# Patient Record
Sex: FEMALE | Race: WHITE | NOT HISPANIC OR LATINO | Employment: FULL TIME | URBAN - METROPOLITAN AREA
[De-identification: names, ages, dates, MRNs, and addresses within clinical notes are randomized per-mention and may not be internally consistent; named-entity substitution may affect disease eponyms.]

---

## 2017-01-12 ENCOUNTER — TELEPHONE (OUTPATIENT)
Dept: TRANSPLANT | Facility: CLINIC | Age: 52
End: 2017-01-12

## 2017-01-12 NOTE — TELEPHONE ENCOUNTER
Spoke with patient in regards to status update. Chart received in eval  Phase on 1/11/17. Patient informed that we have to process her chart and will review it to see if everything was received. Patient informed that if eval is complete, she may be presented to committee within the next few weeks. Patient verbalized understanding.

## 2017-01-12 NOTE — TELEPHONE ENCOUNTER
----- Message from Ethel Cevallos sent at 1/12/2017 11:09 AM CST -----  Contact: pt   ..Patient calling to speak with coordinator about immunization list, please call

## 2017-01-24 ENCOUNTER — TELEPHONE (OUTPATIENT)
Dept: TRANSPLANT | Facility: CLINIC | Age: 52
End: 2017-01-24

## 2017-01-24 NOTE — TELEPHONE ENCOUNTER
Patient will be presented to transplant selection committee on 2/3/17. Patient has [employer] primary, will need dental clearance. Patient stated that she has an appointment on tomorrow. Patient informed and verbalized understanding.

## 2017-01-24 NOTE — TELEPHONE ENCOUNTER
----- Message from Elie Langford RN sent at 1/24/2017  8:22 AM CST -----  Contact: pt      ----- Message -----     From: Evan Williamson     Sent: 1/23/2017   4:15 PM       To: [facility] Pre-Kidney Transplant Clinical    Pt is requesting to speak with you please return call at

## 2017-01-30 ENCOUNTER — TELEPHONE (OUTPATIENT)
Dept: TRANSPLANT | Facility: CLINIC | Age: 52
End: 2017-01-30

## 2017-01-30 NOTE — TELEPHONE ENCOUNTER
Noted colonoscopy was due to bew repeated 6/2016 according to report from 6/2015. I spoke with pt who states she was told 3 years. She will clarify with gastro and get an addendum to the note for 3 years if that is the case. If it does need to repeated at 1 year it is overdue and she will get it scheduled.

## 2017-02-02 ENCOUNTER — TELEPHONE (OUTPATIENT)
Dept: TRANSPLANT | Facility: CLINIC | Age: 52
End: 2017-02-02

## 2017-02-02 NOTE — TELEPHONE ENCOUNTER
----- Message from Gifty Humphrey sent at 2/1/2017  2:57 PM CST -----  Contact: pt   Patient calling to update elvia with some information. Please call

## 2017-02-02 NOTE — TELEPHONE ENCOUNTER
Patient stated that she's she spoken to her GI doctor who stated that she will need to repeat her colonoscopy.   she will schedule her appointment for a follow up.

## 2017-02-03 ENCOUNTER — COMMITTEE REVIEW (OUTPATIENT)
Dept: TRANSPLANT | Facility: CLINIC | Age: 52
End: 2017-02-03

## 2017-02-03 ENCOUNTER — TELEPHONE (OUTPATIENT)
Dept: TRANSPLANT | Facility: CLINIC | Age: 52
End: 2017-02-03

## 2017-02-03 NOTE — TELEPHONE ENCOUNTER
"I spoke with pt to clarify her allergy to Amphotericin B and why it was used and why she had a bone marrow biopsy. As far as Ampho B she states she was hospitalized at [facility] before her 1st transplant and "was having the "fidgets." She and her mother equate the use of Ampho B to that, NOT a fungal infection. She is sure she did not have a fungal infection. These records would have been at [facility] pre [facility] and therefore no longer available. She states she had the bone marrow biopsy because she was anemic and was bruising easily. She was told the biopsy was normal and she did not need hematology follow up. The biopsy was done  12/16/15 NOT 12/16/16 as listed on work up.  "

## 2017-02-03 NOTE — COMMITTEE REVIEW
Native Organ Dx: Polycystic Kidneys      SELECTION COMMITTEE NOTE    Apurva Rodriguez was presented at selection committee on 2/3/2017.  Patient met selection criteria for kidney transplant related to ESRD due to  Polycystic Kidneys.  No absolute contraindications to transplant at this time.  Patient will be placed on the cadaveric wait list pending clarification about treatment with Ampho B (which is listed in alleriges),clarification of why she had her bone marrow biopsy , and colonoscopy.She will also need final financial approval from insurance company.  Patient will return to clinic for routine appointment in 1 year(s).    I spoke with patient and she is not clear on why she got the Ampho B. She will speak with her mom whom she states has an excellent memory. She will also clarify the Bone marrow biopsy. She is to call when her colonoscopy is scheduled. She has requested the prior auth for that.        Note written by  Shruthi Jalloh RN    ===============================================      I was present at the meeting and attest to the decision of the committee.    Dakotah Cho  02/03/2017

## 2017-02-03 NOTE — LETTER
February 3, 2017    LUDA Raygoza  [address] [facility]  [id number]  [place] [address]         Dear Dr. LUDA Raygoza    Patient: Apurva Rodriguez   MR Number: [id number]   YOB: 1965     Your patient, Apurva Rodriguez, was recently discussed at the [facility] Selection Committee.  I am happy to inform you that Apurva has been approved for transplantation pending colonoscopy, clarification of why she received Amphotericin B and why she had a bone marrow biopsy.  She has met selection criteria for a kidney transplant related to ESRD secondary to primary diagnosis of Polycystic Kidneys. Your patient will be placed on the cadaveric wait list pending final financial approval from insurance company.     We appreciate your confidence in allowing us to participate in your patients care.      If you have any questions or concerns, please do not hesitate to contact me.    Sincerely,      Shreya Hodges MD  Medical Director, Kidney & Kidney/Pancreas Transplantation    Cc:  Apurva Rodriguez/OUMAR DE LA CRUZ

## 2017-04-12 ENCOUNTER — TELEPHONE (OUTPATIENT)
Dept: TRANSPLANT | Facility: CLINIC | Age: 52
End: 2017-04-12

## 2017-04-12 NOTE — TELEPHONE ENCOUNTER
----- Message from Ethel Cevallos sent at 4/12/2017 11:21 AM CDT -----  Contact: pt   Please call, [phone]

## 2017-04-12 NOTE — TELEPHONE ENCOUNTER
Call returned. Packet sent for patient to be added on UNOS waitlist. Patient verbalized understanding.

## 2017-04-18 DIAGNOSIS — Z76.82 ORGAN TRANSPLANT CANDIDATE: Primary | ICD-10-CM

## 2017-04-18 DIAGNOSIS — Z76.82 AWAITING ORGAN TRANSPLANT STATUS: Primary | ICD-10-CM

## 2017-05-02 ENCOUNTER — DOCUMENTATION ONLY (OUTPATIENT)
Dept: TRANSFUSION MEDICINE | Facility: HOSPITAL | Age: 52
End: 2017-05-02

## 2017-05-02 ENCOUNTER — LAB VISIT (OUTPATIENT)
Dept: LAB | Facility: HOSPITAL | Age: 52
End: 2017-05-02
Payer: COMMERCIAL

## 2017-05-02 DIAGNOSIS — Z76.82 AWAITING ORGAN TRANSPLANT STATUS: ICD-10-CM

## 2017-05-02 DIAGNOSIS — Z76.82 AWAITING ORGAN TRANSPLANT STATUS: Primary | ICD-10-CM

## 2017-05-02 LAB
B CELL RESULTS - XM ALLO: POSITIVE
B CELL RESULTS - XM ALLO: POSITIVE
B MCS AVERAGE - XM ALLO: 106.75
B MCS AVERAGE - XM ALLO: 98
DONOR MRN: NORMAL
DONOR MRN: NORMAL
FXMAL TESTING DATE: NORMAL
FXMAL TESTING DATE: NORMAL
HLA FLOW CROSSMATCH (ALLO) INTERPRETATION: NORMAL
SERUM COLLECTION DT - XM ALLO: NORMAL
SERUM COLLECTION DT - XM ALLO: NORMAL
T CELL RESULTS - XM ALLO: NEGATIVE
T CELL RESULTS - XM ALLO: NEGATIVE
T MCS AVERAGE - XM ALLO: -7
T MCS AVERAGE - XM ALLO: -9.5

## 2017-05-02 PROCEDURE — 86826 HLA X-MATCH NONCYTOTOXC ADDL: CPT | Mod: PO,TXP

## 2017-05-02 PROCEDURE — 86825 HLA X-MATH NON-CYTOTOXIC: CPT | Mod: PO,TXP

## 2017-05-02 PROCEDURE — 86825 HLA X-MATH NON-CYTOTOXIC: CPT | Mod: 91,PO,TXP

## 2017-05-02 PROCEDURE — 86826 HLA X-MATCH NONCYTOTOXC ADDL: CPT | Mod: 91,PO,TXP

## 2017-05-02 NOTE — PROGRESS NOTES
[facility] TRANSFUSION MEDICINE  Section of Transfusion Medicine and Histocompatibility  HLA Note    Virtual Crossmatch Result Report     Patient has weak Donor Specific Antibodies (DSA) to Donor [id number]: DP2 (1645), DQA1*03:03 (1594).    Virtual crossmatch results reported by ISAAC at 05/02/2017    Please call the HLA Lab [phone] with any concerns or questions.    MD KAITLYN Duran MD, KARIN  Section of Transfusion Medicine & Histocompatibility  Department of Pathology and Laboratory Medicine  [facility]  05/02/2017

## 2017-05-08 ENCOUNTER — TELEPHONE (OUTPATIENT)
Dept: TRANSPLANT | Facility: CLINIC | Age: 52
End: 2017-05-08

## 2017-05-08 NOTE — TELEPHONE ENCOUNTER
ON-CALL NOTE    UNOS# [id number]    Notified by Serge Cisneros, , that Apurva Rodriguez on list for cadaveric kidney.  Spoke with patient and identified no acute medical issues in telephone assessment.  Current sample of blood is available for crossmatch.  Patient reports no sensitizing event since last blood sample for PRA received.    Notified by Serge Cisneros that crossmatch is positive .  Patient notified of test results and verbalized understanding.

## 2017-05-11 ENCOUNTER — LAB VISIT (OUTPATIENT)
Dept: LAB | Facility: HOSPITAL | Age: 52
End: 2017-05-11
Payer: COMMERCIAL

## 2017-05-11 DIAGNOSIS — Z76.82 AWAITING ORGAN TRANSPLANT STATUS: ICD-10-CM

## 2017-06-05 ENCOUNTER — CLINICAL SUPPORT (OUTPATIENT)
Dept: INFECTIOUS DISEASES | Facility: CLINIC | Age: 52
End: 2017-06-05
Payer: MEDICARE

## 2017-06-05 DIAGNOSIS — Z76.82 ORGAN TRANSPLANT CANDIDATE: ICD-10-CM

## 2017-06-05 PROCEDURE — 99999 PR PBB SHADOW E&M-EST. PATIENT-LVL II: CPT | Mod: PBBFAC,TXP,,

## 2017-06-05 PROCEDURE — 90632 HEPA VACCINE ADULT IM: CPT | Mod: TXP,S$GLB,, | Performed by: INTERNAL MEDICINE

## 2017-06-05 PROCEDURE — 99212 OFFICE O/P EST SF 10 MIN: CPT | Mod: PBBFAC,TXP

## 2017-06-05 PROCEDURE — 90471 IMMUNIZATION ADMIN: CPT | Mod: TXP,S$GLB,, | Performed by: INTERNAL MEDICINE

## 2017-06-26 ENCOUNTER — LAB VISIT (OUTPATIENT)
Dept: LAB | Facility: HOSPITAL | Age: 52
End: 2017-06-26
Payer: COMMERCIAL

## 2017-06-26 DIAGNOSIS — Z76.82 AWAITING ORGAN TRANSPLANT STATUS: ICD-10-CM

## 2017-06-26 PROCEDURE — 86833 HLA CLASS II HIGH DEFIN QUAL: CPT | Mod: PO,TXP

## 2017-06-26 PROCEDURE — 86829 HLA CLASS I/II ANTIBODY QUAL: CPT | Mod: 91,PO,TXP

## 2017-06-26 PROCEDURE — 86832 HLA CLASS I HIGH DEFIN QUAL: CPT | Mod: PO,TXP

## 2017-06-26 PROCEDURE — 86829 HLA CLASS I/II ANTIBODY QUAL: CPT | Mod: PO,TXP

## 2017-07-07 PROCEDURE — 86829 HLA CLASS I/II ANTIBODY QUAL: CPT | Mod: PO,TXP

## 2017-07-07 PROCEDURE — 86829 HLA CLASS I/II ANTIBODY QUAL: CPT | Mod: 91,PO,TXP

## 2017-07-11 NOTE — Clinical Note
July 11, 2017        Apurva Rodriguez  [address] [facility] [address]         Dear Apurva Rodriguez:    As part of our commitment to share important information with our transplant patients, we want to provide you with the most current kidney and kidney/pancreas transplant outcomes at the [facility] as published bi-annually by the Scientific Registry for Organ Recipients (SRTR).    For kidney transplants performed between 1/1/2014 and 6/30/2016 the 1-year patient and graft survival rates are as follows:   Kidney-Cadaveric Donor Kidney-Living Donor Kidney/Pancreas   Adults [facility] 1-Year Expected 1-Year National 1-Year [facility] 1-Year Expected 1-Year National 1-Year [facility] 1-Year Expected 1-Year National 1-Year   Graft Survival 93.09% 94.31% 93.92% 97.83% 97.54% 97.75% 98.18% 96.73% 96.15%   Patient Survival 96.56% 96.91% 96.57% 100% 98.88% 98.83% 98.18% 97.52% 97.54%         To learn more about transplant outcomes in the [place] you can go to www.srtr.org.     Please call the Kidney Transplant Office at [phone] or [phone] should you have any questions or if:     Your insurance changes in any way   Your address or phone number changes   There are changes in your health   You are in the hospital or Emergency Room for any reason      Sincerely,  Kidney Transplant Team

## 2017-07-11 NOTE — LETTER
IMPORTANT      July 13, 2017    Apurva Rodriguez  [address] [facility] [address]     Re: [id number]    Dear Mr/Mrs Rodriguez,    Thank you for choosing [facility] as your health care provider.  We are committed to assisting you with timely insurance filing and payment of your account.  To protect your liability, updated insurance information must be given to us at the time of service and we should be notified immediately if      · Your insurance benefits/plan changes.   You become eligible for any other benefits   Your current plan/coverage terms.    Also, please bring in a copy of your insurance premium payment if you have one of the following types of insurance:    · Coverage from a longterm plan.  · Coverage from the Affordable Healthcare Act Plan.  · Coverage from a COBRA plan  · Premium paid by the National Kidney Foundation.    To ensure we have the correct insurance (Medical/Pharmacy) on file and to answer any questions regarding your benefits, please call us at [phone] or [phone] and ask to speak to the kidney  indicated below:      Transplant Dept  Kvng Hernandez   Heart   Guillermina Aceves   Kidney (A-K) and Lung  Alanjuan c Rojasrafael    Kidney (L-Z)  Lolis Love   Liver    We look forward to hearing from you soon.    Sincerely,      Transplant Financial Services

## 2017-07-12 LAB — HPRA INTERPRETATION: NORMAL

## 2017-07-13 ENCOUNTER — LAB VISIT (OUTPATIENT)
Dept: LAB | Facility: HOSPITAL | Age: 52
End: 2017-07-13
Payer: COMMERCIAL

## 2017-07-13 DIAGNOSIS — Z76.82 AWAITING ORGAN TRANSPLANT STATUS: ICD-10-CM

## 2017-07-13 PROCEDURE — 86829 HLA CLASS I/II ANTIBODY QUAL: CPT | Mod: PO,TXP

## 2017-07-13 PROCEDURE — 86829 HLA CLASS I/II ANTIBODY QUAL: CPT | Mod: 91,PO,TXP

## 2017-07-13 NOTE — ADDENDUM NOTE
Encounter addended by: Inna Williamson on: 7/13/2017 11:07 AM<BR>    Actions taken: Letter status changed

## 2017-07-25 LAB — HPRA INTERPRETATION: NORMAL

## 2017-07-27 LAB
CLASS I ANTIBODIES - LUMINEX: NEGATIVE
CLASS II ANTIBODIES - LUMINEX: NORMAL
CLASS II ANTIBODY COMMENTS - LUMINEX: NORMAL
CPRA %: 95
SERUM COLLECTION DT - LUMINEX CLASS I: NORMAL
SERUM COLLECTION DT - LUMINEX CLASS II: NORMAL
SPCL1 TESTING DATE: NORMAL
SPCL2 TESTING DATE: NORMAL
SPCLU TESTING DATE: NORMAL

## 2017-08-01 DIAGNOSIS — Z76.82 ORGAN TRANSPLANT CANDIDATE: ICD-10-CM

## 2017-08-08 ENCOUNTER — LAB VISIT (OUTPATIENT)
Dept: LAB | Facility: HOSPITAL | Age: 52
End: 2017-08-08
Payer: COMMERCIAL

## 2017-08-08 DIAGNOSIS — Z76.82 AWAITING ORGAN TRANSPLANT STATUS: ICD-10-CM

## 2017-08-08 PROCEDURE — 86829 HLA CLASS I/II ANTIBODY QUAL: CPT | Mod: PO,TXP

## 2017-08-08 PROCEDURE — 86829 HLA CLASS I/II ANTIBODY QUAL: CPT | Mod: 91,PO,TXP

## 2017-08-09 LAB — HPRA INTERPRETATION: NORMAL

## 2017-08-21 ENCOUNTER — TELEPHONE (OUTPATIENT)
Dept: TRANSPLANT | Facility: CLINIC | Age: 52
End: 2017-08-21

## 2017-08-23 LAB — HPRA INTERPRETATION: NORMAL

## 2017-10-05 ENCOUNTER — LAB VISIT (OUTPATIENT)
Dept: LAB | Facility: HOSPITAL | Age: 52
End: 2017-10-05
Payer: COMMERCIAL

## 2017-10-05 DIAGNOSIS — Z76.82 AWAITING ORGAN TRANSPLANT STATUS: ICD-10-CM

## 2017-11-06 ENCOUNTER — TELEPHONE (OUTPATIENT)
Dept: TRANSPLANT | Facility: CLINIC | Age: 52
End: 2017-11-06

## 2017-11-06 ENCOUNTER — SURGERY (OUTPATIENT)
Age: 52
End: 2017-11-06

## 2017-11-06 ENCOUNTER — LAB VISIT (OUTPATIENT)
Dept: LAB | Facility: HOSPITAL | Age: 52
End: 2017-11-06
Payer: COMMERCIAL

## 2017-11-06 ENCOUNTER — HOSPITAL ENCOUNTER (INPATIENT)
Facility: HOSPITAL | Age: 52
LOS: 8 days | Discharge: HOME | DRG: 652 | End: 2017-11-14
Attending: TRANSPLANT SURGERY | Admitting: TRANSPLANT SURGERY
Payer: MEDICARE

## 2017-11-06 ENCOUNTER — ANESTHESIA EVENT (OUTPATIENT)
Dept: SURGERY | Facility: HOSPITAL | Age: 52
DRG: 652 | End: 2017-11-06
Payer: MEDICARE

## 2017-11-06 ENCOUNTER — ANESTHESIA (OUTPATIENT)
Dept: SURGERY | Facility: HOSPITAL | Age: 52
DRG: 652 | End: 2017-11-06
Payer: MEDICARE

## 2017-11-06 DIAGNOSIS — Q61.3 POLYCYSTIC KIDNEY DISEASE: ICD-10-CM

## 2017-11-06 DIAGNOSIS — Z01.811 PRE-OP CHEST EXAM: ICD-10-CM

## 2017-11-06 DIAGNOSIS — E83.39 HYPOPHOSPHATEMIA: ICD-10-CM

## 2017-11-06 DIAGNOSIS — N19 RENAL FAILURE: ICD-10-CM

## 2017-11-06 DIAGNOSIS — D70.2 OTHER DRUG-INDUCED NEUTROPENIA: ICD-10-CM

## 2017-11-06 DIAGNOSIS — Z29.89 PROPHYLACTIC IMMUNOTHERAPY: ICD-10-CM

## 2017-11-06 DIAGNOSIS — Z76.82 KIDNEY TRANSPLANT CANDIDATE: ICD-10-CM

## 2017-11-06 DIAGNOSIS — R13.10 DYSPHAGIA, UNSPECIFIED TYPE: ICD-10-CM

## 2017-11-06 DIAGNOSIS — K13.79 UVULAR EDEMA: ICD-10-CM

## 2017-11-06 DIAGNOSIS — E87.20 METABOLIC ACIDOSIS: ICD-10-CM

## 2017-11-06 DIAGNOSIS — N25.81 SECONDARY HYPERPARATHYROIDISM OF RENAL ORIGIN: ICD-10-CM

## 2017-11-06 DIAGNOSIS — Z94.0 S/P KIDNEY TRANSPLANT: ICD-10-CM

## 2017-11-06 DIAGNOSIS — Z94.0 STATUS POST DECEASED-DONOR KIDNEY TRANSPLANTATION: Primary | ICD-10-CM

## 2017-11-06 DIAGNOSIS — I10 ESSENTIAL HYPERTENSION: ICD-10-CM

## 2017-11-06 DIAGNOSIS — Z91.89 AT RISK FOR OPPORTUNISTIC INFECTIONS: ICD-10-CM

## 2017-11-06 DIAGNOSIS — R74.01 TRANSAMINITIS: ICD-10-CM

## 2017-11-06 DIAGNOSIS — Z79.60 LONG-TERM USE OF IMMUNOSUPPRESSANT MEDICATION: ICD-10-CM

## 2017-11-06 DIAGNOSIS — E83.42 HYPOMAGNESEMIA: ICD-10-CM

## 2017-11-06 DIAGNOSIS — Z76.82 KIDNEY TRANSPLANT CANDIDATE: Primary | ICD-10-CM

## 2017-11-06 LAB
25(OH)D3+25(OH)D2 SERPL-MCNC: 24 NG/ML
ABO + RH BLD: NORMAL
ALBUMIN SERPL BCP-MCNC: 3.5 G/DL
ALP SERPL-CCNC: 194 U/L
ALT SERPL W/O P-5'-P-CCNC: 25 U/L
ANION GAP SERPL CALC-SCNC: 13 MMOL/L
APTT BLDCRRT: 24.4 SEC
AST SERPL-CCNC: 20 U/L
B CELL RESULTS - XM ALLO: NEGATIVE
B CELL RESULTS - XM ALLO: NEGATIVE
B MCS AVERAGE - XM ALLO: -9.5
B MCS AVERAGE - XM ALLO: -9.5
BASOPHILS # BLD AUTO: 0.03 K/UL
BASOPHILS NFR BLD: 0.7 %
BILIRUB SERPL-MCNC: 0.6 MG/DL
BLD GP AB SCN CELLS X3 SERPL QL: NORMAL
BUN SERPL-MCNC: 74 MG/DL
CALCIUM SERPL-MCNC: 9 MG/DL
CHLORIDE SERPL-SCNC: 92 MMOL/L
CHOLEST SERPL-MCNC: 186 MG/DL
CHOLEST/HDLC SERPL: 3.3 {RATIO}
CO2 SERPL-SCNC: 24 MMOL/L
CREAT SERPL-MCNC: 6.4 MG/DL
CREAT UR-MCNC: 48 MG/DL
DIFFERENTIAL METHOD: ABNORMAL
DONOR MRN: NORMAL
DONOR MRN: NORMAL
EOSINOPHIL # BLD AUTO: 0.1 K/UL
EOSINOPHIL NFR BLD: 3.1 %
ERYTHROCYTE [DISTWIDTH] IN BLOOD BY AUTOMATED COUNT: 14.3 %
EST. GFR  (AFRICAN AMERICAN): 7.8 ML/MIN/1.73 M^2
EST. GFR  (NON AFRICAN AMERICAN): 6.8 ML/MIN/1.73 M^2
FXMAL TESTING DATE: NORMAL
FXMAL TESTING DATE: NORMAL
GLUCOSE SERPL-MCNC: 89 MG/DL
HCT VFR BLD AUTO: 30 %
HDLC SERPL-MCNC: 56 MG/DL
HDLC SERPL: 30.1 %
HGB BLD-MCNC: 10.1 G/DL
HLA FLOW CROSSMATCH (ALLO) INTERPRETATION: NORMAL
IMM GRANULOCYTES # BLD AUTO: 0.01 K/UL
IMM GRANULOCYTES NFR BLD AUTO: 0.2 %
INR PPP: 0.9
LDH SERPL L TO P-CCNC: 192 U/L
LDLC SERPL CALC-MCNC: 96.4 MG/DL
LYMPHOCYTES # BLD AUTO: 1.8 K/UL
LYMPHOCYTES NFR BLD: 39.8 %
MCH RBC QN AUTO: 32.8 PG
MCHC RBC AUTO-ENTMCNC: 33.7 G/DL
MCV RBC AUTO: 97 FL
MONOCYTES # BLD AUTO: 0.4 K/UL
MONOCYTES NFR BLD: 8.5 %
NEUTROPHILS # BLD AUTO: 2.2 K/UL
NEUTROPHILS NFR BLD: 47.7 %
NONHDLC SERPL-MCNC: 130 MG/DL
NRBC BLD-RTO: 0 /100 WBC
PHOSPHATE SERPL-MCNC: 4.6 MG/DL
PLATELET # BLD AUTO: 135 K/UL
PMV BLD AUTO: 9.3 FL
POTASSIUM SERPL-SCNC: 3.7 MMOL/L
PROT SERPL-MCNC: 7.1 G/DL
PROT UR-MCNC: 61 MG/DL
PROT/CREAT RATIO, UR: 1.27
PROTHROMBIN TIME: 10 SEC
PTH-INTACT SERPL-MCNC: 996 PG/ML
RBC # BLD AUTO: 3.08 M/UL
SERUM COLLECTION DT - XM ALLO: NORMAL
SERUM COLLECTION DT - XM ALLO: NORMAL
SODIUM SERPL-SCNC: 129 MMOL/L
T CELL RESULTS - XM ALLO: NEGATIVE
T CELL RESULTS - XM ALLO: NEGATIVE
T MCS AVERAGE - XM ALLO: -14
T MCS AVERAGE - XM ALLO: -15.5
TRIGL SERPL-MCNC: 168 MG/DL
URATE SERPL-MCNC: 6.6 MG/DL
WBC # BLD AUTO: 4.57 K/UL

## 2017-11-06 PROCEDURE — 83970 ASSAY OF PARATHORMONE: CPT

## 2017-11-06 PROCEDURE — 86825 HLA X-MATH NON-CYTOTOXIC: CPT | Mod: PO,TXP

## 2017-11-06 PROCEDURE — 86826 HLA X-MATCH NONCYTOTOXC ADDL: CPT | Mod: PO,TXP

## 2017-11-06 PROCEDURE — D9220A PRA ANESTHESIA: Mod: ANES,,, | Performed by: ANESTHESIOLOGY

## 2017-11-06 PROCEDURE — 86901 BLOOD TYPING SEROLOGIC RH(D): CPT

## 2017-11-06 PROCEDURE — 87340 HEPATITIS B SURFACE AG IA: CPT

## 2017-11-06 PROCEDURE — 86706 HEP B SURFACE ANTIBODY: CPT

## 2017-11-06 PROCEDURE — 20600001 HC STEP DOWN PRIVATE ROOM: Mod: NTX

## 2017-11-06 PROCEDURE — 86826 HLA X-MATCH NONCYTOTOXC ADDL: CPT | Mod: 91,PO,TXP

## 2017-11-06 PROCEDURE — 36000930 HC OR TIME LEV VII 1ST 15 MIN: Performed by: TRANSPLANT SURGERY

## 2017-11-06 PROCEDURE — 89051 BODY FLUID CELL COUNT: CPT

## 2017-11-06 PROCEDURE — 50605 INSERT URETERAL SUPPORT: CPT | Mod: 51,RT,, | Performed by: TRANSPLANT SURGERY

## 2017-11-06 PROCEDURE — C1751 CATH, INF, PER/CENT/MIDLINE: HCPCS | Mod: NTX | Performed by: NURSE ANESTHETIST, CERTIFIED REGISTERED

## 2017-11-06 PROCEDURE — 50360 RNL ALTRNSPLJ W/O RCP NFRCT: CPT | Mod: RT,,, | Performed by: TRANSPLANT SURGERY

## 2017-11-06 PROCEDURE — 36000931 HC OR TIME LEV VII EA ADD 15 MIN: Performed by: TRANSPLANT SURGERY

## 2017-11-06 PROCEDURE — 80061 LIPID PANEL: CPT

## 2017-11-06 PROCEDURE — D9220A PRA ANESTHESIA: Mod: CRNA,,, | Performed by: NURSE ANESTHETIST, CERTIFIED REGISTERED

## 2017-11-06 PROCEDURE — 36556 INSERT NON-TUNNEL CV CATH: CPT | Mod: 59,,, | Performed by: ANESTHESIOLOGY

## 2017-11-06 PROCEDURE — 36620 INSERTION CATHETER ARTERY: CPT | Mod: 59,,, | Performed by: ANESTHESIOLOGY

## 2017-11-06 PROCEDURE — 86703 HIV-1/HIV-2 1 RESULT ANTBDY: CPT

## 2017-11-06 PROCEDURE — 27800505 HC CATH, RADIAL ARTERY KIT: Mod: NTX | Performed by: NURSE ANESTHETIST, CERTIFIED REGISTERED

## 2017-11-06 PROCEDURE — 25000003 PHARM REV CODE 250: Performed by: NURSE ANESTHETIST, CERTIFIED REGISTERED

## 2017-11-06 PROCEDURE — 37000009 HC ANESTHESIA EA ADD 15 MINS: Performed by: TRANSPLANT SURGERY

## 2017-11-06 PROCEDURE — 86920 COMPATIBILITY TEST SPIN: CPT

## 2017-11-06 PROCEDURE — 86900 BLOOD TYPING SEROLOGIC ABO: CPT

## 2017-11-06 PROCEDURE — 27100025 HC TUBING, SET FLUID WARMER: Mod: NTX | Performed by: NURSE ANESTHETIST, CERTIFIED REGISTERED

## 2017-11-06 PROCEDURE — 63600175 PHARM REV CODE 636 W HCPCS: Mod: NTX | Performed by: STUDENT IN AN ORGANIZED HEALTH CARE EDUCATION/TRAINING PROGRAM

## 2017-11-06 PROCEDURE — 93010 ELECTROCARDIOGRAM REPORT: CPT | Mod: ,,, | Performed by: INTERNAL MEDICINE

## 2017-11-06 PROCEDURE — 87075 CULTR BACTERIA EXCEPT BLOOD: CPT

## 2017-11-06 PROCEDURE — 86825 HLA X-MATH NON-CYTOTOXIC: CPT | Mod: 91,PO,TXP

## 2017-11-06 PROCEDURE — 84550 ASSAY OF BLOOD/URIC ACID: CPT

## 2017-11-06 PROCEDURE — 82306 VITAMIN D 25 HYDROXY: CPT

## 2017-11-06 PROCEDURE — 85610 PROTHROMBIN TIME: CPT

## 2017-11-06 PROCEDURE — 85025 COMPLETE CBC W/AUTO DIFF WBC: CPT

## 2017-11-06 PROCEDURE — 80053 COMPREHEN METABOLIC PANEL: CPT

## 2017-11-06 PROCEDURE — 27200950 HC CAPD SUPPORT: Mod: NTX

## 2017-11-06 PROCEDURE — 63600175 PHARM REV CODE 636 W HCPCS: Performed by: NURSE ANESTHETIST, CERTIFIED REGISTERED

## 2017-11-06 PROCEDURE — 36415 COLL VENOUS BLD VENIPUNCTURE: CPT

## 2017-11-06 PROCEDURE — 82570 ASSAY OF URINE CREATININE: CPT

## 2017-11-06 PROCEDURE — 87205 SMEAR GRAM STAIN: CPT

## 2017-11-06 PROCEDURE — 27201423 OPTIME MED/SURG SUP & DEVICES STERILE SUPPLY: Performed by: TRANSPLANT SURGERY

## 2017-11-06 PROCEDURE — 37000008 HC ANESTHESIA 1ST 15 MINUTES: Performed by: TRANSPLANT SURGERY

## 2017-11-06 PROCEDURE — 71000033 HC RECOVERY, INTIAL HOUR: Performed by: TRANSPLANT SURGERY

## 2017-11-06 PROCEDURE — 87070 CULTURE OTHR SPECIMN AEROBIC: CPT

## 2017-11-06 PROCEDURE — 0TY00Z0 TRANSPLANTATION OF RIGHT KIDNEY, ALLOGENEIC, OPEN APPROACH: ICD-10-PCS | Performed by: TRANSPLANT SURGERY

## 2017-11-06 PROCEDURE — 82962 GLUCOSE BLOOD TEST: CPT | Performed by: TRANSPLANT SURGERY

## 2017-11-06 PROCEDURE — 84100 ASSAY OF PHOSPHORUS: CPT

## 2017-11-06 PROCEDURE — 85730 THROMBOPLASTIN TIME PARTIAL: CPT

## 2017-11-06 PROCEDURE — 71000039 HC RECOVERY, EACH ADD'L HOUR: Performed by: TRANSPLANT SURGERY

## 2017-11-06 PROCEDURE — C2617 STENT, NON-COR, TEM W/O DEL: HCPCS | Performed by: TRANSPLANT SURGERY

## 2017-11-06 PROCEDURE — 83615 LACTATE (LD) (LDH) ENZYME: CPT

## 2017-11-06 DEVICE — STENT DOUBLE J 7FRX12CM: Type: IMPLANTABLE DEVICE | Site: URETER | Status: FUNCTIONAL

## 2017-11-06 RX ORDER — DIPHENHYDRAMINE HYDROCHLORIDE 50 MG/ML
50 INJECTION INTRAMUSCULAR; INTRAVENOUS ONCE
Status: CANCELLED | OUTPATIENT
Start: 2017-11-06

## 2017-11-06 RX ORDER — ACETAMINOPHEN 650 MG/20.3ML
650 LIQUID ORAL EVERY 4 HOURS PRN
Status: COMPLETED | OUTPATIENT
Start: 2017-11-06 | End: 2017-11-07

## 2017-11-06 RX ORDER — DIPHENHYDRAMINE HYDROCHLORIDE 50 MG/ML
50 INJECTION INTRAMUSCULAR; INTRAVENOUS EVERY 6 HOURS PRN
Status: COMPLETED | OUTPATIENT
Start: 2017-11-06 | End: 2017-11-07

## 2017-11-06 RX ORDER — MUPIROCIN 20 MG/G
OINTMENT TOPICAL
Status: DISCONTINUED | OUTPATIENT
Start: 2017-11-06 | End: 2017-11-07 | Stop reason: HOSPADM

## 2017-11-06 RX ORDER — HEPARIN SODIUM 5000 [USP'U]/ML
5000 INJECTION, SOLUTION INTRAVENOUS; SUBCUTANEOUS ONCE
Status: COMPLETED | OUTPATIENT
Start: 2017-11-06 | End: 2017-11-06

## 2017-11-06 RX ORDER — ACETAMINOPHEN 650 MG/20.3ML
650 LIQUID ORAL EVERY 4 HOURS PRN
Status: CANCELLED | OUTPATIENT
Start: 2017-11-06

## 2017-11-06 RX ADMIN — PROPOFOL 100 MG: 10 INJECTION, EMULSION INTRAVENOUS at 11:11

## 2017-11-06 RX ADMIN — FENTANYL CITRATE 100 MCG: 50 INJECTION, SOLUTION INTRAMUSCULAR; INTRAVENOUS at 11:11

## 2017-11-06 RX ADMIN — ROCURONIUM BROMIDE 40 MG: 10 INJECTION, SOLUTION INTRAVENOUS at 11:11

## 2017-11-06 RX ADMIN — SODIUM CHLORIDE: 0.9 INJECTION, SOLUTION INTRAVENOUS at 11:11

## 2017-11-06 RX ADMIN — HEPARIN SODIUM 5000 UNITS: 5000 INJECTION, SOLUTION INTRAVENOUS; SUBCUTANEOUS at 10:11

## 2017-11-06 RX ADMIN — LIDOCAINE HYDROCHLORIDE 50 MG: 10 INJECTION, SOLUTION INTRAVENOUS at 11:11

## 2017-11-06 RX ADMIN — MIDAZOLAM HYDROCHLORIDE 2 MG: 1 INJECTION, SOLUTION INTRAMUSCULAR; INTRAVENOUS at 11:11

## 2017-11-06 NOTE — TELEPHONE ENCOUNTER
ON-CALL NOTE    UNOS# [id number]    Notified by Serge Cisneros, , that Apurva Rodriguez on list for cadaveric kidney.  Spoke with patient and identified no acute medical issues in telephone assessment.  Current sample of blood is available for crossmatch.  Patient reports no sensitizing event since last blood sample for PRA received.  Pt informed that this offer is PHS high risk due to sample hemodilution, pt accepts offer.    Report given to Desiree Valente RN, and Larry Arias RN.

## 2017-11-06 NOTE — TELEPHONE ENCOUNTER
SW spoke with pt regarding her caregiver plan. Pt reports that her caregiver plan in unchanged and is as follows:    PRIMARY CAREGIVER: Lawrence Perdomo will be primary caregiver, phone number [phone].       provided in-depth information to patient and caregiver regarding pre- and post-transplant caregiver role.   strongly encourages patient and caregiver to have concrete plan regarding post-transplant care giving, including back-up caregiver(s) to ensure care giving needs are met as needed.     Patient and Caregiver states understanding all aspects of caregiver role/commitment and is able/willing/committed to being caregiver to the fullest extent necessary. Wife was with pt when she had her first transplant (at [facility]) 20 years ago.       Patient and Caregiver verbalizes understanding of the education provided today and caregiver responsibilities.          remains available. Patient and Caregiver agree to contact  in a timely manner if concerns arise.       Able to take time off work without financial concerns: Pt's wife states she has plenty of time off, mother is retired and sister is able to take time off as well.  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      Additional Significant Others who will Assist with Transplant:  Name: Lory Rodriguez [phone]  Age: 72  City: [place] State: [place]  Relationship: mother  Does person drive? yes     Name: Fior Duarte [phone]  Age: 53  City: [place] State: [place]  Relationship: sister  Does person drive? Yes      Pt also reports that he [employer] is primary and her Medicare is secondary and reports no concerns with affording post transplant costs. FREDERIC remains available to pt, pt's family, and transplant team at [phone].

## 2017-11-07 ENCOUNTER — TELEPHONE (OUTPATIENT)
Dept: TRANSPLANT | Facility: CLINIC | Age: 52
End: 2017-11-07

## 2017-11-07 PROBLEM — Z94.0 S/P KIDNEY TRANSPLANT: Status: ACTIVE | Noted: 2017-11-07

## 2017-11-07 PROBLEM — N19 RENAL FAILURE: Status: RESOLVED | Noted: 2017-11-06 | Resolved: 2017-11-07

## 2017-11-07 PROBLEM — Z29.89 PROPHYLACTIC IMMUNOTHERAPY: Status: ACTIVE | Noted: 2017-11-07

## 2017-11-07 PROBLEM — Z91.89 AT RISK FOR OPPORTUNISTIC INFECTIONS: Status: ACTIVE | Noted: 2017-11-07

## 2017-11-07 PROBLEM — Z79.60 LONG-TERM USE OF IMMUNOSUPPRESSANT MEDICATION: Status: ACTIVE | Noted: 2017-11-07

## 2017-11-07 LAB
ALBUMIN SERPL BCP-MCNC: 2.2 G/DL
ALBUMIN SERPL BCP-MCNC: 2.3 G/DL
ANION GAP SERPL CALC-SCNC: 11 MMOL/L
ANION GAP SERPL CALC-SCNC: 7 MMOL/L
APPEARANCE FLD: CLEAR
BASOPHILS # BLD AUTO: 0 K/UL
BASOPHILS NFR BLD: 0 %
BODY FLD TYPE: NORMAL
BUN SERPL-MCNC: 55 MG/DL
BUN SERPL-MCNC: 66 MG/DL
CALCIUM SERPL-MCNC: 7.5 MG/DL
CALCIUM SERPL-MCNC: 7.7 MG/DL
CHLORIDE SERPL-SCNC: 106 MMOL/L
CHLORIDE SERPL-SCNC: 108 MMOL/L
CO2 SERPL-SCNC: 18 MMOL/L
CO2 SERPL-SCNC: 21 MMOL/L
COLOR FLD: YELLOW
CREAT SERPL-MCNC: 4.4 MG/DL
CREAT SERPL-MCNC: 5.3 MG/DL
DIFFERENTIAL METHOD: ABNORMAL
EOSINOPHIL # BLD AUTO: 0 K/UL
EOSINOPHIL NFR BLD: 0 %
EOSINOPHIL NFR FLD MANUAL: 1 %
ERYTHROCYTE [DISTWIDTH] IN BLOOD BY AUTOMATED COUNT: 14.6 %
EST. GFR  (AFRICAN AMERICAN): 12.3 ML/MIN/1.73 M^2
EST. GFR  (AFRICAN AMERICAN): 9.8 ML/MIN/1.73 M^2
EST. GFR  (NON AFRICAN AMERICAN): 10.7 ML/MIN/1.73 M^2
EST. GFR  (NON AFRICAN AMERICAN): 8.5 ML/MIN/1.73 M^2
GLUCOSE SERPL-MCNC: 120 MG/DL (ref 70–110)
GLUCOSE SERPL-MCNC: 133 MG/DL
GLUCOSE SERPL-MCNC: 233 MG/DL
GLUCOSE SERPL-MCNC: 82 MG/DL (ref 70–110)
HBV SURFACE AG SERPL QL IA: NEGATIVE
HCO3 UR-SCNC: 17.1 MMOL/L (ref 24–28)
HCO3 UR-SCNC: 21.2 MMOL/L (ref 24–28)
HCT VFR BLD AUTO: 24.6 %
HCT VFR BLD AUTO: 25.7 %
HCT VFR BLD CALC: 23 %PCV (ref 36–54)
HCT VFR BLD CALC: 25 %PCV (ref 36–54)
HGB BLD-MCNC: 8.3 G/DL
HIV 1+2 AB+HIV1 P24 AG SERPL QL IA: NEGATIVE
IMM GRANULOCYTES # BLD AUTO: 0.04 K/UL
IMM GRANULOCYTES NFR BLD AUTO: 0.4 %
LYMPHOCYTES # BLD AUTO: 0 K/UL
LYMPHOCYTES NFR BLD: 0 %
LYMPHOCYTES NFR FLD MANUAL: 14 %
MCH RBC QN AUTO: 32.7 PG
MCHC RBC AUTO-ENTMCNC: 32.3 G/DL
MCV RBC AUTO: 101 FL
MONOCYTES # BLD AUTO: 0.1 K/UL
MONOCYTES NFR BLD: 1.1 %
MONOS+MACROS NFR FLD MANUAL: 82 %
NEUTROPHILS # BLD AUTO: 11.2 K/UL
NEUTROPHILS NFR BLD: 98.5 %
NEUTROPHILS NFR FLD MANUAL: 3 %
NRBC BLD-RTO: 0 /100 WBC
PCO2 BLDA: 27.6 MMHG (ref 35–45)
PCO2 BLDA: 27.8 MMHG (ref 35–45)
PH SMN: 7.4 [PH] (ref 7.35–7.45)
PH SMN: 7.49 [PH] (ref 7.35–7.45)
PHOSPHATE SERPL-MCNC: 3.6 MG/DL
PHOSPHATE SERPL-MCNC: 3.9 MG/DL
PLATELET # BLD AUTO: 92 K/UL
PMV BLD AUTO: 9.2 FL
PO2 BLDA: 119 MMHG (ref 80–100)
PO2 BLDA: 181 MMHG (ref 80–100)
POC BE: -2 MMOL/L
POC BE: -8 MMOL/L
POC IONIZED CALCIUM: 0.98 MMOL/L (ref 1.06–1.42)
POC IONIZED CALCIUM: 1.13 MMOL/L (ref 1.06–1.42)
POC SATURATED O2: 100 % (ref 95–100)
POC SATURATED O2: 99 % (ref 95–100)
POC TCO2: 18 MMOL/L (ref 23–27)
POC TCO2: 22 MMOL/L (ref 23–27)
POCT GLUCOSE: 129 MG/DL (ref 70–110)
POCT GLUCOSE: 130 MG/DL (ref 70–110)
POCT GLUCOSE: 155 MG/DL (ref 70–110)
POCT GLUCOSE: 183 MG/DL (ref 70–110)
POTASSIUM BLD-SCNC: 2.8 MMOL/L (ref 3.5–5.1)
POTASSIUM BLD-SCNC: 3 MMOL/L (ref 3.5–5.1)
POTASSIUM SERPL-SCNC: 3.3 MMOL/L
POTASSIUM SERPL-SCNC: 3.5 MMOL/L
RBC # BLD AUTO: 2.54 M/UL
SAMPLE: ABNORMAL
SAMPLE: ABNORMAL
SODIUM BLD-SCNC: 136 MMOL/L (ref 136–145)
SODIUM BLD-SCNC: 137 MMOL/L (ref 136–145)
SODIUM SERPL-SCNC: 135 MMOL/L
SODIUM SERPL-SCNC: 136 MMOL/L
WBC # BLD AUTO: 11.32 K/UL
WBC # FLD: 11 /CU MM

## 2017-11-07 PROCEDURE — 63600175 PHARM REV CODE 636 W HCPCS: Performed by: TRANSPLANT SURGERY

## 2017-11-07 PROCEDURE — 86833 HLA CLASS II HIGH DEFIN QUAL: CPT | Mod: PO,TXP

## 2017-11-07 PROCEDURE — 25000003 PHARM REV CODE 250: Mod: NTX | Performed by: STUDENT IN AN ORGANIZED HEALTH CARE EDUCATION/TRAINING PROGRAM

## 2017-11-07 PROCEDURE — 85025 COMPLETE CBC W/AUTO DIFF WBC: CPT

## 2017-11-07 PROCEDURE — 25000003 PHARM REV CODE 250: Performed by: NURSE PRACTITIONER

## 2017-11-07 PROCEDURE — 25000003 PHARM REV CODE 250: Performed by: TRANSPLANT SURGERY

## 2017-11-07 PROCEDURE — S5010 5% DEXTROSE AND 0.45% SALINE: HCPCS | Performed by: SURGERY

## 2017-11-07 PROCEDURE — 85014 HEMATOCRIT: CPT

## 2017-11-07 PROCEDURE — 80069 RENAL FUNCTION PANEL: CPT

## 2017-11-07 PROCEDURE — 80069 RENAL FUNCTION PANEL: CPT | Mod: 91

## 2017-11-07 PROCEDURE — 86832 HLA CLASS I HIGH DEFIN QUAL: CPT | Mod: PO,TXP

## 2017-11-07 PROCEDURE — 25000003 PHARM REV CODE 250: Performed by: SURGERY

## 2017-11-07 PROCEDURE — 63600175 PHARM REV CODE 636 W HCPCS: Performed by: SURGERY

## 2017-11-07 PROCEDURE — 36415 COLL VENOUS BLD VENIPUNCTURE: CPT

## 2017-11-07 PROCEDURE — 99223 1ST HOSP IP/OBS HIGH 75: CPT | Mod: ,,, | Performed by: PHYSICIAN ASSISTANT

## 2017-11-07 PROCEDURE — 82960 TEST FOR G6PD ENZYME: CPT

## 2017-11-07 PROCEDURE — 97802 MEDICAL NUTRITION INDIV IN: CPT | Performed by: DIETITIAN, REGISTERED

## 2017-11-07 PROCEDURE — 88300 SURGICAL PATH GROSS: CPT | Performed by: PATHOLOGY

## 2017-11-07 PROCEDURE — 63600175 PHARM REV CODE 636 W HCPCS

## 2017-11-07 PROCEDURE — 63600175 PHARM REV CODE 636 W HCPCS: Performed by: NURSE ANESTHETIST, CERTIFIED REGISTERED

## 2017-11-07 PROCEDURE — 63600175 PHARM REV CODE 636 W HCPCS: Mod: NTX | Performed by: PHYSICIAN ASSISTANT

## 2017-11-07 PROCEDURE — 81300002 HC KIDNEY TRANSPORT, GROUND 4-5 HOURS

## 2017-11-07 PROCEDURE — 63600175 PHARM REV CODE 636 W HCPCS: Mod: NTX | Performed by: STUDENT IN AN ORGANIZED HEALTH CARE EDUCATION/TRAINING PROGRAM

## 2017-11-07 PROCEDURE — 20600001 HC STEP DOWN PRIVATE ROOM

## 2017-11-07 PROCEDURE — 88300 SURGICAL PATH GROSS: CPT | Mod: 26,,, | Performed by: PATHOLOGY

## 2017-11-07 PROCEDURE — 25000003 PHARM REV CODE 250: Performed by: PHYSICIAN ASSISTANT

## 2017-11-07 PROCEDURE — 81200001 HC KIDNEY ACQUISITION - CADAVER

## 2017-11-07 PROCEDURE — 25000003 PHARM REV CODE 250: Performed by: NURSE ANESTHETIST, CERTIFIED REGISTERED

## 2017-11-07 RX ORDER — SULFAMETHOXAZOLE AND TRIMETHOPRIM 400; 80 MG/1; MG/1
1 TABLET ORAL DAILY
Status: DISCONTINUED | OUTPATIENT
Start: 2017-11-08 | End: 2017-11-10

## 2017-11-07 RX ORDER — HYDRALAZINE HYDROCHLORIDE 20 MG/ML
10 INJECTION INTRAMUSCULAR; INTRAVENOUS EVERY 6 HOURS PRN
Status: DISCONTINUED | OUTPATIENT
Start: 2017-11-07 | End: 2017-11-14 | Stop reason: HOSPADM

## 2017-11-07 RX ORDER — CARVEDILOL 6.25 MG/1
6.25 TABLET ORAL 2 TIMES DAILY
Status: DISCONTINUED | OUTPATIENT
Start: 2017-11-07 | End: 2017-11-07

## 2017-11-07 RX ORDER — MYCOPHENOLATE MOFETIL 250 MG/1
500 CAPSULE ORAL 2 TIMES DAILY
Status: DISCONTINUED | OUTPATIENT
Start: 2017-11-07 | End: 2017-11-09

## 2017-11-07 RX ORDER — MIDAZOLAM HYDROCHLORIDE 1 MG/ML
INJECTION, SOLUTION INTRAMUSCULAR; INTRAVENOUS
Status: DISCONTINUED | OUTPATIENT
Start: 2017-11-06 | End: 2017-11-07

## 2017-11-07 RX ORDER — BISACODYL 5 MG
10 TABLET, DELAYED RELEASE (ENTERIC COATED) ORAL EVERY EVENING
Status: DISCONTINUED | OUTPATIENT
Start: 2017-11-07 | End: 2017-11-08

## 2017-11-07 RX ORDER — HEPARIN SODIUM 5000 [USP'U]/ML
5000 INJECTION, SOLUTION INTRAVENOUS; SUBCUTANEOUS EVERY 8 HOURS
Status: DISCONTINUED | OUTPATIENT
Start: 2017-11-07 | End: 2017-11-09

## 2017-11-07 RX ORDER — HYDROMORPHONE HYDROCHLORIDE 2 MG/ML
INJECTION, SOLUTION INTRAMUSCULAR; INTRAVENOUS; SUBCUTANEOUS
Status: DISCONTINUED | OUTPATIENT
Start: 2017-11-07 | End: 2017-11-07

## 2017-11-07 RX ORDER — VERAPAMIL HYDROCHLORIDE 2.5 MG/ML
INJECTION, SOLUTION INTRAVENOUS
Status: DISCONTINUED | OUTPATIENT
Start: 2017-11-07 | End: 2017-11-07 | Stop reason: HOSPADM

## 2017-11-07 RX ORDER — DEXTROSE MONOHYDRATE AND SODIUM CHLORIDE 5; .45 G/100ML; G/100ML
INJECTION, SOLUTION INTRAVENOUS CONTINUOUS
Status: DISCONTINUED | OUTPATIENT
Start: 2017-11-07 | End: 2017-11-08

## 2017-11-07 RX ORDER — INSULIN ASPART 100 [IU]/ML
0-5 INJECTION, SOLUTION INTRAVENOUS; SUBCUTANEOUS
Status: DISCONTINUED | OUTPATIENT
Start: 2017-11-07 | End: 2017-11-14 | Stop reason: HOSPADM

## 2017-11-07 RX ORDER — FUROSEMIDE 10 MG/ML
INJECTION INTRAMUSCULAR; INTRAVENOUS
Status: DISCONTINUED | OUTPATIENT
Start: 2017-11-07 | End: 2017-11-07

## 2017-11-07 RX ORDER — BISACODYL 5 MG
10 TABLET, DELAYED RELEASE (ENTERIC COATED) ORAL EVERY EVENING
Status: DISCONTINUED | OUTPATIENT
Start: 2017-11-07 | End: 2017-11-07

## 2017-11-07 RX ORDER — IBUPROFEN 200 MG
24 TABLET ORAL
Status: DISCONTINUED | OUTPATIENT
Start: 2017-11-07 | End: 2017-11-14 | Stop reason: HOSPADM

## 2017-11-07 RX ORDER — ROCURONIUM BROMIDE 10 MG/ML
INJECTION, SOLUTION INTRAVENOUS
Status: DISCONTINUED | OUTPATIENT
Start: 2017-11-06 | End: 2017-11-07

## 2017-11-07 RX ORDER — MANNITOL 250 MG/ML
INJECTION, SOLUTION INTRAVENOUS
Status: DISCONTINUED | OUTPATIENT
Start: 2017-11-07 | End: 2017-11-07

## 2017-11-07 RX ORDER — HYDROMORPHONE HCL IN 0.9% NACL 6 MG/30 ML
PATIENT CONTROLLED ANALGESIA SYRINGE INTRAVENOUS
Status: COMPLETED
Start: 2017-11-07 | End: 2017-11-07

## 2017-11-07 RX ORDER — ERGOCALCIFEROL 1.25 MG/1
50000 CAPSULE ORAL
Status: DISCONTINUED | OUTPATIENT
Start: 2017-11-08 | End: 2017-11-14 | Stop reason: HOSPADM

## 2017-11-07 RX ORDER — PAPAVERINE HYDROCHLORIDE 30 MG/ML
INJECTION INTRAMUSCULAR; INTRAVENOUS
Status: DISCONTINUED | OUTPATIENT
Start: 2017-11-07 | End: 2017-11-07 | Stop reason: HOSPADM

## 2017-11-07 RX ORDER — CARVEDILOL 3.12 MG/1
3.12 TABLET ORAL 2 TIMES DAILY
Status: DISCONTINUED | OUTPATIENT
Start: 2017-11-07 | End: 2017-11-10

## 2017-11-07 RX ORDER — ONDANSETRON 2 MG/ML
4 INJECTION INTRAMUSCULAR; INTRAVENOUS ONCE AS NEEDED
Status: ACTIVE | OUTPATIENT
Start: 2017-11-07 | End: 2017-11-07

## 2017-11-07 RX ORDER — TACROLIMUS 1 MG/1
2 CAPSULE ORAL 2 TIMES DAILY
Status: DISCONTINUED | OUTPATIENT
Start: 2017-11-07 | End: 2017-11-08

## 2017-11-07 RX ORDER — PHENYLEPHRINE HYDROCHLORIDE 10 MG/ML
INJECTION INTRAVENOUS
Status: DISCONTINUED | OUTPATIENT
Start: 2017-11-07 | End: 2017-11-07

## 2017-11-07 RX ORDER — DOCUSATE SODIUM 100 MG/1
100 CAPSULE, LIQUID FILLED ORAL 3 TIMES DAILY
Status: DISCONTINUED | OUTPATIENT
Start: 2017-11-07 | End: 2017-11-08

## 2017-11-07 RX ORDER — SODIUM CHLORIDE 9 MG/ML
INJECTION, SOLUTION INTRAVENOUS CONTINUOUS PRN
Status: DISCONTINUED | OUTPATIENT
Start: 2017-11-06 | End: 2017-11-07

## 2017-11-07 RX ORDER — CINACALCET 30 MG/1
30 TABLET, FILM COATED ORAL
Status: DISCONTINUED | OUTPATIENT
Start: 2017-11-08 | End: 2017-11-14 | Stop reason: HOSPADM

## 2017-11-07 RX ORDER — GLYCOPYRROLATE 0.2 MG/ML
INJECTION INTRAMUSCULAR; INTRAVENOUS
Status: DISCONTINUED | OUTPATIENT
Start: 2017-11-07 | End: 2017-11-07

## 2017-11-07 RX ORDER — FENTANYL CITRATE 50 UG/ML
INJECTION, SOLUTION INTRAMUSCULAR; INTRAVENOUS
Status: DISCONTINUED | OUTPATIENT
Start: 2017-11-06 | End: 2017-11-07

## 2017-11-07 RX ORDER — NALOXONE HCL 0.4 MG/ML
0.02 VIAL (ML) INJECTION
Status: DISCONTINUED | OUTPATIENT
Start: 2017-11-07 | End: 2017-11-14 | Stop reason: HOSPADM

## 2017-11-07 RX ORDER — METHYLPREDNISOLONE SOD SUCC 125 MG
125 VIAL (EA) INJECTION ONCE
Status: COMPLETED | OUTPATIENT
Start: 2017-11-09 | End: 2017-11-09

## 2017-11-07 RX ORDER — BISACODYL 5 MG
5 TABLET, DELAYED RELEASE (ENTERIC COATED) ORAL NIGHTLY
Status: DISCONTINUED | OUTPATIENT
Start: 2017-11-07 | End: 2017-11-07

## 2017-11-07 RX ORDER — IBUPROFEN 200 MG
16 TABLET ORAL
Status: DISCONTINUED | OUTPATIENT
Start: 2017-11-07 | End: 2017-11-14 | Stop reason: HOSPADM

## 2017-11-07 RX ORDER — HEPARIN SODIUM 10000 [USP'U]/ML
INJECTION, SOLUTION INTRAVENOUS; SUBCUTANEOUS
Status: DISCONTINUED | OUTPATIENT
Start: 2017-11-07 | End: 2017-11-07 | Stop reason: HOSPADM

## 2017-11-07 RX ORDER — NEOSTIGMINE METHYLSULFATE 1 MG/ML
INJECTION, SOLUTION INTRAVENOUS
Status: DISCONTINUED | OUTPATIENT
Start: 2017-11-07 | End: 2017-11-07

## 2017-11-07 RX ORDER — LIDOCAINE HYDROCHLORIDE 10 MG/ML
INJECTION, SOLUTION INTRAVENOUS
Status: DISCONTINUED | OUTPATIENT
Start: 2017-11-06 | End: 2017-11-07

## 2017-11-07 RX ORDER — BISACODYL 5 MG
10 TABLET, DELAYED RELEASE (ENTERIC COATED) ORAL NIGHTLY
Status: DISCONTINUED | OUTPATIENT
Start: 2017-11-07 | End: 2017-11-07

## 2017-11-07 RX ORDER — CEFAZOLIN SODIUM 1 G/3ML
INJECTION, POWDER, FOR SOLUTION INTRAMUSCULAR; INTRAVENOUS
Status: DISCONTINUED | OUTPATIENT
Start: 2017-11-07 | End: 2017-11-07 | Stop reason: HOSPADM

## 2017-11-07 RX ORDER — SODIUM CHLORIDE 9 MG/ML
INJECTION, SOLUTION INTRAVENOUS CONTINUOUS
Status: DISCONTINUED | OUTPATIENT
Start: 2017-11-07 | End: 2017-11-08

## 2017-11-07 RX ORDER — NYSTATIN 100000 [USP'U]/ML
500000 SUSPENSION ORAL
Status: DISCONTINUED | OUTPATIENT
Start: 2017-11-07 | End: 2017-11-14 | Stop reason: HOSPADM

## 2017-11-07 RX ORDER — HYDROMORPHONE HCL IN 0.9% NACL 6 MG/30 ML
PATIENT CONTROLLED ANALGESIA SYRINGE INTRAVENOUS CONTINUOUS
Status: DISCONTINUED | OUTPATIENT
Start: 2017-11-07 | End: 2017-11-08

## 2017-11-07 RX ORDER — FAMOTIDINE 20 MG/1
20 TABLET, FILM COATED ORAL NIGHTLY
Status: DISCONTINUED | OUTPATIENT
Start: 2017-11-07 | End: 2017-11-14 | Stop reason: HOSPADM

## 2017-11-07 RX ORDER — MUPIROCIN 20 MG/G
1 OINTMENT TOPICAL 2 TIMES DAILY
Status: DISPENSED | OUTPATIENT
Start: 2017-11-07 | End: 2017-11-12

## 2017-11-07 RX ORDER — ONDANSETRON 2 MG/ML
INJECTION INTRAMUSCULAR; INTRAVENOUS
Status: DISCONTINUED | OUTPATIENT
Start: 2017-11-07 | End: 2017-11-07

## 2017-11-07 RX ORDER — FAMOTIDINE 20 MG/1
20 TABLET, FILM COATED ORAL NIGHTLY
Status: DISCONTINUED | OUTPATIENT
Start: 2017-11-07 | End: 2017-11-07

## 2017-11-07 RX ORDER — GLUCAGON 1 MG
1 KIT INJECTION CONTINUOUS PRN
Status: DISCONTINUED | OUTPATIENT
Start: 2017-11-07 | End: 2017-11-14 | Stop reason: HOSPADM

## 2017-11-07 RX ORDER — CEFAZOLIN SODIUM 2 G/50ML
2 SOLUTION INTRAVENOUS
Status: COMPLETED | OUTPATIENT
Start: 2017-11-07 | End: 2017-11-07

## 2017-11-07 RX ORDER — PROPOFOL 10 MG/ML
VIAL (ML) INTRAVENOUS
Status: DISCONTINUED | OUTPATIENT
Start: 2017-11-06 | End: 2017-11-07

## 2017-11-07 RX ORDER — AMLODIPINE BESYLATE 10 MG/1
10 TABLET ORAL DAILY
Status: DISCONTINUED | OUTPATIENT
Start: 2017-11-07 | End: 2017-11-07

## 2017-11-07 RX ORDER — VALGANCICLOVIR 450 MG/1
450 TABLET, FILM COATED ORAL EVERY MORNING
Status: DISCONTINUED | OUTPATIENT
Start: 2017-11-07 | End: 2017-11-10

## 2017-11-07 RX ORDER — DIPHENHYDRAMINE HYDROCHLORIDE 50 MG/ML
12.5 INJECTION INTRAMUSCULAR; INTRAVENOUS EVERY 4 HOURS PRN
Status: DISCONTINUED | OUTPATIENT
Start: 2017-11-07 | End: 2017-11-14 | Stop reason: HOSPADM

## 2017-11-07 RX ADMIN — CALCIUM CHLORIDE 100 MG: 100 INJECTION, SOLUTION INTRAVENOUS at 02:11

## 2017-11-07 RX ADMIN — HYDROMORPHONE HYDROCHLORIDE 0.4 MG: 2 INJECTION, SOLUTION INTRAMUSCULAR; INTRAVENOUS; SUBCUTANEOUS at 04:11

## 2017-11-07 RX ADMIN — THERA TABS 1 TABLET: TAB at 09:11

## 2017-11-07 RX ADMIN — BISACODYL 10 MG: 5 TABLET, COATED ORAL at 11:11

## 2017-11-07 RX ADMIN — VERAPAMIL HYDROCHLORIDE 5 MG: 2.5 INJECTION, SOLUTION INTRAVENOUS at 03:11

## 2017-11-07 RX ADMIN — HEPARIN SODIUM 10000 UNITS: 10000 INJECTION INTRAVENOUS; SUBCUTANEOUS at 12:11

## 2017-11-07 RX ADMIN — SODIUM CHLORIDE 250 ML: 9 INJECTION, SOLUTION INTRAVENOUS at 12:11

## 2017-11-07 RX ADMIN — PHENYLEPHRINE HYDROCHLORIDE 0.5 MCG/KG/MIN: 10 INJECTION INTRAVENOUS at 02:11

## 2017-11-07 RX ADMIN — SODIUM CHLORIDE 30 MG: 9 INJECTION, SOLUTION INTRAVENOUS at 01:11

## 2017-11-07 RX ADMIN — CEFAZOLIN SODIUM 2 G: 2 SOLUTION INTRAVENOUS at 09:11

## 2017-11-07 RX ADMIN — PHENYLEPHRINE HYDROCHLORIDE 50 MCG: 10 INJECTION INTRAVENOUS at 02:11

## 2017-11-07 RX ADMIN — CEFAZOLIN SODIUM 2 G: 2 SOLUTION INTRAVENOUS at 12:11

## 2017-11-07 RX ADMIN — DIPHENHYDRAMINE HYDROCHLORIDE 50 MG: 50 INJECTION, SOLUTION INTRAMUSCULAR; INTRAVENOUS at 12:11

## 2017-11-07 RX ADMIN — SODIUM CHLORIDE 1000 ML: 0.9 INJECTION, SOLUTION INTRAVENOUS at 04:11

## 2017-11-07 RX ADMIN — NEOSTIGMINE METHYLSULFATE 5 MG: 1 INJECTION INTRAVENOUS at 04:11

## 2017-11-07 RX ADMIN — SODIUM CHLORIDE 175 ML/HR: 0.9 INJECTION, SOLUTION INTRAVENOUS at 05:11

## 2017-11-07 RX ADMIN — MYCOPHENOLATE MOFETIL 500 MG: 250 CAPSULE ORAL at 09:11

## 2017-11-07 RX ADMIN — NYSTATIN 500000 UNITS: 500000 SUSPENSION ORAL at 05:11

## 2017-11-07 RX ADMIN — DOCUSATE SODIUM 100 MG: 100 CAPSULE, LIQUID FILLED ORAL at 01:11

## 2017-11-07 RX ADMIN — SODIUM CHLORIDE: 0.9 INJECTION, SOLUTION INTRAVENOUS at 12:11

## 2017-11-07 RX ADMIN — CALCIUM CHLORIDE 300 MG: 100 INJECTION, SOLUTION INTRAVENOUS at 01:11

## 2017-11-07 RX ADMIN — NYSTATIN 500000 UNITS: 500000 SUSPENSION ORAL at 09:11

## 2017-11-07 RX ADMIN — NYSTATIN 500000 UNITS: 500000 SUSPENSION ORAL at 01:11

## 2017-11-07 RX ADMIN — DEXTROSE AND SODIUM CHLORIDE: 5; .45 INJECTION, SOLUTION INTRAVENOUS at 05:11

## 2017-11-07 RX ADMIN — TACROLIMUS 2 MG: 1 CAPSULE ORAL at 05:11

## 2017-11-07 RX ADMIN — CEFAZOLIN 1 G: 1 INJECTION, POWDER, FOR SOLUTION INTRAVENOUS at 12:11

## 2017-11-07 RX ADMIN — MANNITOL 25000 MG: 250 INJECTION, SOLUTION INTRAVENOUS at 02:11

## 2017-11-07 RX ADMIN — DOCUSATE SODIUM 100 MG: 100 CAPSULE, LIQUID FILLED ORAL at 06:11

## 2017-11-07 RX ADMIN — VALGANCICLOVIR 450 MG: 450 TABLET, FILM COATED ORAL at 08:11

## 2017-11-07 RX ADMIN — SODIUM CHLORIDE: 0.9 INJECTION, SOLUTION INTRAVENOUS at 02:11

## 2017-11-07 RX ADMIN — ROCURONIUM BROMIDE 10 MG: 10 INJECTION, SOLUTION INTRAVENOUS at 02:11

## 2017-11-07 RX ADMIN — HEPARIN SODIUM 5000 UNITS: 5000 INJECTION, SOLUTION INTRAVENOUS; SUBCUTANEOUS at 01:11

## 2017-11-07 RX ADMIN — HEPARIN SODIUM 5000 UNITS: 5000 INJECTION, SOLUTION INTRAVENOUS; SUBCUTANEOUS at 09:11

## 2017-11-07 RX ADMIN — CALCIUM CHLORIDE 600 MG: 100 INJECTION, SOLUTION INTRAVENOUS at 02:11

## 2017-11-07 RX ADMIN — SODIUM CHLORIDE 500 ML: 0.9 INJECTION, SOLUTION INTRAVENOUS at 07:11

## 2017-11-07 RX ADMIN — CARVEDILOL 3.12 MG: 3.12 TABLET, FILM COATED ORAL at 09:11

## 2017-11-07 RX ADMIN — Medication: at 05:11

## 2017-11-07 RX ADMIN — FENTANYL CITRATE 100 MCG: 50 INJECTION, SOLUTION INTRAMUSCULAR; INTRAVENOUS at 12:11

## 2017-11-07 RX ADMIN — SODIUM CHLORIDE 500 ML: 0.9 INJECTION, SOLUTION INTRAVENOUS at 11:11

## 2017-11-07 RX ADMIN — PROPOFOL 50 MG: 10 INJECTION, EMULSION INTRAVENOUS at 12:11

## 2017-11-07 RX ADMIN — ROCURONIUM BROMIDE 10 MG: 10 INJECTION, SOLUTION INTRAVENOUS at 12:11

## 2017-11-07 RX ADMIN — MYCOPHENOLATE MOFETIL 500 MG: 250 CAPSULE ORAL at 01:11

## 2017-11-07 RX ADMIN — TACROLIMUS 2 MG: 1 CAPSULE ORAL at 08:11

## 2017-11-07 RX ADMIN — GLYCOPYRROLATE 0.6 MG: 0.2 INJECTION, SOLUTION INTRAMUSCULAR; INTRAVENOUS at 04:11

## 2017-11-07 RX ADMIN — ACETAMINOPHEN 650 MG: 650 SOLUTION ORAL at 12:11

## 2017-11-07 RX ADMIN — PHENYLEPHRINE HYDROCHLORIDE 100 MCG: 10 INJECTION INTRAVENOUS at 02:11

## 2017-11-07 RX ADMIN — Medication 2 G: at 04:11

## 2017-11-07 RX ADMIN — Medication 2 G: at 12:11

## 2017-11-07 RX ADMIN — HEPARIN SODIUM 5000 UNITS: 5000 INJECTION, SOLUTION INTRAVENOUS; SUBCUTANEOUS at 06:11

## 2017-11-07 RX ADMIN — FUROSEMIDE 100 MG: 10 INJECTION, SOLUTION INTRAMUSCULAR; INTRAVENOUS at 02:11

## 2017-11-07 RX ADMIN — DOCUSATE SODIUM 100 MG: 100 CAPSULE, LIQUID FILLED ORAL at 09:11

## 2017-11-07 NOTE — PROGRESS NOTES
Patient admitted for Kidney transplant.Transplant coordinator met with the patient on rounds to introduce self and explain the coordinator role. The post-transplant teaching book was given. Transplant Coordinator explained that she will follow the patient while in the hospital and assist with discharge.     Pt admitted for a cadaveric kidney transplant  ESRD 2/2 to [facility]. 1st transplant in 1996  Campath induction  7 day ranjeet and ABIGAIL in place

## 2017-11-07 NOTE — ASSESSMENT & PLAN NOTE
- Start Bactrim for PCP prophylaxis  - Start Valcyte for CMV prophylaxis  - Start nystatin for thrush prophylaxis

## 2017-11-07 NOTE — PROGRESS NOTES
Pre-operative Discussion Note  Kidney Transplant Surgery    Apurva Rodriguez is a 52 y.o. female admitted for kidney transplant.  I discussed the planned procedure in detail, including expected hospital course and outcomes, benefits, risks, and potential complications.  Complications discussed included death, graft failure, bleeding, infection, vascular thrombosis, and rejection.  I discussed the risks of anesthesia, as well as the potential need for re-operation.  The possibility of other complications not specifically mentioned was also discussed.  Also, I discussed the need for lifelong immunosuppression and the possibility of serious complications from immunosuppressive drugs.    The discussion included the risks that the patient will incur if she elects to not have the proposed procedure.    Relevant donor-specific risk factors were disclosed and discussed with the patient, including:   PHS: I discussed the use of organs from donors with PHS increased risk behavior, including the testing protocols utilized, as well as data from the literature regarding the likelihood of transmission of hepatitis or HIV.  The patient is willing to consider such grafts.    Specific PHS Increased Risk Behavior criteria for the organ donor include:  Donor risk for infection is unknown due to history of hemodilution    All questions were answered.  The patient and available family members voice understanding and agree to proceed with the transplant.    UNOS Patient Status  Note on scores:  ICU = 10 = total assistance  TSU = 20-30 = partial assistance  Outpatient admitted for transplant requiring medical care in last year = 40-50 = partial assistance  Scores 60 or higher indicate no assistance, meaning no need for medical care in last year. This would be very unusual for a transplant candidate.    UNOS Patient Status  Functional Status: 50% - Requires considerable assistance and frequent medical care  Physical Capacity: No  Limitations

## 2017-11-07 NOTE — PROGRESS NOTES
Notified family of pt arrival to PACU.  Family waiting in room for pt return.  Family had no questions.

## 2017-11-07 NOTE — ANESTHESIA PROCEDURE NOTES
Arterial    Diagnosis: ESRD    Patient location during procedure: done in OR  Procedure start time: 11/6/2017 11:50 PM  Timeout: 11/6/2017 11:45 PM  Procedure end time: 11/7/2017 11:55 PM  Staffing  Anesthesiologist: SARAI PRESTON  Resident/CRNA: GENEVA MACIAS  Performed: resident/CRNA   Anesthesiologist was present at the time of the procedure.  Preanesthetic Checklist  Completed: patient identified, site marked, surgical consent, pre-op evaluation, timeout performed, IV checked, risks and benefits discussed, monitors and equipment checked and anesthesia consent givenArterial  Skin Prep: chlorhexidine gluconate  Local Infiltration: none  Orientation: left  Location: radial  Catheter Size: 20 G  Catheter placement by Anatomical landmarks. Heme positive aspiration all ports.Insertion Attempts: 1  Assessment  Dressing: secured with tape and tegaderm

## 2017-11-07 NOTE — TRANSFER OF CARE
Anesthesia Transfer of Care Note    Patient: Apurva Rodriguez    Procedure(s) Performed: Procedure(s):  TRANSPLANT-KIDNEY    Patient location: PACU    Anesthesia Type: general    Transport from OR: Transported from OR on 6-10 L/min O2 by face mask with adequate spontaneous ventilation    Post pain: adequate analgesia    Post assessment: no apparent anesthetic complications    Post vital signs: stable    Level of consciousness: sedated    Nausea/Vomiting: no nausea/vomiting    Complications: none    Transfer of care protocol was followed      Last vitals:   Visit Vitals  BP (!) 90/54 (BP Location: Left arm, Patient Position: Lying)   Pulse 70   Temp 36.6 °C (97.9 °F) (Temporal)   Resp 14   Ht 5' (1.524 m)   Wt 62.5 kg (137 lb 12.6 oz)   SpO2 100%   Breastfeeding? No   BMI 26.91 kg/m²

## 2017-11-07 NOTE — ANESTHESIA PROCEDURE NOTES
Central Line    Diagnosis: esrd  Patient location during procedure: done in OR  Procedure start time: 11/7/2017 12:08 AM  Timeout: 11/7/2017 12:08 AM  Procedure end time: 11/7/2017 12:15 AM  Staffing  Anesthesiologist: SARAI PRESTON  Performed: anesthesiologist   Anesthesiologist was present at the time of the procedure.  Preanesthetic Checklist  Completed: patient identified, site marked, surgical consent, pre-op evaluation, timeout performed, IV checked, risks and benefits discussed, monitors and equipment checked and anesthesia consent given  Indication  Indication: hemodynamic monitoring, vascular access, med administration     Anesthesia   general anesthesia    Central Line  Skin Prep: skin prepped with Betadine, skin prep agent completely dried prior to procedure  maximum sterile barriers used during central venous catheter insertion  hand hygiene performed prior to central venous catheter insertion  Location: left internal jugular,   Catheter type: triple lumen  Catheter Size: 7 Fr  Ultrasound: vascular probe with ultrasound  Vessel Caliber: medium, patent  Vascular Doppler:  not done, compressibility normal  Needle advanced into vessel with real time Ultrasound guidance.  Guidewire confirmed in vessel.  Sterile sheath used.   Manometry: Venous cannualation confirmed by visual estimation of blood vessel pressure using manometry.  Insertion Attempts: 1   Securement:line sutured, chlorhexidine patch, sterile dressing applied and blood return through all ports     Post-Procedure

## 2017-11-07 NOTE — NURSING
Pt escorted to OR via transport escort. Surgeon at the bedside for final assessment prior to surgery. All consents signed, labs and X-ray complete, Teds/scds on. Pt denies having any questions/concerns at this time.

## 2017-11-07 NOTE — PROGRESS NOTES
[facility]  Adult Nutrition  Progress Note    SUMMARY     Recommendations    Recommendation/Intervention: Continue current diet, encouraging intake of protein at all meals and snacks. Patient tolerating diet well. Emphasized importance of low sodium diet lifelong. Post transplant nutrition education provided. RD following.     Goals: Pt to consume/tolerate > 75% EEN and EPN  Nutrition Goal Status: new  Communication of RD Recs: discussed on rounds    Reason for Assessment    Reason for Assessment: physician consult  Diagnosis: transplant/postoperative complications (OKTx 11/7)  Relevent Medical History: 1st [facility]Tx 1996, gerd, htn, dld   Interdisciplinary Rounds: attended     General Information Comments: Pt diet advanced POD#1 to regular, pt ate 1/3 of breakfast today with no complaints, reports following renal diet in past few years once back on dialysis    Nutrition Discharge Planning: Post transplant nutrition education provided. Food safety/drug interactions emphasized. General healthy diet recommended. RD name/contact information, education material left. No other needs identified. Caregiver present.    Nutrition Prescription Ordered    Current Diet Order: Regular       Evaluation of Received Nutrients/Fluid Intake      % Intake of Estimated Energy Needs: 25 - 50 %  % Meal Intake: 50%     Nutrition Risk Screen     Nutrition Risk Screen: no indicators present    Nutrition/Diet History    Patient Reported Diet/Restrictions/Preferences: renal  Typical Food/Fluid Intake: Patient and wife both cook frequently  Food Preferences: No cultural or Judaism food preferences noted     Labs/Tests/Procedures/Meds       Pertinent Labs Reviewed: reviewed  Pertinent Labs Comments: K 3.3, BUN 66, Cr 5.3, gfr 8.5, glu 133, ,   Pertinent Medications Reviewed: reviewed  Pertinent Medications Comments: docusate, prednisone, MVI, tacrolimus, IVF, D5    Physical Findings    Overall Physical Appearance:  nourished     Oral/Mouth Cavity: WDL  Skin: incision    Anthropometrics    Temp: 98.3 °F (36.8 °C)     Height: 5' (152.4 cm)  Weight Method: Bed Scale  Weight: 62.5 kg (137 lb 12.6 oz)     Ideal Body Weight (IBW), Female: 100 lb     % Ideal Body Weight, Female (lb): 137.79 lb  BMI (Calculated): 27        Estimated/Assessed Needs    Weight Used For Calorie Calculations: 62.5 kg (137 lb 12.6 oz)   Height (cm): 152.4 cm  Energy Calorie Requirements (kcal): 1434  Energy Need Method: [place]-St Jeor (x 1.25 PAL)      RMR ([place]-St. Jeor Equation): 1146.5      Weight Used For Protein Calculations: 62.5 kg (137 lb 12.6 oz)  Protein Requirements: 75-88g (1.2-1.4g/kg)     Fluid Need Method: RDA Method (or per MD)      RDA Method (mL): 1434             Assessment and Plan    * S/P kidney transplant    Nutrition Diagnosis:  Increased nutrient needs    Related to (etiology):   Protein for wound healing and muscle maintenance    Signs and Symptoms (as evidenced by):   S/p OKTx 11/7    Interventions/Recommendations (treatment strategy):  See [facility]    Nutrition Diagnosis Status:   New              Monitor and Evaluation    Food and Nutrient Intake: energy intake, food and beverage intake  Food and Nutrient Adminstration: diet order  Knowledge/Beliefs/Attitudes: food and nutrition knowledge/skill  Physical Activity and Function: nutrition-related ADLs and IADLs  Anthropometric Measurements: weight, weight change, body mass index  Biochemical Data, Medical Tests and Procedures: electrolyte and renal panel, lipid profile, gastrointestinal profile, glucose/endocrine profile, inflammatory profile  Nutrition-Focused Physical Findings: overall appearance    Nutrition Risk    Level of Risk:  (2x/week)    Nutrition Follow-Up

## 2017-11-07 NOTE — ANESTHESIA PREPROCEDURE EVALUATION
2017  Apurva Rodriguez is a 52 y.o., female with PMH significant for ESRD 2/2 polycystic kidney disease (She received her first kidney transplant at [facility] at  ) now on peritoneal dialysis, GERD and HTN. She presents for:    Pre-operative evaluation for Procedure(s) (LRB):  TRANSPLANT-KIDNEY (N/A)      Patient Active Problem List   Diagnosis    Monocular esotropia, right eye    History of strabismus surgery    Lattice degeneration of right retina    Status post -donor kidney transplantation    Avascular necrosis left hip    Gastroesophageal reflux disease without esophagitis    Secondary hyperparathyroidism of renal origin    S/P bone marrow biopsy    Dyslipidemia    ESRD (end stage renal disease)    Essential hypertension    Polycystic kidney disease    Renal failure       Review of patient's allergies indicates:   Allergen Reactions    Amphotericin b      Peritonitis reaction in dialysis solution while on peritoneal dialysis    Azelex [azelaic acid] Hives    Cleocin [clindamycin hcl] Hives    Decadron [dexamethasone]      Highly anxious    Morphine Swelling     lips    Reglan [metoclopramide hcl] Other (See Comments)    Valumag      Vallium//Highly anxious    Vancomycin analogues Itching     Hair areas        No current facility-administered medications on file prior to encounter.      Current Outpatient Prescriptions on File Prior to Encounter   Medication Sig Dispense Refill    amlodipine (NORVASC) 10 MG tablet Take 10 mg by mouth once daily.       atorvastatin (LIPITOR) 20 MG tablet Take 20 mg by mouth once daily.       carvedilol (COREG) 6.25 MG tablet TK ONE TABLET PO BID  3    DARBEPOETIN SHOLA IN POLYSORBAT (ARANESP, IN POLYSORBATE, INJ) Inject 15,000 Units as directed every 14 (fourteen) days.       docusate sodium (COLACE) 100 MG capsule Take 100 mg by  mouth 2 (two) times daily.      ELIDEL 1 % cream Apply 1 application topically 2 (two) times daily.  6    fish oil-omega-3 fatty acids 300-1,000 mg capsule Take 2 g by mouth once daily.      gabapentin (NEURONTIN) 100 MG capsule TK 1 TO 3 CS PO QHS  3    lidocaine (LIDODERM) 5 % PLACE 1 PATCH TO INTACT SKIN REMOVE AFTER 12 H QD  3    ranitidine (ZANTAC) 150 MG tablet Take 150 mg by mouth 2 (two) times daily.       sevelamer HCl (RENAGEL) 800 MG Tab Take 1,600 mg by mouth 3 (three) times daily with meals.      sulfamethoxazole-trimethoprim 400-80mg (BACTRIM,SEPTRA) 400-80 mg per tablet Take 1 tablet by mouth every 7 days.  3       Past Surgical History:   Procedure Laterality Date    ABDOMINAL HERNIA REPAIR      CHOLECYSTECTOMY      laparoascopic    EYE MUSCLE SURGERY Bilateral     as a baby(2 years)    FRACTURE SURGERY Right     rigt femur :steel karan pl;acement and replacement    KIDNEY TRANSPLANT Right     RLQ transplant,  donor    left hip revision      PARATHYROIDECTOMY      PERITONEAL CATHETER INSERTION      PERITONEAL CATHETER INSERTION      right hip decompression      TONSILLECTOMY      TOTAL HIP ARTHROPLASTY Left        Social History     Social History    Marital status:      Spouse name: N/A    Number of children: N/A    Years of education: N/A     Occupational History    self employed      full time     Social History Main Topics    Smoking status: Never Smoker    Smokeless tobacco: Not on file    Alcohol use No    Drug use: No    Sexual activity: Yes     Partners: Female     Other Topics Concern    Not on file     Social History Narrative    No narrative on file         Vital Signs Range (Last 24H):  Temp:  [36.5 °C (97.7 °F)]   Pulse:  [64]   SpO2:  [100 %]       CBC: No results for input(s): WBC, RBC, HGB, HCT, PLT, MCV, MCH, MCHC in the last 72 hours.    CMP: No results for input(s): NA, K, CL, CO2, BUN, CREATININE, GLU, MG, PHOS, CALCIUM, ALBUMIN,  PROT, ALKPHOS, ALT, AST, BILITOT in the last 72 hours.    INR  No results for input(s): INR, PROTIME, APTT in the last 72 hours.    Invalid input(s): PT        Diagnostic Studies:      EKG:  pending      Anesthesia Evaluation    I have reviewed the Patient Summary Reports.    I have reviewed the Nursing Notes.   I have reviewed the Medications.     Review of Systems  Anesthesia Hx:  No problems with previous Anesthesia  History of prior surgery of interest to airway management or planning: Denies Family Hx of Anesthesia complications.   Denies Personal Hx of Anesthesia complications.   Social:  Non-Smoker, No Alcohol Use    Hematology/Oncology:  Hematology Normal   Oncology Normal     EENT/Dental:EENT/Dental Normal   Cardiovascular:   Hypertension ECG has been reviewed.    Pulmonary:  Pulmonary Normal    Renal/:   Chronic Renal Disease, ESRD    Hepatic/GI:   GERD, well controlled    Musculoskeletal:  Musculoskeletal Normal    Neurological:  Neurology Normal    Endocrine:  Endocrine Normal    Dermatological:  Skin Normal    Psych:  Psychiatric Normal           Physical Exam  General:  Well nourished    Airway/Jaw/Neck:  Airway Findings: Mouth Opening: Normal Tongue: Normal  General Airway Assessment: Adult  Mallampati: II  Improves to I with phonation.  TM Distance: Normal, at least 6 cm      Dental:  Dental Findings: In tact   Chest/Lungs:  Chest/Lungs Findings: Clear to auscultation, Normal Respiratory Rate     Heart/Vascular:  Heart Findings: Rate: Normal  Rhythm: Regular Rhythm  Sounds: Normal        Mental Status:  Mental Status Findings:  Cooperative, Alert and Oriented         Anesthesia Plan  Type of Anesthesia, risks & benefits discussed:  Anesthesia Type:  general  Patient's Preference:   Intra-op Monitoring Plan: arterial line, central line and standard ASA monitors  Intra-op Monitoring Plan Comments:   Post Op Pain Control Plan:   Post Op Pain Control Plan Comments:   Induction:   IV  Beta Blocker:   Patient is on a Beta-Blocker and has received one dose within the past 24 hours (No further documentation required).       Informed Consent: Patient understands risks and agrees with Anesthesia plan.  Questions answered. Anesthesia consent signed with patient.  ASA Score: 4     Day of Surgery Review of History & Physical:    H&P update referred to the surgeon.         Ready For Surgery From Anesthesia Perspective.

## 2017-11-07 NOTE — OP NOTE
Operative Report    Date of Procedure: 11/07/2017    Surgeon: Rei Cartagena MD  First Assistant: Anabela Heller    Pre-operative Diagnosis: Allograft kidney for transplantation  Post-operative Diagnosis: Same    Procedure(s) Performed: Back Table Preparation of Kidney, Simple    Anesthesia: Not applicable  Estimated Blood Loss: Not applicable  Fluids Administered: Not applicable    Findings: as described below   Drains: not applicable    Preamble  Indications: This report describes only the backbench preparation of the kidney prior to transplantation.  The transplant operation itself is described in a separate report.    ABO Confirmation: Immediately following arrival of the donor organ and prior to implantation, a formal ABO confirmation was done according to hospital and UNOS policies.  I confirmed the UNOS ID number of the donor organ and the donor and recipient ABO types, directly verifying these data by comparison with the UNOS [facility] report.  This confirmation was personally done by an attending surgeon and circulating nurse, and is officially documented elsewhere.    Time-Out: A complete time out was carried out prior to the procedure, with confirmation of patient identity, correct procedure, correct operative site, appropriate antibiotic prophylaxis, review of any known allergies, and presence of all needed equipment.    Procedure in Detail  Prior to starting the operation, the right kidney  was prepared on the back table. Arterial anatomy was double. Venous anatomy was single. Ureteral anatomy was single. Back table vascular reconstruction was not required .  Unneeded fat was removed from the kidney, the vessels were cleaned of adherent tissue and tested for leaks, and the kidney was maintained at ice temperature in organ preservation solution until it was brought to the operative field.

## 2017-11-07 NOTE — CONSULTS
[facility]  Kidney Transplant  Consult Note    Inpatient consult to Transplant Nephrology (KTM)  Consult performed by: JACQUELINE PEREZ  Consult ordered by: KANE WILSON            Subjective:     History of Present Illness:   Ms. Apurva Rodriguez is a 52 y.o female with a PMH of PKD with previous RLQ renal transplant (3/16/96-4/2016).  Prior to admit she was on nightly peritoneal dialysis, without complication. Her urine output is about 0.5-0.75 liters/day. PMH otherwise significant for parathyroidectomy and AVN requiring hip replacement.  She was admitted 11/6/17 for DBD kidney transplant which took place this AM without complication.  PD cath was removed.  It was noted that pt had significant scar tissue and a friable bladder and a 7 day pollack was recommended and drain was placed.  Post op labs with stable H/H, nice drop in Cr, and pt with good UOP.    Pre op labs with Vit D 24 and .  Cinacalcet started.  Of note, pt with parathyroidectomy in past with implant in shoulder.  Her recent PTHs have not been as high- unclear specific numbers.    Post surgery, Mrs. Rodriguez's pain is controlled, she is siting in chair, tolerating a diet, IS ordered.  ABIGAIL serosang.  She will continue PCA today and start full pathway tomorrow.  Of note, pt's BP dropped to low 100s systolic throughout the day with low CVP and improved x 2 with NaCl bolus.  Plan is to continue to monitor closely.        Immunosuppressants     Start     Stop Route Frequency Ordered    11/07/17 1000  mycophenolate capsule 500 mg  (IP TXP POST-OP CAMPATH)      -- Oral 2 times daily 11/07/17 0427    11/07/17 0800  tacrolimus capsule 2 mg  (IP TXP POST-OP CAMPATH)      -- Oral 2 times daily 11/07/17 0427          Previous Transplant: yes    Past Medical and Surgical History: Ms. Rodriguez has a past medical history of Anemia; Avascular necrosis left hip (12/6/2016); Dyslipidemia (12/6/2016); ESRD (end stage renal disease)  (12/6/2016); Essential hypertension (12/6/2016); Gastroesophageal reflux disease without esophagitis (12/6/2016); Hypertension; Polycystic kidney disease; S/P bone marrow biopsy (12/6/2016); and Secondary hyperparathyroidism of renal origin (12/6/2016).  She has a past surgical history that includes Eye muscle surgery (Bilateral); Peritoneal catheter insertion; Kidney transplant (Right, 1996); Peritoneal catheter insertion; Cholecystectomy; Total hip arthroplasty (Left, 2001); right hip decompression; left hip revision; Parathyroidectomy; Abdominal hernia repair; Fracture surgery (Right); and Tonsillectomy.    Past Social and Family History: Ms. Rodriguez reports that she has never smoked. She does not have any smokeless tobacco history on file. She reports that she does not drink alcohol or use drugs.Her family history includes COPD in her father; Cancer in her paternal grandmother and sister; Kidney disease in her sister; No Known Problems in her mother.    Intake/Output - Last 3 Shifts       11/05 0700 - 11/06 0659 11/06 0700 - 11/07 0659 11/07 0700 - 11/08 0659    P.O.   2540    I.V. (mL/kg)  3800 (60.8) 325 (5.2)    IV Piggyback   1050    Total Intake(mL/kg)  3800 (60.8) 3915 (62.6)    Urine (mL/kg/hr)  1175 3000 (5.2)    Drains  25 50 (0.1)    Blood  300     Total Output   1500 3050    Net   +2300 +865           Urine Occurrence  1 x     Stool Occurrence  0 x            Review of Systems   Constitutional: Positive for fatigue. Negative for activity change, appetite change, chills, diaphoresis and fever.   HENT: Negative for mouth sores.    Respiratory: Negative for cough and shortness of breath.    Cardiovascular: Negative for leg swelling.   Gastrointestinal: Positive for abdominal pain. Negative for abdominal distention, constipation, diarrhea, nausea and vomiting.   Genitourinary: Negative for flank pain and hematuria.   Musculoskeletal: Negative for arthralgias, back pain and myalgias.   Skin: Positive  for wound.   Neurological: Negative for tremors and weakness.   Psychiatric/Behavioral: Negative for dysphoric mood and sleep disturbance. The patient is not nervous/anxious.      Objective:     Vital Signs (Most Recent):  Temp: 98.3 °F (36.8 °C) (11/07/17 1445)  Pulse: 76 (11/07/17 1445)  Resp: 18 (11/07/17 1230)  BP: 133/75 (11/07/17 1445)  SpO2: 99 % (11/07/17 1445) Vital Signs (24h Range):  Temp:  [97.6 °F (36.4 °C)-98.3 °F (36.8 °C)] 98.3 °F (36.8 °C)  Pulse:  [62-76] 76  Resp:  [10-18] 18  SpO2:  [96 %-100 %] 99 %  BP: ()/(54-78) 133/75  Arterial Line BP: ()/(42-51) 101/51     Weight: 62.5 kg (137 lb 12.6 oz)  Height: 5' (152.4 cm)  Body mass index is 26.91 kg/m².    Physical Exam   Constitutional: She is oriented to person, place, and time. She appears well-developed and well-nourished. No distress.   HENT:   Head: Normocephalic and atraumatic.   Eyes: No scleral icterus.   Cardiovascular: Normal rate, regular rhythm and normal heart sounds.  Exam reveals no gallop and no friction rub.    No murmur heard.  Pulmonary/Chest: Effort normal and breath sounds normal. She has no rales.   Abdominal: Soft. Bowel sounds are normal. She exhibits no distension. There is no tenderness.   Ktx incision bandaged with minimal oozing on dressing  ABIGAIL x 1 serosang   Musculoskeletal: Normal range of motion. She exhibits no edema.   Neurological: She is alert and oriented to person, place, and time.   Skin: Skin is warm and dry. She is not diaphoretic.   Psychiatric: She has a normal mood and affect. Her behavior is normal. Judgment and thought content normal.   Nursing note and vitals reviewed.      Significant Labs:  CBC:   Recent Labs  Lab 11/06/17  1947  11/07/17  0422 11/07/17  0526 11/07/17  1108   WBC 4.57  --   --   --  11.32   RBC 3.08*  --   --   --  2.54*   HGB 10.1*  --   --   --  8.3*   HCT 30.0*  < > 25* 24.6* 25.7*   *  --   --   --  92*   MCV 97  --   --   --  101*   MCH 32.8*  --   --   --   32.7*   MCHC 33.7  --   --   --  32.3   < > = values in this interval not displayed.  CMP:   Recent Labs  Lab 17  1947 17  0526 17  1108   GLU 89 133* 233*   CALCIUM 9.0 7.7* 7.5*   ALBUMIN 3.5 2.3* 2.2*   PROT 7.1  --   --    * 136 135*   K 3.7 3.3* 3.5   CO2 24 21* 18*   CL 92* 108 106   BUN 74* 66* 55*   CREATININE 6.4* 5.3* 4.4*   ALKPHOS 194*  --   --    ALT 25  --   --    AST 20  --   --      Labs within the past 24 hours have been reviewed.      Assessment/Plan:     * Status post -donor kidney transplantation    - Labs trending down nicely with great UOP  - Continue pathway  - Keep -140 for now        Prophylactic immunotherapy    - Campath induction protocol  - Continue tacrolimus and Cellcept  - Will monitor for signs of toxic side effects, check daily tacrolimus troughs, and change meds accordingly.          Long-term use of immunosuppressant medication              At risk for opportunistic infections    - Start Bactrim for PCP prophylaxis  - Start Valcyte for CMV prophylaxis  - Start nystatin for thrush prophylaxis          Essential hypertension    - Hold off on starting home amlodipine for lower pressures  - Goal pressure for now 130-140 SBP  - Start low dose Coreg (home med) 3.125 mg with hold parameters          Secondary hyperparathyroidism of renal origin    - Start cinacalcet and ergo  - Monitor              Discharge Planning:  Plan for POD 2-3 discharge.      Wolf Fernandez PA-C  Kidney Transplant  [facility]

## 2017-11-07 NOTE — ASSESSMENT & PLAN NOTE
Nutrition Diagnosis:  Increased nutrient needs    Related to (etiology):   Protein for wound healing and muscle maintenance    Signs and Symptoms (as evidenced by):   S/p OKTx 11/7    Interventions/Recommendations (treatment strategy):  See reccs    Nutrition Diagnosis Status:   New

## 2017-11-07 NOTE — PROGRESS NOTES
Admit Note     Met with patient and neighbor to assess needs. Patient is a 52 y.o.  female, admitted for for kidney transplant.      Patient admitted from home on 11/6/2017 .  At this time, patient presents as alert and oriented x 4, pleasant, good eye contact, well groomed, recall good, concentration/judgement good, average intelligence, calm, communicative, cooperative and asking and answering questions appropriately.  At this time, patients caregiver presents as at home taking a shower, however pt's neighbor presents as alert and oriented x 4, pleasant, good eye contact, well groomed, recall good, concentration/judgement good, average intelligence, calm, communicative, cooperative and asking and answering questions appropriately.    Household/Family Systems     Patient resides with patient's wife, at [address] [place] [address].  Support system includes wife, mother, sister, neighbors, and other friends.  Patient does not have dependents that are need of being cared for. pt does have a dog that will be cared for by neighbor (Ajith) and a cat to be cared for by friend: Jerry Lima primary caregiver is Lawrence Perdomo, patients wife, phone number [phone]. Pt also reports mother: Lory Rodriguez [phone] and sister: Fior Duarte [phone] will assist with care.   Confirmed patients contact information is [phone] (home);   Telephone Information:   Mobile [phone]   .    During admission, patient's caregiver plans to stay in patient's room.  Confirmed patient and patients caregivers do have access to reliable transportation.    Cognitive Status/Learning     Patient reports reading ability as college and states patient does have difficulty with seeing and wears glasses.  Patient reports patient learns best by a combination of verbal, written, and tactile instructions..   Needed: No.   Highest education level: Post-College Graduate Degree  x2    Vocation/Disability   .  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    Adherence     Patient reports a high level of adherence to patients health care regimen.  Adherence counseling and education provided. Patient verbalizes understanding.    Substance Use    Patient reports the following substance usage.    Tobacco: none, patient denies any use.  Alcohol: none, patient denies any use.  Illicit Drugs/Non-prescribed Medications: none, patient denies any use.  Patient states clear understanding of the potential impact of substance use.  Substance abstinence/cessation counseling, education and resources provided and reviewed.     Services Utilizing/ADLS    Infusion Service: Prior to admission, patient utilizing? yes in past after first transplant  Home Health: Prior to admission, patient utilizing? no  DME: Prior to admission, yes Pt reports having a walker, cane, BSC (from pt's total hip replacement), and PD equipment  Pulmonary/Cardiac Rehab: Prior to admission, no  Dialysis:  Prior to admission, yes PD at [facility]  Transplant Specialty Pharmacy:  Prior to admission, no. Pt reports that she is unsure of her choice at this time and reports that she will need to discuss it with her wife. SW notified ERNESTINA Paz, PharmD.    Prior to admission, patient reports patient was independent with ADLS and was driving.  Patient reports patient is not able to care for self at this time due to compromised medical condition (as documented in medical record) and physical weakness..  Patient indicates a willingness to care for self once medically cleared to do so.    Insurance/Medications    Insured by   Payor/Plan Subscr  Sex Relation Sub. Ins. ID Effective Group Num   1. CIGNA - CIGNA* LIU PAULSON 1965 Female  [id number]                                   [id number]   2. CIG[employer] - CIGNA* LIU PAULSON  1965 Female  [id number] 11/[birth year]/16 [id number]                                   [address], [place] [address]      Primary Insurance (for UNOS reporting): Private Insurance  Secondary Insurance (for UNOS reporting): Public Insurance - Medicare FFS (Fee For Service)    Patient reports patient is able to obtain and afford medications at this time and at time of discharge.    Living Will/Healthcare Power of     Patient states patient has a LW and/or HCPA.   provided education regarding LW and HCPA and the completion of forms.    Coping/Mental Health    Patient is coping adequately with the aid of  family members and friends. Pt's wife at home showering before returning to hospital and pt's neighbor present in room.  Patient denies mental health difficulties.     Discharge Planning    At time of discharge, patient plans to return to patient's home under the care of Lawrence Perdomo.  Patients wife will transport patient.  Per rounds today, expected discharge date has not been medically determined at this time. Patient and patients caregiver  verbalize understanding and are involved in treatment planning and discharge process.    Additional Concerns    Patient and Caregiver deny additional needs and/or concerns at this time. Patient is being followed for needs, education, resources, information, emotional support, supportive counseling, and for supportive and skilled discharge plan of care.  Patient and Caregiver verbalizes understanding and agreement with information reviewed, social work availability, and how to access available resources as needed.  remains available.

## 2017-11-07 NOTE — PROGRESS NOTES
JOANA Heart notified of patient's most recent /63, CVP 5 to 6. Order received for 500cc NS bolus. Will implement and continue to monitor.

## 2017-11-07 NOTE — PLAN OF CARE
Problem: Patient Care Overview  Goal: Plan of Care Review  Outcome: Ongoing (interventions implemented as appropriate)  POD #0 - patient remains in stepdown status this afternoon. Hourly UOP 200cc to 450cc. IVF remain I=O. Patient received two 500cc NS boluses with appropriate response in CVP/BP. Remains on Dilaudid PCA - reports pain is controlled but has not been pushing PCA button often. Encouraged to use pain medication as needed. Patient splinting incision to cough, using IS. Tolerating regular diet and liquids without difficulty - patient drank 2.7L today - patient's wife recording all intake. OOB to chair this afternoon.     1815 Surgical dressing removed per JOSSUE Main NP. Incision painted with betadine.

## 2017-11-07 NOTE — H&P
[facility]  Kidney Transplant  H&P      Subjective:     Chief Complaint/Reason for Admission: Kidney Transplantation    History of Present Illness:  Ms. Apurva Rodriguez is a 52 y.o female with a PMH of PCKD with previous RLQ renal transplant (3/16/96). See is being admitted for kidney transplantation #2. She is currently doing nightly peritoneal dialysis, without complication. Her urine output is about 0.5-0.75 liters/day. She states she is in her usual state of health, denies fevers, chills, N/V, diarrhea, dysuria, CP, SOB, or any recent illnesses.     Dialysis History: Ms. Mixon is on peritoneal dialysis started on 4/2016. Patient is dialyzing on cyclic peritoneal dialysis.  Patient reports that she is tolerating dialysis well.     Date of Last Dialysis: 11/6/17    Native urine output per day: 500-750 mL    Previous Transplant: yes; organ: kidney, date: 3/16/1996, complications: failed in 4/2016.    PTA Medications   Medication Sig    amlodipine (NORVASC) 10 MG tablet Take 10 mg by mouth once daily.     atorvastatin (LIPITOR) 20 MG tablet Take 20 mg by mouth once daily.     carvedilol (COREG) 6.25 MG tablet TK ONE TABLET PO BID    DARBEPOETIN SHOLA IN POLYSORBAT (ARANESP, IN POLYSORBATE, INJ) Inject 15,000 Units as directed every 14 (fourteen) days.     docusate sodium (COLACE) 100 MG capsule Take 100 mg by mouth 2 (two) times daily.    ELIDEL 1 % cream Apply 1 application topically 2 (two) times daily.    fish oil-omega-3 fatty acids 300-1,000 mg capsule Take 2 g by mouth once daily.    gabapentin (NEURONTIN) 100 MG capsule TK 1 TO 3 CS PO QHS    lidocaine (LIDODERM) 5 % PLACE 1 PATCH TO INTACT SKIN REMOVE AFTER 12 H QD    ranitidine (ZANTAC) 150 MG tablet Take 150 mg by mouth 2 (two) times daily.     sevelamer HCl (RENAGEL) 800 MG Tab Take 1,600 mg by mouth 3 (three) times daily with meals.    sulfamethoxazole-trimethoprim 400-80mg (BACTRIM,SEPTRA) 400-80 mg per tablet Take 1  "tablet by mouth every 7 days.       Review of patient's allergies indicates:   Allergen Reactions    Amphotericin b      Peritonitis reaction in dialysis solution while on peritoneal dialysis    Azelex [azelaic acid] Hives    Cleocin [clindamycin hcl] Hives    Decadron [dexamethasone]      Highly anxious    Morphine Swelling     lips    Reglan [metoclopramide hcl] Other (See Comments)    Valumag      Vallium//Highly anxious    Vancomycin analogues Itching     Hair areas       Past Medical History:   Diagnosis Date    Anemia     Avascular necrosis left hip 2016    S/p replacement Cord decompression right side Revision of the left hip: with a "larger" hardware placement     Dyslipidemia 2016    ESRD (end stage renal disease) 2016    She mentioned that the cause of the kidney failure was associated with several birth defects: PKD Bilateral nephrocalcinosis Hyperuricemia Chronic Icthyosis     Essential hypertension 2016    Gastroesophageal reflux disease without esophagitis 2016    Hypertension     Polycystic kidney disease     diagnosed at early age    S/P bone marrow biopsy 2016    Secondary hyperparathyroidism of renal origin 2016     Past Surgical History:   Procedure Laterality Date    ABDOMINAL HERNIA REPAIR      CHOLECYSTECTOMY      laparoascopic    EYE MUSCLE SURGERY Bilateral     as a baby(2 years)    FRACTURE SURGERY Right     rigt femur :steel karan pl;acement and replacement    KIDNEY TRANSPLANT Right     RLQ transplant,  donor    left hip revision      PARATHYROIDECTOMY      PERITONEAL CATHETER INSERTION      PERITONEAL CATHETER INSERTION      right hip decompression      TONSILLECTOMY      TOTAL HIP ARTHROPLASTY Left      Family History     Problem Relation (Age of Onset)    COPD Father    Cancer Sister, Paternal Grandmother    Kidney disease Sister    No Known Problems Mother        Social History Main Topics    Smoking " status: Never Smoker    Smokeless tobacco: Not on file    Alcohol use No    Drug use: No    Sexual activity: Yes     Partners: Female        Review of Systems   Constitutional: Negative for activity change, appetite change, fever and unexpected weight change.   HENT: Negative for congestion, sore throat, trouble swallowing and voice change.    Respiratory: Negative for cough and shortness of breath.    Gastrointestinal: Negative for abdominal distention, abdominal pain, diarrhea, nausea and vomiting.   Genitourinary: Negative for decreased urine volume, difficulty urinating, flank pain, hematuria and urgency.   Skin: Negative for color change and wound.   Neurological: Negative for seizures, weakness and light-headedness.   Psychiatric/Behavioral: Negative for confusion and suicidal ideas. The patient is not nervous/anxious.      Objective:     Vital Signs (Most Recent):  Temp: 97.7 °F (36.5 °C) (11/06/17 1912)  Pulse: 64 (11/06/17 1912)  SpO2: 100 % (11/06/17 1912)        There is no height or weight on file to calculate BMI.     Physical Exam   Constitutional: She is oriented to person, place, and time. She appears well-developed and well-nourished.   HENT:   Head: Normocephalic and atraumatic.   Eyes: Pupils are equal, round, and reactive to light. Right eye exhibits no discharge. No scleral icterus.   Neck: Normal range of motion. Neck supple.   Cardiovascular: Normal rate, regular rhythm, normal heart sounds and intact distal pulses.  Exam reveals no gallop and no friction rub.    No murmur heard.  Pulmonary/Chest: Effort normal and breath sounds normal. No respiratory distress.   Abdominal: Soft. Bowel sounds are normal. She exhibits no distension. There is no tenderness.   PD catheter   Musculoskeletal: Normal range of motion. She exhibits no edema.   Neurological: She is alert and oriented to person, place, and time.   Skin: Skin is warm and dry. No rash noted. She is not diaphoretic.   Psychiatric: She  has a normal mood and affect. Her behavior is normal. Judgment and thought content normal.       Laboratory    Recent Labs  Lab 11/06/17 1947   WBC 4.57   RBC 3.08*   HGB 10.1*   HCT 30.0*   *   MCV 97   MCH 32.8*   MCHC 33.7     CMP: No results for input(s): GLU, CALCIUM, ALBUMIN, PROT, NA, K, CO2, CL, BUN, CREATININE, ALKPHOS, ALT, AST in the last 168 hours.    Invalid input(s): BILITO  Coagulation: No results for input(s): APTT in the last 168 hours.    Invalid input(s): PT  Labs within the past 24 hours have been reviewed.    Diagnostic Results:  EKG on admission- NSR, normal EKG  CXR- WNL, no acute process seen      Assessment/Plan:     The patient presents for kidney transplant.  There are no apparent contraindications to proceeding with the planned transplant.  The patient understands that the transplant could potentially be cancelled pending detailed assessment of the donor organ.  She will receive Campath induction.  A complete discussion of the transplant procedure, including risks, complications, and alternatives, as well as any donor-specific risk factors requiring specific disclosure, will be carried out by the responsible staff surgeon prior to the procedure.     Plan:  - Admit to Kidney transplant  - NPO  - Cultures from PD cath  - Pre-op orders placed, labs pending   - Consent performed by Transplant Surgical Resident and Anesthesia   - To OR tonight for kidney transplant      Deana Parra PA-C  Kidney Transplant  [facility]

## 2017-11-07 NOTE — PROGRESS NOTES
Notified JOANA Warner that patient has some concerns with Cellcept from prior use because of diarrhea and bruising.  PA states will discuss with patient.  Will continue to monitor patient.

## 2017-11-07 NOTE — PROGRESS NOTES
TRANSPLANT NOTE:      ORGAN:  RIGHT KIDNEY   Disease Etiology: Polycystic Kidneys  Donor Type:   - Brain Death   [facility] High Risk:  Yes   Donor CMV Status:  Positive  Donor HBcAB:  Negative     Apurva Rodriguez is a 52 y.o. female s/p    - Brain Death  kidney transplant on 2017 (Kidney), 3/16/1996 (Kidney) for Polycystic Kidneys.   This patient will follow the Campath Induction protocol.  This patients immunosuppression will include Campath for induction with early steroid withdrawal and Prograf and Cellcept for maintenance immunosuppression.  Opportunistic infection prophylaxis will include Valcyte for 3 months (CMV D + , R + ), Bactrim for 1 year, and nystatin for 4 weeks.  Patient is to begin self medications today, and I plan to meet with this patient and his/her support person on POD #2 to review the medication section of the Kidney Transplant Education Manual.  I have reviewed the pre-op medications and have restarted those, as appropriate.

## 2017-11-07 NOTE — SUBJECTIVE & OBJECTIVE
Subjective:     History of Present Illness:   Ms. Apurva Rodriguez is a 52 y.o female with a PMH of PKD with previous RLQ renal transplant (3/16/96-4/2016).  Prior to admit she was on nightly peritoneal dialysis, without complication. Her urine output is about 0.5-0.75 liters/day. PMH otherwise significant for parathyroidectomy and AVN requiring hip replacement.  She was admitted 11/6/17 for DBD kidney transplant which took place this AM without complication.  PD cath was removed.  It was noted that pt had significant scar tissue and a friable bladder and a 7 day pollack was recommended and drain was placed.  Post op labs with stable H/H, nice drop in Cr, and pt with good UOP.    Pre op labs with Vit D 24 and .  Cinacalcet started.  Of note, pt with parathyroidectomy in past with implant in shoulder.  Her recent PTHs have not been as high- unclear specific numbers.    Post surgery, Mrs. Rodriguez's pain is controlled, she is siting in chair, tolerating a diet, IS ordered.  ABIGAIL serosang.  She will continue PCA today and start full pathway tomorrow.  Of note, pt's BP dropped to low 100s systolic throughout the day with low CVP and improved x 2 with NaCl bolus.  Plan is to continue to monitor closely.      Ms. Rodriguez is a 52 y.o. 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  She received a living related kidney transplant on 11/7/17.  She was on hemodialysis at the time of transplantation.  She received induction therapy with Campath and her maintenance immunosuppression consists of:   Immunosuppressants     Start     Stop Route Frequency Ordered    11/07/17 1000  mycophenolate capsule 500 mg  (IP TXP POST-OP CAMPATH)      -- Oral 2 times daily 11/07/17 0427    11/07/17 0800  tacrolimus capsule 2 mg  (IP TXP POST-OP CAMPATH)      -- Oral 2 times daily 11/07/17 0427          Previous Transplant: yes      Interval History:      Past Medical and Surgical History: Ms. Rodriguez has  a past medical history of Anemia; Avascular necrosis left hip (12/6/2016); Dyslipidemia (12/6/2016); ESRD (end stage renal disease) (12/6/2016); Essential hypertension (12/6/2016); Gastroesophageal reflux disease without esophagitis (12/6/2016); Hypertension; Polycystic kidney disease; S/P bone marrow biopsy (12/6/2016); and Secondary hyperparathyroidism of renal origin (12/6/2016).  She has a past surgical history that includes Eye muscle surgery (Bilateral); Peritoneal catheter insertion; Kidney transplant (Right, 1996); Peritoneal catheter insertion; Cholecystectomy; Total hip arthroplasty (Left, 2001); right hip decompression; left hip revision; Parathyroidectomy; Abdominal hernia repair; Fracture surgery (Right); and Tonsillectomy.    Past Social and Family History: Ms. Rodriguez reports that she has never smoked. She does not have any smokeless tobacco history on file. She reports that she does not drink alcohol or use drugs.Her family history includes COPD in her father; Cancer in her paternal grandmother and sister; Kidney disease in her sister; No Known Problems in her mother.    Intake/Output - Last 3 Shifts       11/05 0700 - 11/06 0659 11/06 0700 - 11/07 0659 11/07 0700 - 11/08 0659    P.O.   2540    I.V. (mL/kg)  3800 (60.8) 325 (5.2)    IV Piggyback   1050    Total Intake(mL/kg)  3800 (60.8) 3915 (62.6)    Urine (mL/kg/hr)  1175 3000 (5.2)    Drains  25 50 (0.1)    Blood  300     Total Output   1500 3050    Net   +2300 +865           Urine Occurrence  1 x     Stool Occurrence  0 x            Review of Systems   Constitutional: Positive for fatigue. Negative for activity change, appetite change, chills, diaphoresis and fever.   HENT: Negative for mouth sores.    Respiratory: Negative for cough and shortness of breath.    Cardiovascular: Negative for leg swelling.   Gastrointestinal: Positive for abdominal pain. Negative for abdominal distention, constipation, diarrhea, nausea and vomiting.    Genitourinary: Negative for flank pain and hematuria.   Musculoskeletal: Negative for arthralgias, back pain and myalgias.   Skin: Positive for wound.   Neurological: Negative for tremors and weakness.   Psychiatric/Behavioral: Negative for dysphoric mood and sleep disturbance. The patient is not nervous/anxious.      Objective:     Vital Signs (Most Recent):  Temp: 98.3 °F (36.8 °C) (11/07/17 1445)  Pulse: 76 (11/07/17 1445)  Resp: 18 (11/07/17 1230)  BP: 133/75 (11/07/17 1445)  SpO2: 99 % (11/07/17 1445) Vital Signs (24h Range):  Temp:  [97.6 °F (36.4 °C)-98.3 °F (36.8 °C)] 98.3 °F (36.8 °C)  Pulse:  [62-76] 76  Resp:  [10-18] 18  SpO2:  [96 %-100 %] 99 %  BP: ()/(54-78) 133/75  Arterial Line BP: ()/(42-51) 101/51     Weight: 62.5 kg (137 lb 12.6 oz)  Height: 5' (152.4 cm)  Body mass index is 26.91 kg/m².    Physical Exam   Constitutional: She is oriented to person, place, and time. She appears well-developed and well-nourished. No distress.   HENT:   Head: Normocephalic and atraumatic.   Eyes: No scleral icterus.   Cardiovascular: Normal rate, regular rhythm and normal heart sounds.  Exam reveals no gallop and no friction rub.    No murmur heard.  Pulmonary/Chest: Effort normal and breath sounds normal. She has no rales.   Abdominal: Soft. Bowel sounds are normal. She exhibits no distension. There is no tenderness.   Ktx incision bandaged with minimal oozing on dressing  ABIGAIL x 1 serosang   Musculoskeletal: Normal range of motion. She exhibits no edema.   Neurological: She is alert and oriented to person, place, and time.   Skin: Skin is warm and dry. She is not diaphoretic.   Psychiatric: She has a normal mood and affect. Her behavior is normal. Judgment and thought content normal.   Nursing note and vitals reviewed.      Significant Labs:  CBC:   Recent Labs  Lab 11/06/17  1947  11/07/17  0422 11/07/17  0526 11/07/17  1108   WBC 4.57  --   --   --  11.32   RBC 3.08*  --   --   --  2.54*   HGB 10.1*   --   --   --  8.3*   HCT 30.0*  < > 25* 24.6* 25.7*   *  --   --   --  92*   MCV 97  --   --   --  101*   MCH 32.8*  --   --   --  32.7*   MCHC 33.7  --   --   --  32.3   < > = values in this interval not displayed.  CMP:   Recent Labs  Lab 11/06/17  1947 11/07/17  0526 11/07/17  1108   GLU 89 133* 233*   CALCIUM 9.0 7.7* 7.5*   ALBUMIN 3.5 2.3* 2.2*   PROT 7.1  --   --    * 136 135*   K 3.7 3.3* 3.5   CO2 24 21* 18*   CL 92* 108 106   BUN 74* 66* 55*   CREATININE 6.4* 5.3* 4.4*   ALKPHOS 194*  --   --    ALT 25  --   --    AST 20  --   --      Labs within the past 24 hours have been reviewed.

## 2017-11-07 NOTE — ASSESSMENT & PLAN NOTE
- Hold off on starting home amlodipine for lower pressures  - Goal pressure for now 130-140 SBP  - Start low dose Coreg (home med) 3.125 mg with hold parameters

## 2017-11-07 NOTE — OP NOTE
Operative Report    Date of Procedure: 2017    Surgeons:  Surgeon(s) and Role:     * Rei Cartagena MD - Primary     * Anabela Rodrigez MD - Resident - Assisting    First Assistant Attestation:  The indicated resident served as first assistant for this procedure.    Pre-operative Diagnosis: ESRD, requiring chronic dialysis secondary to Polycystic Kidneys  Post-operative Diagnosis: Same    Procedure(s) Performed:   1. Back Table Preparation of Right Kidney    2.  - Brain Death Kidney transplant    Anesthesia: General endotracheal    Preamble  Indications and Patient Counseling: The patient is a 52 y.o. year-old female with end-stage kidney disease secondary to Polycystic Kidneys who has been evaluated for a kidney transplant.  The procedure was thoroughly discussed with the patient, including potential risks, complications, and alternatives.  Specific complications mentioned included death, graft non-function, bleeding, infection, and rejection, as well as the possibility of other complications not specifically mentioned.    Donor Risk Factors: Prior to the operation, the patient was advised of any donor-specific risk factors requiring specific disclosure.  Factors in this case included PHS increased risk behavior.      Specific PHS Increased Risk Behavior criteria for the organ donor include:  Donor risk for infection is unknown due to history of hemodilution    All questions were answered, the patient voiced appropriate understanding, and she agreed to proceed with the planned procedure.    ABO Confirmation: Immediately following arrival of the donor organ and prior to implantation, a formal ABO confirmation was done according to hospital and UNOS policies.  I confirmed the UNOS ID number ([id number]) of the donor organ and the donor and recipient ABO types, directly verifying these data by comparison with the UNOS Match Run report ([id number]).  This confirmation was personally done by an attending  surgeon and circulating nurse, and is officially documented elsewhere.    Time-Out: A complete time out was carried out prior to incision, with confirmation of patient identity, correct procedure, correct operative site, appropriate antibiotic prophylaxis, review of any known allergies, and presence of all needed equipment.    Procedure in Detail  Prior to starting the operation, the right kidney  was prepared on the back table. Arterial anatomy was double. Venous anatomy was single. Ureteral anatomy was single. Back table vascular reconstruction was not required .  Unneeded fat was removed from the kidney, the vessels were cleaned of adherent tissue and tested for leaks, and the kidney was maintained at ice temperature in organ preservation solution until it was brought to the operative field.     The patient was brought into the operating room and placed in a supine position on the OR table.  After the induction of general endotracheal anesthesia, lines were placed by the anesthesiologist.  The urinary bladder was catheterized and irrigated with antibiotic solution.  There was no tension on the axillae and all pressure points were padded.  Sequential compression boots were used as were Alyse Huggers.  The abdomen was prepped and draped in the usual sterile fashion.  Skin was incised over the left with a knife and deepened with electrocautery.  The peritoneum and its contents were swept medially, exposing the left external iliac artery and the left external iliac vein.  The Bookwalter retractor was used to provide exposure.  Overlying lymphatics were ligated or cauterized and the vessels were dissected free for a length compatible with anastomosis.  The kidney was brought to the OR table at 11/7/2017  2:05 AM.  Venous control was obtained with a vascular clamp.  A venotomy was made, the vein irrigated, and an end renal to left external iliac vein anastomosis was created with 5-0 polypropylene.  Arterial control was  obtained with a vascular clamp.  Arteriotomy was made, the artery irrigated, and an end renal to left external iliac artery anastomosis was created with 6-0 polypropylene.  The kidney was unclamped and reperfused at 2017  2:40 AM.  Reperfusion quality was good. Intraoperative urine production was observed.  After hemostasis was obtained, a Lich uretero-neocystostomy was created.  The bladder was filled and identified, opened, and the anastomosis created using 6-0 PDS.  The bladder muscle was closed over the distal ureter to create an antireflux tunnel.  A ureteral stent was used.  With the kidney well perfused and sitting appropriately without tension on the anastomoses, viscera were replaced in their usual position.  The wound was closed after a final check for hemostasis.  Overall, the graft quality was assessed to be good. At the end of the case the needle, sponge and instrument counts were all correct.  Sterile dressings were applied and the patient was brought to the recovery room/ICU in good condition.  Tissues were very friable and bladder quality was thin and poor   Peritoneal dialysis catheter was removed at the end of the case    Estimated Blood Loss: 300 mL  Fluids Administered:    Drains: 15fBlake drains x 1  Specimens: none    Findings:    Organ Transplanted: Right Kidney    Arterial Anatomy: double  Number of Arteries: 2  Configuration of Multiple Arteries: common patch   Venous Anatomy: single  Number of Veins: 1  Ureteral Anatomy: single  Number of Ureters: 1  Reperfusion Quality: good  Overall Graft Quality: good  Intraoperative Urine Production: yes  [place]: not to be removed before 7 days.  Ureteral Stent: Yes    Ischemic Times:   Anastomosis (warm ischemia) time: 35 minutes   Cold ischemia time: 852 minutes  Total ischemia time: 887 minutes    Donor Data:  UNOS ID:  [id number]  UNOS Match Run:  [id number]  Donor Type:   - Brain Death  Donor CMV Status:  Positive  Donor HBcAB:   Negative  Donor HCV Status:  Negative

## 2017-11-07 NOTE — ASSESSMENT & PLAN NOTE
- Campath induction protocol  - Continue tacrolimus and Cellcept  - Will monitor for signs of toxic side effects, check daily tacrolimus troughs, and change meds accordingly.

## 2017-11-07 NOTE — TELEPHONE ENCOUNTER
ON-CALL NOTE    UNOS# [id number]    Notified by Serge Cisneros that crossmatch is negative.  Patient notified of test results and verbalized understanding.  Instructed patient to drain PD fluid that was dwelling from last cycle and instill 300 ml of dianeal fluid so that a sample can be collected.  Instructed patient to proceed to admissions office for admission for kidney transplant.  Dialysis unit notified.    Contacted IBETH Nicholson (PD nurse) to notify of upcoming admission and fluid sample at 1505.

## 2017-11-07 NOTE — SUBJECTIVE & OBJECTIVE
Subjective:     Chief Complaint/Reason for Admission: Kidney Transplantation    History of Present Illness:  Ms. Apurva Rodriguez is a 52 y.o female with a PMH of PCKD with previous RLQ renal transplant (3/16/96). See is being admitted for kidney transplantation #2. She is currently doing nightly peritoneal dialysis, without complication. Her urine output is about 0.5-0.75 liters/day. She states she is in her usual state of health, denies fevers, chills, N/V, diarrhea, dysuria, CP, SOB, or any recent illnesses.     Dialysis History: Ms. Mixon is on peritoneal dialysis started on 4/2016. Patient is dialyzing on cyclic peritoneal dialysis.  Patient reports that she is tolerating dialysis well.      Date of Last Dialysis: 11/6/17    Native urine output per day: 500-750 mL    Previous Transplant: yes; organ: kidney, date: 3/16/1996, complications: failed in 4/2016.    PTA Medications   Medication Sig    amlodipine (NORVASC) 10 MG tablet Take 10 mg by mouth once daily.     atorvastatin (LIPITOR) 20 MG tablet Take 20 mg by mouth once daily.     carvedilol (COREG) 6.25 MG tablet TK ONE TABLET PO BID    DARBEPOETIN SHOLA IN POLYSORBAT (ARANESP, IN POLYSORBATE, INJ) Inject 15,000 Units as directed every 14 (fourteen) days.     docusate sodium (COLACE) 100 MG capsule Take 100 mg by mouth 2 (two) times daily.    ELIDEL 1 % cream Apply 1 application topically 2 (two) times daily.    fish oil-omega-3 fatty acids 300-1,000 mg capsule Take 2 g by mouth once daily.    gabapentin (NEURONTIN) 100 MG capsule TK 1 TO 3 CS PO QHS    lidocaine (LIDODERM) 5 % PLACE 1 PATCH TO INTACT SKIN REMOVE AFTER 12 H QD    ranitidine (ZANTAC) 150 MG tablet Take 150 mg by mouth 2 (two) times daily.     sevelamer HCl (RENAGEL) 800 MG Tab Take 1,600 mg by mouth 3 (three) times daily with meals.    sulfamethoxazole-trimethoprim 400-80mg (BACTRIM,SEPTRA) 400-80 mg per tablet Take 1 tablet by mouth every 7 days.       Review of patient's  "allergies indicates:   Allergen Reactions    Amphotericin b      Peritonitis reaction in dialysis solution while on peritoneal dialysis    Azelex [azelaic acid] Hives    Cleocin [clindamycin hcl] Hives    Decadron [dexamethasone]      Highly anxious    Morphine Swelling     lips    Reglan [metoclopramide hcl] Other (See Comments)    Valumag      Vallium//Highly anxious    Vancomycin analogues Itching     Hair areas       Past Medical History:   Diagnosis Date    Anemia     Avascular necrosis left hip 2016    S/p replacement Cord decompression right side Revision of the left hip: with a "larger" hardware placement     Dyslipidemia 2016    ESRD (end stage renal disease) 2016    She mentioned that the cause of the kidney failure was associated with several birth defects: PKD Bilateral nephrocalcinosis Hyperuricemia Chronic Icthyosis     Essential hypertension 2016    Gastroesophageal reflux disease without esophagitis 2016    Hypertension     Polycystic kidney disease     diagnosed at early age    S/P bone marrow biopsy 2016    Secondary hyperparathyroidism of renal origin 2016     Past Surgical History:   Procedure Laterality Date    ABDOMINAL HERNIA REPAIR      CHOLECYSTECTOMY      laparoascopic    EYE MUSCLE SURGERY Bilateral     as a baby(1 years)    FRACTURE SURGERY Right     rigt femur :steel karan pl;acement and replacement    KIDNEY TRANSPLANT Right     RLQ transplant,  donor    left hip revision      PARATHYROIDECTOMY      PERITONEAL CATHETER INSERTION      PERITONEAL CATHETER INSERTION      right hip decompression      TONSILLECTOMY      TOTAL HIP ARTHROPLASTY Left      Family History     Problem Relation (Age of Onset)    COPD Father    Cancer Sister, Paternal Grandmother    Kidney disease Sister    No Known Problems Mother        Social History Main Topics    Smoking status: Never Smoker    Smokeless tobacco: Not on file    " Alcohol use No    Drug use: No    Sexual activity: Yes     Partners: Female        Review of Systems   Constitutional: Negative for activity change, appetite change, fever and unexpected weight change.   HENT: Negative for congestion, sore throat, trouble swallowing and voice change.    Respiratory: Negative for cough and shortness of breath.    Gastrointestinal: Negative for abdominal distention, abdominal pain, diarrhea, nausea and vomiting.   Genitourinary: Negative for decreased urine volume, difficulty urinating, flank pain, hematuria and urgency.   Skin: Negative for color change and wound.   Neurological: Negative for seizures, weakness and light-headedness.   Psychiatric/Behavioral: Negative for confusion and suicidal ideas. The patient is not nervous/anxious.      Objective:     Vital Signs (Most Recent):  Temp: 97.7 °F (36.5 °C) (11/06/17 1912)  Pulse: 64 (11/06/17 1912)  Resp: 18 (11/06/17 1912)  SpO2: 100 % (11/06/17 1912)  Height: 5' (152.4 cm)  Weight: 62.1 kg (136 lb 14.5 oz)  Body mass index is 26.74 kg/m².     Physical Exam   Constitutional: She is oriented to person, place, and time. She appears well-developed and well-nourished.   HENT:   Head: Normocephalic and atraumatic.   Scar at base of throat 2/2 parathyroid surgery   Eyes: Pupils are equal, round, and reactive to light. Right eye exhibits no discharge. No scleral icterus.   Neck: Normal range of motion. Neck supple.   Cardiovascular: Normal rate, regular rhythm, normal heart sounds and intact distal pulses.  Exam reveals no gallop and no friction rub.    No murmur heard.  Pulmonary/Chest: Effort normal and breath sounds normal. No respiratory distress.   Abdominal: Soft. Bowel sounds are normal. She exhibits no distension. There is no tenderness.   PD catheter R abdomen  Previous ktx incision, healed and barely visible, RLQ   Musculoskeletal: Normal range of motion. She exhibits no edema.   Scar on R thigh and buttocks  Scar on L hip and  buttocks   Neurological: She is alert and oriented to person, place, and time.   Skin: Skin is warm and dry. No rash noted. She is not diaphoretic.   Psychiatric: She has a normal mood and affect. Her behavior is normal. Judgment and thought content normal.       Laboratory  CBC:     Recent Labs  Lab 11/06/17 1947   WBC 4.57   RBC 3.08*   HGB 10.1*   HCT 30.0*   *   MCV 97   MCH 32.8*   MCHC 33.7     CMP: No results for input(s): GLU, CALCIUM, ALBUMIN, PROT, NA, K, CO2, CL, BUN, CREATININE, ALKPHOS, ALT, AST in the last 168 hours.    Invalid input(s): BILITO  Coagulation:     Recent Labs  Lab 11/06/17 1947   APTT 24.4     Labs within the past 24 hours have been reviewed.    Diagnostic Results:  EKG on admission- NSR, normal EKG  CXR- WNL, no acute process noted

## 2017-11-07 NOTE — HPI
Ms. Apurva Rodriguez is a 52 y.o female with a PMH of PKD with previous RLQ renal transplant (3/16/96-4/2016).  Prior to admit she was on nightly peritoneal dialysis, without complication. Her urine output is about 0.5-0.75 liters/day. PMH otherwise significant for parathyroidectomy and AVN requiring hip replacement.  She was admitted 11/6/17 for DBD kidney transplant which took place this AM without complication.  PD cath was removed.  It was noted that pt had significant scar tissue and a friable bladder and a 7 day pollack was recommended and drain was placed.  Post op labs with stable H/H, nice drop in Cr, and pt with good UOP.    Pre op labs with Vit D 24 and .  Cinacalcet started.  Of note, pt with parathyroidectomy in past with implant in shoulder.  Her recent PTHs have not been as high- unclear specific numbers.    Interval history:  No acute events overnight.  Cr GREAT, UOP excellent.  Pt is 7 day pollack- will plan to remove after possible bx tomorrow of liver.  Voice is back to baseline.  Main issue, elevating LFTs.  Hepatology following, several viral studies pending. Plan for NPO after MN and schedule liver bx tomorrow if numbers up more tomorrow and viral studies unremarkable.

## 2017-11-07 NOTE — PROGRESS NOTES
Notified JOANA Wheeler that patient's BP in 110's/60's and patient scheduled for Norvasc and Coreg at this time.  PA states to hold BP temporarily.  Also notified PA that patient requesting PPI twice a day per home regiment.  PA states will speak with pharmacist.  Will continue to monitor patient.

## 2017-11-07 NOTE — PLAN OF CARE
Problem: Patient Care Overview  Goal: Plan of Care Review  Recommendations    Recommendation/Intervention: Continue current diet, encouraging intake of protein at all meals and snacks. Patient tolerating diet well. Emphasized importance of low sodium diet lifelong. Post transplant nutrition education provided. RD following.     Goals: Pt to consume/tolerate > 75% EEN and EPN  Nutrition Goal Status: new  Communication of RD Recs: discussed on rounds

## 2017-11-08 PROBLEM — R13.10 DIFFICULTY SWALLOWING: Status: ACTIVE | Noted: 2017-11-08

## 2017-11-08 LAB
ALBUMIN SERPL BCP-MCNC: 1.9 G/DL
ALP SERPL-CCNC: 156 U/L
ALT SERPL W/O P-5'-P-CCNC: 47 U/L
ANION GAP SERPL CALC-SCNC: 4 MMOL/L
AST SERPL-CCNC: 62 U/L
BASOPHILS # BLD AUTO: 0.01 K/UL
BASOPHILS NFR BLD: 0.2 %
BILIRUB SERPL-MCNC: 0.2 MG/DL
BUN SERPL-MCNC: 29 MG/DL
CALCIUM SERPL-MCNC: 7.4 MG/DL
CHLORIDE SERPL-SCNC: 115 MMOL/L
CO2 SERPL-SCNC: 21 MMOL/L
CREAT SERPL-MCNC: 1.8 MG/DL
DIFFERENTIAL METHOD: ABNORMAL
EOSINOPHIL # BLD AUTO: 0 K/UL
EOSINOPHIL NFR BLD: 0 %
ERYTHROCYTE [DISTWIDTH] IN BLOOD BY AUTOMATED COUNT: 14.7 %
EST. GFR  (AFRICAN AMERICAN): 36.3 ML/MIN/1.73 M^2
EST. GFR  (NON AFRICAN AMERICAN): 31.5 ML/MIN/1.73 M^2
GLUCOSE SERPL-MCNC: 110 MG/DL
HCT VFR BLD AUTO: 21.8 %
HCT VFR BLD AUTO: 24.7 %
HEP. B SURF AB, QUAL: POSITIVE
HEP. B SURF AB, QUANT.: NORMAL MIU/ML
HGB BLD-MCNC: 7 G/DL
HGB BLD-MCNC: 7.8 G/DL
HPRA INTERPRETATION: NORMAL
IMM GRANULOCYTES # BLD AUTO: 0.03 K/UL
IMM GRANULOCYTES NFR BLD AUTO: 0.6 %
LDH SERPL L TO P-CCNC: 270 U/L
LYMPHOCYTES # BLD AUTO: 0 K/UL
LYMPHOCYTES NFR BLD: 0.2 %
MAGNESIUM SERPL-MCNC: 1.8 MG/DL
MCH RBC QN AUTO: 32.3 PG
MCHC RBC AUTO-ENTMCNC: 32.1 G/DL
MCV RBC AUTO: 101 FL
MONOCYTES # BLD AUTO: 0.1 K/UL
MONOCYTES NFR BLD: 1.8 %
NEUTROPHILS # BLD AUTO: 5.3 K/UL
NEUTROPHILS NFR BLD: 97.2 %
NRBC BLD-RTO: 0 /100 WBC
PHOSPHATE SERPL-MCNC: 2.5 MG/DL
PLATELET # BLD AUTO: 70 K/UL
PMV BLD AUTO: 9.2 FL
POCT GLUCOSE: 156 MG/DL (ref 70–110)
POCT GLUCOSE: 163 MG/DL (ref 70–110)
POCT GLUCOSE: 172 MG/DL (ref 70–110)
POTASSIUM SERPL-SCNC: 3.3 MMOL/L
PROT SERPL-MCNC: 4.3 G/DL
RBC # BLD AUTO: 2.17 M/UL
SODIUM SERPL-SCNC: 140 MMOL/L
TACROLIMUS BLD-MCNC: <1.5 NG/ML
WBC # BLD AUTO: 5.44 K/UL

## 2017-11-08 PROCEDURE — 84100 ASSAY OF PHOSPHORUS: CPT

## 2017-11-08 PROCEDURE — 80197 ASSAY OF TACROLIMUS: CPT

## 2017-11-08 PROCEDURE — 80053 COMPREHEN METABOLIC PANEL: CPT

## 2017-11-08 PROCEDURE — 63600175 PHARM REV CODE 636 W HCPCS: Performed by: PHYSICIAN ASSISTANT

## 2017-11-08 PROCEDURE — 63600175 PHARM REV CODE 636 W HCPCS: Performed by: SURGERY

## 2017-11-08 PROCEDURE — 25000003 PHARM REV CODE 250: Performed by: TRANSPLANT SURGERY

## 2017-11-08 PROCEDURE — 85018 HEMOGLOBIN: CPT

## 2017-11-08 PROCEDURE — 85014 HEMATOCRIT: CPT

## 2017-11-08 PROCEDURE — 83615 LACTATE (LD) (LDH) ENZYME: CPT

## 2017-11-08 PROCEDURE — 85025 COMPLETE CBC W/AUTO DIFF WBC: CPT

## 2017-11-08 PROCEDURE — 25000003 PHARM REV CODE 250: Performed by: PHYSICIAN ASSISTANT

## 2017-11-08 PROCEDURE — 83735 ASSAY OF MAGNESIUM: CPT

## 2017-11-08 PROCEDURE — 25000003 PHARM REV CODE 250: Performed by: SURGERY

## 2017-11-08 PROCEDURE — 20600001 HC STEP DOWN PRIVATE ROOM

## 2017-11-08 RX ORDER — CINACALCET 30 MG/1
30 TABLET, FILM COATED ORAL
Qty: 30 TABLET | Refills: 11 | Status: SHIPPED | OUTPATIENT
Start: 2017-11-09 | End: 2017-12-06 | Stop reason: SDUPTHER

## 2017-11-08 RX ORDER — HYDROCODONE BITARTRATE AND ACETAMINOPHEN 500; 5 MG/1; MG/1
TABLET ORAL
Status: DISCONTINUED | OUTPATIENT
Start: 2017-11-08 | End: 2017-11-14

## 2017-11-08 RX ORDER — VALGANCICLOVIR 450 MG/1
900 TABLET, FILM COATED ORAL DAILY
Qty: 60 TABLET | Refills: 2 | Status: SHIPPED | OUTPATIENT
Start: 2017-11-07 | End: 2017-11-14 | Stop reason: ALTCHOICE

## 2017-11-08 RX ORDER — HYDROCODONE BITARTRATE AND ACETAMINOPHEN 500; 5 MG/1; MG/1
TABLET ORAL
Status: DISCONTINUED | OUTPATIENT
Start: 2017-11-08 | End: 2017-11-08

## 2017-11-08 RX ORDER — TACROLIMUS 1 MG/1
2 CAPSULE ORAL ONCE
Status: COMPLETED | OUTPATIENT
Start: 2017-11-08 | End: 2017-11-08

## 2017-11-08 RX ORDER — TACROLIMUS 1 MG/1
4 CAPSULE ORAL 2 TIMES DAILY
Status: DISCONTINUED | OUTPATIENT
Start: 2017-11-08 | End: 2017-11-09

## 2017-11-08 RX ORDER — NYSTATIN 100000 [USP'U]/ML
500000 SUSPENSION ORAL
Qty: 473 ML | Refills: 0 | Status: SHIPPED | OUTPATIENT
Start: 2017-11-08 | End: 2017-12-06

## 2017-11-08 RX ORDER — SODIUM CHLORIDE 9 MG/ML
INJECTION, SOLUTION INTRAVENOUS CONTINUOUS
Status: DISCONTINUED | OUTPATIENT
Start: 2017-11-08 | End: 2017-11-08

## 2017-11-08 RX ORDER — TACROLIMUS 1 MG/1
6 CAPSULE ORAL EVERY 12 HOURS
Qty: 360 CAPSULE | Refills: 11 | Status: SHIPPED | OUTPATIENT
Start: 2017-11-08 | End: 2017-11-14

## 2017-11-08 RX ORDER — POTASSIUM CHLORIDE 750 MG/1
40 CAPSULE, EXTENDED RELEASE ORAL ONCE
Status: COMPLETED | OUTPATIENT
Start: 2017-11-08 | End: 2017-11-08

## 2017-11-08 RX ORDER — ONDANSETRON 8 MG/1
8 TABLET, ORALLY DISINTEGRATING ORAL EVERY 6 HOURS PRN
Status: DISCONTINUED | OUTPATIENT
Start: 2017-11-09 | End: 2017-11-14 | Stop reason: HOSPADM

## 2017-11-08 RX ORDER — MYCOPHENOLATE MOFETIL 250 MG/1
500 CAPSULE ORAL 2 TIMES DAILY
Qty: 120 CAPSULE | Refills: 11 | Status: SHIPPED | OUTPATIENT
Start: 2017-11-08 | End: 2017-11-10 | Stop reason: ALTCHOICE

## 2017-11-08 RX ORDER — BISACODYL 5 MG
10 TABLET, DELAYED RELEASE (ENTERIC COATED) ORAL DAILY
Qty: 30 TABLET | Refills: 0 | Status: SHIPPED | OUTPATIENT
Start: 2017-11-09 | End: 2017-11-29 | Stop reason: ALTCHOICE

## 2017-11-08 RX ORDER — CARVEDILOL 3.12 MG/1
3.12 TABLET ORAL 2 TIMES DAILY
Qty: 60 TABLET | Refills: 11 | Status: SHIPPED | OUTPATIENT
Start: 2017-11-08 | End: 2017-11-14 | Stop reason: DRUGHIGH

## 2017-11-08 RX ORDER — ERGOCALCIFEROL 1.25 MG/1
50000 CAPSULE ORAL
Qty: 4 CAPSULE | Refills: 3 | Status: SHIPPED | OUTPATIENT
Start: 2017-11-15 | End: 2017-12-06 | Stop reason: SDUPTHER

## 2017-11-08 RX ORDER — SULFAMETHOXAZOLE AND TRIMETHOPRIM 400; 80 MG/1; MG/1
1 TABLET ORAL DAILY
Qty: 30 TABLET | Refills: 11 | Status: SHIPPED | OUTPATIENT
Start: 2017-11-07 | End: 2017-11-10 | Stop reason: ALTCHOICE

## 2017-11-08 RX ORDER — FAMOTIDINE 20 MG/1
20 TABLET, FILM COATED ORAL NIGHTLY
Qty: 30 TABLET | Refills: 2 | Status: SHIPPED | OUTPATIENT
Start: 2017-11-08 | End: 2017-12-06 | Stop reason: SDUPTHER

## 2017-11-08 RX ORDER — ACETAMINOPHEN 325 MG/1
650 TABLET ORAL EVERY 6 HOURS PRN
Status: DISCONTINUED | OUTPATIENT
Start: 2017-11-08 | End: 2017-11-09

## 2017-11-08 RX ADMIN — BISACODYL 10 MG: 5 TABLET, COATED ORAL at 09:11

## 2017-11-08 RX ADMIN — ERGOCALCIFEROL 50000 UNITS: 1.25 CAPSULE ORAL at 09:11

## 2017-11-08 RX ADMIN — SULFAMETHOXAZOLE AND TRIMETHOPRIM 1 TABLET: 400; 80 TABLET ORAL at 09:11

## 2017-11-08 RX ADMIN — THERA TABS 1 TABLET: TAB at 09:11

## 2017-11-08 RX ADMIN — TRAMADOL HYDROCHLORIDE 25 MG: 50 TABLET, FILM COATED ORAL at 02:11

## 2017-11-08 RX ADMIN — DOCUSATE SODIUM 100 MG: 100 CAPSULE, LIQUID FILLED ORAL at 06:11

## 2017-11-08 RX ADMIN — MYCOPHENOLATE MOFETIL 500 MG: 250 CAPSULE ORAL at 08:11

## 2017-11-08 RX ADMIN — POTASSIUM CHLORIDE 40 MEQ: 750 CAPSULE, EXTENDED RELEASE ORAL at 09:11

## 2017-11-08 RX ADMIN — MYCOPHENOLATE MOFETIL 500 MG: 250 CAPSULE ORAL at 09:11

## 2017-11-08 RX ADMIN — CINACALCET HYDROCHLORIDE 30 MG: 30 TABLET, COATED ORAL at 09:11

## 2017-11-08 RX ADMIN — VALGANCICLOVIR 450 MG: 450 TABLET, FILM COATED ORAL at 09:11

## 2017-11-08 RX ADMIN — SODIUM CHLORIDE: 0.9 INJECTION, SOLUTION INTRAVENOUS at 09:11

## 2017-11-08 RX ADMIN — HEPARIN SODIUM 5000 UNITS: 5000 INJECTION, SOLUTION INTRAVENOUS; SUBCUTANEOUS at 02:11

## 2017-11-08 RX ADMIN — NYSTATIN 500000 UNITS: 500000 SUSPENSION ORAL at 08:11

## 2017-11-08 RX ADMIN — DOCUSATE SODIUM 100 MG: 100 CAPSULE, LIQUID FILLED ORAL at 08:11

## 2017-11-08 RX ADMIN — NYSTATIN 500000 UNITS: 500000 SUSPENSION ORAL at 05:11

## 2017-11-08 RX ADMIN — NYSTATIN 500000 UNITS: 500000 SUSPENSION ORAL at 09:11

## 2017-11-08 RX ADMIN — DIBASIC SODIUM PHOSPHATE, MONOBASIC POTASSIUM PHOSPHATE AND MONOBASIC SODIUM PHOSPHATE 2 TABLET: 852; 155; 130 TABLET ORAL at 08:11

## 2017-11-08 RX ADMIN — FAMOTIDINE 20 MG: 20 TABLET, FILM COATED ORAL at 08:11

## 2017-11-08 RX ADMIN — DEXTROSE MONOHYDRATE 250 MG: 5 INJECTION, SOLUTION INTRAVENOUS at 10:11

## 2017-11-08 RX ADMIN — HEPARIN SODIUM 5000 UNITS: 5000 INJECTION, SOLUTION INTRAVENOUS; SUBCUTANEOUS at 06:11

## 2017-11-08 RX ADMIN — TACROLIMUS 2 MG: 1 CAPSULE ORAL at 09:11

## 2017-11-08 RX ADMIN — HEPARIN SODIUM 5000 UNITS: 5000 INJECTION, SOLUTION INTRAVENOUS; SUBCUTANEOUS at 10:11

## 2017-11-08 RX ADMIN — CARVEDILOL 3.12 MG: 3.12 TABLET, FILM COATED ORAL at 08:11

## 2017-11-08 RX ADMIN — DOCUSATE SODIUM 100 MG: 100 CAPSULE, LIQUID FILLED ORAL at 04:11

## 2017-11-08 RX ADMIN — TACROLIMUS 2 MG: 1 CAPSULE ORAL at 11:11

## 2017-11-08 RX ADMIN — TACROLIMUS 4 MG: 1 CAPSULE ORAL at 05:11

## 2017-11-08 RX ADMIN — DIBASIC SODIUM PHOSPHATE, MONOBASIC POTASSIUM PHOSPHATE AND MONOBASIC SODIUM PHOSPHATE 2 TABLET: 852; 155; 130 TABLET ORAL at 09:11

## 2017-11-08 RX ADMIN — NYSTATIN 500000 UNITS: 500000 SUSPENSION ORAL at 02:11

## 2017-11-08 RX ADMIN — ACETAMINOPHEN 650 MG: 325 TABLET ORAL at 11:11

## 2017-11-08 NOTE — SUBJECTIVE & OBJECTIVE
Subjective:     History of Present Illness:   Ms. Apurva Rodriguez is a 52 y.o female with a PMH of PKD with previous RLQ renal transplant (3/16/96-4/2016).  Prior to admit she was on nightly peritoneal dialysis, without complication. Her urine output is about 0.5-0.75 liters/day. PMH otherwise significant for parathyroidectomy and AVN requiring hip replacement.  She was admitted 11/6/17 for DBD kidney transplant which took place this AM without complication.  PD cath was removed.  It was noted that pt had significant scar tissue and a friable bladder and a 7 day pollack was recommended and drain was placed.  Post op labs with stable H/H, nice drop in Cr, and pt with good UOP.    Pre op labs with Vit D 24 and .  Cinacalcet started.  Of note, pt with parathyroidectomy in past with implant in shoulder.  Her recent PTHs have not been as high- unclear specific numbers.    Interval history:  Creatinine continues to trend down nicely - 1.8 today with great UOP.  She appears to be positive 5 L from yesterday- plan to d/c IVF.  Post surgery, Mrs. Rodriguez's pain is controlled, she is ambulating well, tolerating a diet, using IS.  ABIGAIL serosang.  Pt placed on pathway.      Of note, she reports coughing on phlegm this AM with subsequent irritation of uvula leading to difficulty swallowing.  Upon examination, a fluid filled area adjacent to the uvula on L side present the size of a pea.  Plan to discuss with ENT.      Ms. Rodriguez is a 52 y.o. 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  She received a living related kidney transplant on 11/7/17.  She was on hemodialysis at the time of transplantation.  She received induction therapy with Campath and her maintenance immunosuppression consists of:   Immunosuppressants     Start     Stop Route Frequency Ordered    11/08/17 1800  tacrolimus capsule 4 mg  (IP TXP POST-OP CAMPATH)      -- Oral 2 times daily 11/08/17 1029    11/07/17 1000   mycophenolate capsule 500 mg  (IP TXP POST-OP CAMPATH)      -- Oral 2 times daily 11/07/17 0427          Previous Transplant: yes      Interval History:      Past Medical and Surgical History: Ms. Rodriguez has a past medical history of Anemia; Avascular necrosis left hip (12/6/2016); Dyslipidemia (12/6/2016); ESRD (end stage renal disease) (12/6/2016); Essential hypertension (12/6/2016); Gastroesophageal reflux disease without esophagitis (12/6/2016); Hypertension; Polycystic kidney disease; S/P bone marrow biopsy (12/6/2016); and Secondary hyperparathyroidism of renal origin (12/6/2016).  She has a past surgical history that includes Eye muscle surgery (Bilateral); Peritoneal catheter insertion; Kidney transplant (Right, 1996); Peritoneal catheter insertion; Cholecystectomy; Total hip arthroplasty (Left, 2001); right hip decompression; left hip revision; Parathyroidectomy; Abdominal hernia repair; Fracture surgery (Right); and Tonsillectomy.    Past Social and Family History: Ms. Rodriguez reports that she has never smoked. She does not have any smokeless tobacco history on file. She reports that she does not drink alcohol or use drugs.Her family history includes COPD in her father; Cancer in her paternal grandmother and sister; Kidney disease in her sister; No Known Problems in her mother.    Intake/Output - Last 3 Shifts       11/06 0700 - 11/07 0659 11/07 0700 - 11/08 0659 11/08 0700 - 11/09 0659    P.O.  4370 645    I.V. (mL/kg) 3800 (60.8) 7924.7 (125) 723.8 (11.4)    IV Piggyback  1050 100    Total Intake(mL/kg) 3800 (60.8) 02652.7 (210.5) 1468.8 (23.2)    Urine (mL/kg/hr) 1175 7975 (5.2) 1125 (2.4)    Drains 25 150 (0.1) 50 (0.1)    Blood 300      Total Output 1500 8125 1175    Net +2300 +5219.7 +293.8           Urine Occurrence 1 x      Stool Occurrence 0 x             Review of Systems   Constitutional: Negative for activity change, appetite change, chills, diaphoresis, fatigue and fever.   HENT:  Positive for sore throat and trouble swallowing. Negative for mouth sores.    Respiratory: Negative for cough and shortness of breath.    Cardiovascular: Negative.  Negative for leg swelling.   Gastrointestinal: Positive for abdominal pain and constipation. Negative for abdominal distention, diarrhea, nausea and vomiting.   Genitourinary: Negative for flank pain and hematuria.   Musculoskeletal: Negative for arthralgias, back pain and myalgias.   Skin: Positive for wound.   Neurological: Negative for tremors and weakness.   Psychiatric/Behavioral: Negative for dysphoric mood and sleep disturbance. The patient is not nervous/anxious.      Objective:     Vital Signs (Most Recent):  Temp: 98 °F (36.7 °C) (11/08/17 1116)  Pulse: 88 (11/08/17 1116)  Resp: 18 (11/08/17 1116)  BP: (!) 141/73 (11/08/17 1116)  SpO2: (!) 94 % (11/08/17 1116) Vital Signs (24h Range):  Temp:  [97.7 °F (36.5 °C)-98.7 °F (37.1 °C)] 98 °F (36.7 °C)  Pulse:  [65-89] 88  Resp:  [18] 18  SpO2:  [94 %-100 %] 94 %  BP: (125-141)/(64-75) 141/73     Weight: 63.4 kg (139 lb 12.4 oz)  Height: 5' (152.4 cm)  Body mass index is 27.3 kg/m².    Physical Exam   Constitutional: She is oriented to person, place, and time. She appears well-developed and well-nourished. No distress.   HENT:   Head: Normocephalic and atraumatic.   Mouth/Throat:       Eyes: No scleral icterus.   Cardiovascular: Normal rate, regular rhythm and normal heart sounds.  Exam reveals no gallop and no friction rub.    No murmur heard.  Pulmonary/Chest: Effort normal and breath sounds normal. She has no rales.   Abdominal: Bowel sounds are normal. She exhibits distension. There is no tenderness.   Ktx incision bandaged with minimal oozing on dressing  ABIGAIL x 1 serosang   Musculoskeletal: Normal range of motion. She exhibits no edema.   Neurological: She is alert and oriented to person, place, and time.   Skin: Skin is warm and dry. She is not diaphoretic.   Psychiatric: She has a normal mood  and affect. Her behavior is normal. Judgment and thought content normal.   Nursing note and vitals reviewed.      Significant Labs:  CBC:   Recent Labs  Lab 11/06/17 1947 11/07/17 1108 11/08/17 0415 11/08/17 0807   WBC 4.57  --  11.32 5.44  --    RBC 3.08*  --  2.54* 2.17*  --    HGB 10.1*  --  8.3* 7.0* 7.8*   HCT 30.0*  < > 25.7* 21.8* 24.7*   *  --  92* 70*  --    MCV 97  --  101* 101*  --    MCH 32.8*  --  32.7* 32.3*  --    MCHC 33.7  --  32.3 32.1  --    < > = values in this interval not displayed.  CMP:     Recent Labs  Lab 11/06/17 1947 11/07/17 0526 11/07/17 1108 11/08/17 0415   GLU 89 133* 233* 110   CALCIUM 9.0 7.7* 7.5* 7.4*   ALBUMIN 3.5 2.3* 2.2* 1.9*   PROT 7.1  --   --  4.3*   * 136 135* 140   K 3.7 3.3* 3.5 3.3*   CO2 24 21* 18* 21*   CL 92* 108 106 115*   BUN 74* 66* 55* 29*   CREATININE 6.4* 5.3* 4.4* 1.8*   ALKPHOS 194*  --   --  156*   ALT 25  --   --  47*   AST 20  --   --  62*     Labs within the past 24 hours have been reviewed.

## 2017-11-08 NOTE — PLAN OF CARE
"Problem: Patient Care Overview  Goal: Plan of Care Review  Outcome: Ongoing (interventions implemented as appropriate)  POD #1 - patient placed on pathway. IVF d/c'd, PO intake encouraged. Patient drank about 1L so far today, CVP 6 to 7. [place] to remain in place for 7 days - UOP 1875cc this shift. PCA discontinued - Ultram and Tylenol ordered for pain. Moderate relief obtained from pain meds. Patient with c/o "feeling like something is caught in my throat" and being unable to clear it. Also with c/o sore throat - chloraseptic spray in use. ENT consulted. Patient using IS and acapella independently. Self-med teaching done with patient and spouse. Will reinforce education as needed.      "

## 2017-11-08 NOTE — ASSESSMENT & PLAN NOTE
- Pt with inflammation of uvula s/p coughing on phlegm this AM  - Now with diffifulty eating hard foods  - Continue soft foods  - ENT paged to discuss- will await return call

## 2017-11-08 NOTE — DISCHARGE SUMMARY
[facility]  Kidney Transplant  Discharge Summary    Patient Name: Apurva Rodriguez  MRN: [id number]  Admission Date: 11/6/2017  Hospital Length of Stay: 2 days  Discharge Date and Time:  11/11/2017 8:26 AM  Attending Physician: Rei Cartagena MD   Discharging Provider: Dia Main NP-C  Primary Care Provider: Patrice Mclaughlin MD    HPI/Hospital Course:   Mrs. Apurva Rodriguez is a 52 y.o female with a PMH of PKD with previous RLQ renal transplant (3/16/96-4/2016).  Prior to admit she was on nightly peritoneal dialysis, without complication. Her urine output is about 0.5-0.75 liters/day. PMH otherwise significant for parathyroidectomy and AVN requiring hip replacement.  She was admitted 11/6/17 for DBD kidney transplant which took place this AM without complication.  PD cath was removed.  It was noted that pt had significant scar tissue and a friable bladder and a 7 day pollack was recommended and drain was placed.  Post op labs with stable H/H, nice drop in Cr, and pt with good UOP.    Creatinine trended down nicely during stay with excellent UOP.  She was placed on pathway.  Post surgery, Mrs. Rodriguez's surgical pain was controlled.  She is ambulating well, tolerating diet, using IS, with return of GI function.  Pollack was removed on POD #7 (friable bladder noted in OR).  ABIGAIL removed later in day w/o complication.      Pre op labs with , Vit D 24.  Cinacalcet and ergocalciferol started.  Of note, pt with parathyroidectomy in past with implant in shoulder.  Her recent PTHs have not been as high- unclear specific numbers.  For BP, she was restarted on Coreg at lower dose and increaed to home dose 6.25 on POD#3 w good management.  Potassium 5.2 day of discharge- instructed pt to f/u low potassium diet.  Magnesium was low.  She was started on Mag PO.  She has been instructed to follow high Mag diet.    Of note, patient had coughing w phlegm POD #1 w subsequent irritation of uvula leading  to difficulty swallowing.  Upon examination, a clear fluid filled area adjacent to the uvula on L side present the size of a pea.  ENT consulted and recommended ice decadron.  Pain better w Chloraseptic spray, soft diet and pain meds as needed. This improved during stay.  Voice back to baseline.     WBC trending down.  Myfortic was d/c'd.  Of note, pt had been on Cellcept w previous transplant for 20 years.  Cellcept was changed briefly to Myfortic when pt had abdominal cramping and mult BMs.  Once bowel regimen discontinued, bowel movements returned to normal.  If WBC cont to improve, restart Cellcept.      Lastly, LFTs noted to be acutely up.  Repeat levels higher.  Liver US 11/10/17 completely normal.  Tylenol d/c'd.  Bactrim discontinued.  Patient was unable to tolerate Pentamidine treatment.  G6PD normal activity.  Was going to start Atovaquone however, Atovaquone can cause LFTs to go up more than Bactrim.  As LFTs cont to improve, restart Bactrim.  Valcyte stopped for neutropenia.  Cont to check CMV PCR weekly.  Hepatology viral studies, HSV, EBV, VZV, HCV and CMV PCR pending.   AFP normal at 3.5.  Cylex and parvovirus also pending.  Will need Hepatology f/u as outpatient.      Ms. Rodriguez was discharged home in good, stable condition with great kidney function.  She looked well on day of discharge.  She will follow up per protocol.  She has labs and clinic visit tomorrow.      Final Active Diagnoses:    Diagnosis Date Noted POA    PRINCIPAL PROBLEM:  Status post -donor kidney transplantation [Z94.0] 2016 Not Applicable    Long-term use of immunosuppressant medication [Z79.899] 2017 Not Applicable    Prophylactic immunotherapy [Z29.8] 2017 Not Applicable    At risk for opportunistic infections [Z91.89] 2017 No    Difficulty swallowing [R13.10] 2017 No    Essential hypertension [I10] 2016 Yes    Secondary hyperparathyroidism of renal origin [N25.81]  12/06/2016 Yes    Polycystic kidney disease [Q61.3]  Not Applicable      Problems Resolved During this Admission:    Diagnosis Date Noted Date Resolved POA    Renal failure [N19] 11/06/2017 11/07/2017 Yes       Treatments: surgery: see HPI.    Consults         Status Ordering Provider     Inpatient consult to Transplant Nephrology (KTM)  Once     Provider:  (Not yet assigned)    Completed KANE WILSON     IP consult to dietary  Once     Provider:  (Not yet assigned)    Completed KANE WILSON          Pending Diagnostic Studies:     Procedure Component Value Units Date/Time    Glucose 6 phosphate dehydrogenase [[id number]] Collected:  11/07/17 0526    Order Status:  Sent Lab Status:  In process Updated:  11/07/17 0527    Specimen:  Blood from Blood     Narrative:       Collection has been rescheduled by PMK at 11/7/2017 04:35 Reason: pt   in surgery         Significant Diagnostic Studies: Labs:   Lab Results   Component Value Date    WBC 2.34 (L) 11/11/2017    HGB 8.3 (L) 11/11/2017    HCT 25.5 (L) 11/11/2017    MCV 99 (H) 11/11/2017     (L) 11/11/2017     CMP  Sodium   Date Value Ref Range Status   11/11/2017 146 (H) 136 - 145 mmol/L Final     Potassium   Date Value Ref Range Status   11/11/2017 4.5 3.5 - 5.1 mmol/L Final     Chloride   Date Value Ref Range Status   11/11/2017 116 (H) 95 - 110 mmol/L Final     CO2   Date Value Ref Range Status   11/11/2017 24 23 - 29 mmol/L Final     Glucose   Date Value Ref Range Status   11/11/2017 91 70 - 110 mg/dL Final     BUN, Bld   Date Value Ref Range Status   11/11/2017 12 6 - 20 mg/dL Final     Creatinine   Date Value Ref Range Status   11/11/2017 0.9 0.5 - 1.4 mg/dL Final     Calcium   Date Value Ref Range Status   11/11/2017 7.7 (L) 8.7 - 10.5 mg/dL Final     Total Protein   Date Value Ref Range Status   11/11/2017 5.0 (L) 6.0 - 8.4 g/dL Final     Albumin   Date Value Ref Range Status   11/11/2017 2.4 (L) 3.5 - 5.2 g/dL Final     Total Bilirubin    Date Value Ref Range Status   11/11/2017 0.5 0.1 - 1.0 mg/dL Final     Comment:     For infants and newborns, interpretation of results should be based  on gestational age, weight and in agreement with clinical  observations.  Premature Infant recommended reference ranges:  Up to 24 hours.............<8.0 mg/dL  Up to 48 hours............<12.0 mg/dL  3-5 days..................<15.0 mg/dL  6-29 days.................<15.0 mg/dL       Alkaline Phosphatase   Date Value Ref Range Status   11/11/2017 284 (H) 55 - 135 U/L Final     AST   Date Value Ref Range Status   11/11/2017 198 (H) 10 - 40 U/L Final     ALT   Date Value Ref Range Status   11/11/2017 366 (H) 10 - 44 U/L Final     Anion Gap   Date Value Ref Range Status   11/11/2017 6 (L) 8 - 16 mmol/L Final     eGFR if    Date Value Ref Range Status   11/11/2017 >60.0 >60 mL/min/1.73 m^2 Final     eGFR if non    Date Value Ref Range Status   11/11/2017 >60.0 >60 mL/min/1.73 m^2 Final     Comment:     Calculation used to obtain the estimated glomerular filtration  rate (eGFR) is the CKD-EPI equation.        Tacrolimus Lvl   Date Value Ref Range Status   11/10/2017 6.8 5.0 - 15.0 ng/mL Final     Comment:     Testing performed by Liquid Chromatography-Tandem  Mass Spectrometry (LC-MS/MS).  This test was developed and its performance characteristics  determined by [facility], Department of Pathology  and Laboratory Medicine in a manner consistent with CLIA  requirements. It has not been cleared or approved by the [place]  Food and Drug Administration.  This test is used for clinical   purposes.  It should not be regarded as investigational or for  research.     11/09/2017 2.4 (L) 5.0 - 15.0 ng/mL Final     Comment:     Testing performed by Liquid Chromatography-Tandem  Mass Spectrometry (LC-MS/MS).  This test was developed and its performance characteristics  determined by [facility], Department of Pathology  and  Laboratory Medicine in a manner consistent with CLIA  requirements. It has not been cleared or approved by the [place]  Food and Drug Administration.  This test is used for clinical   purposes.  It should not be regarded as investigational or for  research.     11/08/2017 <1.5 (L) 5.0 - 15.0 ng/mL Final     Comment:     Testing performed by Liquid Chromatography-Tandem  Mass Spectrometry (LC-MS/MS).  This test was developed and its performance characteristics  determined by [facility], Department of Pathology  and Laboratory Medicine in a manner consistent with CLIA  requirements. It has not been cleared or approved by the [place]  Food and Drug Administration.  This test is used for clinical   purposes.  It should not be regarded as investigational or for  research.       All labs within the past 24 hours have been reviewed    Discharged Condition: good    Medications:  Reconciled Home Medications:   Current Discharge Medication List      START taking these medications    Details   bisacodyl (DULCOLAX) 5 mg EC tablet Take 2 tablets (10 mg total) by mouth once daily.  Qty: 30 tablet, Refills: 0      cinacalcet (SENSIPAR) 30 MG Tab Take 1 tablet (30 mg total) by mouth daily with breakfast.  Qty: 30 tablet, Refills: 11      ergocalciferol (ERGOCALCIFEROL) 50,000 unit Cap Take 1 capsule (50,000 Units total) by mouth every 7 days. Wednesday  Qty: 4 capsule, Refills: 3      famotidine (PEPCID) 20 MG tablet Take 1 tablet (20 mg total) by mouth every evening.  Qty: 30 tablet, Refills: 2      k phos di & mono-sod phos mono (K-PHOS-NEUTRAL) 250 mg Tab Take 2 tablets by mouth 2 (two) times daily.  Qty: 120 tablet, Refills: 2      multivitamin (THERAGRAN) tablet Take 1 tablet by mouth once daily.      mycophenolate (MYFORTIC) 180 MG TbEC Take 2 tablets (360 mg total) by mouth 2 (two) times daily.  Qty: 120 tablet, Refills: 11      nystatin (MYCOSTATIN) 100,000 unit/mL suspension Take 5 mLs (500,000 Units total) by mouth 2  hours after meals and at bedtime. STOP 12/6/17  Qty: 473 mL, Refills: 0      oxyCODONE (ROXICODONE) 5 MG immediate release tablet Take 0.5-1 tablets (2.5-5 mg total) by mouth every 4 (four) hours as needed for Pain.  Qty: 30 tablet, Refills: 0      sodium bicarbonate 650 MG tablet Take 1 tablet (650 mg total) by mouth 2 (two) times daily.  Qty: 60 tablet, Refills: 2      tacrolimus (PROGRAF) 1 MG Cap Take 6 capsules (6 mg total) by mouth every 12 (twelve) hours.  Qty: 360 capsule, Refills: 11      valGANciclovir (VALCYTE) 450 mg Tab Take 2 tablets (900 mg total) by mouth once daily. STOP 2/5/18  Qty: 60 tablet, Refills: 2         CONTINUE these medications which have CHANGED    Details   !! carvedilol (COREG) 3.125 MG tablet Take 1 tablet (3.125 mg total) by mouth 2 (two) times daily.  Qty: 60 tablet, Refills: 11      !! carvedilol (COREG) 6.25 MG tablet Take 1 tablet (6.25 mg total) by mouth 2 (two) times daily.  Qty: 60 tablet, Refills: 11       !! - Potential duplicate medications found. Please discuss with provider.      CONTINUE these medications which have NOT CHANGED    Details   atorvastatin (LIPITOR) 20 MG tablet Take 20 mg by mouth once daily.       docusate sodium (COLACE) 100 MG capsule Take 100 mg by mouth 2 (two) times daily.      gabapentin (NEURONTIN) 100 MG capsule TK 1 TO 3 CS PO QHS  Refills: 3           Time spent caring for patient (Greater than 1/2 spent in direct face-to-face contact): > 30 minutes    Dia Main, NP-C  Kidney Transplant  [facility]

## 2017-11-08 NOTE — ASSESSMENT & PLAN NOTE
- Continue Bactrim for PCP prophylaxis  - Continue Valcyte for CMV prophylaxis  - Continue nystatin for thrush prophylaxis

## 2017-11-08 NOTE — PROGRESS NOTES
[facility]  Kidney Transplant  Progress Note      Reason for Follow-up: Reassessment of Kidney Transplant - 2017  (#2) recipient and management of immunosuppression.    ORGAN:  RIGHT KIDNEY   Donor Type:   - Brain Death       Subjective:     History of Present Illness:   Ms. Apurva Rodriguez is a 52 y.o female with a PMH of PKD with previous RLQ renal transplant (3/16/).  Prior to admit she was on nightly peritoneal dialysis, without complication. Her urine output is about 0.5-0.75 liters/day. PMH otherwise significant for parathyroidectomy and AVN requiring hip replacement.  She was admitted 17 for DBD kidney transplant which took place this AM without complication.  PD cath was removed.  It was noted that pt had significant scar tissue and a friable bladder and a 7 day pollack was recommended and drain was placed.  Post op labs with stable H/H, nice drop in Cr, and pt with good UOP.    Pre op labs with Vit D 24 and .  Cinacalcet started.  Of note, pt with parathyroidectomy in past with implant in shoulder.  Her recent PTHs have not been as high- unclear specific numbers.    Interval history:  Creatinine continues to trend down nicely - 1.8 today with great UOP.  She appears to be positive 5 L from yesterday- plan to d/c IVF.  Post surgery, Mrs. Rodriguez's pain is controlled, she is ambulating well, tolerating a diet, using IS.  ABIGAIL serosang.  Pt placed on pathway.      Of note, she reports coughing on phlegm this AM with subsequent irritation of uvula leading to difficulty swallowing.  Upon examination, a fluid filled area adjacent to the uvula on L side present the size of a pea.  Plan to discuss with ENT.        Immunosuppressants     Start     Stop Route Frequency Ordered    17 1800  tacrolimus capsule 4 mg  (IP TXP POST-OP CAMPATH)      -- Oral 2 times daily 17 1029    17 1000  mycophenolate capsule 500 mg  (IP TXP POST-OP CAMPATH)      --  Oral 2 times daily 11/07/17 0427          Previous Transplant: yes    Past Medical and Surgical History: Ms. Rodriguez has a past medical history of Anemia; Avascular necrosis left hip (12/6/2016); Dyslipidemia (12/6/2016); ESRD (end stage renal disease) (12/6/2016); Essential hypertension (12/6/2016); Gastroesophageal reflux disease without esophagitis (12/6/2016); Hypertension; Polycystic kidney disease; S/P bone marrow biopsy (12/6/2016); and Secondary hyperparathyroidism of renal origin (12/6/2016).  She has a past surgical history that includes Eye muscle surgery (Bilateral); Peritoneal catheter insertion; Kidney transplant (Right, 1996); Peritoneal catheter insertion; Cholecystectomy; Total hip arthroplasty (Left, 2001); right hip decompression; left hip revision; Parathyroidectomy; Abdominal hernia repair; Fracture surgery (Right); and Tonsillectomy.    Past Social and Family History: Ms. Rodriguez reports that she has never smoked. She does not have any smokeless tobacco history on file. She reports that she does not drink alcohol or use drugs.Her family history includes COPD in her father; Cancer in her paternal grandmother and sister; Kidney disease in her sister; No Known Problems in her mother.    Intake/Output - Last 3 Shifts       11/06 0700 - 11/07 0659 11/07 0700 - 11/08 0659 11/08 0700 - 11/09 0659    P.O.  4370 645    I.V. (mL/kg) 3800 (60.8) 7924.7 (125) 723.8 (11.4)    IV Piggyback  1050 100    Total Intake(mL/kg) 3800 (60.8) 94652.7 (210.5) 1468.8 (23.2)    Urine (mL/kg/hr) 1175 7975 (5.2) 1125 (2.4)    Drains 25 150 (0.1) 50 (0.1)    Blood 300      Total Output 1500 8125 1175    Net +2300 +5219.7 +293.8           Urine Occurrence 1 x      Stool Occurrence 0 x             Review of Systems   Constitutional: Negative for activity change, appetite change, chills, diaphoresis, fatigue and fever.   HENT: Positive for sore throat and trouble swallowing. Negative for mouth sores.    Respiratory:  Negative for cough and shortness of breath.    Cardiovascular: Negative.  Negative for leg swelling.   Gastrointestinal: Positive for abdominal pain and constipation. Negative for abdominal distention, diarrhea, nausea and vomiting.   Genitourinary: Negative for flank pain and hematuria.   Musculoskeletal: Negative for arthralgias, back pain and myalgias.   Skin: Positive for wound.   Neurological: Negative for tremors and weakness.   Psychiatric/Behavioral: Negative for dysphoric mood and sleep disturbance. The patient is not nervous/anxious.      Objective:     Vital Signs (Most Recent):  Temp: 98 °F (36.7 °C) (11/08/17 1116)  Pulse: 88 (11/08/17 1116)  Resp: 18 (11/08/17 1116)  BP: (!) 141/73 (11/08/17 1116)  SpO2: (!) 94 % (11/08/17 1116) Vital Signs (24h Range):  Temp:  [97.7 °F (36.5 °C)-98.7 °F (37.1 °C)] 98 °F (36.7 °C)  Pulse:  [65-89] 88  Resp:  [18] 18  SpO2:  [94 %-100 %] 94 %  BP: (125-141)/(64-75) 141/73     Weight: 63.4 kg (139 lb 12.4 oz)  Height: 5' (152.4 cm)  Body mass index is 27.3 kg/m².    Physical Exam   Constitutional: She is oriented to person, place, and time. She appears well-developed and well-nourished. No distress.   HENT:   Head: Normocephalic and atraumatic.   Mouth/Throat:       Eyes: No scleral icterus.   Cardiovascular: Normal rate, regular rhythm and normal heart sounds.  Exam reveals no gallop and no friction rub.    No murmur heard.  Pulmonary/Chest: Effort normal and breath sounds normal. She has no rales.   Abdominal: Bowel sounds are normal. She exhibits distension. There is no tenderness.   Ktx incision bandaged with minimal oozing on dressing  ABIGAIL x 1 serosang   Musculoskeletal: Normal range of motion. She exhibits no edema.   Neurological: She is alert and oriented to person, place, and time.   Skin: Skin is warm and dry. She is not diaphoretic.   Psychiatric: She has a normal mood and affect. Her behavior is normal. Judgment and thought content normal.   Nursing note and  vitals reviewed.      Significant Labs:  CBC:   Recent Labs  Lab 175 17  0807   WBC 4.57  --  11.32 5.44  --    RBC 3.08*  --  2.54* 2.17*  --    HGB 10.1*  --  8.3* 7.0* 7.8*   HCT 30.0*  < > 25.7* 21.8* 24.7*   *  --  92* 70*  --    MCV 97  --  101* 101*  --    MCH 32.8*  --  32.7* 32.3*  --    MCHC 33.7  --  32.3 32.1  --    < > = values in this interval not displayed.  CMP:     Recent Labs  Lab 175   GLU 89 133* 233* 110   CALCIUM 9.0 7.7* 7.5* 7.4*   ALBUMIN 3.5 2.3* 2.2* 1.9*   PROT 7.1  --   --  4.3*   * 136 135* 140   K 3.7 3.3* 3.5 3.3*   CO2 24 21* 18* 21*   CL 92* 108 106 115*   BUN 74* 66* 55* 29*   CREATININE 6.4* 5.3* 4.4* 1.8*   ALKPHOS 194*  --   --  156*   ALT 25  --   --  47*   AST 20  --   --  62*     Labs within the past 24 hours have been reviewed.      Assessment/Plan:     * Status post -donor kidney transplantation    - Labs trending down nicely with great UOP  - Continue pathway, D/C IVF  - Keep -140 for now        Prophylactic immunotherapy    - Campath induction protocol  - Continue tacrolimus and Cellcept  - Will monitor for signs of toxic side effects, check daily tacrolimus troughs, and change meds accordingly.          Long-term use of immunosuppressant medication              At risk for opportunistic infections    - Continue Bactrim for PCP prophylaxis  - Continue Valcyte for CMV prophylaxis  - Continue nystatin for thrush prophylaxis          Difficulty swallowing    - Pt with inflammation of uvula s/p coughing on phlegm this AM  - Now with diffifulty eating hard foods  - Continue soft foods  - ENT paged to discuss- will await return call          Polycystic kidney disease              Essential hypertension    - Hold off on starting home amlodipine for lower pressures  - Goal pressure for now 130-140 SBP  - Start low dose Coreg (home med) 3.125 mg with  hold parameters          Secondary hyperparathyroidism of renal origin    - Continue cinacalcet and ergo  - Monitor              Discharge Planning:  D/C Friday.    Wolf Fernandez PA-C  Kidney Transplant  [facility]

## 2017-11-08 NOTE — NURSING
Pt AAO, wife at the bedside. VSS(Q2), see flowsheet for further assessment data.    ACX AC/HS. Last .    POD #1. LLQ incision HARLAN w/ staples. 1 J/P drain in place; mls(SS). Pain well controlled, PCA pump in use(2mg max, 6min lock out).     Q2 CVPs; 6-9.    Infection control in progress; Ancef administered as ordered.    I/Os monitored.I=Os.Pollack remains patent(7 day pollack)    Pt up with 1 assist ambulated in room; Ambulated X 1.      Fall precautions in place; pt remains free of injury. Daily labs monitored. NO acute events overnight.

## 2017-11-09 PROBLEM — R19.7 DIARRHEA: Status: ACTIVE | Noted: 2017-11-09

## 2017-11-09 PROBLEM — E87.20 METABOLIC ACIDOSIS: Status: ACTIVE | Noted: 2017-11-09

## 2017-11-09 PROBLEM — R13.10 DIFFICULTY SWALLOWING: Status: RESOLVED | Noted: 2017-11-08 | Resolved: 2017-11-09

## 2017-11-09 PROBLEM — E83.39 HYPOPHOSPHATEMIA: Status: ACTIVE | Noted: 2017-11-09

## 2017-11-09 PROBLEM — J02.9 SORE THROAT: Status: ACTIVE | Noted: 2017-11-09

## 2017-11-09 LAB
ALBUMIN SERPL BCP-MCNC: 2.2 G/DL
ALBUMIN SERPL BCP-MCNC: 2.5 G/DL
ALP SERPL-CCNC: 241 U/L
ALT SERPL W/O P-5'-P-CCNC: 172 U/L
ANION GAP SERPL CALC-SCNC: 5 MMOL/L
ANION GAP SERPL CALC-SCNC: 7 MMOL/L
AST SERPL-CCNC: 153 U/L
BACTERIA FLD AEROBE CULT: NO GROWTH
BASOPHILS # BLD AUTO: 0 K/UL
BASOPHILS NFR BLD: 0 %
BILIRUB SERPL-MCNC: 0.6 MG/DL
BLD PROD TYP BPU: NORMAL
BLOOD UNIT EXPIRATION DATE: NORMAL
BLOOD UNIT TYPE CODE: 5100
BLOOD UNIT TYPE: NORMAL
BUN SERPL-MCNC: 15 MG/DL
BUN SERPL-MCNC: 17 MG/DL
CALCIUM SERPL-MCNC: 7.8 MG/DL
CALCIUM SERPL-MCNC: 8.3 MG/DL
CHLORIDE SERPL-SCNC: 114 MMOL/L
CHLORIDE SERPL-SCNC: 116 MMOL/L
CO2 SERPL-SCNC: 19 MMOL/L
CO2 SERPL-SCNC: 23 MMOL/L
CODING SYSTEM: NORMAL
CREAT SERPL-MCNC: 1 MG/DL
CREAT SERPL-MCNC: 1.1 MG/DL
DIFFERENTIAL METHOD: ABNORMAL
DISPENSE STATUS: NORMAL
EOSINOPHIL # BLD AUTO: 0 K/UL
EOSINOPHIL NFR BLD: 0 %
ERYTHROCYTE [DISTWIDTH] IN BLOOD BY AUTOMATED COUNT: 15.3 %
EST. GFR  (AFRICAN AMERICAN): >60 ML/MIN/1.73 M^2
EST. GFR  (AFRICAN AMERICAN): >60 ML/MIN/1.73 M^2
EST. GFR  (NON AFRICAN AMERICAN): 57.1 ML/MIN/1.73 M^2
EST. GFR  (NON AFRICAN AMERICAN): >60 ML/MIN/1.73 M^2
GLUCOSE SERPL-MCNC: 222 MG/DL
GLUCOSE SERPL-MCNC: 85 MG/DL
GLUCOSE-6-PHOSPHATE DEHYDROGENASE QUAL: NORMAL
GRAM STN SPEC: NORMAL
HCT VFR BLD AUTO: 21.2 %
HCT VFR BLD AUTO: 21.2 %
HGB BLD-MCNC: 6.9 G/DL
HGB BLD-MCNC: 7 G/DL
IMM GRANULOCYTES # BLD AUTO: 0.02 K/UL
IMM GRANULOCYTES NFR BLD AUTO: 0.4 %
LYMPHOCYTES # BLD AUTO: 0 K/UL
LYMPHOCYTES NFR BLD: 0 %
MAGNESIUM SERPL-MCNC: 1.6 MG/DL
MCH RBC QN AUTO: 32.9 PG
MCHC RBC AUTO-ENTMCNC: 32.5 G/DL
MCV RBC AUTO: 101 FL
MONOCYTES # BLD AUTO: 0.2 K/UL
MONOCYTES NFR BLD: 3.1 %
NEUTROPHILS # BLD AUTO: 5 K/UL
NEUTROPHILS NFR BLD: 96.5 %
NRBC BLD-RTO: 0 /100 WBC
NUM UNITS TRANS PACKED RBC: NORMAL
PHOSPHATE SERPL-MCNC: 1.9 MG/DL
PLATELET # BLD AUTO: 65 K/UL
PMV BLD AUTO: 9.7 FL
POCT GLUCOSE: 112 MG/DL (ref 70–110)
POCT GLUCOSE: 169 MG/DL (ref 70–110)
POCT GLUCOSE: 174 MG/DL (ref 70–110)
POCT GLUCOSE: 189 MG/DL (ref 70–110)
POTASSIUM SERPL-SCNC: 3.6 MMOL/L
POTASSIUM SERPL-SCNC: 4.4 MMOL/L
PROT SERPL-MCNC: 5.7 G/DL
RBC # BLD AUTO: 2.1 M/UL
SODIUM SERPL-SCNC: 142 MMOL/L
SODIUM SERPL-SCNC: 142 MMOL/L
TACROLIMUS BLD-MCNC: 2.4 NG/ML
WBC # BLD AUTO: 5.17 K/UL

## 2017-11-09 PROCEDURE — 25000003 PHARM REV CODE 250: Performed by: NURSE PRACTITIONER

## 2017-11-09 PROCEDURE — P9016 RBC LEUKOCYTES REDUCED: HCPCS

## 2017-11-09 PROCEDURE — 63600175 PHARM REV CODE 636 W HCPCS: Performed by: NURSE PRACTITIONER

## 2017-11-09 PROCEDURE — 36430 TRANSFUSION BLD/BLD COMPNT: CPT

## 2017-11-09 PROCEDURE — 85025 COMPLETE CBC W/AUTO DIFF WBC: CPT

## 2017-11-09 PROCEDURE — 99233 SBSQ HOSP IP/OBS HIGH 50: CPT | Mod: 24,,, | Performed by: NURSE PRACTITIONER

## 2017-11-09 PROCEDURE — 97803 MED NUTRITION INDIV SUBSEQ: CPT | Performed by: DIETITIAN, REGISTERED

## 2017-11-09 PROCEDURE — 20600001 HC STEP DOWN PRIVATE ROOM

## 2017-11-09 PROCEDURE — 25000003 PHARM REV CODE 250: Performed by: PHYSICIAN ASSISTANT

## 2017-11-09 PROCEDURE — 85014 HEMATOCRIT: CPT

## 2017-11-09 PROCEDURE — 80069 RENAL FUNCTION PANEL: CPT

## 2017-11-09 PROCEDURE — 63600175 PHARM REV CODE 636 W HCPCS: Performed by: TRANSPLANT SURGERY

## 2017-11-09 PROCEDURE — 80053 COMPREHEN METABOLIC PANEL: CPT

## 2017-11-09 PROCEDURE — 83735 ASSAY OF MAGNESIUM: CPT

## 2017-11-09 PROCEDURE — 63600175 PHARM REV CODE 636 W HCPCS: Performed by: PHYSICIAN ASSISTANT

## 2017-11-09 PROCEDURE — 85018 HEMOGLOBIN: CPT

## 2017-11-09 PROCEDURE — 25000003 PHARM REV CODE 250: Performed by: INTERNAL MEDICINE

## 2017-11-09 PROCEDURE — 25000003 PHARM REV CODE 250: Performed by: SURGERY

## 2017-11-09 PROCEDURE — 80197 ASSAY OF TACROLIMUS: CPT

## 2017-11-09 PROCEDURE — 63600175 PHARM REV CODE 636 W HCPCS: Performed by: SURGERY

## 2017-11-09 PROCEDURE — 63600175 PHARM REV CODE 636 W HCPCS: Performed by: INTERNAL MEDICINE

## 2017-11-09 RX ORDER — HYDROCODONE BITARTRATE AND ACETAMINOPHEN 500; 5 MG/1; MG/1
TABLET ORAL
Status: DISCONTINUED | OUTPATIENT
Start: 2017-11-09 | End: 2017-11-14

## 2017-11-09 RX ORDER — HEPARIN SODIUM 5000 [USP'U]/ML
5000 INJECTION, SOLUTION INTRAVENOUS; SUBCUTANEOUS EVERY 12 HOURS
Status: DISCONTINUED | OUTPATIENT
Start: 2017-11-09 | End: 2017-11-14 | Stop reason: HOSPADM

## 2017-11-09 RX ORDER — ACETAMINOPHEN 325 MG/1
650 TABLET ORAL EVERY 6 HOURS
Status: DISCONTINUED | OUTPATIENT
Start: 2017-11-09 | End: 2017-11-09

## 2017-11-09 RX ORDER — OXYCODONE HCL 5 MG/5 ML
5 SOLUTION, ORAL ORAL EVERY 4 HOURS PRN
Status: DISCONTINUED | OUTPATIENT
Start: 2017-11-09 | End: 2017-11-10

## 2017-11-09 RX ORDER — SODIUM BICARBONATE 650 MG/1
650 TABLET ORAL 2 TIMES DAILY
Status: DISCONTINUED | OUTPATIENT
Start: 2017-11-09 | End: 2017-11-14 | Stop reason: HOSPADM

## 2017-11-09 RX ORDER — TACROLIMUS 1 MG/1
6 CAPSULE ORAL 2 TIMES DAILY
Status: DISCONTINUED | OUTPATIENT
Start: 2017-11-09 | End: 2017-11-13

## 2017-11-09 RX ORDER — MYCOPHENOLIC ACID 180 MG/1
360 TABLET, DELAYED RELEASE ORAL 2 TIMES DAILY
Status: DISCONTINUED | OUTPATIENT
Start: 2017-11-09 | End: 2017-11-12

## 2017-11-09 RX ORDER — OXYCODONE HCL 5 MG/5 ML
2.5 SOLUTION, ORAL ORAL EVERY 4 HOURS PRN
Status: DISCONTINUED | OUTPATIENT
Start: 2017-11-09 | End: 2017-11-10

## 2017-11-09 RX ORDER — TACROLIMUS 1 MG/1
2 CAPSULE ORAL ONCE
Status: COMPLETED | OUTPATIENT
Start: 2017-11-09 | End: 2017-11-09

## 2017-11-09 RX ORDER — ACETAMINOPHEN 650 MG/20.3ML
650 LIQUID ORAL EVERY 6 HOURS
Status: DISCONTINUED | OUTPATIENT
Start: 2017-11-09 | End: 2017-11-10

## 2017-11-09 RX ORDER — MAGNESIUM SULFATE HEPTAHYDRATE 40 MG/ML
2 INJECTION, SOLUTION INTRAVENOUS ONCE
Status: COMPLETED | OUTPATIENT
Start: 2017-11-09 | End: 2017-11-09

## 2017-11-09 RX ADMIN — DIBASIC SODIUM PHOSPHATE, MONOBASIC POTASSIUM PHOSPHATE AND MONOBASIC SODIUM PHOSPHATE 2 TABLET: 852; 155; 130 TABLET ORAL at 09:11

## 2017-11-09 RX ADMIN — NYSTATIN 500000 UNITS: 500000 SUSPENSION ORAL at 08:11

## 2017-11-09 RX ADMIN — CARVEDILOL 3.12 MG: 3.12 TABLET, FILM COATED ORAL at 08:11

## 2017-11-09 RX ADMIN — THERA TABS 1 TABLET: TAB at 08:11

## 2017-11-09 RX ADMIN — METHYLPREDNISOLONE SODIUM SUCCINATE 125 MG: 125 INJECTION, POWDER, FOR SOLUTION INTRAMUSCULAR; INTRAVENOUS at 08:11

## 2017-11-09 RX ADMIN — MYCOPHENOLATE MOFETIL 500 MG: 250 CAPSULE ORAL at 08:11

## 2017-11-09 RX ADMIN — CINACALCET HYDROCHLORIDE 30 MG: 30 TABLET, COATED ORAL at 08:11

## 2017-11-09 RX ADMIN — NYSTATIN 500000 UNITS: 500000 SUSPENSION ORAL at 02:11

## 2017-11-09 RX ADMIN — OXYCODONE HYDROCHLORIDE 2.5 MG: 5 SOLUTION ORAL at 08:11

## 2017-11-09 RX ADMIN — CARVEDILOL 3.12 MG: 3.12 TABLET, FILM COATED ORAL at 09:11

## 2017-11-09 RX ADMIN — DIBASIC SODIUM PHOSPHATE, MONOBASIC POTASSIUM PHOSPHATE AND MONOBASIC SODIUM PHOSPHATE 2 TABLET: 852; 155; 130 TABLET ORAL at 08:11

## 2017-11-09 RX ADMIN — TACROLIMUS 2 MG: 1 CAPSULE ORAL at 10:11

## 2017-11-09 RX ADMIN — MAGNESIUM SULFATE IN WATER 2 G: 40 INJECTION, SOLUTION INTRAVENOUS at 10:11

## 2017-11-09 RX ADMIN — VALGANCICLOVIR 450 MG: 450 TABLET, FILM COATED ORAL at 08:11

## 2017-11-09 RX ADMIN — TACROLIMUS 4 MG: 1 CAPSULE ORAL at 08:11

## 2017-11-09 RX ADMIN — ACETAMINOPHEN 650 MG: 650 SOLUTION ORAL at 06:11

## 2017-11-09 RX ADMIN — ACETAMINOPHEN 650 MG: 325 TABLET ORAL at 09:11

## 2017-11-09 RX ADMIN — SODIUM BICARBONATE 650 MG TABLET 650 MG: at 10:11

## 2017-11-09 RX ADMIN — TRAMADOL HYDROCHLORIDE 25 MG: 50 TABLET, FILM COATED ORAL at 11:11

## 2017-11-09 RX ADMIN — TACROLIMUS 6 MG: 1 CAPSULE ORAL at 06:11

## 2017-11-09 RX ADMIN — FAMOTIDINE 20 MG: 20 TABLET, FILM COATED ORAL at 09:11

## 2017-11-09 RX ADMIN — HEPARIN SODIUM 5000 UNITS: 5000 INJECTION, SOLUTION INTRAVENOUS; SUBCUTANEOUS at 09:11

## 2017-11-09 RX ADMIN — SODIUM BICARBONATE 650 MG TABLET 650 MG: at 09:11

## 2017-11-09 RX ADMIN — NYSTATIN 500000 UNITS: 500000 SUSPENSION ORAL at 09:11

## 2017-11-09 RX ADMIN — ONDANSETRON 8 MG: 8 TABLET, ORALLY DISINTEGRATING ORAL at 02:11

## 2017-11-09 RX ADMIN — NYSTATIN 500000 UNITS: 500000 SUSPENSION ORAL at 06:11

## 2017-11-09 RX ADMIN — POTASSIUM PHOSPHATE, MONOBASIC AND POTASSIUM PHOSPHATE, DIBASIC 20 MMOL: 224; 236 INJECTION, SOLUTION, CONCENTRATE INTRAVENOUS at 06:11

## 2017-11-09 RX ADMIN — SULFAMETHOXAZOLE AND TRIMETHOPRIM 1 TABLET: 400; 80 TABLET ORAL at 08:11

## 2017-11-09 RX ADMIN — OXYCODONE HYDROCHLORIDE 2.5 MG: 5 SOLUTION ORAL at 02:11

## 2017-11-09 RX ADMIN — MYCOPHENILIC ACID 360 MG: 180 TABLET, DELAYED RELEASE ORAL at 09:11

## 2017-11-09 NOTE — ASSESSMENT & PLAN NOTE
- s/p intubation.  ENT consulted.  Mild erythema to oropharynx noted.  ENT recommended ice chips and Decadron.  Pt is on solu-medrol.  Pt having some improvement w Chloraseptic spray.  - Cont liquids/soft food.  Replacing electrolytes.  Monitor.

## 2017-11-09 NOTE — CONSULTS
Subjective:       Patient ID: Apurva Rodriguez is a 52 y.o. female.    Reason for consult: uvular edema     HPI   53 y/o F with ESRD POD#2 s/p kidney transplant. Intubation was uneventful. She was extubated without complications postoperatively. She report noticing throat irritation with coughing and gagging earlier today. She denies throat pain or odynophagia. She denies dyspnea or SOB. ENT consulted to evaluate edematous uvula.    Review of Systems    See HPI    Past Medical History: she  has a past medical history of Anemia; Avascular necrosis left hip (12/6/2016); Dyslipidemia (12/6/2016); ESRD (end stage renal disease) (12/6/2016); Essential hypertension (12/6/2016); Gastroesophageal reflux disease without esophagitis (12/6/2016); Hypertension; Polycystic kidney disease; S/P bone marrow biopsy (12/6/2016); and Secondary hyperparathyroidism of renal origin (12/6/2016).    Past Surgical History: she  has a past surgical history that includes Eye muscle surgery (Bilateral); Peritoneal catheter insertion; Kidney transplant (Right, 1996); Peritoneal catheter insertion; Cholecystectomy; Total hip arthroplasty (Left, 2001); right hip decompression; left hip revision; Parathyroidectomy; Abdominal hernia repair; Fracture surgery (Right); and Tonsillectomy.    Social History: she  reports that she has never smoked. She does not have any smokeless tobacco history on file. She reports that she does not drink alcohol or use drugs.    Family History: family history includes COPD in her father; Cancer in her paternal grandmother and sister; Kidney disease in her sister; No Known Problems in her mother.    Medications:    bisacodyl  10 mg Oral Daily    carvedilol  3.125 mg Oral BID    cinacalcet  30 mg Oral Daily with breakfast    docusate sodium  100 mg Oral TID    ergocalciferol  50,000 Units Oral Q7 Days    famotidine  20 mg Oral QHS    heparin (porcine)  5,000 Units Subcutaneous Q8H    k phos di & mono-sod phos  mono  500 mg Oral BID    [START ON 11/9/2017] methylPREDNISolone sodium succinate  125 mg Intravenous Once    multivitamin  1 tablet Oral Daily    mupirocin  1 g Nasal BID    mycophenolate  500 mg Oral BID    nystatin  500,000 Units Mouth/Throat QID (PC + HS)    sulfamethoxazole-trimethoprim 400-80mg  1 tablet Oral Daily    tacrolimus  4 mg Oral BID    valGANciclovir  450 mg Oral Daily       Allergies: she is allergic to amphotericin b; azelex [azelaic acid]; cleocin [clindamycin hcl]; decadron [dexamethasone]; morphine; reglan [metoclopramide hcl]; valumag; and vancomycin analogues.    Objective:      Physical Exam    Temp:  [97.7 °F (36.5 °C)-98.7 °F (37.1 °C)] 98.4 °F (36.9 °C)  Pulse:  [65-89] 83  Resp:  [16-18] 16  SpO2:  [94 %-100 %] 95 %  BP: (125-141)/(65-75) 141/75    Constitutional: Well appearing, communicating, alert, oriented, NAD.  Voice clear,   Eyes: EOM I Bilaterally  Head/Face: Normocephalic,  Negative paranasal sinus pressure/tenderness.  Salivary glands WNL.  House Brackmann I Bilaterally.  Right Ear: Auricle WNL.  Left Ear: Auricle WNL.  Nose: No gross nasal septal deviation. Inferior Turbinates 1+ bilaterally. No septal perforation. No masses/lesions. External nasal skin without masses/lesions.  Oral Cavity: Gingiva/lips WNL.  FOM Soft, no masses palpated. Oral Tongue mobile. Hard Palate WNL.   Oropharynx: Tonsillar fossa without lesions. Posterior oropharynx with cobblestoning and evidence of reflux; 2 mm prominent minor salivary gland left posterior pharyngeal wall.  Soft palate without masses. Midline uvula, elongated, edematous with mild erythema   Neck/Lymphatic: No LAD I-VI bilaterally.  Thyroidectomy scar well healed.  No masses noted on exam.  Respiratory: Normal respiratory effort, no stridor, no retractions noted.    CBC    Recent Labs  Lab 11/08/17  0415 11/08/17  0807   WBC 5.44  --    HGB 7.0* 7.8*   HCT 21.8* 24.7*   *  --    PLT 70*  --      BMP    Recent Labs  Lab  17  0415         K 3.3*   *   CO2 21*   BUN 29*   CREATININE 1.8*   CALCIUM 7.4*   MG 1.8       Assessment:       1. Status post -donor kidney transplantation    2. Renal failure    3. Pre-op chest exam    4. S/P kidney transplant    5. At risk for opportunistic infections    6. Essential hypertension    7. Long-term use of immunosuppressant medication    8. Prophylactic immunotherapy    9. Secondary hyperparathyroidism of renal origin        Uvular hydrops likely 2/2 to intubation/extubation  Plan:       -Encourage PO, ice chips  -Can consider decadron for edema  -Cont GERD treatment   -please call ENT with questions

## 2017-11-09 NOTE — ASSESSMENT & PLAN NOTE
Chronic Prophylactic Immunosuppression- cont to check prograf level daily.  Assess for toxicity and adjust level as needed.  Cellcept changed to Myfortic for diarrhea.

## 2017-11-09 NOTE — ASSESSMENT & PLAN NOTE
- bowel regimen discontinued. Cellcept changed to Myfortic.  Sister has diarrhea w prograf.  Pt was on Cyclosporine for last transplant.

## 2017-11-09 NOTE — PROGRESS NOTES
"SW met with patient and wife to assess coping. Patient and Caregiver present as alert and oriented x 4, pleasant, good eye contact, well groomed, recall good, concentration/judgement good, average intelligence, calm, communicative, cooperative and asking and answering questions appropriately. Pt reports that she is having a difficult time coping with this surgery and reports that it was much harder than the last time. Pt states, "Its taking a lot more out of me". SW offered support, reflective listening, and normalization of pt's feelings and concerns. Pt will continue to be followed for any continuity of care issues, discharge planning, and emotional support. SW remains available at [phone].    "

## 2017-11-09 NOTE — PHYSICIAN QUERY
"PT Name: Apuvra Rodriguez  MR #: [id number]    Physician Query Form - Hematology Clarification      CDS/: Zoya Cleaning               Contact information: [email or web address]   This form is a permanent document in the medical record.      Query Date: November 9, 2017    By submitting this query, we are merely seeking further clarification of documentation. Please utilize your independent clinical judgment when addressing the question(s) below.    The Medical record contains the following:   Indicators  Supporting Clinical Findings Location in Medical Record    "Anemia" documented     X H & H = Hemoglobin 10.1-6.9-7.0  Hematocrit 30-21.2 Labs 11/6-11/9    BP =                     HR=      "GI bleeding" documented     X Acute bleeding (Non GI site) EBL 300cc Op Note 11/7    Transfusion(s)     X Treatment: 1unit PRBC  Transfusion record     Other:        Provider, please specify diagnosis or diagnoses associated with above clinical findings.    [  ] Acute blood loss anemia expected post-operatively    [X ] Acute blood loss anemia    [ X ] Anemia of chronic disease ( Specify chronic disease)      [ X ] CKD (specify stage) ___________________________     [  ] Neoplastic disease      [  ] Due to Chemotherapy      [  ] Other (Specify) _______________________________     [  ] Clinically Undetermined     [  ] Other Hematological Diagnosis (please specify): _________________________________    [  ] Clinically Undetermined       Please document in your progress notes daily for the duration of treatment, until resolved, and include in your discharge summary.                                                                                                      "

## 2017-11-09 NOTE — ASSESSMENT & PLAN NOTE
Nutrition Diagnosis:  Increased nutrient needs    Related to (etiology):   Protein for healing and muscle maintenance    Signs and Symptoms (as evidenced by):   S/p OKTx 10/7    Interventions/Recommendations (treatment strategy):  See recs    Nutrition Diagnosis Status:   New

## 2017-11-09 NOTE — PLAN OF CARE
Problem: Patient Care Overview  Goal: Plan of Care Review  -Pt free from fall or injury so far this shift. Instructed to call if assistance needed, verbalized understanding.   -BG checked AC/HS. Last , no SSI coverage needed this shift.   -Pt with decreased appetite, and having difficulty swallowing solid foods due to throat discomfort. Pt encouraged to eat soft foods and drink Boost with each meal.   -Creat trending down (1). 1850 cc of urine so far today.   -H&H 6.9/21.2, redraw 7/21.1, 1 unit PRBC's given.   -Mg level 1.6, 2 gm Mg rider given. Phos level 1.9, 20 mmol IV k Phos given.   -Pt with abdominal cramps and loose stools, Cellcept DC'd, Myfortic started.   -Pt reports mild abdominal pain, states more discomfort to throat. Pt using Chloraseptic spray. Tylenol ordered scheduled Q 6 hrs.Tramadol given once. Oxycodone added to pain regimen, given once today. Pt encouraged to ask for pain meds, and to not wait until pain gets to severe, verbalized understanding.   -Pt alternating between sitting up in chair and resting in bed throughout the day. Pt walked the halls once today, pt aware she still needs to walk 2 more times this shift, verbalized understanding.   -Wife pulled self meds with 100% accuracy, wife also painting incision with Betadine.

## 2017-11-09 NOTE — NURSING
Pt AAO, wife at the bedside. VSS(Q4), see flowsheet for further assessment data.     ACX AC/HS. Last .     POD #2. LLQ incision HARLAN w/ staples. 1 J/P drain in place; 90 mls(SS). Pain well controlled, No pain meds given overnight   Q6 CVPs; Last cvp 9.     Infection control in progress; Ancef administered as ordered.     I/Os monitored. See chart for details.Pollack remains patent(7 day pollack); pollack irrigated X 1 due to pt's complaint of discomfort. No complications noted.     Pt up with 1 assist ambulated in room; Ambulated encouraged.        Fall precautions in place; pt remains free of injury. Daily labs monitored. NO acute events overnight.

## 2017-11-09 NOTE — PHYSICIAN QUERY
PT Name: Apurva Rodriguez  MR #: [id number]     Physician Query Form - Documentation Clarification      CDS/: Zoya Cleaning               Contact information:[email or web address]     This form is a permanent document in the medical record.     Query Date: November 9, 2017    By submitting this query, we are merely seeking further clarification of documentation. Please utilize your independent clinical judgment when addressing the question(s) below.    The Medical record reflects the following:    Supporting Clinical Findings Location in Medical Record   CO2 24-18-19    Sodium bicarbonate 650mg po bid Labs 11/6-11/9    MAR 11/9                                                                                Doctor, Please specify diagnosis or diagnoses associated with above clinical findings.    Provider Use Only          (X  ) Acidosis     (  ) Other_________________                                                                                                         [  ] Clinically undetermined

## 2017-11-09 NOTE — ASSESSMENT & PLAN NOTE
- Campath induction protocol.  Continue tacrolimus and Myfortic.  Will monitor for signs of toxic side effects, check daily tacrolimus troughs, and change meds accordingly.

## 2017-11-09 NOTE — ASSESSMENT & PLAN NOTE
- Hold off on starting home amlodipine for lower pressures  - Goal pressure for now 130-140 SBP  - Started low dose Coreg (home med) 3.125 mg with hold parameters

## 2017-11-09 NOTE — PROGRESS NOTES
[facility]  Kidney Transplant  Progress Note      Reason for Follow-up: Reassessment of Kidney Transplant - 2017  (#2) recipient and management of immunosuppression.    ORGAN:  RIGHT KIDNEY   Donor Type:   - Brain Death   PHS Increased Risk: yes   Cold Ischemia:   Induction Medications: Campath        Subjective:     History of Present Illness:   Ms. Apurva Rodriguez is a 52 y.o female with a PMH of PKD with previous RLQ renal transplant (3/16/).  Prior to admit she was on nightly peritoneal dialysis, without complication. Her urine output is about 0.5-0.75 liters/day. PMH otherwise significant for parathyroidectomy and AVN requiring hip replacement.  She was admitted 17 for DBD kidney transplant which took place this AM without complication.  PD cath was removed.  It was noted that pt had significant scar tissue and a friable bladder and a 7 day pollack was recommended and drain was placed.  Post op labs with stable H/H, nice drop in Cr, and pt with good UOP.    Pre op labs with Vit D 24 and .  Cinacalcet started.  Of note, pt with parathyroidectomy in past with implant in shoulder.  Her recent PTHs have not been as high- unclear specific numbers.    Interval history:  Cont to c/o throat pain, denies difficulty swallowing liquids.  Oropharyngeal w mild erythema.  Will crush large pills and give in apples sauce/pudding.  ENT recommended ice chips and Decadron- pt is on Solu Medrol.  Pain is slightly better w chloraseptic spray.  Encourage pt to gargle w warm, salt water.  Creatinine trending down nicely (1).  Excellent UOP (7 day pollack).  ABIGAIL drain w 140 ml serosang drainage.  She notes having 4 BMs yesterday and is having abdominal cramping.  Bowel regimen d/c'f.  Cellcept changed to Myfortic.  She reports her sister, also a kidney transplant recipient for the second time, has diarrhea w meals w Prograf.  Pt was on Cyclosporine w first transplant.  Will monitor.   Encourage OOB and ambulating in hallways.  Pt is not requesting pain meds except Tylenol.  Tylenol ATC ordered.  Ultram and Oxy available PRN.  Cont IS.      Ms. Rodriguez is a 52 y.o. 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  She received a living related kidney transplant on 11/7/17.  She was on hemodialysis at the time of transplantation.  She received induction therapy with Campath and her maintenance immunosuppression consists of:   Immunosuppressants     Start     Stop Route Frequency Ordered    11/09/17 2200  mycophenolate EC tablet 360 mg      -- Oral 2 times daily 11/09/17 1155    11/09/17 1800  tacrolimus capsule 6 mg  (IP TXP POST-OP CAMPATH)      -- Oral 2 times daily 11/09/17 1000          Previous Transplant: yes      Past Medical and Surgical History: Ms. Rodriguez has a past medical history of Anemia; Avascular necrosis left hip (12/6/2016); Dyslipidemia (12/6/2016); ESRD (end stage renal disease) (12/6/2016); Essential hypertension (12/6/2016); Gastroesophageal reflux disease without esophagitis (12/6/2016); Hypertension; Polycystic kidney disease; S/P bone marrow biopsy (12/6/2016); and Secondary hyperparathyroidism of renal origin (12/6/2016).  She has a past surgical history that includes Eye muscle surgery (Bilateral); Peritoneal catheter insertion; Kidney transplant (Right, 1996); Peritoneal catheter insertion; Cholecystectomy; Total hip arthroplasty (Left, 2001); right hip decompression; left hip revision; Parathyroidectomy; Abdominal hernia repair; Fracture surgery (Right); and Tonsillectomy.    Past Social and Family History: Ms. Rodriguez reports that she has never smoked. She does not have any smokeless tobacco history on file. She reports that she does not drink alcohol or use drugs.Her family history includes COPD in her father; Cancer in her paternal grandmother and sister; Kidney disease in her sister; No Known Problems in her mother.    Intake/Output  - Last 3 Shifts       11/07 0700 - 11/08 0659 11/08 0700 - 11/09 0659 11/09 0700 - 11/10 0659    P.O. 4370 1275 700    I.V. (mL/kg) 7924.7 (125) 723.8 (11.3)     Blood   350    IV Piggyback 1050 100 500    Total Intake(mL/kg) 78331.7 (210.5) 2098.8 (32.8) 1550 (24.3)    Urine (mL/kg/hr) 7975 (5.2) 3175 (2.1) 1900 (4.2)    Drains 150 (0.1) 140 (0.1)     Stool  0 (0)     Blood       Total Output 8125 3315 1900    Net +5219.7 -1216.3 -350           Urine Occurrence  0 x     Stool Occurrence  4 x            Review of Systems   Constitutional: Negative for activity change, appetite change, chills, diaphoresis, fatigue and fever.   HENT: Positive for sore throat. Negative for mouth sores and trouble swallowing.    Respiratory: Negative for cough and shortness of breath.    Cardiovascular: Negative.  Negative for leg swelling.   Gastrointestinal: Positive for abdominal pain and diarrhea. Negative for abdominal distention, constipation, nausea and vomiting.   Genitourinary: Negative for flank pain and hematuria.   Musculoskeletal: Negative for arthralgias, back pain and myalgias.   Skin: Positive for wound.   Allergic/Immunologic: Positive for immunocompromised state.   Neurological: Positive for weakness. Negative for tremors.   Psychiatric/Behavioral: Negative for dysphoric mood and sleep disturbance. The patient is not nervous/anxious.      Objective:     Vital Signs (Most Recent):  Temp: 98.5 °F (36.9 °C) (11/09/17 1156)  Pulse: 84 (11/09/17 1156)  Resp: 18 (11/09/17 1156)  BP: (!) 145/80 (11/09/17 0755)  SpO2: 98 % (11/09/17 1156) Vital Signs (24h Range):  Temp:  [98.3 °F (36.8 °C)-98.9 °F (37.2 °C)] 98.5 °F (36.9 °C)  Pulse:  [77-92] 84  Resp:  [16-18] 18  SpO2:  [95 %-98 %] 98 %  BP: (136-164)/(73-87) 145/80  Arterial Line BP: (150-158)/(72-85) 158/85     Weight: 63.9 kg (140 lb 14 oz)  Height: 5' (152.4 cm)  Body mass index is 27.51 kg/m².    Physical Exam   Constitutional: She is oriented to person, place, and  time. She appears well-developed. No distress.   HENT:   Head: Normocephalic and atraumatic.   Nose: Nose normal.   Mouth/Throat: No oropharyngeal exudate.       orpharynx w mild erythema, no masses or exudate noted.     Eyes: EOM are normal. Pupils are equal, round, and reactive to light. No scleral icterus.   Cardiovascular: Normal rate, regular rhythm and normal heart sounds.  Exam reveals no gallop and no friction rub.    No murmur heard.  Pulmonary/Chest: Effort normal and breath sounds normal. She has no rales.   Abdominal: Bowel sounds are normal. She exhibits no distension. There is no tenderness.   Ktx incision bandaged with minimal oozing on dressing  ABIGAIL x 1 serosang   Musculoskeletal: Normal range of motion. She exhibits no edema.   Neurological: She is alert and oriented to person, place, and time.   Skin: Skin is warm and dry. Capillary refill takes 2 to 3 seconds. She is not diaphoretic. No erythema.   Psychiatric: She has a normal mood and affect. Her behavior is normal. Judgment and thought content normal.   Nursing note and vitals reviewed.      Significant Labs:  CBC:     Recent Labs  Lab 11/07/17 1108 11/08/17 0415 11/08/17  0807 11/09/17 0415 11/09/17  0634   WBC 11.32 5.44  --  5.17  --    RBC 2.54* 2.17*  --  2.10*  --    HGB 8.3* 7.0* 7.8* 6.9* 7.0*   HCT 25.7* 21.8* 24.7* 21.2* 21.2*   PLT 92* 70*  --  65*  --    * 101*  --  101*  --    MCH 32.7* 32.3*  --  32.9*  --    MCHC 32.3 32.1  --  32.5  --      CMP:     Recent Labs  Lab 11/06/17  1947 11/07/17  1108 11/08/17 0415 11/09/17  0415   GLU 89  < > 233* 110 85   CALCIUM 9.0  < > 7.5* 7.4* 7.8*   ALBUMIN 3.5  < > 2.2* 1.9* 2.2*   PROT 7.1  --   --  4.3*  --    *  < > 135* 140 142   K 3.7  < > 3.5 3.3* 3.6   CO2 24  < > 18* 21* 19*   CL 92*  < > 106 115* 116*   BUN 74*  < > 55* 29* 17   CREATININE 6.4*  < > 4.4* 1.8* 1.0   ALKPHOS 194*  --   --  156*  --    ALT 25  --   --  47*  --    AST 20  --   --  62*  --    < > = values  in this interval not displayed.  Labs within the past 24 hours have been reviewed.      Assessment/Plan:     * Status post -donor kidney transplantation    - s/p DBD [facility] high rish kidney transplant for ESRD 2/2 polycystic kidney disease 17.  Surgery significant for friable bladder mucosa.  7 day pollack.  Cr trending down nicely.  UOP excellent.    - Continue pathway.  IV fluids d/c'd late on POD 31.  Encourage PO intake.    - Keep -140 for now        Sore throat    - s/p intubation.  ENT consulted.  Mild erythema to oropharynx noted.  ENT recommended ice chips and Decadron.  Pt is on solu-medrol.  Pt having some improvement w Chloraseptic spray.  - Cont liquids/soft food.  Replacing electrolytes.  Monitor.            Hypophosphatemia    - replaced. Cont to monitor labs on daily basis.            Diarrhea    - bowel regimen discontinued. Cellcept changed to Myfortic.  Sister has diarrhea w prograf.  Pt was on Cyclosporine for last transplant.            Metabolic acidosis    - started sodium bicarb.          At risk for opportunistic infections    - Continue Bactrim for PCP prophylaxis  - Continue Valcyte for CMV prophylaxis  - Continue nystatin for thrush prophylaxis          Prophylactic immunotherapy    - Campath induction protocol.  Continue tacrolimus and Myfortic.  Will monitor for signs of toxic side effects, check daily tacrolimus troughs, and change meds accordingly.          Long-term use of immunosuppressant medication    Chronic Prophylactic Immunosuppression- cont to check prograf level daily.  Assess for toxicity and adjust level as needed.  Cellcept changed to Myfortic for diarrhea.             Essential hypertension    - Hold off on starting home amlodipine for lower pressures  - Goal pressure for now 130-140 SBP  - Started low dose Coreg (home med) 3.125 mg with hold parameters          Secondary hyperparathyroidism of renal origin    - Continue cinacalcet and ergo.  PTH 17  996.  - Monitor              Discharge Planning:  Possible discharge tomorrow.    Dia Main NP  Kidney Transplant  [facility]

## 2017-11-09 NOTE — PROGRESS NOTES
[facility]  Adult Nutrition  Progress Note    SUMMARY     Recommendations    Recommendation/Intervention: Continue current diet, encouraging intake. Pt now only drinking liquids and puddings, helped patient order full liquid meal. No further questions about nutrition education. RD following.     Goals: Pt to consume/tolerate > 75% EEN and EPN  Nutrition Goal Status: progressing towards goal  Communication of RD Recs: discussed on rounds      Reason for Assessment    Reason for Assessment: RD follow-up  Diagnosis: transplant/postoperative complications (OKTx 11/7)  Relevent Medical History: 1st OKTx 1996, gerd, htn, dld   Interdisciplinary Rounds: attended     General Information Comments: Pt now with throat pain and only able to tolerate liquids - requesting additional Boost at each meal in order to consume adequate calories; wife at bedside very attentive and educated on post tx nutrition    Nutrition Discharge Planning: Post transplant nutrition education provided. Food safety/drug interactions emphasized. General healthy diet recommended. RD name/contact information, education material left. No other needs identified. Caregiver present.    Nutrition Prescription Ordered    Current Diet Order: Regular     Oral Nutrition Supplement: Boost 2 per meal     Evaluation of Received Nutrients/Fluid Intake     Energy Calories Required: not meeting needs      Protein Required: not meeting needs   Fluid Required: meeting needs   Tolerance: not tolerating  % Intake of Estimated Energy Needs: 50 - 75 %  % Meal Intake: 50%     Nutrition Risk Screen     Nutrition Risk Screen: no indicators present    Nutrition/Diet History    Patient Reported Diet/Restrictions/Preferences: renal  Typical Food/Fluid Intake: Patient and wife both cook frequently  Food Preferences: No cultural or Sikhism food preferences noted        Labs/Tests/Procedures/Meds       Pertinent Labs Reviewed: reviewed  Pertinent Labs Comments: CORETTA  1.9, AST 62, ALT 47, Cl 116,   Pertinent Medications Reviewed: reviewed  Pertinent Medications Comments: ergocalciferol, K/Phos, heparin, MVI, tacrolimus    Physical Findings    Overall Physical Appearance: nourished     Oral/Mouth Cavity: WDL  Skin: incision    Anthropometrics    Temp: 98.5 °F (36.9 °C)     Height: 5' (152.4 cm)  Weight Method: Bed Scale  Weight: 63.9 kg (140 lb 14 oz)     Ideal Body Weight (IBW), Female: 100 lb     % Ideal Body Weight, Female (lb): 137.79 lb  BMI (Calculated): 27  BMI Grade: 25 - 29.9 - overweight        Estimated/Assessed Needs    Weight Used For Calorie Calculations: 62.5 kg (137 lb 12.6 oz)   Height (cm): 152.4 cm  Energy Calorie Requirements (kcal): 1434  Energy Need Method: [place]oure-St Jeor (x 1.25 PAL)        RMR (LaMoure-St. Jeor Equation): 1146.5      Weight Used For Protein Calculations: 62.5 kg (137 lb 12.6 oz)  Protein Requirements: 75-88g (1.2-1.4g/kg)  Fluid Need Method: RDA Method (or per MD)      RDA Method (mL): 1434             Assessment and Plan    * Status post -donor kidney transplantation    Nutrition Diagnosis:  Increased nutrient needs    Related to (etiology):   Protein for healing and muscle maintenance    Signs and Symptoms (as evidenced by):   S/p OKTx 10/7    Interventions/Recommendations (treatment strategy):  See recs    Nutrition Diagnosis Status:   New              Monitor and Evaluation    Food and Nutrient Intake: energy intake, food and beverage intake  Food and Nutrient Adminstration: diet order  Knowledge/Beliefs/Attitudes: food and nutrition knowledge/skill  Physical Activity and Function: nutrition-related ADLs and IADLs  Anthropometric Measurements: weight, weight change, body mass index  Biochemical Data, Medical Tests and Procedures: electrolyte and renal panel, lipid profile, gastrointestinal profile, glucose/endocrine profile, inflammatory profile  Nutrition-Focused Physical Findings: overall appearance    Nutrition  Risk    Level of Risk:  (2x/week)    Nutrition Follow-Up    RD Follow-up?: Yes

## 2017-11-09 NOTE — ASSESSMENT & PLAN NOTE
- s/p DBD PHS high rish kidney transplant for ESRD 2/2 polycystic kidney disease 11/7/17.  Surgery significant for friable bladder mucosa.  7 day pollack.  Cr trending down nicely.  UOP excellent.    - Continue pathway.  IV fluids d/c'd late on POD 31.  Encourage PO intake.    - Keep -140 for now

## 2017-11-09 NOTE — SUBJECTIVE & OBJECTIVE
Subjective:     History of Present Illness:   Ms. Apurva Rodriguez is a 52 y.o female with a PMH of PKD with previous RLQ renal transplant (3/16/96-4/2016).  Prior to admit she was on nightly peritoneal dialysis, without complication. Her urine output is about 0.5-0.75 liters/day. PMH otherwise significant for parathyroidectomy and AVN requiring hip replacement.  She was admitted 11/6/17 for DBD kidney transplant which took place this AM without complication.  PD cath was removed.  It was noted that pt had significant scar tissue and a friable bladder and a 7 day pollack was recommended and drain was placed.  Post op labs with stable H/H, nice drop in Cr, and pt with good UOP.    Pre op labs with Vit D 24 and .  Cinacalcet started.  Of note, pt with parathyroidectomy in past with implant in shoulder.  Her recent PTHs have not been as high- unclear specific numbers.    Interval history:  Cont to c/o throat pain, denies difficulty swallowing liquids.  Oropharyngeal w mild erythema.  Will crush large pills and give in apples sauce/pudding.  ENT recommended ice chips and Decadron- pt is on Solu Medrol.  Pain is slightly better w chloraseptic spray.  Encourage pt to gargle w warm, salt water.  Creatinine trending down nicely (1).  Excellent UOP (7 day pollack).  ABIGAIL drain w 140 ml serosang drainage.  She notes having 4 BMs yesterday and is having abdominal cramping.  Bowel regimen d/c'f.  Cellcept changed to Myfortic.  She reports her sister, also a kidney transplant recipient for the second time, has diarrhea w meals w Prograf.  Pt was on Cyclosporine w first transplant.  Will monitor.  Encourage OOB and ambulating in hallways.  Pt is not requesting pain meds except Tylenol.  Tylenol ATC ordered.  Ultram and Oxy available PRN.  Cont IS.      Ms. Rodriguez is a 52 y.o. 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  She received a living related kidney transplant on 11/7/17.  She was on  hemodialysis at the time of transplantation.  She received induction therapy with Campath and her maintenance immunosuppression consists of:   Immunosuppressants     Start     Stop Route Frequency Ordered    11/09/17 2200  mycophenolate EC tablet 360 mg      -- Oral 2 times daily 11/09/17 1155    11/09/17 1800  tacrolimus capsule 6 mg  (IP TXP POST-OP CAMPATH)      -- Oral 2 times daily 11/09/17 1000          Previous Transplant: yes      Interval History:      Past Medical and Surgical History: Ms. Rodriguez has a past medical history of Anemia; Avascular necrosis left hip (12/6/2016); Dyslipidemia (12/6/2016); ESRD (end stage renal disease) (12/6/2016); Essential hypertension (12/6/2016); Gastroesophageal reflux disease without esophagitis (12/6/2016); Hypertension; Polycystic kidney disease; S/P bone marrow biopsy (12/6/2016); and Secondary hyperparathyroidism of renal origin (12/6/2016).  She has a past surgical history that includes Eye muscle surgery (Bilateral); Peritoneal catheter insertion; Kidney transplant (Right, 1996); Peritoneal catheter insertion; Cholecystectomy; Total hip arthroplasty (Left, 2001); right hip decompression; left hip revision; Parathyroidectomy; Abdominal hernia repair; Fracture surgery (Right); and Tonsillectomy.    Past Social and Family History: Ms. Rodriguez reports that she has never smoked. She does not have any smokeless tobacco history on file. She reports that she does not drink alcohol or use drugs.Her family history includes COPD in her father; Cancer in her paternal grandmother and sister; Kidney disease in her sister; No Known Problems in her mother.    Intake/Output - Last 3 Shifts       11/07 0700 - 11/08 0659 11/08 0700 - 11/09 0659 11/09 0700 - 11/10 0659    P.O. 4370 1275 700    I.V. (mL/kg) 7924.7 (125) 723.8 (11.3)     Blood   350    IV Piggyback 1050 100 500    Total Intake(mL/kg) 86544.7 (210.5) 2098.8 (32.8) 1550 (24.3)    Urine (mL/kg/hr) 7975 (5.2) 3487  (2.1) 1900 (4.2)    Drains 150 (0.1) 140 (0.1)     Stool  0 (0)     Blood       Total Output 8125 3315 1900    Net +5219.7 -1216.3 -350           Urine Occurrence  0 x     Stool Occurrence  4 x            Review of Systems   Constitutional: Negative for activity change, appetite change, chills, diaphoresis, fatigue and fever.   HENT: Positive for sore throat. Negative for mouth sores and trouble swallowing.    Respiratory: Negative for cough and shortness of breath.    Cardiovascular: Negative.  Negative for leg swelling.   Gastrointestinal: Positive for abdominal pain and diarrhea. Negative for abdominal distention, constipation, nausea and vomiting.   Genitourinary: Negative for flank pain and hematuria.   Musculoskeletal: Negative for arthralgias, back pain and myalgias.   Skin: Positive for wound.   Allergic/Immunologic: Positive for immunocompromised state.   Neurological: Positive for weakness. Negative for tremors.   Psychiatric/Behavioral: Negative for dysphoric mood and sleep disturbance. The patient is not nervous/anxious.      Objective:     Vital Signs (Most Recent):  Temp: 98.5 °F (36.9 °C) (11/09/17 1156)  Pulse: 84 (11/09/17 1156)  Resp: 18 (11/09/17 1156)  BP: (!) 145/80 (11/09/17 0755)  SpO2: 98 % (11/09/17 1156) Vital Signs (24h Range):  Temp:  [98.3 °F (36.8 °C)-98.9 °F (37.2 °C)] 98.5 °F (36.9 °C)  Pulse:  [77-92] 84  Resp:  [16-18] 18  SpO2:  [95 %-98 %] 98 %  BP: (136-164)/(73-87) 145/80  Arterial Line BP: (150-158)/(72-85) 158/85     Weight: 63.9 kg (140 lb 14 oz)  Height: 5' (152.4 cm)  Body mass index is 27.51 kg/m².    Physical Exam   Constitutional: She is oriented to person, place, and time. She appears well-developed. No distress.   HENT:   Head: Normocephalic and atraumatic.   Nose: Nose normal.   Mouth/Throat: No oropharyngeal exudate.       orpharynx w mild erythema, no masses or exudate noted.     Eyes: EOM are normal. Pupils are equal, round, and reactive to light. No scleral  icterus.   Cardiovascular: Normal rate, regular rhythm and normal heart sounds.  Exam reveals no gallop and no friction rub.    No murmur heard.  Pulmonary/Chest: Effort normal and breath sounds normal. She has no rales.   Abdominal: Bowel sounds are normal. She exhibits no distension. There is no tenderness.   Ktx incision bandaged with minimal oozing on dressing  ABIGAIL x 1 serosang   Musculoskeletal: Normal range of motion. She exhibits no edema.   Neurological: She is alert and oriented to person, place, and time.   Skin: Skin is warm and dry. Capillary refill takes 2 to 3 seconds. She is not diaphoretic. No erythema.   Psychiatric: She has a normal mood and affect. Her behavior is normal. Judgment and thought content normal.   Nursing note and vitals reviewed.      Significant Labs:  CBC:     Recent Labs  Lab 11/07/17 1108 11/08/17 0415 11/08/17 0807 11/09/17 0415 11/09/17  0634   WBC 11.32 5.44  --  5.17  --    RBC 2.54* 2.17*  --  2.10*  --    HGB 8.3* 7.0* 7.8* 6.9* 7.0*   HCT 25.7* 21.8* 24.7* 21.2* 21.2*   PLT 92* 70*  --  65*  --    * 101*  --  101*  --    MCH 32.7* 32.3*  --  32.9*  --    MCHC 32.3 32.1  --  32.5  --      CMP:     Recent Labs  Lab 11/06/17 1947 11/07/17 1108 11/08/17 0415 11/09/17 0415   GLU 89  < > 233* 110 85   CALCIUM 9.0  < > 7.5* 7.4* 7.8*   ALBUMIN 3.5  < > 2.2* 1.9* 2.2*   PROT 7.1  --   --  4.3*  --    *  < > 135* 140 142   K 3.7  < > 3.5 3.3* 3.6   CO2 24  < > 18* 21* 19*   CL 92*  < > 106 115* 116*   BUN 74*  < > 55* 29* 17   CREATININE 6.4*  < > 4.4* 1.8* 1.0   ALKPHOS 194*  --   --  156*  --    ALT 25  --   --  47*  --    AST 20  --   --  62*  --    < > = values in this interval not displayed.  Labs within the past 24 hours have been reviewed.

## 2017-11-10 DIAGNOSIS — Z94.0 KIDNEY REPLACED BY TRANSPLANT: Primary | ICD-10-CM

## 2017-11-10 PROBLEM — R19.7 DIARRHEA: Status: RESOLVED | Noted: 2017-11-09 | Resolved: 2017-11-10

## 2017-11-10 PROBLEM — K13.79 UVULAR EDEMA: Status: ACTIVE | Noted: 2017-11-10

## 2017-11-10 LAB
ALBUMIN SERPL BCP-MCNC: 2.2 G/DL
ALBUMIN SERPL BCP-MCNC: 2.2 G/DL
ALP SERPL-CCNC: 212 U/L
ALT SERPL W/O P-5'-P-CCNC: 211 U/L
ANION GAP SERPL CALC-SCNC: 4 MMOL/L
AST SERPL-CCNC: 143 U/L
BASOPHILS # BLD AUTO: 0.01 K/UL
BASOPHILS NFR BLD: 0.3 %
BILIRUB DIRECT SERPL-MCNC: 0.2 MG/DL
BILIRUB SERPL-MCNC: 0.3 MG/DL
BUN SERPL-MCNC: 15 MG/DL
CALCIUM SERPL-MCNC: 7.8 MG/DL
CHLORIDE SERPL-SCNC: 113 MMOL/L
CO2 SERPL-SCNC: 23 MMOL/L
CREAT SERPL-MCNC: 0.9 MG/DL
DIFFERENTIAL METHOD: ABNORMAL
EOSINOPHIL # BLD AUTO: 0 K/UL
EOSINOPHIL NFR BLD: 0 %
ERYTHROCYTE [DISTWIDTH] IN BLOOD BY AUTOMATED COUNT: 15.5 %
EST. GFR  (AFRICAN AMERICAN): >60 ML/MIN/1.73 M^2
EST. GFR  (NON AFRICAN AMERICAN): >60 ML/MIN/1.73 M^2
GLUCOSE SERPL-MCNC: 76 MG/DL
HAV IGM SERPL QL IA: NEGATIVE
HBV CORE IGM SERPL QL IA: NEGATIVE
HBV SURFACE AG SERPL QL IA: NEGATIVE
HCT VFR BLD AUTO: 23.9 %
HCV AB SERPL QL IA: NEGATIVE
HGB BLD-MCNC: 8 G/DL
IMM GRANULOCYTES # BLD AUTO: 0.05 K/UL
IMM GRANULOCYTES NFR BLD AUTO: 1.4 %
LYMPHOCYTES # BLD AUTO: 0 K/UL
LYMPHOCYTES NFR BLD: 0.3 %
MAGNESIUM SERPL-MCNC: 1.9 MG/DL
MCH RBC QN AUTO: 32.8 PG
MCHC RBC AUTO-ENTMCNC: 33.5 G/DL
MCV RBC AUTO: 98 FL
MONOCYTES # BLD AUTO: 0.1 K/UL
MONOCYTES NFR BLD: 2.9 %
NEUTROPHILS # BLD AUTO: 3.3 K/UL
NEUTROPHILS NFR BLD: 95.1 %
NRBC BLD-RTO: 0 /100 WBC
PHOSPHATE SERPL-MCNC: 1.8 MG/DL
PLATELET # BLD AUTO: 73 K/UL
PMV BLD AUTO: 9.9 FL
POCT GLUCOSE: 103 MG/DL (ref 70–110)
POCT GLUCOSE: 135 MG/DL (ref 70–110)
POCT GLUCOSE: 142 MG/DL (ref 70–110)
POCT GLUCOSE: 220 MG/DL (ref 70–110)
POCT GLUCOSE: 87 MG/DL (ref 70–110)
POTASSIUM SERPL-SCNC: 4 MMOL/L
PROT SERPL-MCNC: 4.7 G/DL
RBC # BLD AUTO: 2.44 M/UL
SODIUM SERPL-SCNC: 140 MMOL/L
TACROLIMUS BLD-MCNC: 6.8 NG/ML
WBC # BLD AUTO: 3.45 K/UL

## 2017-11-10 PROCEDURE — 80197 ASSAY OF TACROLIMUS: CPT

## 2017-11-10 PROCEDURE — 25000003 PHARM REV CODE 250: Performed by: INTERNAL MEDICINE

## 2017-11-10 PROCEDURE — 63600175 PHARM REV CODE 636 W HCPCS: Performed by: NURSE PRACTITIONER

## 2017-11-10 PROCEDURE — 25000003 PHARM REV CODE 250: Performed by: NURSE PRACTITIONER

## 2017-11-10 PROCEDURE — 85025 COMPLETE CBC W/AUTO DIFF WBC: CPT

## 2017-11-10 PROCEDURE — 25000003 PHARM REV CODE 250: Performed by: PHYSICIAN ASSISTANT

## 2017-11-10 PROCEDURE — 80074 ACUTE HEPATITIS PANEL: CPT

## 2017-11-10 PROCEDURE — 82960 TEST FOR G6PD ENZYME: CPT

## 2017-11-10 PROCEDURE — 25000242 PHARM REV CODE 250 ALT 637 W/ HCPCS: Performed by: NURSE PRACTITIONER

## 2017-11-10 PROCEDURE — 83735 ASSAY OF MAGNESIUM: CPT

## 2017-11-10 PROCEDURE — 25000003 PHARM REV CODE 250: Performed by: SURGERY

## 2017-11-10 PROCEDURE — 94760 N-INVAS EAR/PLS OXIMETRY 1: CPT

## 2017-11-10 PROCEDURE — 99233 SBSQ HOSP IP/OBS HIGH 50: CPT | Mod: 24,,, | Performed by: NURSE PRACTITIONER

## 2017-11-10 PROCEDURE — 80069 RENAL FUNCTION PANEL: CPT

## 2017-11-10 PROCEDURE — 94640 AIRWAY INHALATION TREATMENT: CPT

## 2017-11-10 PROCEDURE — 80076 HEPATIC FUNCTION PANEL: CPT

## 2017-11-10 PROCEDURE — 20600001 HC STEP DOWN PRIVATE ROOM

## 2017-11-10 PROCEDURE — 63600175 PHARM REV CODE 636 W HCPCS: Performed by: TRANSPLANT SURGERY

## 2017-11-10 RX ORDER — PENTAMIDINE ISETHIONATE 300 MG/300MG
300 INHALANT RESPIRATORY (INHALATION) ONCE
Status: COMPLETED | OUTPATIENT
Start: 2017-11-10 | End: 2017-11-10

## 2017-11-10 RX ORDER — CARVEDILOL 6.25 MG/1
6.25 TABLET ORAL 2 TIMES DAILY
Status: DISCONTINUED | OUTPATIENT
Start: 2017-11-10 | End: 2017-11-14 | Stop reason: HOSPADM

## 2017-11-10 RX ORDER — ALBUTEROL SULFATE 0.83 MG/ML
2.5 SOLUTION RESPIRATORY (INHALATION) ONCE AS NEEDED
Status: COMPLETED | OUTPATIENT
Start: 2017-11-10 | End: 2017-11-10

## 2017-11-10 RX ORDER — MYCOPHENOLIC ACID 180 MG/1
360 TABLET, DELAYED RELEASE ORAL 2 TIMES DAILY
Qty: 120 TABLET | Refills: 11 | Status: SHIPPED | OUTPATIENT
Start: 2017-11-10 | End: 2017-11-14 | Stop reason: ALTCHOICE

## 2017-11-10 RX ORDER — ALBUTEROL SULFATE 0.83 MG/ML
2.5 SOLUTION RESPIRATORY (INHALATION) ONCE AS NEEDED
Status: CANCELLED | OUTPATIENT
Start: 2017-11-10 | End: 2017-11-10

## 2017-11-10 RX ORDER — DOCUSATE SODIUM 100 MG/1
100 CAPSULE, LIQUID FILLED ORAL 2 TIMES DAILY
Status: DISCONTINUED | OUTPATIENT
Start: 2017-11-10 | End: 2017-11-14 | Stop reason: HOSPADM

## 2017-11-10 RX ORDER — OXYCODONE HCL 5 MG/5 ML
2.5 SOLUTION, ORAL ORAL EVERY 4 HOURS PRN
Status: DISCONTINUED | OUTPATIENT
Start: 2017-11-10 | End: 2017-11-14 | Stop reason: HOSPADM

## 2017-11-10 RX ORDER — OXYCODONE HYDROCHLORIDE 5 MG/1
2.5-5 TABLET ORAL EVERY 4 HOURS PRN
Qty: 30 TABLET | Refills: 0 | Status: SHIPPED | OUTPATIENT
Start: 2017-11-10 | End: 2017-11-14 | Stop reason: ALTCHOICE

## 2017-11-10 RX ORDER — CARVEDILOL 6.25 MG/1
6.25 TABLET ORAL 2 TIMES DAILY
Qty: 60 TABLET | Refills: 11 | Status: SHIPPED | OUTPATIENT
Start: 2017-11-10 | End: 2017-12-06 | Stop reason: SDUPTHER

## 2017-11-10 RX ORDER — CARVEDILOL 3.12 MG/1
3.12 TABLET ORAL ONCE
Status: DISCONTINUED | OUTPATIENT
Start: 2017-11-10 | End: 2017-11-10

## 2017-11-10 RX ORDER — SODIUM BICARBONATE 650 MG/1
650 TABLET ORAL 2 TIMES DAILY
Qty: 60 TABLET | Refills: 2 | Status: SHIPPED | OUTPATIENT
Start: 2017-11-10 | End: 2017-12-06 | Stop reason: SDUPTHER

## 2017-11-10 RX ORDER — VALGANCICLOVIR 450 MG/1
900 TABLET, FILM COATED ORAL DAILY
Status: DISCONTINUED | OUTPATIENT
Start: 2017-11-11 | End: 2017-11-12

## 2017-11-10 RX ORDER — PENTAMIDINE ISETHIONATE 300 MG/300MG
300 INHALANT RESPIRATORY (INHALATION) ONCE
Status: CANCELLED | OUTPATIENT
Start: 2017-11-10 | End: 2017-11-10

## 2017-11-10 RX ORDER — CARVEDILOL 3.12 MG/1
3.12 TABLET ORAL ONCE
Status: COMPLETED | OUTPATIENT
Start: 2017-11-10 | End: 2017-11-10

## 2017-11-10 RX ORDER — OXYCODONE HCL 5 MG/5 ML
5 SOLUTION, ORAL ORAL EVERY 4 HOURS PRN
Status: DISCONTINUED | OUTPATIENT
Start: 2017-11-10 | End: 2017-11-13

## 2017-11-10 RX ADMIN — DOCUSATE SODIUM 100 MG: 100 CAPSULE, LIQUID FILLED ORAL at 07:11

## 2017-11-10 RX ADMIN — DIBASIC SODIUM PHOSPHATE, MONOBASIC POTASSIUM PHOSPHATE AND MONOBASIC SODIUM PHOSPHATE 2 TABLET: 852; 155; 130 TABLET ORAL at 07:11

## 2017-11-10 RX ADMIN — THERA TABS 1 TABLET: TAB at 08:11

## 2017-11-10 RX ADMIN — ACETAMINOPHEN 650 MG: 650 SOLUTION ORAL at 06:11

## 2017-11-10 RX ADMIN — HEPARIN SODIUM 5000 UNITS: 5000 INJECTION, SOLUTION INTRAVENOUS; SUBCUTANEOUS at 07:11

## 2017-11-10 RX ADMIN — DIBASIC SODIUM PHOSPHATE, MONOBASIC POTASSIUM PHOSPHATE AND MONOBASIC SODIUM PHOSPHATE 2 TABLET: 852; 155; 130 TABLET ORAL at 08:11

## 2017-11-10 RX ADMIN — SODIUM BICARBONATE 650 MG TABLET 650 MG: at 08:11

## 2017-11-10 RX ADMIN — CARVEDILOL 6.25 MG: 6.25 TABLET, FILM COATED ORAL at 07:11

## 2017-11-10 RX ADMIN — ACETAMINOPHEN 650 MG: 650 SOLUTION ORAL at 12:11

## 2017-11-10 RX ADMIN — PENTAMIDINE ISETHIONATE 300 MG: 300 INHALANT RESPIRATORY (INHALATION) at 11:11

## 2017-11-10 RX ADMIN — MYCOPHENILIC ACID 360 MG: 180 TABLET, DELAYED RELEASE ORAL at 08:11

## 2017-11-10 RX ADMIN — FAMOTIDINE 20 MG: 20 TABLET, FILM COATED ORAL at 07:11

## 2017-11-10 RX ADMIN — SODIUM BICARBONATE 650 MG TABLET 650 MG: at 07:11

## 2017-11-10 RX ADMIN — CINACALCET HYDROCHLORIDE 30 MG: 30 TABLET, COATED ORAL at 08:11

## 2017-11-10 RX ADMIN — OXYCODONE HYDROCHLORIDE 2.5 MG: 5 SOLUTION ORAL at 09:11

## 2017-11-10 RX ADMIN — SULFAMETHOXAZOLE AND TRIMETHOPRIM 1 TABLET: 400; 80 TABLET ORAL at 08:11

## 2017-11-10 RX ADMIN — VALGANCICLOVIR 450 MG: 450 TABLET, FILM COATED ORAL at 08:11

## 2017-11-10 RX ADMIN — CARVEDILOL 3.12 MG: 3.12 TABLET, FILM COATED ORAL at 10:11

## 2017-11-10 RX ADMIN — DOCUSATE SODIUM 100 MG: 100 CAPSULE, LIQUID FILLED ORAL at 10:11

## 2017-11-10 RX ADMIN — TACROLIMUS 6 MG: 1 CAPSULE ORAL at 06:11

## 2017-11-10 RX ADMIN — CARVEDILOL 3.12 MG: 3.12 TABLET, FILM COATED ORAL at 08:11

## 2017-11-10 RX ADMIN — OXYCODONE HYDROCHLORIDE 5 MG: 5 SOLUTION ORAL at 07:11

## 2017-11-10 RX ADMIN — NYSTATIN 500000 UNITS: 500000 SUSPENSION ORAL at 07:11

## 2017-11-10 RX ADMIN — NYSTATIN 500000 UNITS: 500000 SUSPENSION ORAL at 06:11

## 2017-11-10 RX ADMIN — POTASSIUM PHOSPHATE, MONOBASIC AND POTASSIUM PHOSPHATE, DIBASIC 20 MMOL: 224; 236 INJECTION, SOLUTION, CONCENTRATE INTRAVENOUS at 08:11

## 2017-11-10 RX ADMIN — MYCOPHENILIC ACID 360 MG: 180 TABLET, DELAYED RELEASE ORAL at 07:11

## 2017-11-10 RX ADMIN — TACROLIMUS 6 MG: 1 CAPSULE ORAL at 08:11

## 2017-11-10 RX ADMIN — HEPARIN SODIUM 5000 UNITS: 5000 INJECTION, SOLUTION INTRAVENOUS; SUBCUTANEOUS at 08:11

## 2017-11-10 RX ADMIN — ALBUTEROL SULFATE 2.5 MG: 2.5 SOLUTION RESPIRATORY (INHALATION) at 10:11

## 2017-11-10 RX ADMIN — OXYCODONE HYDROCHLORIDE 5 MG: 5 SOLUTION ORAL at 02:11

## 2017-11-10 RX ADMIN — NYSTATIN 500000 UNITS: 500000 SUSPENSION ORAL at 02:11

## 2017-11-10 NOTE — PROGRESS NOTES
Transplant Teaching Book given to patient, Apurva Rodriguez, on 11/8/2017.  During the course of the hospital stay the patient received information regarding kidney transplant. Teaching and instruction were completed.  Areas that were discussed included: how to contact the Transplant Team, the importance of measuring intake of fluids and urine output, and monitoring vital signs such as blood pressure, temperature, and daily weights.  Parameters for which to report abnormal findings were given.  Appointment were provided along with the rational for the importance of lab work and clinic visits.  A written medication list was provided.  The importance of immunosuppressive medications, their common side effects, and treatment to prevent or minimize side effects has been reviewed.  Signs and symptoms of rejection and infection along with various treatments were reviewed.  The need to avoid infection was discussed.  Wound care and special consideration regarding activities of daily living were explained.  Written and verbal teaching of the above information was given.

## 2017-11-10 NOTE — ANESTHESIA POSTPROCEDURE EVALUATION
Anesthesia Post Evaluation    Patient: Apurva Rodriguez    Procedure(s) Performed: Procedure(s) (LRB):  TRANSPLANT-KIDNEY  REMOVAL-CATHETER-DIALYSIS-PERITONEAL (Right)    Final Anesthesia Type: general  Patient location during evaluation: PACU  Patient participation: Yes- Able to Participate  Level of consciousness: awake  Post-procedure vital signs: reviewed and stable  Pain management: adequate  Airway patency: patent  PONV status at discharge: No PONV  Anesthetic complications: no      Cardiovascular status: stable  Respiratory status: unassisted  Hydration status: euvolemic  Follow-up not needed.        Visit Vitals  BP (!) 144/72 (BP Location: Left arm, Patient Position: Lying)   Pulse 79   Temp 36.6 °C (97.8 °F) (Oral)   Resp 17   Ht 5' (1.524 m)   Wt 63.9 kg (140 lb 14 oz)   SpO2 (!) 94%   Breastfeeding? No   BMI 27.51 kg/m²       Pain/Alissa Score: Pain Assessment Performed: Yes (11/9/2017  7:55 AM)  Presence of Pain: complains of pain/discomfort (11/9/2017  7:55 AM)  Pain Rating Prior to Med Admin: 4 (11/9/2017  8:54 PM)

## 2017-11-10 NOTE — PROGRESS NOTES
"SW met with patient to assess coping. Patient presents as alert and oriented x 4, pleasant, good eye contact, well groomed, recall good, concentration/judgement good, average intelligence, calm, communicative, cooperative and asking and answering questions appropriately. Pt reports that she is "over today". Pt reports that the pentamidine treatment cause her to choke and she was worried about her liver tests. Pt continues to reports pain in her throat and did not feel like talking much, but was willing to listen. 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 Pt expressed gratefulness for SW coming to talk with pt. Pt will continue to be followed for any continuity of care issues, discharge planning, and emotional support. FREDERIC remains available at [phone].    "

## 2017-11-10 NOTE — PROGRESS NOTES
. Pt does not want insulin. States has only been drinking boost and eating pudding today, because that is all she can swallow. Asks if can recheck before bed before administering insulin. Notified JOANA Diaz, Boost changed to Glucose controlled.

## 2017-11-10 NOTE — PROGRESS NOTES
Pt unable to tolerate Pentamidine inhalation, coughing excessively. Sats 97%. Notified Dia NP, no orders given at this time. Will continue to monitor.

## 2017-11-10 NOTE — PROGRESS NOTES
"Pt reports feeling "puffy" in suprapubic area.   Area appears slightly puffy. Site is not hard and pt denies tenderness to area. NICHOLAS Zamudio notified. Lizz stated that she would pass word along to day team.   "

## 2017-11-10 NOTE — PLAN OF CARE
Problem: Patient Care Overview  Goal: Plan of Care Review  Pt free from fall or injury so far this shift. Instructed to call if assistance needed, verbalized understanding.   BG checked AC/HS. No SSI coverage needed today.   LE's elevated. Tylenol DC'd. Complete Abd US ordered, results pending. Bactrim DC'd. Hep. Panel sent.   Creat down to 0.9 today. UO 1300 cc so far this shift.   Phos level 1.8, IV replacement given.   BP ^. Coreg dose ^ to 6.25 mg.   CL removed today.   Pain well controlled with PRN Oxycodone, given twice today.   Pt has not had a BM today. Colace restarted.   Pt ambulated the halls once this shift. States will walk 2 more times today.   Afebrile. Hand hygiene reinforced. Daily labs monitored.

## 2017-11-11 PROBLEM — R74.01 TRANSAMINITIS: Status: ACTIVE | Noted: 2017-11-11

## 2017-11-11 LAB
ALBUMIN SERPL BCP-MCNC: 2.4 G/DL
ALP SERPL-CCNC: 284 U/L
ALT SERPL W/O P-5'-P-CCNC: 366 U/L
ANION GAP SERPL CALC-SCNC: 6 MMOL/L
AST SERPL-CCNC: 198 U/L
BASOPHILS # BLD AUTO: 0.02 K/UL
BASOPHILS NFR BLD: 0.9 %
BILIRUB SERPL-MCNC: 0.5 MG/DL
BUN SERPL-MCNC: 12 MG/DL
CALCIUM SERPL-MCNC: 7.7 MG/DL
CHLORIDE SERPL-SCNC: 116 MMOL/L
CO2 SERPL-SCNC: 24 MMOL/L
CREAT SERPL-MCNC: 0.9 MG/DL
DIFFERENTIAL METHOD: ABNORMAL
EOSINOPHIL # BLD AUTO: 0 K/UL
EOSINOPHIL NFR BLD: 0 %
ERYTHROCYTE [DISTWIDTH] IN BLOOD BY AUTOMATED COUNT: 15.7 %
EST. GFR  (AFRICAN AMERICAN): >60 ML/MIN/1.73 M^2
EST. GFR  (NON AFRICAN AMERICAN): >60 ML/MIN/1.73 M^2
GLUCOSE SERPL-MCNC: 91 MG/DL
HCT VFR BLD AUTO: 25.5 %
HGB BLD-MCNC: 8.3 G/DL
IMM GRANULOCYTES # BLD AUTO: 0.11 K/UL
IMM GRANULOCYTES NFR BLD AUTO: 4.7 %
LYMPHOCYTES # BLD AUTO: 0 K/UL
LYMPHOCYTES NFR BLD: 0.4 %
MAGNESIUM SERPL-MCNC: 1.7 MG/DL
MCH RBC QN AUTO: 32.3 PG
MCHC RBC AUTO-ENTMCNC: 32.5 G/DL
MCV RBC AUTO: 99 FL
MONOCYTES # BLD AUTO: 0.1 K/UL
MONOCYTES NFR BLD: 4.7 %
NEUTROPHILS # BLD AUTO: 2.1 K/UL
NEUTROPHILS NFR BLD: 89.3 %
NRBC BLD-RTO: 1 /100 WBC
PLATELET # BLD AUTO: 105 K/UL
PMV BLD AUTO: 10.2 FL
POCT GLUCOSE: 109 MG/DL (ref 70–110)
POCT GLUCOSE: 116 MG/DL (ref 70–110)
POCT GLUCOSE: 88 MG/DL (ref 70–110)
POCT GLUCOSE: 95 MG/DL (ref 70–110)
POTASSIUM SERPL-SCNC: 4.5 MMOL/L
PROT SERPL-MCNC: 5 G/DL
RBC # BLD AUTO: 2.57 M/UL
SODIUM SERPL-SCNC: 146 MMOL/L
TACROLIMUS BLD-MCNC: 7.3 NG/ML
WBC # BLD AUTO: 2.34 K/UL

## 2017-11-11 PROCEDURE — 25000003 PHARM REV CODE 250: Performed by: PHYSICIAN ASSISTANT

## 2017-11-11 PROCEDURE — 99900035 HC TECH TIME PER 15 MIN (STAT)

## 2017-11-11 PROCEDURE — 25000003 PHARM REV CODE 250: Performed by: SURGERY

## 2017-11-11 PROCEDURE — 25000003 PHARM REV CODE 250: Performed by: NURSE PRACTITIONER

## 2017-11-11 PROCEDURE — 99233 SBSQ HOSP IP/OBS HIGH 50: CPT | Mod: ,,, | Performed by: INTERNAL MEDICINE

## 2017-11-11 PROCEDURE — 80197 ASSAY OF TACROLIMUS: CPT

## 2017-11-11 PROCEDURE — 36415 COLL VENOUS BLD VENIPUNCTURE: CPT

## 2017-11-11 PROCEDURE — 63600175 PHARM REV CODE 636 W HCPCS: Performed by: NURSE PRACTITIONER

## 2017-11-11 PROCEDURE — 85025 COMPLETE CBC W/AUTO DIFF WBC: CPT

## 2017-11-11 PROCEDURE — 20600001 HC STEP DOWN PRIVATE ROOM

## 2017-11-11 PROCEDURE — 80053 COMPREHEN METABOLIC PANEL: CPT

## 2017-11-11 PROCEDURE — 83735 ASSAY OF MAGNESIUM: CPT

## 2017-11-11 PROCEDURE — 63600175 PHARM REV CODE 636 W HCPCS: Performed by: TRANSPLANT SURGERY

## 2017-11-11 PROCEDURE — 25000003 PHARM REV CODE 250: Performed by: INTERNAL MEDICINE

## 2017-11-11 RX ADMIN — NYSTATIN 500000 UNITS: 500000 SUSPENSION ORAL at 09:11

## 2017-11-11 RX ADMIN — CARVEDILOL 6.25 MG: 6.25 TABLET, FILM COATED ORAL at 08:11

## 2017-11-11 RX ADMIN — DIBASIC SODIUM PHOSPHATE, MONOBASIC POTASSIUM PHOSPHATE AND MONOBASIC SODIUM PHOSPHATE 2 TABLET: 852; 155; 130 TABLET ORAL at 09:11

## 2017-11-11 RX ADMIN — THERA TABS 1 TABLET: TAB at 08:11

## 2017-11-11 RX ADMIN — DIBASIC SODIUM PHOSPHATE, MONOBASIC POTASSIUM PHOSPHATE AND MONOBASIC SODIUM PHOSPHATE 2 TABLET: 852; 155; 130 TABLET ORAL at 08:11

## 2017-11-11 RX ADMIN — OXYCODONE HYDROCHLORIDE 2.5 MG: 5 SOLUTION ORAL at 08:11

## 2017-11-11 RX ADMIN — NYSTATIN 500000 UNITS: 500000 SUSPENSION ORAL at 08:11

## 2017-11-11 RX ADMIN — OXYCODONE HYDROCHLORIDE 5 MG: 5 SOLUTION ORAL at 11:11

## 2017-11-11 RX ADMIN — CINACALCET HYDROCHLORIDE 30 MG: 30 TABLET, COATED ORAL at 08:11

## 2017-11-11 RX ADMIN — DOCUSATE SODIUM 100 MG: 100 CAPSULE, LIQUID FILLED ORAL at 09:11

## 2017-11-11 RX ADMIN — TACROLIMUS 6 MG: 1 CAPSULE ORAL at 06:11

## 2017-11-11 RX ADMIN — SODIUM BICARBONATE 650 MG TABLET 650 MG: at 09:11

## 2017-11-11 RX ADMIN — MYCOPHENILIC ACID 360 MG: 180 TABLET, DELAYED RELEASE ORAL at 09:11

## 2017-11-11 RX ADMIN — MYCOPHENILIC ACID 360 MG: 180 TABLET, DELAYED RELEASE ORAL at 08:11

## 2017-11-11 RX ADMIN — SODIUM BICARBONATE 650 MG TABLET 650 MG: at 08:11

## 2017-11-11 RX ADMIN — TACROLIMUS 6 MG: 1 CAPSULE ORAL at 08:11

## 2017-11-11 RX ADMIN — CARVEDILOL 6.25 MG: 6.25 TABLET, FILM COATED ORAL at 09:11

## 2017-11-11 RX ADMIN — NYSTATIN 500000 UNITS: 500000 SUSPENSION ORAL at 06:11

## 2017-11-11 RX ADMIN — DOCUSATE SODIUM 100 MG: 100 CAPSULE, LIQUID FILLED ORAL at 08:11

## 2017-11-11 RX ADMIN — HEPARIN SODIUM 5000 UNITS: 5000 INJECTION, SOLUTION INTRAVENOUS; SUBCUTANEOUS at 08:11

## 2017-11-11 RX ADMIN — NYSTATIN 500000 UNITS: 500000 SUSPENSION ORAL at 12:11

## 2017-11-11 RX ADMIN — FAMOTIDINE 20 MG: 20 TABLET, FILM COATED ORAL at 09:11

## 2017-11-11 RX ADMIN — VALGANCICLOVIR 900 MG: 450 TABLET, FILM COATED ORAL at 08:11

## 2017-11-11 NOTE — ASSESSMENT & PLAN NOTE
- Hold off on starting home amlodipine for lower pressures; watch trend  - Goal pressure for now 130-140 SBP  - Coreg increased to 6.25. Bid POD#3.

## 2017-11-11 NOTE — ASSESSMENT & PLAN NOTE
- Campath induction protocol.    - Tacro level at target. Continue tacrolimus and Myfortic.  Will monitor for signs of toxic side effects, check daily tacrolimus troughs, and change meds accordingly.

## 2017-11-11 NOTE — PLAN OF CARE
Problem: Patient Care Overview  Goal: Plan of Care Review  Outcome: Ongoing (interventions implemented as appropriate)  Patient AAOx4 and VSS. Her tmax today was 99.9. Her BG is being monitored ac/hs and was 116 before lunch. [place] is still in place and her UO so far today has been 1100 mL. Left abdominal ABIGAIL in place and has put out no drainage. She also has an old PD cath site with no complaints. Hepatology was consulted today due to elevated liver enzymes. Self meds are set up in the room and her significant other pulls them 100%. She's able to ambulate without assistance and has experienced no falls. She has no questions or concerns at this time. She's stable and will continue to monitor.

## 2017-11-11 NOTE — PROGRESS NOTES
[facility]  Kidney Transplant  Progress Note        Reason for Follow-up: Reassessment of Kidney Transplant - 2017  (#2) recipient and management of immunosuppression.     ORGAN:  RIGHT KIDNEY   Donor Type:   - Brain Death   PHS Increased Risk: yes   Cold Ischemia:   Induction Medications: Campath           Subjective:      History of Present Illness:   Ms. Apurva Rodriguez is a 52 y.o female with a PMH of PKD with previous RLQ renal transplant (3/16/).  Prior to admit she was on nightly peritoneal dialysis, without complication. Her urine output is about 0.5-0.75 liters/day. PMH otherwise significant for parathyroidectomy and AVN requiring hip replacement.  She was admitted 17 for DBD kidney transplant which took place this AM without complication.  PD cath was removed.  It was noted that pt had significant scar tissue and a friable bladder and a 7 day pollack was recommended and drain was placed.  Post op labs with stable H/H, nice drop in Cr, and pt with good UOP.     Pre op labs with Vit D 24 and .  Cinacalcet started.  Of note, pt with parathyroidectomy in past with implant in shoulder.  Her recent PTHs have not been as high- unclear specific   numbers.  I  nterval history:  Cont to c/o throat pain, but better.  She has been using Chloraseptic w some relieve.  She is swallowing liquids w/o difficulty.  ENT was consulted and recommended ice chips and Decadron- pt is on Solu Medrol.  Encourage pt to gargle w warm, salt water.  Creatinine trending down nicely (0.9).  Excellent UOP (7 day pollack).  ABIGAIL drain w 52 ml serosang drainage.  She had no BMs yesterday.  Colace only was restarted today.  She cont to take Myfortic.  Tylenol ATC was helping w pain BUT LFTs now up.  Tylenol d/c'd.  She has liquid Roxicodone 2.5 or 5 mg as needed. Cont IS.    Liver US reviewed and normal.  Hep C pending.  S/p Dr Vides w Hepatology - he plans to look at patients chart.       Ms.  Jennifer is a 52 y.o. 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  She received a living related kidney transplant on 11/7/17.  She was on hemodialysis at the time of transplantation.  She received induction therapy with Campath and her maintenance immunosuppression consists of:             Immunosuppressants      Start     Stop Route Frequency Ordered     11/09/17 2200   mycophenolate EC tablet 360 mg       -- Oral 2 times daily 11/09/17 1155     11/09/17 1800   tacrolimus capsule 6 mg  (IP TXP POST-OP CAMPATH)       -- Oral 2 times daily 11/09/17 1000             Previous Transplant: yes        Past Medical and Surgical History: Ms. Rodriguez has a past medical history of Anemia; Avascular necrosis left hip (12/6/2016); Dyslipidemia (12/6/2016); ESRD (end stage renal disease) (12/6/2016); Essential hypertension (12/6/2016); Gastroesophageal reflux disease without esophagitis (12/6/2016); Hypertension; Polycystic kidney disease; S/P bone marrow biopsy (12/6/2016); and Secondary hyperparathyroidism of renal origin (12/6/2016).  She has a past surgical history that includes Eye muscle surgery (Bilateral); Peritoneal catheter insertion; Kidney transplant (Right, 1996); Peritoneal catheter insertion; Cholecystectomy; Total hip arthroplasty (Left, 2001); right hip decompression; left hip revision; Parathyroidectomy; Abdominal hernia repair; Fracture surgery (Right); and Tonsillectomy.     Past Social and Family History: Ms. Rodriguez reports that she has never smoked. She does not have any smokeless tobacco history on file. She reports that she does not drink alcohol or use drugs.Her family history includes COPD in her father; Cancer in her paternal grandmother and sister; Kidney disease in her sister; No Known Problems in her mother.        Review of Systems   Constitutional: Negative for activity change, appetite change, chills, diaphoresis, fatigue and fever.   HENT: Positive for sore  throat. Negative for mouth sores and trouble swallowing.    Respiratory: Negative for cough and shortness of breath.    Cardiovascular: Negative.  Negative for leg swelling.   Gastrointestinal: Positive for abdominal pain and diarrhea. Negative for abdominal distention, constipation, nausea and vomiting.   Genitourinary: Negative for flank pain and hematuria.   Musculoskeletal: Negative for arthralgias, back pain and myalgias.   Skin: Positive for wound.   Allergic/Immunologic: Positive for immunocompromised state.   Neurological: Positive for weakness. Negative for tremors.   Psychiatric/Behavioral: Negative for dysphoric mood and sleep disturbance. The patient is not nervous/anxious.       Objective:      Vital Signs (Most Recent)  Temp: 99.9 °F (37.7 °C) (11/11/17 0745)  Pulse: 77 (11/11/17 0745)  Resp: 18 (11/11/17 0745)  BP: (!) 150/69 (11/11/17 0745)  SpO2: 97 % (11/11/17 0745)    Vital Signs Range (Last 24H):  Temp:  [98 °F (36.7 °C)-99.9 °F (37.7 °C)]   Pulse:  [64-77]   Resp:  [16-18]   BP: (138-156)/(69-80)   SpO2:  [94 %-97 %]     I & O (Last 24H):  Intake/Output Summary (Last 24 hours) at 11/11/17 0810  Last data filed at 11/11/17 0600   Gross per 24 hour   Intake             3130 ml   Output             4580 ml   Net            -1450 ml       Weight: 63.9 kg (140 lb 14 oz)  Height: 5' (152.4 cm)  Body mass index is 27.51 kg/m².     Physical Exam   Constitutional: She is oriented to person, place, and time. She appears well-developed. No distress.   HENT:   Head: Normocephalic and atraumatic.   Nose: Nose normal.   Mouth/Throat: No oropharyngeal exudate.       orpharynx w mild erythema, no masses or exudate noted.     Eyes: EOM are normal. Pupils are equal, round, and reactive to light. No scleral icterus.   Cardiovascular: Normal rate, regular rhythm and normal heart sounds.  Exam reveals no gallop and no friction rub.    No murmur heard.  Pulmonary/Chest: Effort normal and breath sounds normal. She has  no rales.   Abdominal: Bowel sounds are normal. She exhibits no distension. There is no tenderness.   Ktx incision bandaged with minimal oozing on dressing  ABIGAIL x 1 serosang   Musculoskeletal: Normal range of motion. She exhibits no edema.   Neurological: She is alert and oriented to person, place, and time.   Skin: Skin is warm and dry. Capillary refill takes 2 to 3 seconds. She is not diaphoretic. No erythema.   Psychiatric: She has a normal mood and affect. Her behavior is normal. Judgment and thought content normal.   Nursing note and vitals reviewed.        Significant Labs:  Lab Results   Component Value Date    WBC 3.45 (L) 11/10/2017    HGB 8.0 (L) 11/10/2017    HCT 23.9 (L) 11/10/2017    MCV 98 11/10/2017    PLT 73 (L) 11/10/2017     CMP  Sodium   Date Value Ref Range Status   11/11/2017 146 (H) 136 - 145 mmol/L Final     Potassium   Date Value Ref Range Status   11/11/2017 4.5 3.5 - 5.1 mmol/L Final     Chloride   Date Value Ref Range Status   11/11/2017 116 (H) 95 - 110 mmol/L Final     CO2   Date Value Ref Range Status   11/11/2017 24 23 - 29 mmol/L Final     Glucose   Date Value Ref Range Status   11/11/2017 91 70 - 110 mg/dL Final     BUN, Bld   Date Value Ref Range Status   11/11/2017 12 6 - 20 mg/dL Final     Creatinine   Date Value Ref Range Status   11/11/2017 0.9 0.5 - 1.4 mg/dL Final     Calcium   Date Value Ref Range Status   11/11/2017 7.7 (L) 8.7 - 10.5 mg/dL Final     Total Protein   Date Value Ref Range Status   11/11/2017 5.0 (L) 6.0 - 8.4 g/dL Final     Albumin   Date Value Ref Range Status   11/11/2017 2.4 (L) 3.5 - 5.2 g/dL Final     Total Bilirubin   Date Value Ref Range Status   11/11/2017 0.5 0.1 - 1.0 mg/dL Final     Comment:     For infants and newborns, interpretation of results should be based  on gestational age, weight and in agreement with clinical  observations.  Premature Infant recommended reference ranges:  Up to 24 hours.............<8.0 mg/dL  Up to 48  hours............<12.0 mg/dL  3-5 days..................<15.0 mg/dL  6-29 days.................<15.0 mg/dL       Alkaline Phosphatase   Date Value Ref Range Status   11/11/2017 284 (H) 55 - 135 U/L Final     AST   Date Value Ref Range Status   11/11/2017 198 (H) 10 - 40 U/L Final     ALT   Date Value Ref Range Status   11/11/2017 366 (H) 10 - 44 U/L Final     Anion Gap   Date Value Ref Range Status   11/11/2017 6 (L) 8 - 16 mmol/L Final     eGFR if    Date Value Ref Range Status   11/11/2017 >60.0 >60 mL/min/1.73 m^2 Final     eGFR if non    Date Value Ref Range Status   11/11/2017 >60.0 >60 mL/min/1.73 m^2 Final     Comment:     Calculation used to obtain the estimated glomerular filtration  rate (eGFR) is the CKD-EPI equation.        Tacrolimus Lvl   Date Value Ref Range Status   11/10/2017 6.8 5.0 - 15.0 ng/mL Final     Comment:     Testing performed by Liquid Chromatography-Tandem  Mass Spectrometry (LC-MS/MS).  This test was developed and its performance characteristics  determined by [facility], Department of Pathology  and Laboratory Medicine in a manner consistent with CLIA  requirements. It has not been cleared or approved by the [place]  Food and Drug Administration.  This test is used for clinical   purposes.  It should not be regarded as investigational or for  research.     11/09/2017 2.4 (L) 5.0 - 15.0 ng/mL Final     Comment:     Testing performed by Liquid Chromatography-Tandem  Mass Spectrometry (LC-MS/MS).  This test was developed and its performance characteristics  determined by [facility], Department of Pathology  and Laboratory Medicine in a manner consistent with CLIA  requirements. It has not been cleared or approved by the [place]  Food and Drug Administration.  This test is used for clinical   purposes.  It should not be regarded as investigational or for  research.     11/08/2017 <1.5 (L) 5.0 - 15.0 ng/mL Final     Comment:     Testing  performed by Liquid Chromatography-Tandem  Mass Spectrometry (LC-MS/MS).  This test was developed and its performance characteristics  determined by [facility], Department of Pathology  and Laboratory Medicine in a manner consistent with CLIA  requirements. It has not been cleared or approved by the [place]  Food and Drug Administration.  This test is used for clinical   purposes.  It should not be regarded as investigational or for  research.           Assessment/Plan:          * Status post -donor kidney transplantation     - s/p DBD [facility] high rish kidney transplant for ESRD 2/2 polycystic kidney disease 17.  Surgery significant for friable bladder mucosa.  7 day pollack.  Cr trending down nicely.  UOP excellent.    - Continue pathway.  IV fluids d/c'd late on POD 31.  Encourage PO intake.    - Keep -140 for now       Sore throat     - s/p intubation.  ENT consulted.  Mild erythema to oropharynx noted.  ENT recommended ice chips and Decadron.  Pt is on solu-medrol.  Pt having some improvement w Chloraseptic spray.  - Cont liquids/soft food.  Replacing electrolytes.  Monitor.            Hypophosphatemia     - replaced. Cont to monitor labs on daily basis.            Diarrhea     - bowel regimen discontinued. Cellcept changed to Myfortic.  Sister has diarrhea w prograf.  Pt was on Cyclosporine for last transplant.    - Today pt notes not having a BM for more than 24 hours, requested colace be restarted.          Metabolic acidosis     - started sodium bicarb.          At risk for opportunistic infections     - Bactrim d/c'd 11/10 for elevated LFTs. Attempted Pentamadine, unabl to tolrate.  G6PD in am for PCP prophylaxis  - Continue Valcyte for CMV prophylaxis  - Continue nystatin for thrush prophylaxis          Prophylactic immunotherapy     - Campath induction protocol.  Continue tacrolimus and Myfortic.  Will monitor for signs of toxic side effects, check daily tacrolimus troughs, and  change meds accordingly.          Long-term use of immunosuppressant medication     Chronic Prophylactic Immunosuppression- cont to check prograf level daily.  Assess for toxicity and adjust level as needed.  Cellcept changed to Myfortic for diarrhea POD#2.              Essential hypertension     - Hold off on starting home amlodipine for lower pressures  - Goal pressure for now 130-140 SBP  - Started low dose Coreg (home med) 3.125 mg.  - Increased Coreg to 6.25 mg POD3 with hold parameters       Elevated LFTs            - LFTs cont to trend up.  Tylenol and Bactrim d/c'd             - Liver US unremarkable.              - d/w Dr Juarez, Hepatology.  Trend numbers, likley to come dpwn. Increase r/t medications??     Secondary hyperparathyroidism of renal origin     - Continue cinacalcet and ergo.  PTH 11/6/17 996.  - Monitor                Discharge Planning:  Possible discharge tomorrow.     Dia Main, NP  Kidney Transplant  [facility]

## 2017-11-11 NOTE — ASSESSMENT & PLAN NOTE
Chronic Prophylactic Immunosuppression- cont to check prograf level daily.  Assess for toxicity and adjust level as needed.  Cellcept changed to Myfortic 11/9/17 for diarrhea.

## 2017-11-11 NOTE — ASSESSMENT & PLAN NOTE
- s/p DBD [facility] high rish kidney transplant for ESRD 2/2 polycystic kidney disease 11/7/17.  Surgery significant for friable bladder mucosa.  7 day pollack.  Progressing well. Creat has had roseann of 0.9-1.1.  - Continue pathway.      - Keep -140 for now  -Follow with strict I/Os, daily weights, BP (goal <140/90), daily labs.

## 2017-11-11 NOTE — ASSESSMENT & PLAN NOTE
- s/p intubation.  ENT consulted; Per ENT uvular hydrops likely from intubation.  Mild erythema to oropharynx noted.  ENT recommended ice chips and Decadron.  Pt is on solu-medrol.  Pt having some improvement w Chloraseptic spray.  - voice stronger today  - Cont liquids/soft food.  Replacing electrolytes.  Monitor.

## 2017-11-11 NOTE — ASSESSMENT & PLAN NOTE
- Bowel regimen discontinued. Cellcept changed to Myfortic.  Sister has diarrhea w prograf.  Pt was on Cyclosporine for last transplant.    -Watch how she does on Myfortic

## 2017-11-11 NOTE — PROGRESS NOTES
[facility]  Kidney Transplant  Progress Note      Reason for Follow-up: Reassessment of Kidney Transplant - 11/7/2017  (#2) recipient and management of immunosuppression.      Subjective:   History of Present Illness:  Ms. Apurva Rodriguez is a 52 y.o female with a PMH of PKD with previous RLQ renal transplant (3/16/96-4/2016).  Prior to admit she was on nightly peritoneal dialysis, without complication. Her urine output is about 0.5-0.75 liters/day. PMH otherwise significant for parathyroidectomy and AVN requiring hip replacement.  She was admitted 11/6/17 for DBD kidney transplant which took place this AM without complication.  PD cath was removed.  It was noted that pt had significant scar tissue and a friable bladder and a 7 day pollack was recommended and drain was placed.  Post op labs with stable H/H, nice drop in Cr, and pt with good UOP.    Pre op labs with Vit D 24 and .  Cinacalcet started.  Of note, pt with parathyroidectomy in past with implant in shoulder.  Her recent PTHs have not been as high- unclear specific numbers.    Interval history:  Cont to c/o throat pain, but better.  She has been using Chloraseptic w some relieve.  She is swallowing liquids w/o difficulty.  ENT was consulted and recommended ice chips and Decadron- pt is on Solu Medrol.  Encourage pt to gargle w warm, salt water.  Creatinine trending down nicely (0.9).  Excellent UOP (7 day pollack).  ABIGAIL drain w 52 ml serosang drainage.  She had no BMs yesterday.  Colace only was restarted today.  She cont to take Myfortic.  Tylenol ATC was helping w pain BUT LFTs now up.  Tylenol d/c'd.  She has liquid Roxicodone 2.5 or 5 mg as needed. Cont IS.    Liver US reviewed and normal.  Hep C pending.  S/p Dr Mahogany goodman Hepatology - he plans to look at patients chart.      Ms. Rodriguez is a 52 y.o. year old female who is status post Kidney Transplant - 11/7/2017  (#2).    Her maintenance immunosuppression consists of:    Immunosuppressants     Start     Stop Route Frequency Ordered    11/09/17 2200  mycophenolate EC tablet 360 mg      -- Oral 2 times daily 11/09/17 1155    11/09/17 1800  tacrolimus capsule 6 mg  (IP TXP POST-OP CAMPATH)      -- Oral 2 times daily 11/09/17 1000        Past Medical, Surgical, Family, and Social History:   Unchanged from H&P.    Scheduled Meds:   carvedilol  6.25 mg Oral BID    cinacalcet  30 mg Oral Daily with breakfast    docusate sodium  100 mg Oral BID    ergocalciferol  50,000 Units Oral Q7 Days    famotidine  20 mg Oral QHS    heparin (porcine)  5,000 Units Subcutaneous Q12H    k phos di & mono-sod phos mono  500 mg Oral BID    multivitamin  1 tablet Oral Daily    mupirocin  1 g Nasal BID    mycophenolate  360 mg Oral BID    nystatin  500,000 Units Mouth/Throat QID (PC + HS)    sodium bicarbonate  650 mg Oral BID    tacrolimus  6 mg Oral BID    valGANciclovir  900 mg Oral Daily     Continuous Infusions:   glucagon (human recombinant)       PRN Meds:sodium chloride, sodium chloride, dextrose 50%, diphenhydrAMINE, glucagon (human recombinant), glucose, glucose, glucose, hydrALAZINE, insulin aspart, naloxone, ondansetron, oxyCODONE, oxyCODONE, traMADol    Intake/Output - Last 3 Shifts       11/09 0700 - 11/10 0659 11/10 0700 - 11/11 0659 11/11 0700 - 11/12 0659    P.O. 2715 2630 800    I.V. (mL/kg) 50 (0.8)      Blood 350      IV Piggyback 500 500     Total Intake(mL/kg) 3615 (56.6) 3130 (49) 800 (12.5)    Urine (mL/kg/hr) 3400 (2.2) 4850 (3.2) 1100 (2.7)    Drains 52 (0) 30 (0) 0 (0)    Stool 0 (0) 0 (0) 0 (0)    Total Output 3452 4880 1100    Net +163 -1750 -300           Stool Occurrence 1 x 0 x 0 x           Review of Systems   Constitutional: Negative for fever.   HENT: Negative for mouth sores.    Eyes: Negative for visual disturbance.   Respiratory: Negative for shortness of breath.    Cardiovascular: Negative for chest pain and leg swelling.   Gastrointestinal: Negative.     Genitourinary: Negative for decreased urine volume, difficulty urinating, dysuria and hematuria.   Musculoskeletal: Negative.    Skin: Negative for rash.   Allergic/Immunologic: Positive for immunocompromised state.   Neurological: Negative for tremors.      Objective:     Vital Signs (Most Recent):  Temp: 98.6 °F (37 °C) (17)  Pulse: 70 (17)  Resp: 18 (17)  BP: (!) 150/72 (17)  SpO2: 98 % (17) Vital Signs (24h Range):  Temp:  [98 °F (36.7 °C)-99.9 °F (37.7 °C)] 98.6 °F (37 °C)  Pulse:  [69-77] 70  Resp:  [18] 18  SpO2:  [96 %-98 %] 98 %  BP: (138-156)/(69-78) 150/72     Weight: 63.9 kg (140 lb 14 oz)  Height: 5' (152.4 cm)  Body mass index is 27.51 kg/m².    Physical Exam   Constitutional: No distress.   Cardiovascular: Normal rate and regular rhythm.    Pulmonary/Chest: Effort normal and breath sounds normal. No respiratory distress.   Abdominal: Soft. Bowel sounds are normal. She exhibits no mass. There is no tenderness.   Incision clean and dry.   Musculoskeletal: She exhibits no edema.   Psychiatric: She has a normal mood and affect.       Laboratory:  CBC:   Recent Labs  Lab 17  0415 17  0634 11/10/17  0450 17  0632   WBC 5.17  --  3.45* 2.34*   RBC 2.10*  --  2.44* 2.57*   HGB 6.9* 7.0* 8.0* 8.3*   HCT 21.2* 21.2* 23.9* 25.5*   PLT 65*  --  73* 105*   *  --  98 99*   MCH 32.9*  --  32.8* 32.3*   MCHC 32.5  --  33.5 32.5     BMP:   Recent Labs  Lab 17  1558 11/10/17  0450 17  0632   * 76 91    140 146*   K 4.4 4.0 4.5   * 113* 116*   CO2 23 23 24   BUN 15 15 12   CREATININE 1.1 0.9 0.9   CALCIUM 8.3* 7.8* 7.7*     Lab Results   Component Value Date    TACROLIMUS 7.3 2017     Labs within the past 24 hours have been reviewed.    Diagnostic Results:  Liver US no sig abnormalities.    Assessment/Plan:     * Status post -donor kidney transplantation    - s/p DBD [facility] high rish kidney  transplant for ESRD 2/2 polycystic kidney disease 11/7/17.  Surgery significant for friable bladder mucosa.  7 day pollack.  Progressing well. Creat has had roseann of 0.9-1.1.  - Continue pathway.      - Keep -140 for now  -Follow with strict I/Os, daily weights, BP (goal <140/90), daily labs.         Uvular edema    See sore throat- improved          Sore throat    - s/p intubation.  ENT consulted; Per ENT uvular hydrops likely from intubation.  Mild erythema to oropharynx noted.  ENT recommended ice chips and Decadron.  Pt is on solu-medrol.  Pt having some improvement w Chloraseptic spray.  - voice stronger today and discomfort improving  - Cont liquids/soft food and advance as tolerated.  - Asked to start using IS. Monitor.            Hypophosphatemia    - Increase phos supplement and continue high ph diet.  - Cont to monitor labs on daily basis.            Secondary hyperparathyroidism of renal origin    - Continue cinacalcet and ergo.  PTH 11/6/17 996.  - Monitor, levels acceptable          Metabolic acidosis    -Corrected,  cont sodium bicarb 650 mg BID.            At risk for opportunistic infections    - Continue Bactrim for PCP prophylaxis  - Continue Valcyte for CMV prophylaxis  - Continue nystatin for thrush prophylaxis          Prophylactic immunotherapy    - Campath induction protocol.    - Tacro level at target. Continue tacrolimus and Myfortic.  Will monitor for signs of toxic side effects, check daily tacrolimus troughs, and change meds accordingly.          Long-term use of immunosuppressant medication    Chronic Prophylactic Immunosuppression- cont to check prograf level daily.  Assess for toxicity and adjust level as needed.  Cellcept changed to Myfortic 11/9/17 for diarrhea.             Essential hypertension    - Hold off on starting home amlodipine for lower pressures; watch trend  - Goal pressure for now 130-140 SBP  - Coreg increased to 6.25. Bid POD#3.          Transaminitis    -Suspect  drug induced. Liver US unremarkable.  -Hepatology consult pending.            Discharge Planning:  LFTs worsening and low grade fever concerning in patient POD4 post kidney transplant. Await repeat labs and hepatology consult, then reassess.    Shreya Waldron MD  Kidney Transplant  [facility]

## 2017-11-11 NOTE — ASSESSMENT & PLAN NOTE
- Hold off on starting home amlodipine for lower pressures  - Goal pressure for now 130-140 SBP  - Started low dose Coreg (home med) 3.125 mg with hold parameters  - Coreg increased to 6.25. Bid POD#3.

## 2017-11-11 NOTE — SUBJECTIVE & OBJECTIVE
Subjective:     History of Present Illness:   Ms. Apurva Rodriguez is a 52 y.o female with a PMH of PKD with previous RLQ renal transplant (3/16/96-4/2016).  Prior to admit she was on nightly peritoneal dialysis, without complication. Her urine output is about 0.5-0.75 liters/day. PMH otherwise significant for parathyroidectomy and AVN requiring hip replacement.  She was admitted 11/6/17 for DBD kidney transplant which took place this AM without complication.  PD cath was removed.  It was noted that pt had significant scar tissue and a friable bladder and a 7 day pollack was recommended and drain was placed.  Post op labs with stable H/H, nice drop in Cr, and pt with good UOP.    Pre op labs with Vit D 24 and .  Cinacalcet started.  Of note, pt with parathyroidectomy in past with implant in shoulder.  Her recent PTHs have not been as high- unclear specific numbers.    Interval history:  Cont to c/o throat pain, but better.  She has been using Chloraseptic w some relieve.  She is swallowing liquids w/o difficulty.  ENT was consulted and recommended ice chips and Decadron- pt is on Solu Medrol.  Encourage pt to gargle w warm, salt water.  Creatinine trending down nicely (0.9).  Excellent UOP (7 day pollack).  ABIGAIL drain w 52 ml serosang drainage.  She had no BMs yesterday.  Colace only was restarted today.  She cont to take Myfortic.  Tylenol ATC was helping w pain BUT LFTs now up.  Tylenol d/c'd.  She has liquid Roxicodone 2.5 or 5 mg as needed. Cont IS.    Liver US reviewed and normal.  Hep C pending.  S/p Dr Mahogany goodman Hepatology - he plans to look at patients chart.      Ms. Rodriguez is a 52 y.o. 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  She received a living related kidney transplant on 11/7/17.  She was on hemodialysis at the time of transplantation.  She received induction therapy with Campath and her maintenance immunosuppression consists of:   Immunosuppressants     Start      Stop Route Frequency Ordered    11/09/17 2200  mycophenolate EC tablet 360 mg      -- Oral 2 times daily 11/09/17 1155    11/09/17 1800  tacrolimus capsule 6 mg  (IP TXP POST-OP CAMPATH)      -- Oral 2 times daily 11/09/17 1000          Previous Transplant: yes  :      Past Medical and Surgical History: Ms. Rodriguez has a past medical history of Anemia; Avascular necrosis left hip (12/6/2016); Dyslipidemia (12/6/2016); ESRD (end stage renal disease) (12/6/2016); Essential hypertension (12/6/2016); Gastroesophageal reflux disease without esophagitis (12/6/2016); Hypertension; Polycystic kidney disease; S/P bone marrow biopsy (12/6/2016); and Secondary hyperparathyroidism of renal origin (12/6/2016).  She has a past surgical history that includes Eye muscle surgery (Bilateral); Peritoneal catheter insertion; Kidney transplant (Right, 1996); Peritoneal catheter insertion; Cholecystectomy; Total hip arthroplasty (Left, 2001); right hip decompression; left hip revision; Parathyroidectomy; Abdominal hernia repair; Fracture surgery (Right); and Tonsillectomy.    Past Social and Family History: Ms. Rodriguez reports that she has never smoked. She does not have any smokeless tobacco history on file. She reports that she does not drink alcohol or use drugs.Her family history includes COPD in her father; Cancer in her paternal grandmother and sister; Kidney disease in her sister; No Known Problems in her mother.    Intake/Output - Last 3 Shifts       11/08 0700 - 11/09 0659 11/09 0700 - 11/10 0659 11/10 0700 - 11/11 0659    P.O. 1275 2715 1780    I.V. (mL/kg) 723.8 (11.3) 50 (0.8)     Blood  350     IV Piggyback 100 500 500    Total Intake(mL/kg) 2098.8 (32.8) 3615 (56.6) 2280 (35.7)    Urine (mL/kg/hr) 3175 (2.1) 3400 (2.2) 2600 (2.8)    Drains 140 (0.1) 52 (0) 15 (0)    Stool 0 (0) 0 (0)     Total Output 3315 3452 2615    Net -1216.3 +163 -335           Urine Occurrence 0 x      Stool Occurrence 4 x 1 x            Review  of Systems   Constitutional: Negative for activity change, appetite change, chills, diaphoresis, fatigue and fever.   HENT: Positive for sore throat. Negative for mouth sores and trouble swallowing.    Respiratory: Negative for cough and shortness of breath.    Cardiovascular: Negative.  Negative for leg swelling.   Gastrointestinal: Positive for abdominal pain and diarrhea. Negative for abdominal distention, constipation, nausea and vomiting.   Genitourinary: Negative for flank pain and hematuria.   Musculoskeletal: Negative for arthralgias, back pain and myalgias.   Skin: Positive for wound.   Allergic/Immunologic: Positive for immunocompromised state.   Neurological: Positive for weakness. Negative for tremors.   Psychiatric/Behavioral: Negative for dysphoric mood and sleep disturbance. The patient is not nervous/anxious.      Objective:     Vital Signs (Most Recent):  Temp: 98.2 °F (36.8 °C) (11/10/17 1550)  Pulse: 76 (11/10/17 1550)  Resp: 18 (11/10/17 1550)  BP: 138/78 (11/10/17 1550)  SpO2: 97 % (11/10/17 1550) Vital Signs (24h Range):  Temp:  [98.2 °F (36.8 °C)-98.7 °F (37.1 °C)] 98.2 °F (36.8 °C)  Pulse:  [64-76] 76  Resp:  [16-18] 18  SpO2:  [94 %-97 %] 97 %  BP: (138-155)/(76-80) 138/78     Weight: 63.9 kg (140 lb 14 oz)  Height: 5' (152.4 cm)  Body mass index is 27.51 kg/m².    Physical Exam   Constitutional: She is oriented to person, place, and time. She appears well-developed. No distress.   HENT:   Head: Normocephalic and atraumatic.   Nose: Nose normal.   Mouth/Throat: No oropharyngeal exudate.       orpharynx w mild erythema, no masses or exudate noted.     Eyes: EOM are normal. Pupils are equal, round, and reactive to light. No scleral icterus.   Cardiovascular: Normal rate, regular rhythm and normal heart sounds.  Exam reveals no gallop and no friction rub.    No murmur heard.  Pulmonary/Chest: Effort normal and breath sounds normal. She has no rales.   Abdominal: Bowel sounds are normal. She  exhibits no distension. There is no tenderness.   Ktx incision bandaged with minimal oozing on dressing  ABIGAIL x 1 serosang   Musculoskeletal: Normal range of motion. She exhibits no edema.   Neurological: She is alert and oriented to person, place, and time.   Skin: Skin is warm and dry. Capillary refill takes 2 to 3 seconds. She is not diaphoretic. No erythema.   Psychiatric: She has a normal mood and affect. Her behavior is normal. Judgment and thought content normal.   Nursing note and vitals reviewed.      Significant Labs:  CBC:     Recent Labs  Lab 11/08/17 0415 11/09/17 0415 11/09/17  0634 11/10/17  0450   WBC 5.44  --  5.17  --  3.45*   RBC 2.17*  --  2.10*  --  2.44*   HGB 7.0*  < > 6.9* 7.0* 8.0*   HCT 21.8*  < > 21.2* 21.2* 23.9*   PLT 70*  --  65*  --  73*   *  --  101*  --  98   MCH 32.3*  --  32.9*  --  32.8*   MCHC 32.1  --  32.5  --  33.5   < > = values in this interval not displayed.  CMP:     Recent Labs  Lab 11/08/17 0415 11/09/17 0415 11/09/17  1558 11/10/17  0450    85 222* 76   CALCIUM 7.4* 7.8* 8.3* 7.8*   ALBUMIN 1.9* 2.2* 2.5* 2.2*  2.2*   PROT 4.3*  --  5.7* 4.7*    142 142 140   K 3.3* 3.6 4.4 4.0   CO2 21* 19* 23 23   * 116* 114* 113*   BUN 29* 17 15 15   CREATININE 1.8* 1.0 1.1 0.9   ALKPHOS 156*  --  241* 212*   ALT 47*  --  172* 211*   AST 62*  --  153* 143*     Labs within the past 24 hours have been reviewed.

## 2017-11-11 NOTE — ASSESSMENT & PLAN NOTE
- s/p DBD [facility] high rish kidney transplant for ESRD 2/2 polycystic kidney disease 11/7/17.  Surgery significant for friable bladder mucosa.  7 day pollack.  Cr trending down nicely.  UOP excellent.    - Continue pathway.  IV fluids d/c'd late on POD #1.  Encourage PO intake.    - Keep -140 for now

## 2017-11-11 NOTE — SUBJECTIVE & OBJECTIVE
Subjective:   History of Present Illness:  Ms. Apurva Rodriguez is a 52 y.o female with a PMH of PKD with previous RLQ renal transplant (3/16/96-4/2016).  Prior to admit she was on nightly peritoneal dialysis, without complication. Her urine output is about 0.5-0.75 liters/day. PMH otherwise significant for parathyroidectomy and AVN requiring hip replacement.  She was admitted 11/6/17 for DBD kidney transplant which took place this AM without complication.  PD cath was removed.  It was noted that pt had significant scar tissue and a friable bladder and a 7 day pollack was recommended and drain was placed.  Post op labs with stable H/H, nice drop in Cr, and pt with good UOP.    Pre op labs with Vit D 24 and .  Cinacalcet started.  Of note, pt with parathyroidectomy in past with implant in shoulder.  Her recent PTHs have not been as high- unclear specific numbers.    Interval history:  Cont to c/o throat pain, but better.  She has been using Chloraseptic w some relieve.  She is swallowing liquids w/o difficulty.  ENT was consulted and recommended ice chips and Decadron- pt is on Solu Medrol.  Encourage pt to gargle w warm, salt water.  Creatinine trending down nicely (0.9).  Excellent UOP (7 day pollack).  ABIGAIL drain w 52 ml serosang drainage.  She had no BMs yesterday.  Colace only was restarted today.  She cont to take Myfortic.  Tylenol ATC was helping w pain BUT LFTs now up.  Tylenol d/c'd.  She has liquid Roxicodone 2.5 or 5 mg as needed. Cont IS.    Liver US reviewed and normal.  Hep C pending.  S/p Dr Mahogany goodman Hepatology - he plans to look at patients chart.      Ms. Rodriguez is a 52 y.o. year old female who is status post Kidney Transplant - 11/7/2017  (#2).    Her maintenance immunosuppression consists of:   Immunosuppressants     Start     Stop Route Frequency Ordered    11/09/17 2200  mycophenolate EC tablet 360 mg      -- Oral 2 times daily 11/09/17 1155    11/09/17 1800  tacrolimus capsule 6 mg  (IP  TXP POST-OP CAMPATH)      -- Oral 2 times daily 11/09/17 1000        Past Medical, Surgical, Family, and Social History:   Unchanged from H&P.    Scheduled Meds:   carvedilol  6.25 mg Oral BID    cinacalcet  30 mg Oral Daily with breakfast    docusate sodium  100 mg Oral BID    ergocalciferol  50,000 Units Oral Q7 Days    famotidine  20 mg Oral QHS    heparin (porcine)  5,000 Units Subcutaneous Q12H    k phos di & mono-sod phos mono  500 mg Oral BID    multivitamin  1 tablet Oral Daily    mupirocin  1 g Nasal BID    mycophenolate  360 mg Oral BID    nystatin  500,000 Units Mouth/Throat QID (PC + HS)    sodium bicarbonate  650 mg Oral BID    tacrolimus  6 mg Oral BID    valGANciclovir  900 mg Oral Daily     Continuous Infusions:   glucagon (human recombinant)       PRN Meds:sodium chloride, sodium chloride, dextrose 50%, diphenhydrAMINE, glucagon (human recombinant), glucose, glucose, glucose, hydrALAZINE, insulin aspart, naloxone, ondansetron, oxyCODONE, oxyCODONE, traMADol    Intake/Output - Last 3 Shifts       11/09 0700 - 11/10 0659 11/10 0700 - 11/11 0659 11/11 0700 - 11/12 0659    P.O. 2715 2630 800    I.V. (mL/kg) 50 (0.8)      Blood 350      IV Piggyback 500 500     Total Intake(mL/kg) 3615 (56.6) 3130 (49) 800 (12.5)    Urine (mL/kg/hr) 3400 (2.2) 4850 (3.2) 1100 (2.7)    Drains 52 (0) 30 (0) 0 (0)    Stool 0 (0) 0 (0) 0 (0)    Total Output 3452 4880 1100    Net +163 -1750 -300           Stool Occurrence 1 x 0 x 0 x           Review of Systems   Constitutional: Negative for fever.   HENT: Negative for mouth sores.    Eyes: Negative for visual disturbance.   Respiratory: Negative for shortness of breath.    Cardiovascular: Negative for chest pain and leg swelling.   Gastrointestinal: Negative.    Genitourinary: Negative for decreased urine volume, difficulty urinating, dysuria and hematuria.   Musculoskeletal: Negative.    Skin: Negative for rash.   Allergic/Immunologic: Positive for  immunocompromised state.   Neurological: Negative for tremors.      Objective:     Vital Signs (Most Recent):  Temp: 98.6 °F (37 °C) (11/11/17 1133)  Pulse: 70 (11/11/17 1133)  Resp: 18 (11/11/17 1133)  BP: (!) 150/72 (11/11/17 1133)  SpO2: 98 % (11/11/17 1133) Vital Signs (24h Range):  Temp:  [98 °F (36.7 °C)-99.9 °F (37.7 °C)] 98.6 °F (37 °C)  Pulse:  [69-77] 70  Resp:  [18] 18  SpO2:  [96 %-98 %] 98 %  BP: (138-156)/(69-78) 150/72     Weight: 63.9 kg (140 lb 14 oz)  Height: 5' (152.4 cm)  Body mass index is 27.51 kg/m².    Physical Exam   Constitutional: No distress.   Cardiovascular: Normal rate and regular rhythm.    Pulmonary/Chest: Effort normal and breath sounds normal. No respiratory distress.   Abdominal: Soft. Bowel sounds are normal. She exhibits no mass. There is no tenderness.   Incision clean and dry.   Musculoskeletal: She exhibits no edema.   Psychiatric: She has a normal mood and affect.       Laboratory:  CBC:   Recent Labs  Lab 11/09/17  0415 11/09/17  0634 11/10/17  0450 11/11/17  0632   WBC 5.17  --  3.45* 2.34*   RBC 2.10*  --  2.44* 2.57*   HGB 6.9* 7.0* 8.0* 8.3*   HCT 21.2* 21.2* 23.9* 25.5*   PLT 65*  --  73* 105*   *  --  98 99*   MCH 32.9*  --  32.8* 32.3*   MCHC 32.5  --  33.5 32.5     BMP:   Recent Labs  Lab 11/09/17  1558 11/10/17  0450 11/11/17  0632   * 76 91    140 146*   K 4.4 4.0 4.5   * 113* 116*   CO2 23 23 24   BUN 15 15 12   CREATININE 1.1 0.9 0.9   CALCIUM 8.3* 7.8* 7.7*     Lab Results   Component Value Date    TACROLIMUS 7.3 11/11/2017     Labs within the past 24 hours have been reviewed.    Diagnostic Results:  Liver US no sig abnormalities.

## 2017-11-11 NOTE — ASSESSMENT & PLAN NOTE
- s/p intubation.  ENT consulted; Per ENT uvular hydrops likely from intubation.  Mild erythema to oropharynx noted.  ENT recommended ice chips and Decadron.  Pt is on solu-medrol.  Pt having some improvement w Chloraseptic spray.  - voice stronger today and discomfort improving  - Cont liquids/soft food and advance as tolerated.  - Asked to start using IS. Monitor.

## 2017-11-12 LAB
ALBUMIN SERPL BCP-MCNC: 2.4 G/DL
ALP SERPL-CCNC: 366 U/L
ALT SERPL W/O P-5'-P-CCNC: 397 U/L
ANION GAP SERPL CALC-SCNC: 5 MMOL/L
ANISOCYTOSIS BLD QL SMEAR: SLIGHT
AST SERPL-CCNC: 180 U/L
BACTERIA #/AREA URNS AUTO: NORMAL /HPF
BASO STIPL BLD QL SMEAR: ABNORMAL
BASOPHILS NFR BLD: 0 %
BILIRUB SERPL-MCNC: 0.6 MG/DL
BILIRUB UR QL STRIP: NEGATIVE
BUN SERPL-MCNC: 15 MG/DL
CALCIUM SERPL-MCNC: 7.9 MG/DL
CHLORIDE SERPL-SCNC: 114 MMOL/L
CLARITY UR REFRACT.AUTO: CLEAR
CO2 SERPL-SCNC: 24 MMOL/L
COLOR UR AUTO: ABNORMAL
CREAT SERPL-MCNC: 0.9 MG/DL
DIFFERENTIAL METHOD: ABNORMAL
EOSINOPHIL NFR BLD: 0 %
ERYTHROCYTE [DISTWIDTH] IN BLOOD BY AUTOMATED COUNT: 15.3 %
EST. GFR  (AFRICAN AMERICAN): >60 ML/MIN/1.73 M^2
EST. GFR  (NON AFRICAN AMERICAN): >60 ML/MIN/1.73 M^2
GLUCOSE SERPL-MCNC: 89 MG/DL
GLUCOSE UR QL STRIP: NEGATIVE
HCT VFR BLD AUTO: 24.9 %
HGB BLD-MCNC: 8.1 G/DL
HGB UR QL STRIP: ABNORMAL
HYPOCHROMIA BLD QL SMEAR: ABNORMAL
IMM GRANULOCYTES # BLD AUTO: ABNORMAL K/UL
IMM GRANULOCYTES NFR BLD AUTO: ABNORMAL %
KETONES UR QL STRIP: NEGATIVE
LEUKOCYTE ESTERASE UR QL STRIP: NEGATIVE
LYMPHOCYTES NFR BLD: 1 %
MAGNESIUM SERPL-MCNC: 1.7 MG/DL
MCH RBC QN AUTO: 32.4 PG
MCHC RBC AUTO-ENTMCNC: 32.5 G/DL
MCV RBC AUTO: 100 FL
METAMYELOCYTES NFR BLD MANUAL: 3 %
MICROSCOPIC COMMENT: NORMAL
MONOCYTES NFR BLD: 3 %
NEUTROPHILS NFR BLD: 89 %
NEUTS BAND NFR BLD MANUAL: 4 %
NITRITE UR QL STRIP: NEGATIVE
NRBC BLD-RTO: 1 /100 WBC
OVALOCYTES BLD QL SMEAR: ABNORMAL
PH UR STRIP: 8 [PH] (ref 5–8)
PHOSPHATE SERPL-MCNC: 2.5 MG/DL
PLATELET # BLD AUTO: 109 K/UL
PLATELET BLD QL SMEAR: ABNORMAL
PMV BLD AUTO: 9.4 FL
POCT GLUCOSE: 100 MG/DL (ref 70–110)
POIKILOCYTOSIS BLD QL SMEAR: SLIGHT
POLYCHROMASIA BLD QL SMEAR: ABNORMAL
POTASSIUM SERPL-SCNC: 4.4 MMOL/L
PROT SERPL-MCNC: 5.2 G/DL
PROT UR QL STRIP: NEGATIVE
RBC # BLD AUTO: 2.5 M/UL
RBC #/AREA URNS AUTO: 1 /HPF (ref 0–4)
SODIUM SERPL-SCNC: 143 MMOL/L
SP GR UR STRIP: 1 (ref 1–1.03)
SPHEROCYTES BLD QL SMEAR: ABNORMAL
STOMATOCYTES BLD QL SMEAR: PRESENT
TACROLIMUS BLD-MCNC: 7.2 NG/ML
URN SPEC COLLECT METH UR: ABNORMAL
UROBILINOGEN UR STRIP-ACNC: NEGATIVE EU/DL
WBC # BLD AUTO: 2.28 K/UL
WBC #/AREA URNS AUTO: 1 /HPF (ref 0–5)

## 2017-11-12 PROCEDURE — 85007 BL SMEAR W/DIFF WBC COUNT: CPT

## 2017-11-12 PROCEDURE — 99233 SBSQ HOSP IP/OBS HIGH 50: CPT | Mod: ,,, | Performed by: INTERNAL MEDICINE

## 2017-11-12 PROCEDURE — 87086 URINE CULTURE/COLONY COUNT: CPT

## 2017-11-12 PROCEDURE — 85027 COMPLETE CBC AUTOMATED: CPT

## 2017-11-12 PROCEDURE — 83735 ASSAY OF MAGNESIUM: CPT

## 2017-11-12 PROCEDURE — 36415 COLL VENOUS BLD VENIPUNCTURE: CPT

## 2017-11-12 PROCEDURE — 25000003 PHARM REV CODE 250: Performed by: INTERNAL MEDICINE

## 2017-11-12 PROCEDURE — 25000003 PHARM REV CODE 250: Performed by: SURGERY

## 2017-11-12 PROCEDURE — 63600175 PHARM REV CODE 636 W HCPCS: Performed by: TRANSPLANT SURGERY

## 2017-11-12 PROCEDURE — 63600175 PHARM REV CODE 636 W HCPCS: Performed by: NURSE PRACTITIONER

## 2017-11-12 PROCEDURE — 81001 URINALYSIS AUTO W/SCOPE: CPT

## 2017-11-12 PROCEDURE — 84100 ASSAY OF PHOSPHORUS: CPT

## 2017-11-12 PROCEDURE — 25000003 PHARM REV CODE 250: Performed by: PHYSICIAN ASSISTANT

## 2017-11-12 PROCEDURE — 25000003 PHARM REV CODE 250: Performed by: NURSE PRACTITIONER

## 2017-11-12 PROCEDURE — 80053 COMPREHEN METABOLIC PANEL: CPT

## 2017-11-12 PROCEDURE — 80197 ASSAY OF TACROLIMUS: CPT

## 2017-11-12 PROCEDURE — 99223 1ST HOSP IP/OBS HIGH 75: CPT | Mod: ,,, | Performed by: INTERNAL MEDICINE

## 2017-11-12 PROCEDURE — 20600001 HC STEP DOWN PRIVATE ROOM

## 2017-11-12 RX ORDER — MYCOPHENOLIC ACID 180 MG/1
180 TABLET, DELAYED RELEASE ORAL 2 TIMES DAILY
Status: DISCONTINUED | OUTPATIENT
Start: 2017-11-12 | End: 2017-11-12

## 2017-11-12 RX ADMIN — HEPARIN SODIUM 5000 UNITS: 5000 INJECTION, SOLUTION INTRAVENOUS; SUBCUTANEOUS at 09:11

## 2017-11-12 RX ADMIN — CINACALCET HYDROCHLORIDE 30 MG: 30 TABLET, COATED ORAL at 08:11

## 2017-11-12 RX ADMIN — NYSTATIN 500000 UNITS: 500000 SUSPENSION ORAL at 08:11

## 2017-11-12 RX ADMIN — TACROLIMUS 6 MG: 1 CAPSULE ORAL at 05:11

## 2017-11-12 RX ADMIN — VALGANCICLOVIR 900 MG: 450 TABLET, FILM COATED ORAL at 08:11

## 2017-11-12 RX ADMIN — THERA TABS 1 TABLET: TAB at 08:11

## 2017-11-12 RX ADMIN — CARVEDILOL 6.25 MG: 6.25 TABLET, FILM COATED ORAL at 08:11

## 2017-11-12 RX ADMIN — DOCUSATE SODIUM 100 MG: 100 CAPSULE, LIQUID FILLED ORAL at 08:11

## 2017-11-12 RX ADMIN — NYSTATIN 500000 UNITS: 500000 SUSPENSION ORAL at 01:11

## 2017-11-12 RX ADMIN — MYCOPHENILIC ACID 360 MG: 180 TABLET, DELAYED RELEASE ORAL at 08:11

## 2017-11-12 RX ADMIN — SODIUM BICARBONATE 650 MG TABLET 650 MG: at 08:11

## 2017-11-12 RX ADMIN — DIBASIC SODIUM PHOSPHATE, MONOBASIC POTASSIUM PHOSPHATE AND MONOBASIC SODIUM PHOSPHATE 2 TABLET: 852; 155; 130 TABLET ORAL at 08:11

## 2017-11-12 RX ADMIN — TRAMADOL HYDROCHLORIDE 25 MG: 50 TABLET, FILM COATED ORAL at 05:11

## 2017-11-12 RX ADMIN — TACROLIMUS 6 MG: 1 CAPSULE ORAL at 08:11

## 2017-11-12 RX ADMIN — FAMOTIDINE 20 MG: 20 TABLET, FILM COATED ORAL at 08:11

## 2017-11-12 RX ADMIN — NYSTATIN 500000 UNITS: 500000 SUSPENSION ORAL at 06:11

## 2017-11-12 NOTE — SUBJECTIVE & OBJECTIVE
"Review of Systems   Constitutional: Negative for activity change and appetite change.   HENT: Negative for ear discharge and facial swelling.    Eyes: Negative for photophobia and redness.   Respiratory: Negative for wheezing and stridor.    Cardiovascular: Negative for chest pain and palpitations.   Endocrine: Negative for cold intolerance and heat intolerance.   Genitourinary: Negative for dysuria and frequency.   Skin: Negative for color change and rash.   Allergic/Immunologic: Positive for immunocompromised state. Negative for food allergies.   Neurological: Negative for seizures and numbness.   Hematological: Does not bruise/bleed easily.   Psychiatric/Behavioral: Negative for agitation.       Past Medical History:   Diagnosis Date    Anemia     Avascular necrosis left hip 2016    S/p replacement Cord decompression right side Revision of the left hip: with a "larger" hardware placement     Dyslipidemia 2016    ESRD (end stage renal disease) 2016    She mentioned that the cause of the kidney failure was associated with several birth defects: PKD Bilateral nephrocalcinosis Hyperuricemia Chronic Icthyosis     Essential hypertension 2016    Gastroesophageal reflux disease without esophagitis 2016    Hypertension     Polycystic kidney disease     diagnosed at early age    S/P bone marrow biopsy 2016    Secondary hyperparathyroidism of renal origin 2016       Past Surgical History:   Procedure Laterality Date    ABDOMINAL HERNIA REPAIR      CHOLECYSTECTOMY      laparoascopic    EYE MUSCLE SURGERY Bilateral     as a baby(1 years)    FRACTURE SURGERY Right     rigt femur :steel karan pl;acement and replacement    KIDNEY TRANSPLANT Right     RLQ transplant,  donor    left hip revision      PARATHYROIDECTOMY      PERITONEAL CATHETER INSERTION      PERITONEAL CATHETER INSERTION      right hip decompression      TONSILLECTOMY      TOTAL HIP ARTHROPLASTY " Left 2001       Family history of liver disease: No    Review of patient's allergies indicates:   Allergen Reactions    Amphotericin b      Peritonitis reaction in dialysis solution while on peritoneal dialysis    Azelex [azelaic acid] Hives    Cleocin [clindamycin hcl] Hives    Decadron [dexamethasone]      Highly anxious    Morphine Swelling     lips    Reglan [metoclopramide hcl] Other (See Comments)    Valumag      Vallium//Highly anxious    Vancomycin analogues Itching     Hair areas       Social History Main Topics    Smoking status: Never Smoker    Smokeless tobacco: Not on file    Alcohol use No    Drug use: No    Sexual activity: Yes     Partners: Female       Prescriptions Prior to Admission   Medication Sig Dispense Refill Last Dose    atorvastatin (LIPITOR) 20 MG tablet Take 20 mg by mouth once daily.    Taking    docusate sodium (COLACE) 100 MG capsule Take 100 mg by mouth 2 (two) times daily.   Taking    gabapentin (NEURONTIN) 100 MG capsule TK 1 TO 3 CS PO QHS  3 Taking       Objective:     Vital Signs (Most Recent):  Temp: 98.7 °F (37.1 °C) (11/12/17 0745)  Pulse: 65 (11/12/17 0745)  Resp: 18 (11/12/17 0745)  BP: (!) 164/75 (11/12/17 0745)  SpO2: 97 % (11/12/17 0745) Vital Signs (24h Range):  Temp:  [98.2 °F (36.8 °C)-99.7 °F (37.6 °C)] 98.7 °F (37.1 °C)  Pulse:  [65-80] 65  Resp:  [16-18] 18  SpO2:  [94 %-100 %] 97 %  BP: (144-164)/(72-81) 164/75     Weight: 63.9 kg (140 lb 14 oz) (11/09/17 0400)  Body mass index is 27.51 kg/m².    Physical Exam   Constitutional: She is oriented to person, place, and time. She appears well-developed and well-nourished.   Eyes: No scleral icterus.   Neck: Neck supple.   Cardiovascular: Normal rate.  Exam reveals no gallop.    Pulmonary/Chest: Effort normal. No respiratory distress.   Abdominal: Soft. Bowel sounds are normal. She exhibits no distension and no mass. There is no tenderness. There is no rebound and no guarding. No hernia.    Musculoskeletal: She exhibits no edema.   Neurological: She is alert and oriented to person, place, and time.   Skin: Skin is warm.   Psychiatric: She has a normal mood and affect.       MELD-Na score: 12 at 11/8/2017  4:15 AM  MELD score: 12 at 11/8/2017  4:15 AM  Calculated from:  Serum Creatinine: 1.8 mg/dL at 11/8/2017  4:15 AM  Serum Sodium: 140 mmol/L (Rounded to 137) at 11/8/2017  4:15 AM  Total Bilirubin: 0.2 mg/dL (Rounded to 1) at 11/8/2017  4:15 AM  INR(ratio): 0.9 (Rounded to 1) at 11/6/2017  7:47 PM  Age: 52 years    Lab Results   Component Value Date    WBC 2.28 (L) 11/12/2017    HGB 8.1 (L) 11/12/2017    HCT 24.9 (L) 11/12/2017     (H) 11/12/2017     (L) 11/12/2017     Lab Results   Component Value Date     11/12/2017    K 4.4 11/12/2017     (H) 11/12/2017    CO2 24 11/12/2017    BUN 15 11/12/2017    CREATININE 0.9 11/12/2017     Lab Results   Component Value Date    ALBUMIN 2.4 (L) 11/12/2017     (H) 11/12/2017     (H) 11/12/2017    ALKPHOS 366 (H) 11/12/2017    BILITOT 0.6 11/12/2017     Lab Results   Component Value Date    TACROLIMUS 7.2 11/12/2017       Imaging:  Reviewed and as noted in HPI.

## 2017-11-12 NOTE — ASSESSMENT & PLAN NOTE
-Resolving  - s/p intubation.  ENT consulted; Per ENT uvular hydrops likely from intubation.  Mild erythema to oropharynx noted.  ENT recommended ice chips and Decadron.  Pt is on solu-medrol.  Pt having some improvement w Chloraseptic spray.  - voice stronger today and discomfort improving  - Cont liquids/soft food and advance as tolerated.  - Continue IS. Monitor.

## 2017-11-12 NOTE — PLAN OF CARE
Problem: Patient Care Overview  Goal: Plan of Care Review  Outcome: Ongoing (interventions implemented as appropriate)  Patient AAOx4 and VSS. She has remained afebrile. Her BG is being monitored ac/hs and was 101 before breakfast. [place] is still in place and her UO so far today has been 2 liters. Left abdominal ABIGAIL in place and has put out 30 mL of serousangunous drainage. She also has an old PD cath site with no complaints. Self meds are set up in the room and her significant other pulls them 100%. Urinalysis and culture sent today. She's able to ambulate without assistance and has experienced no falls. She has no questions or concerns at this time. She's stable and will continue to monitor

## 2017-11-12 NOTE — ASSESSMENT & PLAN NOTE
-Suspect drug induced. Liver US unremarkable.  -Hepatology consult recommended viral PCRs (ordered).

## 2017-11-12 NOTE — ASSESSMENT & PLAN NOTE
Hepatocellular pattern. Likely insult since surgery as previous labs are normal. Normal US with normal biliary tree rules out obstruction. DDx is broad with viral infections, DILI (Bactrim or anesthetic drugs).     Recommend  Hepatitis C viral RNA  Anti-herpes simplex virus PCR, anti-varicella zoster, CMV PCR, EBV, HIV, Iron, ferritin, transferrin saturation  Check VASQUEZ, ASMA  Consider celiac testing with anti TTG, total IgA, TSH  Bactrim is on hold.   If no improvement within 48 hours, we will consider liver biopsy

## 2017-11-12 NOTE — PROGRESS NOTES
[facility]  Kidney Transplant  Progress Note      Reason for Follow-up: Reassessment of Kidney Transplant - 11/7/2017  (#2) recipient and management of immunosuppression.      Subjective:   History of Present Illness:  Ms. Apurva Rodriguez is a 52 y.o female with a PMH of PKD with previous RLQ renal transplant (3/16/96-4/2016).  Prior to admit she was on nightly peritoneal dialysis, without complication. Her urine output is about 0.5-0.75 liters/day. PMH otherwise significant for parathyroidectomy and AVN requiring hip replacement.  She was admitted 11/6/17 for DBD kidney transplant which took place this AM without complication.  PD cath was removed.  It was noted that pt had significant scar tissue and a friable bladder and a 7 day pollack was recommended and drain was placed.  Post op labs with stable H/H, nice drop in Cr, and pt with good UOP.    Pre op labs with Vit D 24 and .  Cinacalcet started.  Of note, pt with parathyroidectomy in past with implant in shoulder.  Her recent PTHs have not been as high- unclear specific numbers.    Interval history:  States voice is back to normal and can eat without any problems today. Liver enzymes noted to still be increasing, though not as much. She denies any anorexia, nausea, vomiting. Has noted urinary catheter is still leaking coming disconnected    Ms. Rodriguez is a 52 y.o. year old female who is status post Kidney Transplant - 11/7/2017  (#2).    Her maintenance immunosuppression consists of:   Immunosuppressants     Start     Stop Route Frequency Ordered    11/12/17 2100  mycophenolate EC tablet 180 mg      -- Oral 2 times daily 11/12/17 1043    11/09/17 1800  tacrolimus capsule 6 mg  (IP TXP POST-OP CAMPATH)      -- Oral 2 times daily 11/09/17 1000        Past Medical, Surgical, Family, and Social History:   Unchanged from H&P.    Scheduled Meds:   carvedilol  6.25 mg Oral BID    cinacalcet  30 mg Oral Daily with breakfast    docusate  sodium  100 mg Oral BID    ergocalciferol  50,000 Units Oral Q7 Days    famotidine  20 mg Oral QHS    heparin (porcine)  5,000 Units Subcutaneous Q12H    k phos di & mono-sod phos mono  500 mg Oral BID    multivitamin  1 tablet Oral Daily    mycophenolate  180 mg Oral BID    nystatin  500,000 Units Mouth/Throat QID (PC + HS)    sodium bicarbonate  650 mg Oral BID    tacrolimus  6 mg Oral BID     Continuous Infusions:   glucagon (human recombinant)       PRN Meds:sodium chloride, sodium chloride, dextrose 50%, diphenhydrAMINE, glucagon (human recombinant), glucose, glucose, glucose, hydrALAZINE, insulin aspart, naloxone, ondansetron, oxyCODONE, oxyCODONE, traMADol    Intake/Output - Last 3 Shifts       11/10 0700 - 11/11 0659 11/11 0700 - 11/12 0659 11/12 0700 - 11/13 0659    P.O. 2630 2815 1350    I.V. (mL/kg)       Blood       IV Piggyback 500      Total Intake(mL/kg) 3130 (49) 2815 (44.1) 1350 (21.1)    Urine (mL/kg/hr) 4850 (3.2) 5400 (3.5) 2075 (3.6)    Drains 30 (0) 0 (0) 30 (0.1)    Stool 0 (0) 0 (0) 0 (0)    Total Output 4880 5400 2105    Net -1750 -2585 -755           Stool Occurrence 0 x 0 x 0 x           Review of Systems   Constitutional: Negative for fever.   HENT: Negative for mouth sores.    Eyes: Negative for visual disturbance.   Respiratory: Negative for shortness of breath.    Cardiovascular: Negative for chest pain and leg swelling.   Gastrointestinal: Negative.    Genitourinary: Negative for decreased urine volume, difficulty urinating, dysuria and hematuria.   Musculoskeletal: Negative.    Skin: Negative for rash.   Allergic/Immunologic: Positive for immunocompromised state.   Neurological: Negative for tremors.      Objective:     Vital Signs (Most Recent):  Temp: 98.4 °F (36.9 °C) (11/12/17 1208)  Pulse: 70 (11/12/17 1208)  Resp: 18 (11/12/17 1208)  BP: 136/65 (11/12/17 1208)  SpO2: 97 % (11/12/17 1208) Vital Signs (24h Range):  Temp:  [98.2 °F (36.8 °C)-99.7 °F (37.6 °C)] 98.4 °F  (36.9 °C)  Pulse:  [65-80] 70  Resp:  [16-18] 18  SpO2:  [94 %-100 %] 97 %  BP: (136-164)/(65-81) 136/65     Weight: 63.9 kg (140 lb 14 oz)  Height: 5' (152.4 cm)  Body mass index is 27.51 kg/m².    Physical Exam   Constitutional: No distress.   Cardiovascular: Normal rate and regular rhythm.    Pulmonary/Chest: Effort normal and breath sounds normal. No respiratory distress.   Abdominal: Soft. Bowel sounds are normal. She exhibits no mass. There is no tenderness.   Incision clean and dry.   Musculoskeletal: She exhibits no edema.   Psychiatric: She has a normal mood and affect.       Laboratory:  CBC:     Recent Labs  Lab 11/10/17  0450 11/11/17  0632 11/12/17  0425   WBC 3.45* 2.34* 2.28*   RBC 2.44* 2.57* 2.50*   HGB 8.0* 8.3* 8.1*   HCT 23.9* 25.5* 24.9*   PLT 73* 105* 109*   MCV 98 99* 100*   MCH 32.8* 32.3* 32.4*   MCHC 33.5 32.5 32.5     BMP:     Recent Labs  Lab 11/10/17  0450 11/11/17  0632 11/12/17  0424   GLU 76 91 89    146* 143   K 4.0 4.5 4.4   * 116* 114*   CO2 23 24 24   BUN 15 12 15   CREATININE 0.9 0.9 0.9   CALCIUM 7.8* 7.7* 7.9*     Lab Results   Component Value Date    TACROLIMUS 7.2 2017     Labs within the past 24 hours have been reviewed.    Diagnostic Results:  Liver US no sig abnormalities.    Assessment/Plan:     * Status post -donor kidney transplantation    - s/p DBD [email or web address]] kidney transplant for ESRD 2/2 polycystic kidney disease 17.  Surgery significant for friable bladder mucosa.  7 day pollack.  Progressing well. Creat has had roseann of 0.9-1.1.  - Continue pathway.      - Keep -140 for now  -Follow with strict I/Os, daily weights, BP (goal <140/90), daily labs.   -Doing very well and creat stabilized at roseann of 0.9        Uvular edema    See sore throat- improved          Sore throat    -Resolving  - s/p intubation.  ENT consulted; Per ENT uvular hydrops likely from intubation.  Mild erythema to oropharynx noted.  ENT recommended ice chips  and Decadron.  Pt is on solu-medrol.  Pt having some improvement w Chloraseptic spray.  - voice stronger today and discomfort improving  - Cont liquids/soft food and advance as tolerated.  - Continue IS. Monitor.            Hypophosphatemia    - Continue phos supplement and continue high ph diet.  - Cont to monitor labs on daily basis.            Secondary hyperparathyroidism of renal origin    - Continue cinacalcet and ergo.  PTH 11/6/17 996.  - Monitor, levels acceptable          Transaminitis    -Suspect drug induced. Liver US unremarkable.  -Hepatology consult recommended viral PCRs (ordered).          Metabolic acidosis    -Corrected,  cont sodium bicarb 650 mg BID.            At risk for opportunistic infections    - Continue Bactrim for PCP prophylaxis  - Continue Valcyte for CMV prophylaxis  - Continue nystatin for thrush prophylaxis          Prophylactic immunotherapy    - Campath induction protocol.    - Tacro level at target. Continue tacrolimus and Myfortic.  Will monitor for signs of toxic side effects, check daily tacrolimus troughs, and change meds accordingly.          Long-term use of immunosuppressant medication    Chronic Prophylactic Immunosuppression- cont to check prograf level daily.  Assess for toxicity and adjust level as needed.  Cellcept changed to Myfortic 11/9/17 for diarrhea.             Essential hypertension    - Hold off on starting home amlodipine for lower pressures; watch trend  - Goal pressure for now 130-140 SBP  - Coreg increased to 6.25. Bid POD#3.              Discharge Planning:  Pending w/u or improvement of transaminitis.    Shreya Waldron MD  Kidney Transplant  [facility]

## 2017-11-12 NOTE — ASSESSMENT & PLAN NOTE
- s/p DBD [facility] kidney transplant for ESRD 2/2 polycystic kidney disease 11/7/17.  Surgery significant for friable bladder mucosa.  7 day pollack.  Progressing well. Creat has had roseann of 0.9-1.1.  - Continue pathway.      - Keep -140 for now  -Follow with strict I/Os, daily weights, BP (goal <140/90), daily labs.   -Doing very well and creat stabilized at roseann of 0.9

## 2017-11-12 NOTE — HPI
Apurva Rodriguez is a 52 y.o. female with with a PMH of PKD with previous RLQ renal transplant (3/16/96-4/2016) who was admitted for repeat KT and underwent surgery on 11/7. Since then, her LFTs started to increase, mainly hepatocellular pattern. She is s/p CCY. She denies alcohol use. No liver involvement of cysts in liver. Her viral serologies negative. Liver US w/doppler is normal. No biliary dilation. No prior liver disease. Reports taking Bactrim for many years.

## 2017-11-12 NOTE — ASSESSMENT & PLAN NOTE
RESOLVED  - Bowel regimen discontinued. Cellcept changed to Myfortic.  Sister has diarrhea w prograf.  Pt was on Cyclosporine for last transplant.    -Watch how she does on Myfortic

## 2017-11-12 NOTE — PLAN OF CARE
Problem: Patient Care Overview  Goal: Plan of Care Review  Outcome: Ongoing (interventions implemented as appropriate)  Pt tolerating all treatments and therapies well, pt is AAOx4 and VSS, pt reported pain one time that was controlled with prn medication, pt bed is low and locked and call bell is in reach,pt instructed to call if any assistance is needed, pt afebrile and shows no new signs of infection, pt family at the bedside and is involved with pt care, no acute events noted or reported, will continue to monitor.

## 2017-11-12 NOTE — CONSULTS
"[facility]  Hepatology  Consult Note    Patient Name: Apurva Rodriguez  MRN: [id number]  Admission Date: 2017  Hospital Length of Stay: 6 days  Attending Provider: Rei Cartagena MD   Primary Care Physician: Patrice Mclaughlin MD  Principal Problem:Status post -donor kidney transplantation    Inpatient consult to Hepatology  Consult performed by: ALFREDO JOHNSON  Consult ordered by: AMY OTTO  Reason for consult: abnormal LFTs        Subjective:     Transplant status: Post-transplant    HPI:  Apurva Rodriguez is a 52 y.o. female with with a PMH of PKD with previous RLQ renal transplant (3/16/) who was admitted for repeat KT and underwent surgery on . Since then, her LFTs started to increase, mainly hepatocellular pattern. She is s/p CCY. She denies alcohol use. No liver involvement of cysts in liver. Her viral serologies negative. Liver US w/doppler is normal. No biliary dilation. No prior liver disease. Reports taking Bactrim for many years.          Review of Systems   Constitutional: Negative for activity change and appetite change.   HENT: Negative for ear discharge and facial swelling.    Eyes: Negative for photophobia and redness.   Respiratory: Negative for wheezing and stridor.    Cardiovascular: Negative for chest pain and palpitations.   Endocrine: Negative for cold intolerance and heat intolerance.   Genitourinary: Negative for dysuria and frequency.   Skin: Negative for color change and rash.   Allergic/Immunologic: Positive for immunocompromised state. Negative for food allergies.   Neurological: Negative for seizures and numbness.   Hematological: Does not bruise/bleed easily.   Psychiatric/Behavioral: Negative for agitation.       Past Medical History:   Diagnosis Date    Anemia     Avascular necrosis left hip 2016    S/p replacement Cord decompression right side Revision of the left hip: with a "larger" hardware placement  "    Dyslipidemia 2016    ESRD (end stage renal disease) 2016    She mentioned that the cause of the kidney failure was associated with several birth defects: PKD Bilateral nephrocalcinosis Hyperuricemia Chronic Icthyosis     Essential hypertension 2016    Gastroesophageal reflux disease without esophagitis 2016    Hypertension     Polycystic kidney disease     diagnosed at early age    S/P bone marrow biopsy 2016    Secondary hyperparathyroidism of renal origin 2016       Past Surgical History:   Procedure Laterality Date    ABDOMINAL HERNIA REPAIR      CHOLECYSTECTOMY      laparoascopic    EYE MUSCLE SURGERY Bilateral     as a baby(2 years)    FRACTURE SURGERY Right     rigt femur :steel karan pl;acement and replacement    KIDNEY TRANSPLANT Right     RLQ transplant,  donor    left hip revision      PARATHYROIDECTOMY      PERITONEAL CATHETER INSERTION      PERITONEAL CATHETER INSERTION      right hip decompression      TONSILLECTOMY      TOTAL HIP ARTHROPLASTY Left        Family history of liver disease: No    Review of patient's allergies indicates:   Allergen Reactions    Amphotericin b      Peritonitis reaction in dialysis solution while on peritoneal dialysis    Azelex [azelaic acid] Hives    Cleocin [clindamycin hcl] Hives    Decadron [dexamethasone]      Highly anxious    Morphine Swelling     lips    Reglan [metoclopramide hcl] Other (See Comments)    Valumag      Vallium//Highly anxious    Vancomycin analogues Itching     Hair areas       Social History Main Topics    Smoking status: Never Smoker    Smokeless tobacco: Not on file    Alcohol use No    Drug use: No    Sexual activity: Yes     Partners: Female       Prescriptions Prior to Admission   Medication Sig Dispense Refill Last Dose    atorvastatin (LIPITOR) 20 MG tablet Take 20 mg by mouth once daily.    Taking    docusate sodium (COLACE) 100 MG capsule Take 100 mg by mouth 2  (two) times daily.   Taking    gabapentin (NEURONTIN) 100 MG capsule TK 1 TO 3 CS PO QHS  3 Taking       Objective:     Vital Signs (Most Recent):  Temp: 98.7 °F (37.1 °C) (11/12/17 0745)  Pulse: 65 (11/12/17 0745)  Resp: 18 (11/12/17 0745)  BP: (!) 164/75 (11/12/17 0745)  SpO2: 97 % (11/12/17 0745) Vital Signs (24h Range):  Temp:  [98.2 °F (36.8 °C)-99.7 °F (37.6 °C)] 98.7 °F (37.1 °C)  Pulse:  [65-80] 65  Resp:  [16-18] 18  SpO2:  [94 %-100 %] 97 %  BP: (144-164)/(72-81) 164/75     Weight: 63.9 kg (140 lb 14 oz) (11/09/17 0400)  Body mass index is 27.51 kg/m².    Physical Exam   Constitutional: She is oriented to person, place, and time. She appears well-developed and well-nourished.   Eyes: No scleral icterus.   Neck: Neck supple.   Cardiovascular: Normal rate.  Exam reveals no gallop.    Pulmonary/Chest: Effort normal. No respiratory distress.   Abdominal: Soft. Bowel sounds are normal. She exhibits no distension and no mass. There is no tenderness. There is no rebound and no guarding. No hernia.   Musculoskeletal: She exhibits no edema.   Neurological: She is alert and oriented to person, place, and time.   Skin: Skin is warm.   Psychiatric: She has a normal mood and affect.       MELD-Na score: 12 at 11/8/2017  4:15 AM  MELD score: 12 at 11/8/2017  4:15 AM  Calculated from:  Serum Creatinine: 1.8 mg/dL at 11/8/2017  4:15 AM  Serum Sodium: 140 mmol/L (Rounded to 137) at 11/8/2017  4:15 AM  Total Bilirubin: 0.2 mg/dL (Rounded to 1) at 11/8/2017  4:15 AM  INR(ratio): 0.9 (Rounded to 1) at 11/6/2017  7:47 PM  Age: 52 years    Lab Results   Component Value Date    WBC 2.28 (L) 11/12/2017    HGB 8.1 (L) 11/12/2017    HCT 24.9 (L) 11/12/2017     (H) 11/12/2017     (L) 11/12/2017     Lab Results   Component Value Date     11/12/2017    K 4.4 11/12/2017     (H) 11/12/2017    CO2 24 11/12/2017    BUN 15 11/12/2017    CREATININE 0.9 11/12/2017     Lab Results   Component Value Date    ALBUMIN  2.4 (L) 11/12/2017     (H) 11/12/2017     (H) 11/12/2017    ALKPHOS 366 (H) 11/12/2017    BILITOT 0.6 11/12/2017     Lab Results   Component Value Date    TACROLIMUS 7.2 11/12/2017       Imaging:  Reviewed and as noted in HPI.         Assessment/Plan:     Transaminitis    Hepatocellular pattern. Likely insult since surgery as previous labs are normal. Normal US with normal biliary tree rules out obstruction. DDx is broad with viral infections, DILI (Bactrim or anesthetic drugs).     Recommend  Hepatitis C viral RNA  Anti-herpes simplex virus PCR, anti-varicella zoster, CMV PCR, EBV, HIV, Iron, ferritin, transferrin saturation  Check VASQUEZ, ASMA  Consider celiac testing with anti TTG, total IgA, TSH  Bactrim is on hold.   If no improvement within 48 hours, we will consider liver biopsy                Thank you for your consult. I will follow-up with patient. Please contact us if you have any additional questions.    Collin Acevedo MD  Hepatology  [facility]

## 2017-11-12 NOTE — SUBJECTIVE & OBJECTIVE
Subjective:   History of Present Illness:  Ms. Apurva Rodriguez is a 52 y.o female with a PMH of PKD with previous RLQ renal transplant (3/16/96-4/2016).  Prior to admit she was on nightly peritoneal dialysis, without complication. Her urine output is about 0.5-0.75 liters/day. PMH otherwise significant for parathyroidectomy and AVN requiring hip replacement.  She was admitted 11/6/17 for DBD kidney transplant which took place this AM without complication.  PD cath was removed.  It was noted that pt had significant scar tissue and a friable bladder and a 7 day pollack was recommended and drain was placed.  Post op labs with stable H/H, nice drop in Cr, and pt with good UOP.    Pre op labs with Vit D 24 and .  Cinacalcet started.  Of note, pt with parathyroidectomy in past with implant in shoulder.  Her recent PTHs have not been as high- unclear specific numbers.    Interval history:  States voice is back to normal and can eat without any problems today. Liver enzymes noted to still be increasing, though not as much. She denies any anorexia, nausea, vomiting. Has noted urinary catheter is still leaking coming disconnected    Ms. Rodriguez is a 52 y.o. year old female who is status post Kidney Transplant - 11/7/2017  (#2).    Her maintenance immunosuppression consists of:   Immunosuppressants     Start     Stop Route Frequency Ordered    11/12/17 2100  mycophenolate EC tablet 180 mg      -- Oral 2 times daily 11/12/17 1043    11/09/17 1800  tacrolimus capsule 6 mg  (IP TXP POST-OP CAMPATH)      -- Oral 2 times daily 11/09/17 1000        Past Medical, Surgical, Family, and Social History:   Unchanged from H&P.    Scheduled Meds:   carvedilol  6.25 mg Oral BID    cinacalcet  30 mg Oral Daily with breakfast    docusate sodium  100 mg Oral BID    ergocalciferol  50,000 Units Oral Q7 Days    famotidine  20 mg Oral QHS    heparin (porcine)  5,000 Units Subcutaneous Q12H    k phos di & mono-sod phos mono  500  mg Oral BID    multivitamin  1 tablet Oral Daily    mycophenolate  180 mg Oral BID    nystatin  500,000 Units Mouth/Throat QID (PC + HS)    sodium bicarbonate  650 mg Oral BID    tacrolimus  6 mg Oral BID     Continuous Infusions:   glucagon (human recombinant)       PRN Meds:sodium chloride, sodium chloride, dextrose 50%, diphenhydrAMINE, glucagon (human recombinant), glucose, glucose, glucose, hydrALAZINE, insulin aspart, naloxone, ondansetron, oxyCODONE, oxyCODONE, traMADol    Intake/Output - Last 3 Shifts       11/10 0700 - 11/11 0659 11/11 0700 - 11/12 0659 11/12 0700 - 11/13 0659    P.O. 2630 2815 1350    I.V. (mL/kg)       Blood       IV Piggyback 500      Total Intake(mL/kg) 3130 (49) 2815 (44.1) 1350 (21.1)    Urine (mL/kg/hr) 4850 (3.2) 5400 (3.5) 2075 (3.6)    Drains 30 (0) 0 (0) 30 (0.1)    Stool 0 (0) 0 (0) 0 (0)    Total Output 4880 5400 2105    Net -1750 -2585 -755           Stool Occurrence 0 x 0 x 0 x           Review of Systems   Constitutional: Negative for fever.   HENT: Negative for mouth sores.    Eyes: Negative for visual disturbance.   Respiratory: Negative for shortness of breath.    Cardiovascular: Negative for chest pain and leg swelling.   Gastrointestinal: Negative.    Genitourinary: Negative for decreased urine volume, difficulty urinating, dysuria and hematuria.   Musculoskeletal: Negative.    Skin: Negative for rash.   Allergic/Immunologic: Positive for immunocompromised state.   Neurological: Negative for tremors.      Objective:     Vital Signs (Most Recent):  Temp: 98.4 °F (36.9 °C) (11/12/17 1208)  Pulse: 70 (11/12/17 1208)  Resp: 18 (11/12/17 1208)  BP: 136/65 (11/12/17 1208)  SpO2: 97 % (11/12/17 1208) Vital Signs (24h Range):  Temp:  [98.2 °F (36.8 °C)-99.7 °F (37.6 °C)] 98.4 °F (36.9 °C)  Pulse:  [65-80] 70  Resp:  [16-18] 18  SpO2:  [94 %-100 %] 97 %  BP: (136-164)/(65-81) 136/65     Weight: 63.9 kg (140 lb 14 oz)  Height: 5' (152.4 cm)  Body mass index is 27.51  kg/m².    Physical Exam   Constitutional: No distress.   Cardiovascular: Normal rate and regular rhythm.    Pulmonary/Chest: Effort normal and breath sounds normal. No respiratory distress.   Abdominal: Soft. Bowel sounds are normal. She exhibits no mass. There is no tenderness.   Incision clean and dry.   Musculoskeletal: She exhibits no edema.   Psychiatric: She has a normal mood and affect.       Laboratory:  CBC:     Recent Labs  Lab 11/10/17  0450 11/11/17  0632 11/12/17  0425   WBC 3.45* 2.34* 2.28*   RBC 2.44* 2.57* 2.50*   HGB 8.0* 8.3* 8.1*   HCT 23.9* 25.5* 24.9*   PLT 73* 105* 109*   MCV 98 99* 100*   MCH 32.8* 32.3* 32.4*   MCHC 33.5 32.5 32.5     BMP:     Recent Labs  Lab 11/10/17  0450 11/11/17  0632 11/12/17  0424   GLU 76 91 89    146* 143   K 4.0 4.5 4.4   * 116* 114*   CO2 23 24 24   BUN 15 12 15   CREATININE 0.9 0.9 0.9   CALCIUM 7.8* 7.7* 7.9*     Lab Results   Component Value Date    TACROLIMUS 7.2 11/12/2017     Labs within the past 24 hours have been reviewed.    Diagnostic Results:  Liver US no sig abnormalities.

## 2017-11-13 PROBLEM — E83.42 HYPOMAGNESEMIA: Status: ACTIVE | Noted: 2017-11-13

## 2017-11-13 PROBLEM — E87.6 HYPOKALEMIA: Status: ACTIVE | Noted: 2017-11-13

## 2017-11-13 PROBLEM — D70.9 NEUTROPENIA: Status: ACTIVE | Noted: 2017-11-13

## 2017-11-13 PROBLEM — K13.79 UVULAR EDEMA: Status: RESOLVED | Noted: 2017-11-10 | Resolved: 2017-11-13

## 2017-11-13 LAB
AFP SERPL-MCNC: 3.5 NG/ML
ALBUMIN SERPL BCP-MCNC: 2.4 G/DL
ALP SERPL-CCNC: 422 U/L
ALT SERPL W/O P-5'-P-CCNC: 430 U/L
ANION GAP SERPL CALC-SCNC: 6 MMOL/L
ANISOCYTOSIS BLD QL SMEAR: SLIGHT
AST SERPL-CCNC: 181 U/L
BACTERIA SPEC ANAEROBE CULT: NORMAL
BACTERIA UR CULT: NO GROWTH
BASOPHILS # BLD AUTO: ABNORMAL K/UL
BASOPHILS NFR BLD: 0 %
BILIRUB SERPL-MCNC: 0.6 MG/DL
BUN SERPL-MCNC: 14 MG/DL
CALCIUM SERPL-MCNC: 7.8 MG/DL
CHLORIDE SERPL-SCNC: 112 MMOL/L
CO2 SERPL-SCNC: 22 MMOL/L
CREAT SERPL-MCNC: 0.8 MG/DL
DIFFERENTIAL METHOD: ABNORMAL
EOSINOPHIL # BLD AUTO: ABNORMAL K/UL
EOSINOPHIL NFR BLD: 0 %
ERYTHROCYTE [DISTWIDTH] IN BLOOD BY AUTOMATED COUNT: 15.1 %
EST. GFR  (AFRICAN AMERICAN): >60 ML/MIN/1.73 M^2
EST. GFR  (NON AFRICAN AMERICAN): >60 ML/MIN/1.73 M^2
GLUCOSE SERPL-MCNC: 85 MG/DL
GLUCOSE-6-PHOSPHATE DEHYDROGENASE QUAL: NORMAL
HCT VFR BLD AUTO: 24.7 %
HETEROPH AB SERPL QL IA: NEGATIVE
HGB BLD-MCNC: 8.1 G/DL
HIV 1+2 AB+HIV1 P24 AG SERPL QL IA: NEGATIVE
IMM GRANULOCYTES # BLD AUTO: ABNORMAL K/UL
IMM GRANULOCYTES NFR BLD AUTO: ABNORMAL %
LYMPHOCYTES # BLD AUTO: ABNORMAL K/UL
LYMPHOCYTES NFR BLD: 0 %
MAGNESIUM SERPL-MCNC: 1.5 MG/DL
MCH RBC QN AUTO: 32.7 PG
MCHC RBC AUTO-ENTMCNC: 32.8 G/DL
MCV RBC AUTO: 100 FL
MONOCYTES # BLD AUTO: ABNORMAL K/UL
MONOCYTES NFR BLD: 3 %
MYELOCYTES NFR BLD MANUAL: 2 %
NEUTROPHILS NFR BLD: 95 %
NRBC BLD-RTO: 0 /100 WBC
OVALOCYTES BLD QL SMEAR: ABNORMAL
PHOSPHATE SERPL-MCNC: 2.6 MG/DL
PLATELET # BLD AUTO: 131 K/UL
PLATELET BLD QL SMEAR: ABNORMAL
PMV BLD AUTO: 9.9 FL
POCT GLUCOSE: 113 MG/DL (ref 70–110)
POCT GLUCOSE: 115 MG/DL (ref 70–110)
POCT GLUCOSE: 120 MG/DL (ref 70–110)
POCT GLUCOSE: 126 MG/DL (ref 70–110)
POCT GLUCOSE: 85 MG/DL (ref 70–110)
POIKILOCYTOSIS BLD QL SMEAR: SLIGHT
POLYCHROMASIA BLD QL SMEAR: ABNORMAL
POTASSIUM SERPL-SCNC: 5.1 MMOL/L
PROT SERPL-MCNC: 5.3 G/DL
RBC # BLD AUTO: 2.48 M/UL
SODIUM SERPL-SCNC: 140 MMOL/L
T4 SERPL-MCNC: 7 UG/DL
TACROLIMUS BLD-MCNC: 7.1 NG/ML
TSH SERPL DL<=0.005 MIU/L-ACNC: 1.52 UIU/ML
WBC # BLD AUTO: 2.58 K/UL

## 2017-11-13 PROCEDURE — 82105 ALPHA-FETOPROTEIN SERUM: CPT

## 2017-11-13 PROCEDURE — 84436 ASSAY OF TOTAL THYROXINE: CPT

## 2017-11-13 PROCEDURE — 36415 COLL VENOUS BLD VENIPUNCTURE: CPT

## 2017-11-13 PROCEDURE — 86703 HIV-1/HIV-2 1 RESULT ANTBDY: CPT

## 2017-11-13 PROCEDURE — 84443 ASSAY THYROID STIM HORMONE: CPT

## 2017-11-13 PROCEDURE — 63600175 PHARM REV CODE 636 W HCPCS: Performed by: TRANSPLANT SURGERY

## 2017-11-13 PROCEDURE — 80197 ASSAY OF TACROLIMUS: CPT

## 2017-11-13 PROCEDURE — 85027 COMPLETE CBC AUTOMATED: CPT

## 2017-11-13 PROCEDURE — 80053 COMPREHEN METABOLIC PANEL: CPT

## 2017-11-13 PROCEDURE — 86787 VARICELLA-ZOSTER ANTIBODY: CPT

## 2017-11-13 PROCEDURE — 25000003 PHARM REV CODE 250: Performed by: NURSE PRACTITIONER

## 2017-11-13 PROCEDURE — 83735 ASSAY OF MAGNESIUM: CPT

## 2017-11-13 PROCEDURE — 86787 VARICELLA-ZOSTER ANTIBODY: CPT | Mod: 91

## 2017-11-13 PROCEDURE — 99024 POSTOP FOLLOW-UP VISIT: CPT | Mod: ,,, | Performed by: NURSE PRACTITIONER

## 2017-11-13 PROCEDURE — 87799 DETECT AGENT NOS DNA QUANT: CPT | Mod: 91

## 2017-11-13 PROCEDURE — 25000003 PHARM REV CODE 250: Performed by: SURGERY

## 2017-11-13 PROCEDURE — 20600001 HC STEP DOWN PRIVATE ROOM

## 2017-11-13 PROCEDURE — 87799 DETECT AGENT NOS DNA QUANT: CPT

## 2017-11-13 PROCEDURE — 87529 HSV DNA AMP PROBE: CPT

## 2017-11-13 PROCEDURE — 86308 HETEROPHILE ANTIBODY SCREEN: CPT

## 2017-11-13 PROCEDURE — 85007 BL SMEAR W/DIFF WBC COUNT: CPT

## 2017-11-13 PROCEDURE — 25000003 PHARM REV CODE 250: Performed by: PHYSICIAN ASSISTANT

## 2017-11-13 PROCEDURE — 84100 ASSAY OF PHOSPHORUS: CPT

## 2017-11-13 PROCEDURE — 63600175 PHARM REV CODE 636 W HCPCS: Performed by: NURSE PRACTITIONER

## 2017-11-13 PROCEDURE — 25000003 PHARM REV CODE 250: Performed by: INTERNAL MEDICINE

## 2017-11-13 PROCEDURE — 86352 CELL FUNCTION ASSAY W/STIM: CPT

## 2017-11-13 PROCEDURE — 87522 HEPATITIS C REVRS TRNSCRPJ: CPT

## 2017-11-13 RX ORDER — LANOLIN ALCOHOL/MO/W.PET/CERES
400 CREAM (GRAM) TOPICAL 2 TIMES DAILY
Status: DISCONTINUED | OUTPATIENT
Start: 2017-11-13 | End: 2017-11-14 | Stop reason: HOSPADM

## 2017-11-13 RX ORDER — TACROLIMUS 1 MG/1
6 CAPSULE ORAL EVERY MORNING
Status: DISCONTINUED | OUTPATIENT
Start: 2017-11-13 | End: 2017-11-14 | Stop reason: HOSPADM

## 2017-11-13 RX ORDER — TACROLIMUS 1 MG/1
7 CAPSULE ORAL EVERY MORNING
Status: DISCONTINUED | OUTPATIENT
Start: 2017-11-14 | End: 2017-11-14 | Stop reason: HOSPADM

## 2017-11-13 RX ORDER — BISACODYL 5 MG
10 TABLET, DELAYED RELEASE (ENTERIC COATED) ORAL ONCE
Status: DISCONTINUED | OUTPATIENT
Start: 2017-11-13 | End: 2017-11-13

## 2017-11-13 RX ORDER — MAGNESIUM SULFATE HEPTAHYDRATE 40 MG/ML
2 INJECTION, SOLUTION INTRAVENOUS ONCE
Status: DISCONTINUED | OUTPATIENT
Start: 2017-11-13 | End: 2017-11-13

## 2017-11-13 RX ORDER — MUPIROCIN 20 MG/G
OINTMENT TOPICAL 2 TIMES DAILY
Status: DISCONTINUED | OUTPATIENT
Start: 2017-11-13 | End: 2017-11-14 | Stop reason: HOSPADM

## 2017-11-13 RX ORDER — TACROLIMUS 1 MG/1
1 CAPSULE ORAL ONCE
Status: COMPLETED | OUTPATIENT
Start: 2017-11-13 | End: 2017-11-13

## 2017-11-13 RX ADMIN — NYSTATIN 500000 UNITS: 500000 SUSPENSION ORAL at 05:11

## 2017-11-13 RX ADMIN — NYSTATIN 500000 UNITS: 500000 SUSPENSION ORAL at 08:11

## 2017-11-13 RX ADMIN — CARVEDILOL 6.25 MG: 6.25 TABLET, FILM COATED ORAL at 08:11

## 2017-11-13 RX ADMIN — TACROLIMUS 1 MG: 1 CAPSULE ORAL at 10:11

## 2017-11-13 RX ADMIN — TRAMADOL HYDROCHLORIDE 25 MG: 50 TABLET, FILM COATED ORAL at 05:11

## 2017-11-13 RX ADMIN — TACROLIMUS 6 MG: 1 CAPSULE ORAL at 08:11

## 2017-11-13 RX ADMIN — MUPIROCIN: 20 OINTMENT TOPICAL at 12:11

## 2017-11-13 RX ADMIN — SODIUM BICARBONATE 650 MG TABLET 650 MG: at 08:11

## 2017-11-13 RX ADMIN — MAGNESIUM OXIDE TAB 400 MG (241.3 MG ELEMENTAL MG) 400 MG: 400 (241.3 MG) TAB at 08:11

## 2017-11-13 RX ADMIN — MAGNESIUM OXIDE TAB 400 MG (241.3 MG ELEMENTAL MG) 400 MG: 400 (241.3 MG) TAB at 09:11

## 2017-11-13 RX ADMIN — THERA TABS 1 TABLET: TAB at 08:11

## 2017-11-13 RX ADMIN — FAMOTIDINE 20 MG: 20 TABLET, FILM COATED ORAL at 08:11

## 2017-11-13 RX ADMIN — CINACALCET HYDROCHLORIDE 30 MG: 30 TABLET, COATED ORAL at 08:11

## 2017-11-13 RX ADMIN — TACROLIMUS 6 MG: 1 CAPSULE ORAL at 05:11

## 2017-11-13 RX ADMIN — DIBASIC SODIUM PHOSPHATE, MONOBASIC POTASSIUM PHOSPHATE AND MONOBASIC SODIUM PHOSPHATE 2 TABLET: 852; 155; 130 TABLET ORAL at 08:11

## 2017-11-13 RX ADMIN — NYSTATIN 500000 UNITS: 500000 SUSPENSION ORAL at 12:11

## 2017-11-13 RX ADMIN — DOCUSATE SODIUM 100 MG: 100 CAPSULE, LIQUID FILLED ORAL at 08:11

## 2017-11-13 RX ADMIN — MUPIROCIN: 20 OINTMENT TOPICAL at 08:11

## 2017-11-13 NOTE — ASSESSMENT & PLAN NOTE
- Hold off on starting home amlodipine for lower pressures; watch trend  - Goal pressure for now 130-140 SBP  - Coreg increased to 6.25 mg bid POD#3.

## 2017-11-13 NOTE — SUBJECTIVE & OBJECTIVE
Subjective:   History of Present Illness:  Ms. Apurva Rodriguez is a 52 y.o female with a PMH of PKD with previous RLQ renal transplant (3/16/96-4/2016).  Prior to admit she was on nightly peritoneal dialysis, without complication. Her urine output is about 0.5-0.75 liters/day. PMH otherwise significant for parathyroidectomy and AVN requiring hip replacement.  She was admitted 11/6/17 for DBD kidney transplant which took place this AM without complication.  PD cath was removed.  It was noted that pt had significant scar tissue and a friable bladder and a 7 day pollack was recommended and drain was placed.  Post op labs with stable H/H, nice drop in Cr, and pt with good UOP.    Pre op labs with Vit D 24 and .  Cinacalcet started.  Of note, pt with parathyroidectomy in past with implant in shoulder.  Her recent PTHs have not been as high- unclear specific numbers.    Interval history:  No acute events overnight.  Cr GREAT, UOP excellent.  Pt is 7 day pollack- will plan to remove after possible bx tomorrow of liver.  Voice is back to baseline.  Main issue, elevating LFTs.  Hepatology following, several viral studies pending. Plan for NPO after MN and schedule liver bx tomorrow if numbers up more tomorrow and viral studies unremarkable.     Ms. Rodriguez is a 52 y.o. year old female who is status post Kidney Transplant - 11/7/2017  (#2).    Her maintenance immunosuppression consists of:   Immunosuppressants     Start     Stop Route Frequency Ordered    11/14/17 0800  tacrolimus capsule 7 mg  (IP TXP POST-OP CAMPATH)      -- Oral Daily 11/13/17 1002    11/13/17 1800  tacrolimus capsule 6 mg      -- Oral Daily 11/13/17 1002        Past Medical, Surgical, Family, and Social History:   Unchanged from H&P.    Scheduled Meds:   carvedilol  6.25 mg Oral BID    cinacalcet  30 mg Oral Daily with breakfast    docusate sodium  100 mg Oral BID    ergocalciferol  50,000 Units Oral Q7 Days    famotidine  20 mg Oral QHS     heparin (porcine)  5,000 Units Subcutaneous Q12H    k phos di & mono-sod phos mono  500 mg Oral BID    magnesium oxide  400 mg Oral BID    multivitamin  1 tablet Oral Daily    mupirocin   Topical (Top) BID    nystatin  500,000 Units Mouth/Throat QID (PC + HS)    sodium bicarbonate  650 mg Oral BID    tacrolimus  6 mg Oral Daily    [START ON 11/14/2017] tacrolimus  7 mg Oral Daily     Continuous Infusions:   glucagon (human recombinant)       PRN Meds:sodium chloride, sodium chloride, dextrose 50%, diphenhydrAMINE, glucagon (human recombinant), glucose, glucose, glucose, hydrALAZINE, insulin aspart, naloxone, ondansetron, oxyCODONE, traMADol    Intake/Output - Last 3 Shifts       11/11 0700 - 11/12 0659 11/12 0700 - 11/13 0659 11/13 0700 - 11/14 0659    P.O. 2815 3700 1090    IV Piggyback       Total Intake(mL/kg) 2815 (44.1) 3700 (52.9) 1090 (15.6)    Urine (mL/kg/hr) 5400 (3.5) 5375 (3.2) 850 (1.2)    Drains 0 (0) 40 (0) 5 (0)    Stool 0 (0) 0 (0) 0 (0)    Total Output 5400 5415 855    Net -2585 -1715 +235           Stool Occurrence 0 x 0 x 2 x           Review of Systems   Constitutional: Negative for fever.   HENT: Negative for mouth sores.    Eyes: Negative for visual disturbance.   Respiratory: Negative for shortness of breath.    Cardiovascular: Negative for chest pain and leg swelling.   Gastrointestinal: Negative.    Genitourinary: Negative for decreased urine volume, difficulty urinating, dysuria and hematuria.   Musculoskeletal: Negative.  Negative for myalgias, neck pain and neck stiffness.   Skin: Positive for wound. Negative for rash.   Allergic/Immunologic: Positive for immunocompromised state.   Neurological: Negative for dizziness, tremors and weakness.   Psychiatric/Behavioral: Negative for decreased concentration, dysphoric mood and suicidal ideas.      Objective:     Vital Signs (Most Recent):  Temp: 98.7 °F (37.1 °C) (11/13/17 1649)  Pulse: 74 (11/13/17 1649)  Resp: 18 (11/13/17  1649)  BP: 137/84 (11/13/17 1649)  SpO2: 99 % (11/13/17 1649) Vital Signs (24h Range):  Temp:  [98.4 °F (36.9 °C)-99.2 °F (37.3 °C)] 98.7 °F (37.1 °C)  Pulse:  [65-78] 74  Resp:  [18-20] 18  SpO2:  [96 %-99 %] 99 %  BP: (129-177)/(70-90) 137/84     Weight: 70 kg (154 lb 5.2 oz)  Height: 5' (152.4 cm)  Body mass index is 30.14 kg/m².    Physical Exam   Constitutional: She is oriented to person, place, and time. She appears well-developed. No distress.   HENT:   Head: Normocephalic and atraumatic.   Mouth/Throat: Oropharynx is clear and moist.   Eyes: EOM are normal. Pupils are equal, round, and reactive to light. No scleral icterus.   Neck: Normal range of motion. Neck supple. No thyromegaly present.   Cardiovascular: Normal rate and regular rhythm.    Pulmonary/Chest: Effort normal and breath sounds normal. No respiratory distress.   Abdominal: Soft. Bowel sounds are normal. She exhibits no distension and no mass. There is no tenderness.   Incision clean and dry.   Musculoskeletal: Normal range of motion. She exhibits no edema.   Neurological: She is alert and oriented to person, place, and time.   Skin: Skin is warm and dry. Capillary refill takes 2 to 3 seconds.   Psychiatric: She has a normal mood and affect.   Nursing note and vitals reviewed.      Laboratory:  CBC:     Recent Labs  Lab 11/11/17  0632 11/12/17 0425 11/13/17  0353   WBC 2.34* 2.28* 2.58*   RBC 2.57* 2.50* 2.48*   HGB 8.3* 8.1* 8.1*   HCT 25.5* 24.9* 24.7*   * 109* 131*   MCV 99* 100* 100*   MCH 32.3* 32.4* 32.7*   MCHC 32.5 32.5 32.8     BMP:     Recent Labs  Lab 11/11/17  0632 11/12/17 0424 11/13/17  0353   GLU 91 89 85   * 143 140   K 4.5 4.4 5.1   * 114* 112*   CO2 24 24 22*   BUN 12 15 14   CREATININE 0.9 0.9 0.8   CALCIUM 7.7* 7.9* 7.8*     Lab Results   Component Value Date    TACROLIMUS 7.1 11/13/2017     Labs within the past 24 hours have been reviewed.    Diagnostic Results:  Liver US no sig abnormalities.

## 2017-11-13 NOTE — ASSESSMENT & PLAN NOTE
- s/p DBD [facility] high rish kidney transplant for ESRD 2/2 polycystic kidney disease 11/7/17.  Surgery significant for friable bladder mucosa.  7 day pollack.  Progressing well. Creat has had roseann of 0.9-1.1.  - Continue pathway.      - Keep -140 for now  -Follow with strict I/Os, daily weights, BP (goal <140/90), daily labs.   -Doing very well and creat stabilized at roseann of 0.9

## 2017-11-13 NOTE — TREATMENT PLAN
Hepatology Follow-up Note    Chart reviewed during rounds.    Noted LFTs with continued uptrend, although rise not as rapid as prior.    Viral serologies pending.    Plan:  Trend CMP and INR daily  Keep NPO past Mn tonight  Decision for possible liver biopsy tomorrow morning based on viral serologies and LFT trend.      Please call with any additional concerns /questions    Gilberto Walsh MD  Gastroenterology Fellow ([id number]-V)  Pager: [phone]

## 2017-11-13 NOTE — ASSESSMENT & PLAN NOTE
- Chronic Prophylactic Immunosuppression- cont to check prograf level daily.  Assess for toxicity and adjust level as needed.  Cellcept changed to Myfortic 11/9/17 for diarrhea and then discontinued 11/11 for neutropenia.

## 2017-11-13 NOTE — PHYSICIAN QUERY
PT Name: Apurva Rodriguez  MR #: [id number]    Physician Query Form - Hematology Clarification      CDS/: Zoya Cleaning               Contact information:     This form is a permanent document in the medical record.      Query Date: November 13, 2017    By submitting this query, we are merely seeking further clarification of documentation. Please utilize your independent clinical judgment when addressing the question(s) below.    The Medical record contains the following:   Indicators    Supporting Clinical Findings Location in Medical Record    Anemia, Thrombocytopenia, Neutropenia, Pancytopenia documented     X H & H Hemoglobin 8.0- 8.1  Hematocrit 23.9-24.7   Labs 11/10-11/13   X WBC WBC 3.45-2.28-2.58 Labs 11/10-11/13    Neutrophils      Granulocytes      Platelets Platelets    Labs 11/10-11/13   X Transfusion(s) 1 unit PRBC  Transfusion record     Treatments:     X Other: Tacrolimus 6mg po q day   Tacrolimus 7mg po q day  MAR      Provider, please specify diagnosis or diagnoses associated with above clinical findings.    [  ] Pancytopenia due to other drug    [  ] Pancytopenia    [ X ] Other Hematological Diagnosis (please specify) Acute blood loss anemia with underlying anemia of CKD; drug induced neutropenia.    [  ] Clinically Undetermined      Please document in your progress notes daily for the duration of treatment, until resolved, and include in your discharge summary.

## 2017-11-13 NOTE — PROGRESS NOTES
Progress note:  Pt informed SW she needed a form completed for refund purposes for a cruise her and her wife were suppose to go on this weekend. SW completed forms and had the appropriate physician sign the forms. Pt denies any other concerns at this time. SW remains available.

## 2017-11-13 NOTE — ASSESSMENT & PLAN NOTE
-Suspect drug induced. Liver US unremarkable.  -Hepatology consult recommended viral PCRs- ordered and pending.

## 2017-11-13 NOTE — PHYSICIAN QUERY
PT Name: Apurva Rodriguez  MR #: [id number]     Physician Query Form - Documentation Clarification      CDS/: Zoya Cleaning               Contact information: [email or web address]     This form is a permanent document in the medical record.     Query Date: November 13, 2017    By submitting this query, we are merely seeking further clarification of documentation. Please utilize your independent clinical judgment when addressing the question(s) below.    The Medical record reflects the following:    Supporting Clinical Findings Location in Medical Record   Potassium 3.7-3.3-5.1    Potassium chloride 40meq po x one  Potassium phosphate 20mmol x once   Potassium phosphate 20mmol x once    Labs 11/6-11/13    MAR 11/8  MAR 11/9  MAR 11/10                                                                                      Doctor, Please specify diagnosis or diagnoses associated with above clinical findings.    Provider Use Only      (X ) Hypokalemia     ( X) Other_Kphos was used to treat hypophosphatemia_______________________                                                                                                         [  ] Clinically undetermined

## 2017-11-13 NOTE — PROGRESS NOTES
[facility]  Kidney Transplant  Progress Note      Reason for Follow-up: Reassessment of Kidney Transplant - 2017  (#2) recipient and management of immunosuppression.    ORGAN:  RIGHT KIDNEY   Donor Type:   - Brain Death   PHS Increased Risk: yes   Cold Ischemia:   Induction Medications: Campath      Subjective:   History of Present Illness:  Ms. Apurva Rodriguez is a 52 y.o female with a PMH of PKD with previous RLQ renal transplant (3/16/).  Prior to admit she was on nightly peritoneal dialysis, without complication. Her urine output is about 0.5-0.75 liters/day. PMH otherwise significant for parathyroidectomy and AVN requiring hip replacement.  She was admitted 17 for DBD kidney transplant which took place this AM without complication.  PD cath was removed.  It was noted that pt had significant scar tissue and a friable bladder and a 7 day pollack was recommended and drain was placed.  Post op labs with stable H/H, nice drop in Cr, and pt with good UOP.    Pre op labs with Vit D 24 and .  Cinacalcet started.  Of note, pt with parathyroidectomy in past with implant in shoulder.  Her recent PTHs have not been as high- unclear specific numbers.    Interval history:  No acute events overnight.  Cr GREAT, UOP excellent.  Pt is 7 day pollack- will plan to remove after possible bx tomorrow of liver.  Voice is back to baseline.  Main issue, elevating LFTs.  Hepatology following, several viral studies pending. Plan for NPO after MN and schedule liver bx tomorrow if numbers up more tomorrow and viral studies unremarkable.     Ms. Rodriguez is a 52 y.o. year old female who is status post Kidney Transplant - 2017  (#2).    Her maintenance immunosuppression consists of:   Immunosuppressants     Start     Stop Route Frequency Ordered    17 0800  tacrolimus capsule 7 mg  (IP TXP POST-OP CAMPATH)      -- Oral Daily 17 1002    17 1800  tacrolimus capsule 6  mg      -- Oral Daily 11/13/17 1002        Past Medical, Surgical, Family, and Social History:   Unchanged from H&P.    Scheduled Meds:   carvedilol  6.25 mg Oral BID    cinacalcet  30 mg Oral Daily with breakfast    docusate sodium  100 mg Oral BID    ergocalciferol  50,000 Units Oral Q7 Days    famotidine  20 mg Oral QHS    heparin (porcine)  5,000 Units Subcutaneous Q12H    k phos di & mono-sod phos mono  500 mg Oral BID    magnesium oxide  400 mg Oral BID    multivitamin  1 tablet Oral Daily    mupirocin   Topical (Top) BID    nystatin  500,000 Units Mouth/Throat QID (PC + HS)    sodium bicarbonate  650 mg Oral BID    tacrolimus  6 mg Oral Daily    [START ON 11/14/2017] tacrolimus  7 mg Oral Daily     Continuous Infusions:   glucagon (human recombinant)       PRN Meds:sodium chloride, sodium chloride, dextrose 50%, diphenhydrAMINE, glucagon (human recombinant), glucose, glucose, glucose, hydrALAZINE, insulin aspart, naloxone, ondansetron, oxyCODONE, traMADol    Intake/Output - Last 3 Shifts       11/11 0700 - 11/12 0659 11/12 0700 - 11/13 0659 11/13 0700 - 11/14 0659    P.O. 2815 3700 1090    IV Piggyback       Total Intake(mL/kg) 2815 (44.1) 3700 (52.9) 1090 (15.6)    Urine (mL/kg/hr) 5400 (3.5) 5375 (3.2) 850 (1.2)    Drains 0 (0) 40 (0) 5 (0)    Stool 0 (0) 0 (0) 0 (0)    Total Output 5400 5415 855    Net -2585 -1715 +235           Stool Occurrence 0 x 0 x 2 x           Review of Systems   Constitutional: Negative for fever.   HENT: Negative for mouth sores.    Eyes: Negative for visual disturbance.   Respiratory: Negative for shortness of breath.    Cardiovascular: Negative for chest pain and leg swelling.   Gastrointestinal: Negative.    Genitourinary: Negative for decreased urine volume, difficulty urinating, dysuria and hematuria.   Musculoskeletal: Negative.  Negative for myalgias, neck pain and neck stiffness.   Skin: Positive for wound. Negative for rash.   Allergic/Immunologic: Positive  for immunocompromised state.   Neurological: Negative for dizziness, tremors and weakness.   Psychiatric/Behavioral: Negative for decreased concentration, dysphoric mood and suicidal ideas.      Objective:     Vital Signs (Most Recent):  Temp: 98.7 °F (37.1 °C) (11/13/17 1649)  Pulse: 74 (11/13/17 1649)  Resp: 18 (11/13/17 1649)  BP: 137/84 (11/13/17 1649)  SpO2: 99 % (11/13/17 1649) Vital Signs (24h Range):  Temp:  [98.4 °F (36.9 °C)-99.2 °F (37.3 °C)] 98.7 °F (37.1 °C)  Pulse:  [65-78] 74  Resp:  [18-20] 18  SpO2:  [96 %-99 %] 99 %  BP: (129-177)/(70-90) 137/84     Weight: 70 kg (154 lb 5.2 oz)  Height: 5' (152.4 cm)  Body mass index is 30.14 kg/m².    Physical Exam   Constitutional: She is oriented to person, place, and time. She appears well-developed. No distress.   HENT:   Head: Normocephalic and atraumatic.   Mouth/Throat: Oropharynx is clear and moist.   Eyes: EOM are normal. Pupils are equal, round, and reactive to light. No scleral icterus.   Neck: Normal range of motion. Neck supple. No thyromegaly present.   Cardiovascular: Normal rate and regular rhythm.    Pulmonary/Chest: Effort normal and breath sounds normal. No respiratory distress.   Abdominal: Soft. Bowel sounds are normal. She exhibits no distension and no mass. There is no tenderness.   Incision clean and dry.   Musculoskeletal: Normal range of motion. She exhibits no edema.   Neurological: She is alert and oriented to person, place, and time.   Skin: Skin is warm and dry. Capillary refill takes 2 to 3 seconds.   Psychiatric: She has a normal mood and affect.   Nursing note and vitals reviewed.      Laboratory:  CBC:     Recent Labs  Lab 11/11/17  0632 11/12/17  0425 11/13/17  0353   WBC 2.34* 2.28* 2.58*   RBC 2.57* 2.50* 2.48*   HGB 8.3* 8.1* 8.1*   HCT 25.5* 24.9* 24.7*   * 109* 131*   MCV 99* 100* 100*   MCH 32.3* 32.4* 32.7*   MCHC 32.5 32.5 32.8     BMP:     Recent Labs  Lab 11/11/17  0632 11/12/17  0424 11/13/17  0353   GLU 91 89  85   * 143 140   K 4.5 4.4 5.1   * 114* 112*   CO2 24 24 22*   BUN 12 15 14   CREATININE 0.9 0.9 0.8   CALCIUM 7.7* 7.9* 7.8*     Lab Results   Component Value Date    TACROLIMUS 7.1 2017     Labs within the past 24 hours have been reviewed.    Diagnostic Results:  Liver US no sig abnormalities.    Assessment/Plan:     * Status post -donor kidney transplantation    - s/p DBD [facility] high rish kidney transplant for ESRD 2/2 polycystic kidney disease 17.  Surgery significant for friable bladder mucosa.  7 day pollack.  Progressing well. Creat has had roseann of 0.9-1.1.  - Continue pathway.      - Keep -140 for now  -Follow with strict I/Os, daily weights, BP (goal <140/90), daily labs.   -Doing very well and creat stabilized at roseann of 0.9        Sore throat    -Resolved  - s/p intubation.  ENT consulted; Per ENT uvular hydrops likely from intubation.  Mild erythema to oropharynx noted.  ENT recommended ice chips and Decadron.  Pt is on solu-medrol.  Pt having some improvement w Chloraseptic spray.  - voice stronger today and discomfort improving  - Cont liquids/soft food and advance as tolerated.  - Continue IS. Monitor.            Neutropenia    - wbc trending down.  Myfortic d/c'd 17.  Pt has BM bx in  for bruising that was unremarkable.  - Valcyte d/c'd.  CMV PCR pending.              Transaminitis    -Suspect drug induced. Liver US unremarkable.  -Hepatology consult recommended viral PCRs- ordered and pending.          Hypophosphatemia    - Continue phos supplement and continue high ph diet.  - Cont to monitor labs on daily basis.            Metabolic acidosis    -Corrected,  cont sodium bicarb 650 mg BID.            At risk for opportunistic infections    - Continue Bactrim for PCP prophylaxis  - Continue Valcyte for CMV prophylaxis  - Continue nystatin for thrush prophylaxis          Prophylactic immunotherapy    - Campath induction protocol.    - Tacro level at target.  Continue tacrolimus and Myfortic.  Will monitor for signs of toxic side effects, check daily tacrolimus troughs, and change meds accordingly.          Long-term use of immunosuppressant medication    - Chronic Prophylactic Immunosuppression- cont to check prograf level daily.  Assess for toxicity and adjust level as needed.  Cellcept changed to Myfortic 11/9/17 for diarrhea and then discontinued 11/11 for neutropenia.              Essential hypertension    - Hold off on starting home amlodipine for lower pressures; watch trend  - Goal pressure for now 130-140 SBP  - Coreg increased to 6.25 mg bid POD#3.          Secondary hyperparathyroidism of renal origin    - Continue cinacalcet and ergo.  PTH 11/6/17 996.  - Monitor, levels acceptable              Discharge Planning:  No plan for discharge- possible liver bx tomorrow if numbers cont to trend up.      Dia Main, NP  Kidney Transplant  [facility]

## 2017-11-13 NOTE — ASSESSMENT & PLAN NOTE
- wbc trending down.  Myfortic d/c'd 11/12/17.  Pt has BM bx in 2015 for bruising that was unremarkable.  - Valcyte d/c'd.  CMV PCR pending.

## 2017-11-13 NOTE — PLAN OF CARE
Problem: Patient Care Overview  Goal: Plan of Care Review  Pt free from fall or injury so far this shift. Instructed to call if assistance needed, verbalized understanding.  BG checked AC/HS. Last BG 85, no SSI coverage needed.  Creat 0.8.  cc so far this shift.   Mg level 1.5, PO Mg ordered BID.    LE's increased. Multiple blood tests ordered by Hepatology, results pending. Pt NPO after MN for a possible liver Bx tomorrow.   Tacro level 7.1, Prograf dose adjusted.   No complaints of pain this shift.   Afebrile. Hand hygiene reinforced. Daily labs monitored.

## 2017-11-13 NOTE — ASSESSMENT & PLAN NOTE
-Resolved  - s/p intubation.  ENT consulted; Per ENT uvular hydrops likely from intubation.  Mild erythema to oropharynx noted.  ENT recommended ice chips and Decadron.  Pt is on solu-medrol.  Pt having some improvement w Chloraseptic spray.  - voice stronger today and discomfort improving  - Cont liquids/soft food and advance as tolerated.  - Continue IS. Monitor.

## 2017-11-13 NOTE — ASSESSMENT & PLAN NOTE
- wbc trending down.  Myfortic d/c'd 11/12/17.  Pt has BM bx in 2015 for bruising that was unremarkable.  - Valcyte d/c'd.  CMV PCR pending.    Likely drug induced.

## 2017-11-14 VITALS
OXYGEN SATURATION: 100 % | HEIGHT: 60 IN | WEIGHT: 156.5 LBS | SYSTOLIC BLOOD PRESSURE: 145 MMHG | TEMPERATURE: 98 F | BODY MASS INDEX: 30.73 KG/M2 | DIASTOLIC BLOOD PRESSURE: 74 MMHG | HEART RATE: 73 BPM | RESPIRATION RATE: 16 BRPM

## 2017-11-14 DIAGNOSIS — Z94.0 KIDNEY REPLACED BY TRANSPLANT: Primary | ICD-10-CM

## 2017-11-14 DIAGNOSIS — R74.8 ELEVATED LIVER ENZYMES: Primary | ICD-10-CM

## 2017-11-14 LAB
ALBUMIN SERPL BCP-MCNC: 2.5 G/DL
ALP SERPL-CCNC: 421 U/L
ALT SERPL W/O P-5'-P-CCNC: 365 U/L
ANION GAP SERPL CALC-SCNC: 6 MMOL/L
ANISOCYTOSIS BLD QL SMEAR: SLIGHT
AST SERPL-CCNC: 118 U/L
BASOPHILS # BLD AUTO: ABNORMAL K/UL
BASOPHILS NFR BLD: 0 %
BILIRUB SERPL-MCNC: 0.5 MG/DL
BUN SERPL-MCNC: 17 MG/DL
CALCIUM SERPL-MCNC: 8.1 MG/DL
CHLORIDE SERPL-SCNC: 113 MMOL/L
CMV DNA SERPL NAA+PROBE-ACNC: NORMAL IU/ML
CO2 SERPL-SCNC: 23 MMOL/L
CREAT SERPL-MCNC: 0.8 MG/DL
DIFFERENTIAL METHOD: ABNORMAL
EBV DNA # BLD NAA+PROBE: NORMAL {COPIES}/ML
EBV DNA BY PCR: NORMAL
EOSINOPHIL # BLD AUTO: ABNORMAL K/UL
EOSINOPHIL NFR BLD: 0 %
ERYTHROCYTE [DISTWIDTH] IN BLOOD BY AUTOMATED COUNT: 15.4 %
EST. GFR  (AFRICAN AMERICAN): >60 ML/MIN/1.73 M^2
EST. GFR  (NON AFRICAN AMERICAN): >60 ML/MIN/1.73 M^2
GLUCOSE SERPL-MCNC: 101 MG/DL
HCT VFR BLD AUTO: 25.8 %
HCV LOG: <1.08 LOG (10) IU/ML
HCV RNA QUANT PCR: <12 IU/ML
HCV, QUALITATIVE: NOT DETECTED IU/ML
HGB BLD-MCNC: 8.2 G/DL
HSV-1 DNA BY PCR: NEGATIVE
HSV-2 DNA BY PCR: NEGATIVE
HYPOCHROMIA BLD QL SMEAR: ABNORMAL
IMM GRANULOCYTES # BLD AUTO: ABNORMAL K/UL
IMM GRANULOCYTES NFR BLD AUTO: ABNORMAL %
LOWER 95% CONFIDENCE INTERVAL: NORMAL
LYMPHOCYTES # BLD AUTO: ABNORMAL K/UL
LYMPHOCYTES NFR BLD: 2 %
MAGNESIUM SERPL-MCNC: 1.6 MG/DL
MCH RBC QN AUTO: 31.9 PG
MCHC RBC AUTO-ENTMCNC: 31.8 G/DL
MCV RBC AUTO: 100 FL
MONOCYTES # BLD AUTO: ABNORMAL K/UL
MONOCYTES NFR BLD: 2 %
MYELOCYTES NFR BLD MANUAL: 1 %
NEUTROPHILS NFR BLD: 95 %
NRBC BLD-RTO: 0 /100 WBC
OVALOCYTES BLD QL SMEAR: ABNORMAL
PHOSPHATE SERPL-MCNC: 2.8 MG/DL
PLATELET # BLD AUTO: 162 K/UL
PMV BLD AUTO: 9.9 FL
POCT GLUCOSE: 104 MG/DL (ref 70–110)
POIKILOCYTOSIS BLD QL SMEAR: SLIGHT
POLYCHROMASIA BLD QL SMEAR: ABNORMAL
POTASSIUM SERPL-SCNC: 5.2 MMOL/L
PROT SERPL-MCNC: 5.6 G/DL
RBC # BLD AUTO: 2.57 M/UL
SODIUM SERPL-SCNC: 142 MMOL/L
TACROLIMUS BLD-MCNC: 8.8 NG/ML
UPPER 95% CONFIDENCE INTERVAL: NORMAL
WBC # BLD AUTO: 3.43 K/UL

## 2017-11-14 PROCEDURE — 25000003 PHARM REV CODE 250: Performed by: NURSE PRACTITIONER

## 2017-11-14 PROCEDURE — 36415 COLL VENOUS BLD VENIPUNCTURE: CPT

## 2017-11-14 PROCEDURE — 25000003 PHARM REV CODE 250: Performed by: PHYSICIAN ASSISTANT

## 2017-11-14 PROCEDURE — 80053 COMPREHEN METABOLIC PANEL: CPT

## 2017-11-14 PROCEDURE — 85027 COMPLETE CBC AUTOMATED: CPT

## 2017-11-14 PROCEDURE — 85007 BL SMEAR W/DIFF WBC COUNT: CPT

## 2017-11-14 PROCEDURE — 25000003 PHARM REV CODE 250: Performed by: SURGERY

## 2017-11-14 PROCEDURE — 83735 ASSAY OF MAGNESIUM: CPT

## 2017-11-14 PROCEDURE — 84100 ASSAY OF PHOSPHORUS: CPT

## 2017-11-14 PROCEDURE — 99232 SBSQ HOSP IP/OBS MODERATE 35: CPT | Mod: ,,, | Performed by: INTERNAL MEDICINE

## 2017-11-14 PROCEDURE — 25000003 PHARM REV CODE 250: Performed by: INTERNAL MEDICINE

## 2017-11-14 PROCEDURE — 63600175 PHARM REV CODE 636 W HCPCS: Performed by: NURSE PRACTITIONER

## 2017-11-14 PROCEDURE — 99239 HOSP IP/OBS DSCHRG MGMT >30: CPT | Mod: ,,, | Performed by: NURSE PRACTITIONER

## 2017-11-14 PROCEDURE — 80197 ASSAY OF TACROLIMUS: CPT

## 2017-11-14 RX ORDER — ATOVAQUONE 750 MG/5ML
1500 SUSPENSION ORAL DAILY
Status: CANCELLED | OUTPATIENT
Start: 2017-11-14

## 2017-11-14 RX ORDER — LIDOCAINE HYDROCHLORIDE 10 MG/ML
1 INJECTION INFILTRATION; PERINEURAL ONCE
Status: DISCONTINUED | OUTPATIENT
Start: 2017-11-14 | End: 2017-11-14 | Stop reason: HOSPADM

## 2017-11-14 RX ORDER — MAGNESIUM SULFATE HEPTAHYDRATE 40 MG/ML
2 INJECTION, SOLUTION INTRAVENOUS ONCE
Status: DISCONTINUED | OUTPATIENT
Start: 2017-11-14 | End: 2017-11-14 | Stop reason: HOSPADM

## 2017-11-14 RX ORDER — TACROLIMUS 1 MG/1
CAPSULE ORAL
Qty: 390 CAPSULE | Refills: 11 | Status: SHIPPED | OUTPATIENT
Start: 2017-11-14 | End: 2017-11-16 | Stop reason: SDUPTHER

## 2017-11-14 RX ORDER — TRAMADOL HYDROCHLORIDE 50 MG/1
25 TABLET ORAL EVERY 6 HOURS PRN
Qty: 30 TABLET | Refills: 0 | Status: SHIPPED | OUTPATIENT
Start: 2017-11-14 | End: 2017-11-24

## 2017-11-14 RX ORDER — ATOVAQUONE 750 MG/5ML
1500 SUSPENSION ORAL DAILY
Qty: 300 ML | Refills: 11 | Status: SHIPPED | OUTPATIENT
Start: 2017-11-07 | End: 2017-12-06 | Stop reason: SDUPTHER

## 2017-11-14 RX ORDER — TRAMADOL HYDROCHLORIDE 50 MG/1
25 TABLET ORAL EVERY 6 HOURS PRN
Qty: 30 TABLET | Refills: 0 | Status: CANCELLED | OUTPATIENT
Start: 2017-11-14

## 2017-11-14 RX ORDER — TACROLIMUS 1 MG/1
CAPSULE ORAL
Qty: 390 CAPSULE | Refills: 11 | Status: SHIPPED | OUTPATIENT
Start: 2017-11-14 | End: 2017-11-14

## 2017-11-14 RX ORDER — LIDOCAINE HYDROCHLORIDE 10 MG/ML
1 INJECTION INFILTRATION; PERINEURAL ONCE
Status: DISCONTINUED | OUTPATIENT
Start: 2017-11-14 | End: 2017-11-14

## 2017-11-14 RX ORDER — LANOLIN ALCOHOL/MO/W.PET/CERES
400 CREAM (GRAM) TOPICAL 2 TIMES DAILY
Qty: 60 TABLET | Refills: 2 | Status: SHIPPED | OUTPATIENT
Start: 2017-11-14 | End: 2017-12-06 | Stop reason: SDUPTHER

## 2017-11-14 RX ADMIN — MUPIROCIN: 20 OINTMENT TOPICAL at 07:11

## 2017-11-14 RX ADMIN — NYSTATIN 500000 UNITS: 500000 SUSPENSION ORAL at 01:11

## 2017-11-14 RX ADMIN — MAGNESIUM OXIDE TAB 400 MG (241.3 MG ELEMENTAL MG) 400 MG: 400 (241.3 MG) TAB at 08:11

## 2017-11-14 RX ADMIN — DIBASIC SODIUM PHOSPHATE, MONOBASIC POTASSIUM PHOSPHATE AND MONOBASIC SODIUM PHOSPHATE 2 TABLET: 852; 155; 130 TABLET ORAL at 07:11

## 2017-11-14 RX ADMIN — TACROLIMUS 7 MG: 1 CAPSULE ORAL at 07:11

## 2017-11-14 RX ADMIN — THERA TABS 1 TABLET: TAB at 07:11

## 2017-11-14 RX ADMIN — CINACALCET HYDROCHLORIDE 30 MG: 30 TABLET, COATED ORAL at 07:11

## 2017-11-14 RX ADMIN — NYSTATIN 500000 UNITS: 500000 SUSPENSION ORAL at 07:11

## 2017-11-14 RX ADMIN — SODIUM BICARBONATE 650 MG TABLET 650 MG: at 07:11

## 2017-11-14 NOTE — PROGRESS NOTES
[facility]  Hepatology  Progress Note    Patient Name: Apurva Rodriguez  MRN: [id number]  Admission Date: 2017  Hospital Length of Stay: 8 days  Attending Provider: Rei Cartagena MD   Primary Care Physician: Patrice Mclaughlin MD  Principal Problem:Status post -donor kidney transplantation    Subjective:     Transplant status: No    HPI: Apurva Rodriguez is a 52 y.o. female with with a PMH of PKD with previous RLQ renal transplant (3/16/) who was admitted for repeat KT and underwent surgery on . Since then, her LFTs started to increase, mainly hepatocellular pattern. She is s/p CCY. She denies alcohol use. No liver involvement of cysts in liver. Her viral serologies negative. Liver US w/doppler is normal. No biliary dilation. No prior liver disease. Reports taking Bactrim for many years.          Interval History:     Feels well.  No complaints.    Relieved we are holding on liver bx    Current Facility-Administered Medications   Medication    carvedilol tablet 6.25 mg    cinacalcet tablet 30 mg    dextrose 50% injection 12.5 g    diphenhydrAMINE injection 12.5 mg    docusate sodium capsule 100 mg    ergocalciferol capsule 50,000 Units    famotidine tablet 20 mg    glucagon (human recombinant) injection 1 mg    glucose chewable tablet 16 g    glucose chewable tablet 16 g    glucose chewable tablet 24 g    heparin (porcine) injection 5,000 Units    hydrALAZINE injection 10 mg    insulin aspart pen 0-5 Units    k phos di & mono-sod phos mono 250 mg tablet 2 tablet    lidocaine HCL 10 mg/ml (1%) injection 1 mL    magnesium oxide tablet 400 mg    magnesium sulfate 2g in water 50mL IVPB (premix)    multivitamin tablet 1 tablet    mupirocin 2 % ointment    naloxone 0.4 mg/mL injection 0.02 mg    nystatin 100,000 unit/mL suspension 500,000 Units    ondansetron disintegrating tablet 8 mg    oxyCODONE 5 mg/5 mL solution 2.5 mg    sodium bicarbonate tablet  650 mg    tacrolimus capsule 6 mg    tacrolimus capsule 7 mg    tramadol split tablet 25 mg       Objective:     Vital Signs (Most Recent):  Temp: 98.5 °F (36.9 °C) (11/14/17 1152)  Pulse: 78 (11/14/17 1152)  Resp: 18 (11/14/17 1152)  BP: 127/66 (11/14/17 1152)  SpO2: 99 % (11/14/17 1152) Vital Signs (24h Range):  Temp:  [98.2 °F (36.8 °C)-99.2 °F (37.3 °C)] 98.5 °F (36.9 °C)  Pulse:  [71-91] 78  Resp:  [16-18] 18  SpO2:  [97 %-100 %] 99 %  BP: (118-140)/(66-84) 127/66     Weight: 71 kg (156 lb 8.4 oz) (11/14/17 0500)  Body mass index is 30.57 kg/m².    Physical Exam   Constitutional: She is oriented to person, place, and time. She appears well-developed and well-nourished.   Eyes: No scleral icterus.   Cardiovascular: Normal rate.  Exam reveals no gallop.    Pulmonary/Chest: Effort normal. No respiratory distress.   Abdominal: Soft. Bowel sounds are normal. She exhibits no distension and no mass. There is no tenderness. There is no rebound and no guarding. No hernia.   Neurological: She is alert and oriented to person, place, and time.   Skin: Skin is warm.   Psychiatric: She has a normal mood and affect.       MELD-Na score: 12 at 11/8/2017  4:15 AM  MELD score: 12 at 11/8/2017  4:15 AM  Calculated from:  Serum Creatinine: 1.8 mg/dL at 11/8/2017  4:15 AM  Serum Sodium: 140 mmol/L (Rounded to 137) at 11/8/2017  4:15 AM  Total Bilirubin: 0.2 mg/dL (Rounded to 1) at 11/8/2017  4:15 AM  INR(ratio): 0.9 (Rounded to 1) at 11/6/2017  7:47 PM  Age: 52 years    Significant Labs:  CBC:   Recent Labs  Lab 11/14/17  0615   WBC 3.43*   RBC 2.57*   HGB 8.2*   HCT 25.8*        CMP:   Recent Labs  Lab 11/14/17  0615      CALCIUM 8.1*   ALBUMIN 2.5*   PROT 5.6*      K 5.2*   CO2 23   *   BUN 17   CREATININE 0.8   ALKPHOS 421*   *   *   BILITOT 0.5     Coagulation: No results for input(s): INR, APTT in the last 168 hours.    Invalid input(s): PT    Significant Imaging:  Labs:  Reviewed    Assessment/Plan:     Transaminitis    Hepatocellular pattern. Likely insult since surgery as previous labs are normal. Normal US with normal biliary tree rules out obstruction. DDx is broad with viral infections, DILI (Bactrim or anesthetic drugs).     Recommend  Follow viral serologies pending currently  Will elect to hold on liver biopsy at this time    If discharging, will have labs in coming days with CMP check. IF LFTs trend back up, elect for outpatient liver biopsy.                Thank you for your consult. I will follow-up with patient. Please contact us if you have any additional questions.    Gilberto Walsh MD  Hepatology  [facility]

## 2017-11-14 NOTE — PROGRESS NOTES
EDUCATION NOTE:    Met with Apurva Rodriguez and her caregivers to provide teaching re: immunosuppressant medications.  Reviewed medication section of the Transplant Education book that was provided.  Emphasized the importance of compliance, role of the blue medication card, concerns for drug interactions, and process of obtaining refills.  Counseled regarding Prograf, Cellcept , prednisone, including directions for use, monitoring, how to handle missed doses, and side effects. Patient verbalized understanding and had the opportunity to ask questions.

## 2017-11-14 NOTE — SUBJECTIVE & OBJECTIVE
Interval History:     Feels well.  No complaints.    Relieved we are holding on liver bx    Current Facility-Administered Medications   Medication    carvedilol tablet 6.25 mg    cinacalcet tablet 30 mg    dextrose 50% injection 12.5 g    diphenhydrAMINE injection 12.5 mg    docusate sodium capsule 100 mg    ergocalciferol capsule 50,000 Units    famotidine tablet 20 mg    glucagon (human recombinant) injection 1 mg    glucose chewable tablet 16 g    glucose chewable tablet 16 g    glucose chewable tablet 24 g    heparin (porcine) injection 5,000 Units    hydrALAZINE injection 10 mg    insulin aspart pen 0-5 Units    k phos di & mono-sod phos mono 250 mg tablet 2 tablet    lidocaine HCL 10 mg/ml (1%) injection 1 mL    magnesium oxide tablet 400 mg    magnesium sulfate 2g in water 50mL IVPB (premix)    multivitamin tablet 1 tablet    mupirocin 2 % ointment    naloxone 0.4 mg/mL injection 0.02 mg    nystatin 100,000 unit/mL suspension 500,000 Units    ondansetron disintegrating tablet 8 mg    oxyCODONE 5 mg/5 mL solution 2.5 mg    sodium bicarbonate tablet 650 mg    tacrolimus capsule 6 mg    tacrolimus capsule 7 mg    tramadol split tablet 25 mg       Objective:     Vital Signs (Most Recent):  Temp: 98.5 °F (36.9 °C) (11/14/17 1152)  Pulse: 78 (11/14/17 1152)  Resp: 18 (11/14/17 1152)  BP: 127/66 (11/14/17 1152)  SpO2: 99 % (11/14/17 1152) Vital Signs (24h Range):  Temp:  [98.2 °F (36.8 °C)-99.2 °F (37.3 °C)] 98.5 °F (36.9 °C)  Pulse:  [71-91] 78  Resp:  [16-18] 18  SpO2:  [97 %-100 %] 99 %  BP: (118-140)/(66-84) 127/66     Weight: 71 kg (156 lb 8.4 oz) (11/14/17 0500)  Body mass index is 30.57 kg/m².    Physical Exam   Constitutional: She is oriented to person, place, and time. She appears well-developed and well-nourished.   Eyes: No scleral icterus.   Cardiovascular: Normal rate.  Exam reveals no gallop.    Pulmonary/Chest: Effort normal. No respiratory distress.   Abdominal: Soft.  Bowel sounds are normal. She exhibits no distension and no mass. There is no tenderness. There is no rebound and no guarding. No hernia.   Neurological: She is alert and oriented to person, place, and time.   Skin: Skin is warm.   Psychiatric: She has a normal mood and affect.       MELD-Na score: 12 at 11/8/2017  4:15 AM  MELD score: 12 at 11/8/2017  4:15 AM  Calculated from:  Serum Creatinine: 1.8 mg/dL at 11/8/2017  4:15 AM  Serum Sodium: 140 mmol/L (Rounded to 137) at 11/8/2017  4:15 AM  Total Bilirubin: 0.2 mg/dL (Rounded to 1) at 11/8/2017  4:15 AM  INR(ratio): 0.9 (Rounded to 1) at 11/6/2017  7:47 PM  Age: 52 years    Significant Labs:  CBC:   Recent Labs  Lab 11/14/17  0615   WBC 3.43*   RBC 2.57*   HGB 8.2*   HCT 25.8*        CMP:   Recent Labs  Lab 11/14/17  0615      CALCIUM 8.1*   ALBUMIN 2.5*   PROT 5.6*      K 5.2*   CO2 23   *   BUN 17   CREATININE 0.8   ALKPHOS 421*   *   *   BILITOT 0.5     Coagulation: No results for input(s): INR, APTT in the last 168 hours.    Invalid input(s): PT    Significant Imaging:  Labs: Reviewed

## 2017-11-14 NOTE — PROGRESS NOTES
"DISCHARGE NOTE:    Apurva Rodriguez is a 52 y.o. female s/p  RIGHT KIDNEY    - Brain Death  transplant on 2017 (Kidney), 3/16/1996 (Kidney) for ESRD secondary to Polycystic Kidneys.      Past Medical History:   Diagnosis Date    Anemia     Avascular necrosis left hip 2016    S/p replacement Cord decompression right side Revision of the left hip: with a "larger" hardware placement     Dyslipidemia 2016    ESRD (end stage renal disease) 2016    She mentioned that the cause of the kidney failure was associated with several birth defects: PKD Bilateral nephrocalcinosis Hyperuricemia Chronic Icthyosis     Essential hypertension 2016    Gastroesophageal reflux disease without esophagitis 2016    Hypertension     Polycystic kidney disease     diagnosed at early age    S/P bone marrow biopsy 2016    Secondary hyperparathyroidism of renal origin 2016       Hospital Course:     Patient retransplant- first transplant 20+ years ago at [facility]. Patient's hospital course was complicated after transplant by severe abdominal cramps and diarrhea. Bowel regimen was discontinued and CellCept was changed to Myfortic. Afterwards, Myfortic and Valcyte were discontinued due to decreased WBC count. Post-op day 3 revealed increase AST and ALT, and scheduled acetaminophen and bactrim were discontinued as transaminitis was suspected to be drug induced. Pentamidine was attempted for PJP ppx, but patient was unable to tolerate the inhalation treatment. Atovaquone was initiated for PJP ppx prior to discharge. Upon discontinuation of Myfortic, tacrolimus dose was increased. Patient received partial tacrolimus prescription upon discharge due to prescription requiring a PA.    Plan is to restart Cellcept as outpatient with improvement of WBC, then Valcyte.          Allergies:   Review of patient's allergies indicates:   Allergen Reactions    Amphotericin b      Peritonitis reaction in " dialysis solution while on peritoneal dialysis    Azelex [azelaic acid] Hives    Cleocin [clindamycin hcl] Hives    Decadron [dexamethasone]      Highly anxious    Morphine Swelling     lips    Reglan [metoclopramide hcl] Other (See Comments)    Valumag      Vallium//Highly anxious    Vancomycin analogues Itching     Hair areas       [facility]    Discharge Medications:   Apurva Rodriguez   Home Medication Instructions RHIANNON:[id number]    Printed on:11/14/17 5349   Medication Information                      atovaquone (MEPRON) 750 mg/5 mL Susp  Take 10 mLs (1,500 mg total) by mouth once daily. STOP 11/7/18             bisacodyl (DULCOLAX) 5 mg EC tablet  Take 2 tablets (10 mg total) by mouth once daily.             carvedilol (COREG) 6.25 MG tablet  Take 1 tablet (6.25 mg total) by mouth 2 (two) times daily.             cinacalcet (SENSIPAR) 30 MG Tab  Take 1 tablet (30 mg total) by mouth daily with breakfast.             docusate sodium (COLACE) 100 MG capsule  Take 100 mg by mouth 2 (two) times daily.             ergocalciferol (ERGOCALCIFEROL) 50,000 unit Cap  Take 1 capsule (50,000 Units total) by mouth every 7 days. Wednesday             famotidine (PEPCID) 20 MG tablet  Take 1 tablet (20 mg total) by mouth every evening.             k phos di & mono-sod phos mono (K-PHOS-NEUTRAL) 250 mg Tab  Take 2 tablets by mouth 2 (two) times daily.             magnesium oxide (MAG-OX) 400 mg tablet  Take 1 tablet (400 mg total) by mouth 2 (two) times daily.             multivitamin (THERAGRAN) tablet  Take 1 tablet by mouth once daily.             nystatin (MYCOSTATIN) 100,000 unit/mL suspension  Take 5 mLs (500,000 Units total) by mouth 2 hours after meals and at bedtime. STOP 12/6/17             sodium bicarbonate 650 MG tablet  Take 1 tablet (650 mg total) by mouth 2 (two) times daily.             tacrolimus (PROGRAF) 1 MG Cap  Take 7 mg PO QAM and 6 mg QPM PM             traMADol (ULTRAM) 50 mg  tablet  Take 0.5 tablets (25 mg total) by mouth every 6 (six) hours as needed for Pain.                 Pharmacy Interventions/Recommendations:  1) Transplant Immunosuppression:   - Tacrolimus 7 mg AM / 6 mg PM       2) Opportunistic Infection prophylaxis:   - Nystatin 500,000 units swish and swallow after meals and before bed  - Atovaquone 1,500 mg (10mL) PO daily       3) Patient Counseling/Education:  Yes. Demonstrated the use of the BP cuff, thermometer.    4) Follow-Up/Discharge Needs:     - Patient will need to  the remainder of tacrolimus prescription at outpatient pharmacy     5) Patient received prescriptions for:         Faxed / Phoned in rx's: To [facility] per patient request.    6) Patient Assistance Information: None     7) The following medications have been placed on HOLD and should be restarted in the outpatient setting (when appropriate):   - Cellcept  - Rashmi     Apurva and her caregiver verbalized their understanding and had the opportunity to ask questions.

## 2017-11-14 NOTE — ASSESSMENT & PLAN NOTE
Hepatocellular pattern. Likely insult since surgery as previous labs are normal. Normal US with normal biliary tree rules out obstruction. DDx is broad with viral infections, DILI (Bactrim or anesthetic drugs).     Recommend  Follow viral serologies pending currently  Will elect to hold on liver biopsy at this time    If discharging, will have labs in coming days with CMP check. IF LFTs trend back up, elect for outpatient liver biopsy.

## 2017-11-14 NOTE — PROGRESS NOTES
Discharge note:  FREDERIC met with pt, pt's mom and partner Karyn. Pt was AAOx4 as well as her caregivers. Pt will be discharging home under the care of pt's mother and wife. Pt reports she is doing well and denies any additional needs at this time. Pt will be transported by her mother at this today and her wife will rotate with her mom as needed. SW remains available.

## 2017-11-14 NOTE — PROGRESS NOTES
Medications reviewed with pt per transplant pharmacist. Pt verbalized understanding. After visit summary reviewed with pt. She verbalized understanding. Waiting on transport

## 2017-11-14 NOTE — PLAN OF CARE
Pt AAOx4, VSS, SpO- 97-99 on RA, AC/HS- no coverage needed  Pt up ad myke, LLQ incision remains CDI with jignesh, ABIGAIL L side putting out scant serous output  Carolina remains CDI- urine output overnight = 2,250, plan to D/C after liver biopsy today  Bed remains in lowest position, non skid socks worn, mother to remain at bedside  Pt has been NPO since MN, will continue to monitor

## 2017-11-15 ENCOUNTER — CLINICAL SUPPORT (OUTPATIENT)
Dept: TRANSPLANT | Facility: CLINIC | Age: 52
End: 2017-11-15
Payer: COMMERCIAL

## 2017-11-15 ENCOUNTER — LAB VISIT (OUTPATIENT)
Dept: LAB | Facility: HOSPITAL | Age: 52
End: 2017-11-15
Attending: INTERNAL MEDICINE
Payer: COMMERCIAL

## 2017-11-15 VITALS
SYSTOLIC BLOOD PRESSURE: 96 MMHG | DIASTOLIC BLOOD PRESSURE: 59 MMHG | HEART RATE: 76 BPM | HEIGHT: 60 IN | HEART RATE: 76 BPM | RESPIRATION RATE: 19 BRPM | DIASTOLIC BLOOD PRESSURE: 59 MMHG | SYSTOLIC BLOOD PRESSURE: 96 MMHG | BODY MASS INDEX: 26.15 KG/M2 | TEMPERATURE: 98 F | BODY MASS INDEX: 26.15 KG/M2 | OXYGEN SATURATION: 100 % | RESPIRATION RATE: 19 BRPM | HEIGHT: 60 IN | OXYGEN SATURATION: 100 % | WEIGHT: 133.19 LBS | WEIGHT: 133.19 LBS | TEMPERATURE: 98 F

## 2017-11-15 DIAGNOSIS — Z94.0 STATUS POST KIDNEY TRANSPLANT: Primary | ICD-10-CM

## 2017-11-15 DIAGNOSIS — Z94.0 KIDNEY REPLACED BY TRANSPLANT: ICD-10-CM

## 2017-11-15 DIAGNOSIS — R74.8 ELEVATED LIVER ENZYMES: ICD-10-CM

## 2017-11-15 LAB
ALBUMIN SERPL BCP-MCNC: 3.1 G/DL
ALBUMIN SERPL BCP-MCNC: 3.1 G/DL
ALP SERPL-CCNC: 481 U/L
ALT SERPL W/O P-5'-P-CCNC: 362 U/L
ANION GAP SERPL CALC-SCNC: 6 MMOL/L
ANION GAP SERPL CALC-SCNC: 6 MMOL/L
AST SERPL-CCNC: 107 U/L
BASOPHILS # BLD AUTO: 0.02 K/UL
BASOPHILS NFR BLD: 0.5 %
BILIRUB SERPL-MCNC: 0.6 MG/DL
BUN SERPL-MCNC: 13 MG/DL
BUN SERPL-MCNC: 13 MG/DL
CALCIUM SERPL-MCNC: 8.7 MG/DL
CALCIUM SERPL-MCNC: 8.7 MG/DL
CHLORIDE SERPL-SCNC: 110 MMOL/L
CHLORIDE SERPL-SCNC: 110 MMOL/L
CO2 SERPL-SCNC: 23 MMOL/L
CO2 SERPL-SCNC: 23 MMOL/L
CREAT SERPL-MCNC: 0.9 MG/DL
CREAT SERPL-MCNC: 0.9 MG/DL
DIFFERENTIAL METHOD: ABNORMAL
EOSINOPHIL # BLD AUTO: 0 K/UL
EOSINOPHIL NFR BLD: 0 %
ERYTHROCYTE [DISTWIDTH] IN BLOOD BY AUTOMATED COUNT: 15.6 %
EST. GFR  (AFRICAN AMERICAN): >60 ML/MIN/1.73 M^2
EST. GFR  (AFRICAN AMERICAN): >60 ML/MIN/1.73 M^2
EST. GFR  (NON AFRICAN AMERICAN): >60 ML/MIN/1.73 M^2
EST. GFR  (NON AFRICAN AMERICAN): >60 ML/MIN/1.73 M^2
GLUCOSE SERPL-MCNC: 105 MG/DL
GLUCOSE SERPL-MCNC: 105 MG/DL
HCT VFR BLD AUTO: 28.1 %
HGB BLD-MCNC: 9 G/DL
IMM GRANULOCYTES # BLD AUTO: 0.18 K/UL
IMM GRANULOCYTES NFR BLD AUTO: 4.2 %
LYMPHOCYTES # BLD AUTO: 0.1 K/UL
LYMPHOCYTES NFR BLD: 1.2 %
MAGNESIUM SERPL-MCNC: 1.6 MG/DL
MCH RBC QN AUTO: 32.7 PG
MCHC RBC AUTO-ENTMCNC: 32 G/DL
MCV RBC AUTO: 102 FL
MONOCYTES # BLD AUTO: 0.1 K/UL
MONOCYTES NFR BLD: 2.8 %
NEUTROPHILS # BLD AUTO: 4 K/UL
NEUTROPHILS NFR BLD: 91.3 %
NRBC BLD-RTO: 0 /100 WBC
PHOSPHATE SERPL-MCNC: 2.5 MG/DL
PLATELET # BLD AUTO: 196 K/UL
PMV BLD AUTO: 9.5 FL
POTASSIUM SERPL-SCNC: 5 MMOL/L
POTASSIUM SERPL-SCNC: 5 MMOL/L
PROT SERPL-MCNC: 6.6 G/DL
RBC # BLD AUTO: 2.75 M/UL
SODIUM SERPL-SCNC: 139 MMOL/L
SODIUM SERPL-SCNC: 139 MMOL/L
TACROLIMUS BLD-MCNC: 10.1 NG/ML
VARICELLA INTERPRETATION: POSITIVE
VARICELLA INTERPRETATION: POSITIVE
VARICELLA ZOSTER IGG: 3.96 ISR
VARICELLA ZOSTER IGG: 4.1 ISR
WBC # BLD AUTO: 4.33 K/UL

## 2017-11-15 PROCEDURE — 85025 COMPLETE CBC W/AUTO DIFF WBC: CPT

## 2017-11-15 PROCEDURE — 80197 ASSAY OF TACROLIMUS: CPT

## 2017-11-15 PROCEDURE — 83735 ASSAY OF MAGNESIUM: CPT

## 2017-11-15 PROCEDURE — 36415 COLL VENOUS BLD VENIPUNCTURE: CPT

## 2017-11-15 PROCEDURE — 99999 PR PBB SHADOW E&M-EST. PATIENT-LVL III: CPT | Mod: PBBFAC,,,

## 2017-11-15 PROCEDURE — 84100 ASSAY OF PHOSPHORUS: CPT

## 2017-11-15 PROCEDURE — 80053 COMPREHEN METABOLIC PANEL: CPT

## 2017-11-15 RX ORDER — MYCOPHENOLATE MOFETIL 250 MG/1
500 CAPSULE ORAL 2 TIMES DAILY
Qty: 240 CAPSULE | Refills: 11 | Status: SHIPPED | OUTPATIENT
Start: 2017-11-15 | End: 2017-11-16 | Stop reason: SDUPTHER

## 2017-11-15 NOTE — PROGRESS NOTES
Clinic Note: First Return to Clinic Post-  Kidney Transplant    Apurva Rodriguez  is a 52 y.o. female  S/p  RIGHT KIDNEY  transplant on 11/7/2017 (Kidney), 3/16/1996 (Kidney) for Polycystic Kidneys.      Met with patient and her caregiver in the clinic to review current medication list.     Current Outpatient Prescriptions   Medication Sig Dispense Refill    atovaquone (MEPRON) 750 mg/5 mL Susp Take 10 mLs (1,500 mg total) by mouth once daily. STOP 11/7/18 300 mL 11    bisacodyl (DULCOLAX) 5 mg EC tablet Take 2 tablets (10 mg total) by mouth once daily. 30 tablet 0    carvedilol (COREG) 6.25 MG tablet Take 1 tablet (6.25 mg total) by mouth 2 (two) times daily. 60 tablet 11    cinacalcet (SENSIPAR) 30 MG Tab Take 1 tablet (30 mg total) by mouth daily with breakfast. 30 tablet 11    docusate sodium (COLACE) 100 MG capsule Take 100 mg by mouth 2 (two) times daily.      ergocalciferol (ERGOCALCIFEROL) 50,000 unit Cap Take 1 capsule (50,000 Units total) by mouth every 7 days. Wednesday 4 capsule 3    famotidine (PEPCID) 20 MG tablet Take 1 tablet (20 mg total) by mouth every evening. 30 tablet 2    k phos di & mono-sod phos mono (K-PHOS-NEUTRAL) 250 mg Tab Take 2 tablets by mouth 2 (two) times daily. 120 tablet 2    magnesium oxide (MAG-OX) 400 mg tablet Take 1 tablet (400 mg total) by mouth 2 (two) times daily. 60 tablet 2    multivitamin (THERAGRAN) tablet Take 1 tablet by mouth once daily.      nystatin (MYCOSTATIN) 100,000 unit/mL suspension Take 5 mLs (500,000 Units total) by mouth 2 hours after meals and at bedtime. STOP 12/6/17 473 mL 0    sodium bicarbonate 650 MG tablet Take 1 tablet (650 mg total) by mouth 2 (two) times daily. 60 tablet 2    tacrolimus (PROGRAF) 1 MG Cap Take 7 mg PO QAM and 6 mg QPM  capsule 11    traMADol (ULTRAM) 50 mg tablet Take 0.5 tablets (25 mg total) by mouth every 6 (six) hours as needed for Pain. 30 tablet 0     No current facility-administered medications for  this visit.          1) Discussed any concerns or problems that the patient encountered since discharge: No complaints at this time. Pain well controlled and patient having regular BM.    2) Reconciled the EMR by having the patient verbalize their medications, dose, and frequency.      3) Verified that patient had prescriptions filled after being discharged from the hospital    4) Reviewed vital signs and laboratory values, and evaluated the need to adjust doses of medications.  WBC improved, consulted with Dr. Loredo and decision made to start mycophenolate 500mg BID. Will procure medication for patient. If CBC continues current trend, will restart valcyte on Friday.    5) Reinforced medication education conducted in the hospital, including medication indications, dosing, administration, side effects, monitoring-- including timing of immunosuppressant levels.     6) OTHER medication follow-up:      Patient received their FIRST fill of medications from [facility].  Discussed the process for obtaining refills of medications, including verifying the dose and mailing address to have medications delivered.     Apurva and her caregivers verbalized understanding and had the opportunity to ask questions.

## 2017-11-15 NOTE — PROGRESS NOTES
"1ST NURSING VISIT POST DISCHARGE NOTE    1st RN appointment with Apurva Rodriguez post discharge 11/15/17 s/p kidney transplant 11/7/17.  Patient's mother accompanied her.  Patient reports no problems.  Patient says that she is sleeping well.  Incision intact with staples.  Patient is able to explain daily incision care and showering instructions.  Reviewed I&O monitoring, measuring, and recording, and the need for hydration (i.e., at least 2 liters of water daily with minimal caffeine and no grapefruit products).  Medication list and rationale were reviewed.  Patient did bring blue medication card and medication bottles for review.  Patient reports that she has stopped Dulcolax and has continued Colace.  Patient has had a bowel movement.  Patient expressed understanding of daily care including BID VS, medications, and I&O documentation.  Patient made aware of today's creatinine level: 0.9.  Patient aware that coordinator will review today's labs with a transplant physician and call the patient with any dose changes indicated.  Next lab appointment scheduled for 11/17/17.  First post-operative transplant team appointment with labs scheduled for 11/20/17.    Using the Kidney Transplant Patient Reference Manual, the patient submitted her open book "Self-assessment of Kidney Transplant Patient Knowledge" test, which was completed in the transplant clinic this morning before 1st nursing visit.  This test includes questions regarding critical dose medications commonly used after kidney transplant, medication dosing and side effects, importance of timed lab draws, important signs and symptoms to report 24/7 immediately post-transplant as well as how to contact the transplant team 24/7.    Test Score: 22/22    After completing the test, he patient was given a copy of the Self-assessment Answer Key to reinforce accurate learning of test content.  Patient expressed her understanding of the value of the information included " in the self-assessment test.

## 2017-11-15 NOTE — PROGRESS NOTES
Results reviewed and the following message sent to patient via [facility]: Please decrease tacrolimus to 6 mg in AM and 5 mg in PM.

## 2017-11-15 NOTE — PROGRESS NOTES
Pt was admitted for a cadaveric kidney transplant.   ESRD 2/2 to PKD (PD cath was removed at time of surgery.)  Previous kidney transplant in 1996-lasted for 20 yrs.   Campath induction, CMV ++     Cr trended down with good UOP     7 day pollack (thin/friable bladder) and ABIGAIL drain removed prior to d/c  LLQ incision HARLAN with staples     WBC trending down. MMF ON HOLD  If WBC cont to improve, restart Cellcept.       LFTs are acutely up. Bactrim discontinued.  Patient was unable to tolerate Pentamidine treatment.  G6PD normal activity.  Restart Bactrim.    Valcyte stopped for neutropenia.  Cont to check CMV PCR weekly.       Will need Hepatology f/u as outpatient.       Pt will d/c home. Labs and RTC 11/15/17 to meet with coordinator/pharmD

## 2017-11-16 ENCOUNTER — PATIENT MESSAGE (OUTPATIENT)
Dept: TRANSPLANT | Facility: CLINIC | Age: 52
End: 2017-11-16

## 2017-11-16 DIAGNOSIS — Z94.0 STATUS POST KIDNEY TRANSPLANT: ICD-10-CM

## 2017-11-16 DIAGNOSIS — R19.7 DIARRHEA, UNSPECIFIED TYPE: Primary | ICD-10-CM

## 2017-11-16 LAB — IMMUNKNOW (STIMULATED): 376 NG/ML

## 2017-11-16 RX ORDER — TACROLIMUS 1 MG/1
CAPSULE ORAL
Qty: 330 CAPSULE | Refills: 11 | Status: SHIPPED | OUTPATIENT
Start: 2017-11-16 | End: 2017-11-20 | Stop reason: SDUPTHER

## 2017-11-16 RX ORDER — MYCOPHENOLATE MOFETIL 250 MG/1
250 CAPSULE ORAL 2 TIMES DAILY
Qty: 60 CAPSULE | Refills: 11 | Status: SHIPPED | OUTPATIENT
Start: 2017-11-16 | End: 2017-11-24

## 2017-11-16 NOTE — TELEPHONE ENCOUNTER
Call placed, labs reviewed by Dr. Tian. Prograf dose decreased to 6/5. As previously agreed to, message left on voicemail.

## 2017-11-16 NOTE — TELEPHONE ENCOUNTER
VORB Hold Cellcept for 24 hours and restart at 250mg po BID.Submit stools tomorrow 11/17/17 for stool study.  Reviewed with MD after patient C/O diarrhea x 3 episodes today getting more watery .Patient stared Cellcept 500mg po BID x 1 day.Patient also C/O of incisional swelling and getting more red.With slight drainage.Patient will get clinic nurse to assess incision in am and schedule to see surgeon if necessary.Patient voice a understanding to present to the ER if pain and or diarrhea  gets worse.Patient is schedule for 11/17/17 labs.

## 2017-11-17 ENCOUNTER — TELEPHONE (OUTPATIENT)
Dept: TRANSPLANT | Facility: CLINIC | Age: 52
End: 2017-11-17

## 2017-11-17 ENCOUNTER — LAB VISIT (OUTPATIENT)
Dept: LAB | Facility: HOSPITAL | Age: 52
End: 2017-11-17
Attending: INTERNAL MEDICINE
Payer: COMMERCIAL

## 2017-11-17 ENCOUNTER — OFFICE VISIT (OUTPATIENT)
Dept: TRANSPLANT | Facility: CLINIC | Age: 52
End: 2017-11-17
Payer: COMMERCIAL

## 2017-11-17 VITALS
TEMPERATURE: 98 F | RESPIRATION RATE: 16 BRPM | OXYGEN SATURATION: 100 % | WEIGHT: 130 LBS | DIASTOLIC BLOOD PRESSURE: 63 MMHG | HEART RATE: 70 BPM | BODY MASS INDEX: 25.52 KG/M2 | HEIGHT: 60 IN | SYSTOLIC BLOOD PRESSURE: 107 MMHG

## 2017-11-17 DIAGNOSIS — Z94.0 KIDNEY REPLACED BY TRANSPLANT: ICD-10-CM

## 2017-11-17 DIAGNOSIS — Z94.0 KIDNEY REPLACED BY TRANSPLANT: Primary | ICD-10-CM

## 2017-11-17 LAB
ALBUMIN SERPL BCP-MCNC: 3 G/DL
ANION GAP SERPL CALC-SCNC: 7 MMOL/L
BASOPHILS # BLD AUTO: 0.02 K/UL
BASOPHILS NFR BLD: 0.4 %
BUN SERPL-MCNC: 15 MG/DL
CALCIUM SERPL-MCNC: 8.2 MG/DL
CHLORIDE SERPL-SCNC: 108 MMOL/L
CO2 SERPL-SCNC: 23 MMOL/L
CREAT SERPL-MCNC: 0.8 MG/DL
DIFFERENTIAL METHOD: ABNORMAL
EOSINOPHIL # BLD AUTO: 0 K/UL
EOSINOPHIL NFR BLD: 0 %
ERYTHROCYTE [DISTWIDTH] IN BLOOD BY AUTOMATED COUNT: 15.3 %
EST. GFR  (AFRICAN AMERICAN): >60 ML/MIN/1.73 M^2
EST. GFR  (NON AFRICAN AMERICAN): >60 ML/MIN/1.73 M^2
GLUCOSE SERPL-MCNC: 109 MG/DL
HCT VFR BLD AUTO: 27.5 %
HGB BLD-MCNC: 8.8 G/DL
IMM GRANULOCYTES # BLD AUTO: 0.06 K/UL
IMM GRANULOCYTES NFR BLD AUTO: 1.3 %
LYMPHOCYTES # BLD AUTO: 0 K/UL
LYMPHOCYTES NFR BLD: 0.9 %
MAGNESIUM SERPL-MCNC: 1.8 MG/DL
MCH RBC QN AUTO: 32.5 PG
MCHC RBC AUTO-ENTMCNC: 32 G/DL
MCV RBC AUTO: 102 FL
MONOCYTES # BLD AUTO: 0.1 K/UL
MONOCYTES NFR BLD: 3 %
NEUTROPHILS # BLD AUTO: 4.4 K/UL
NEUTROPHILS NFR BLD: 94.4 %
NRBC BLD-RTO: 0 /100 WBC
PHOSPHATE SERPL-MCNC: 2.5 MG/DL
PLATELET # BLD AUTO: 224 K/UL
PMV BLD AUTO: 9.5 FL
POTASSIUM SERPL-SCNC: 4.6 MMOL/L
RBC # BLD AUTO: 2.71 M/UL
SODIUM SERPL-SCNC: 138 MMOL/L
TACROLIMUS BLD-MCNC: 8.3 NG/ML
WBC # BLD AUTO: 4.7 K/UL

## 2017-11-17 PROCEDURE — 99999 PR PBB SHADOW E&M-EST. PATIENT-LVL IV: CPT | Mod: PBBFAC,,,

## 2017-11-17 PROCEDURE — 99024 POSTOP FOLLOW-UP VISIT: CPT | Mod: S$GLB,,, | Performed by: TRANSPLANT SURGERY

## 2017-11-17 PROCEDURE — 80197 ASSAY OF TACROLIMUS: CPT

## 2017-11-17 PROCEDURE — 85025 COMPLETE CBC W/AUTO DIFF WBC: CPT

## 2017-11-17 PROCEDURE — 80069 RENAL FUNCTION PANEL: CPT

## 2017-11-17 PROCEDURE — 83735 ASSAY OF MAGNESIUM: CPT

## 2017-11-17 PROCEDURE — 36415 COLL VENOUS BLD VENIPUNCTURE: CPT

## 2017-11-17 NOTE — PROGRESS NOTES
Assessed in clinic for incisional redness. Scant serous discharge from wound. Redness with minimal blanching. Incision probed with sterile swab, no expression of fluid or pus.     Recommend continued observation, no definitive signs of infection at this time. Follow up in clinic on Monday with coordinator.

## 2017-11-19 LAB — B19V DNA # SPEC NAA+PROBE: <100 COPIES/ML

## 2017-11-20 ENCOUNTER — OFFICE VISIT (OUTPATIENT)
Dept: TRANSPLANT | Facility: CLINIC | Age: 52
End: 2017-11-20
Payer: COMMERCIAL

## 2017-11-20 ENCOUNTER — LAB VISIT (OUTPATIENT)
Dept: LAB | Facility: HOSPITAL | Age: 52
End: 2017-11-20
Attending: INTERNAL MEDICINE
Payer: COMMERCIAL

## 2017-11-20 VITALS
BODY MASS INDEX: 25.97 KG/M2 | HEIGHT: 60 IN | WEIGHT: 132.25 LBS | TEMPERATURE: 99 F | DIASTOLIC BLOOD PRESSURE: 79 MMHG | HEART RATE: 61 BPM | OXYGEN SATURATION: 99 % | RESPIRATION RATE: 16 BRPM | SYSTOLIC BLOOD PRESSURE: 129 MMHG

## 2017-11-20 DIAGNOSIS — Z29.89 PROPHYLACTIC IMMUNOTHERAPY: ICD-10-CM

## 2017-11-20 DIAGNOSIS — E83.39 HYPOPHOSPHATEMIA: ICD-10-CM

## 2017-11-20 DIAGNOSIS — Z94.0 S/P KIDNEY TRANSPLANT: Primary | ICD-10-CM

## 2017-11-20 DIAGNOSIS — K21.9 GASTROESOPHAGEAL REFLUX DISEASE WITHOUT ESOPHAGITIS: ICD-10-CM

## 2017-11-20 DIAGNOSIS — E83.42 HYPOMAGNESEMIA: ICD-10-CM

## 2017-11-20 DIAGNOSIS — Z94.0 KIDNEY REPLACED BY TRANSPLANT: Primary | ICD-10-CM

## 2017-11-20 DIAGNOSIS — D70.9 NEUTROPENIA, UNSPECIFIED TYPE: ICD-10-CM

## 2017-11-20 DIAGNOSIS — Z94.0 IMMUNOSUPPRESSIVE MANAGEMENT ENCOUNTER FOLLOWING KIDNEY TRANSPLANT: ICD-10-CM

## 2017-11-20 DIAGNOSIS — Z94.0 STATUS POST DECEASED-DONOR KIDNEY TRANSPLANTATION: ICD-10-CM

## 2017-11-20 DIAGNOSIS — Z79.899 IMMUNOSUPPRESSIVE MANAGEMENT ENCOUNTER FOLLOWING KIDNEY TRANSPLANT: ICD-10-CM

## 2017-11-20 DIAGNOSIS — E87.20 METABOLIC ACIDOSIS: ICD-10-CM

## 2017-11-20 DIAGNOSIS — Z91.89 AT RISK FOR OPPORTUNISTIC INFECTIONS: ICD-10-CM

## 2017-11-20 DIAGNOSIS — R35.89 POLYURIA: Primary | ICD-10-CM

## 2017-11-20 DIAGNOSIS — I10 ESSENTIAL HYPERTENSION: ICD-10-CM

## 2017-11-20 DIAGNOSIS — Z94.0 KIDNEY REPLACED BY TRANSPLANT: ICD-10-CM

## 2017-11-20 DIAGNOSIS — N25.81 SECONDARY HYPERPARATHYROIDISM OF RENAL ORIGIN: ICD-10-CM

## 2017-11-20 LAB
ALBUMIN SERPL BCP-MCNC: 3.3 G/DL
ALBUMIN SERPL BCP-MCNC: 3.3 G/DL
ALP SERPL-CCNC: 549 U/L
ALT SERPL W/O P-5'-P-CCNC: 193 U/L
ANION GAP SERPL CALC-SCNC: 5 MMOL/L
AST SERPL-CCNC: 61 U/L
BASOPHILS # BLD AUTO: 0.03 K/UL
BASOPHILS NFR BLD: 0.9 %
BILIRUB DIRECT SERPL-MCNC: 0.3 MG/DL
BILIRUB SERPL-MCNC: 0.6 MG/DL
BUN SERPL-MCNC: 13 MG/DL
CALCIUM SERPL-MCNC: 9.2 MG/DL
CHLORIDE SERPL-SCNC: 109 MMOL/L
CO2 SERPL-SCNC: 25 MMOL/L
CREAT SERPL-MCNC: 0.9 MG/DL
DIFFERENTIAL METHOD: ABNORMAL
EOSINOPHIL # BLD AUTO: 0 K/UL
EOSINOPHIL NFR BLD: 0 %
ERYTHROCYTE [DISTWIDTH] IN BLOOD BY AUTOMATED COUNT: 14.8 %
EST. GFR  (AFRICAN AMERICAN): >60 ML/MIN/1.73 M^2
EST. GFR  (NON AFRICAN AMERICAN): >60 ML/MIN/1.73 M^2
GLUCOSE SERPL-MCNC: 112 MG/DL
HCT VFR BLD AUTO: 28.3 %
HGB BLD-MCNC: 9.1 G/DL
IMM GRANULOCYTES # BLD AUTO: 0.02 K/UL
IMM GRANULOCYTES NFR BLD AUTO: 0.6 %
LYMPHOCYTES # BLD AUTO: 0 K/UL
LYMPHOCYTES NFR BLD: 0.9 %
MAGNESIUM SERPL-MCNC: 1.9 MG/DL
MCH RBC QN AUTO: 32.7 PG
MCHC RBC AUTO-ENTMCNC: 32.2 G/DL
MCV RBC AUTO: 102 FL
MONOCYTES # BLD AUTO: 0.2 K/UL
MONOCYTES NFR BLD: 6.4 %
NEUTROPHILS # BLD AUTO: 3 K/UL
NEUTROPHILS NFR BLD: 91.2 %
NRBC BLD-RTO: 0 /100 WBC
OSMOLALITY SERPL: 293 MOSM/KG
PHOSPHATE SERPL-MCNC: 2.8 MG/DL
PLATELET # BLD AUTO: 248 K/UL
PMV BLD AUTO: 9.1 FL
POTASSIUM SERPL-SCNC: 4.7 MMOL/L
PROT SERPL-MCNC: 7.1 G/DL
RBC # BLD AUTO: 2.78 M/UL
SODIUM SERPL-SCNC: 139 MMOL/L
TACROLIMUS BLD-MCNC: 12.2 NG/ML
WBC # BLD AUTO: 3.26 K/UL

## 2017-11-20 PROCEDURE — 80197 ASSAY OF TACROLIMUS: CPT

## 2017-11-20 PROCEDURE — 84075 ASSAY ALKALINE PHOSPHATASE: CPT

## 2017-11-20 PROCEDURE — 85025 COMPLETE CBC W/AUTO DIFF WBC: CPT

## 2017-11-20 PROCEDURE — 80069 RENAL FUNCTION PANEL: CPT

## 2017-11-20 PROCEDURE — 99999 PR PBB SHADOW E&M-EST. PATIENT-LVL III: CPT | Mod: PBBFAC,,, | Performed by: INTERNAL MEDICINE

## 2017-11-20 PROCEDURE — 83735 ASSAY OF MAGNESIUM: CPT

## 2017-11-20 PROCEDURE — 99215 OFFICE O/P EST HI 40 MIN: CPT | Mod: S$GLB,,, | Performed by: INTERNAL MEDICINE

## 2017-11-20 PROCEDURE — 83930 ASSAY OF BLOOD OSMOLALITY: CPT

## 2017-11-20 RX ORDER — TACROLIMUS 1 MG/1
CAPSULE ORAL
Qty: 300 CAPSULE | Refills: 11 | Status: SHIPPED | OUTPATIENT
Start: 2017-11-20 | End: 2017-11-24 | Stop reason: SDUPTHER

## 2017-11-20 RX ORDER — VALGANCICLOVIR 450 MG/1
900 TABLET, FILM COATED ORAL DAILY
Qty: 60 TABLET | Refills: 11 | Status: CANCELLED | OUTPATIENT
Start: 2017-11-20 | End: 2018-11-20

## 2017-11-20 NOTE — PROGRESS NOTES
Kidney Post-Transplant Assessment    Referring Physician: LUDA Raygoza  Current Nephrologist: LUDA Raygoza    ORGAN: RIGHT KIDNEY  Donor Type:  - brain death  PHS Increased Risk: yes  Cold Ischemia: 852 mins  Induction Medications: campath - alemtuzumab (anti-cd52), steroids (prednisone,methylprednisolone,solumedrol,medrol,decadron)    Subjective:     CC:  Reassessment of renal allograft function and management of chronic immunosuppression.    Kidney History:  Ms. Rodriguez is a 52 y.o. year old White female with history of ESRD per patient secondary to bilateral congenital nephrocalcinosis who was on PD from  until 3/16/1996 when she received DDRT #1 at [facility] (maintained on CSA/MMF) and then had allograft failure requiring re-initiation of PD on 16 until receiving PHS increased risk DDRT #2 (Campath induction, KDPI 50%, WIT 35 minutes, CIT 14 hours and 12 minutes, CMV D+/R+) on 17. She had a 7 day pollack secondary to thin, friable bladder. Her most recent creatinine is 0.9 from 17. She takes mycophenolate mofetil and tacrolimus for maintenance immunosuppression. Her post transplant course has been uncomplicated to date.    Interval History: This is patient's first transplant nephrology clinic visit. Since initial transplant hospitalization discharge on 17 she denies any hospitalizations or ED visits. States she is feeling well. BPs mostly stable in the 120-140s/70-90s but some values as high as 160/100s. She is taking in 4-5L a day and UOP ranging from 3.9 to 6.35L! She is walking for 20 minutes a day. +incisional pain - states she was seen by Dr. Barragan on 17 and at the time she states he aspirated fluid (thick light pink fluid is coming out from that site where he inserted the Q-tip) but she states it is getting less and less. Pain is improving as well. Using tramadol 25 mg 2-3 pills a day.     Review of Systems  Constitutional: +fatigue; Negative for fever, appetite  change  HENT: Negative for hearing loss, sore throat and mouth sores.   Eyes: Negative for photophobia, pain and visual disturbance.   Respiratory: Negative for cough, chest tightness, shortness of breath and wheezing.   Cardiovascular: Negative for chest pain, palpitations and leg swelling.   Gastrointestinal: +diarrhea resolved despite restarting  BID; Negative for nausea, vomiting, abdominal pain, diarrhea, constipation, blood in stool and abdominal distention.   Genitourinary: +nocturia and frequency; Negative for dysuria, urgency, hematuria, decreased urine volume, difficulty urinating  Musculoskeletal: Negative for back pain, joint swelling, arthralgias and gait problem.   Skin: +incisional erythema improving; Negative for pallor  Neurological: +tremors; +headaches; Negative for dizziness, syncope, weakness, light-headedness  Hematological: +bruises easily; Negative for adenopathy.  Psychiatric/Behavioral: +sleep disturbance; Negative for confusion and dysphoric mood. The patient is not nervous/anxious.     Medications:   Current Outpatient Prescriptions   Medication Sig Dispense Refill    atovaquone (MEPRON) 750 mg/5 mL Susp Take 10 mLs (1,500 mg total) by mouth once daily. STOP 11/7/18 300 mL 11    bisacodyl (DULCOLAX) 5 mg EC tablet Take 2 tablets (10 mg total) by mouth once daily. 30 tablet 0    carvedilol (COREG) 6.25 MG tablet Take 1 tablet (6.25 mg total) by mouth 2 (two) times daily. 60 tablet 11    cinacalcet (SENSIPAR) 30 MG Tab Take 1 tablet (30 mg total) by mouth daily with breakfast. 30 tablet 11    docusate sodium (COLACE) 100 MG capsule Take 100 mg by mouth 2 (two) times daily.      ergocalciferol (ERGOCALCIFEROL) 50,000 unit Cap Take 1 capsule (50,000 Units total) by mouth every 7 days. Wednesday 4 capsule 3    famotidine (PEPCID) 20 MG tablet Take 1 tablet (20 mg total) by mouth every evening. 30 tablet 2    k phos di & mono-sod phos mono (K-PHOS-NEUTRAL) 250 mg Tab Take 2  tablets by mouth 2 (two) times daily. 120 tablet 2    magnesium oxide (MAG-OX) 400 mg tablet Take 1 tablet (400 mg total) by mouth 2 (two) times daily. 60 tablet 2    multivitamin (THERAGRAN) tablet Take 1 tablet by mouth once daily.      mycophenolate (CELLCEPT) 250 mg Cap Take 1 capsule (250 mg total) by mouth 2 (two) times daily. 60 capsule 11    nystatin (MYCOSTATIN) 100,000 unit/mL suspension Take 5 mLs (500,000 Units total) by mouth 2 hours after meals and at bedtime. STOP 12/6/17 473 mL 0    sodium bicarbonate 650 MG tablet Take 1 tablet (650 mg total) by mouth 2 (two) times daily. 60 tablet 2    tacrolimus (PROGRAF) 1 MG Cap Take 6 mg PO QAM and 5 mg QPM PM. By Mouth. Z94.0 kidney Transplant 11/7/2017. 330 capsule 11    traMADol (ULTRAM) 50 mg tablet Take 0.5 tablets (25 mg total) by mouth every 6 (six) hours as needed for Pain. 30 tablet 0     No current facility-administered medications for this visit.        Objective:   Blood pressure 129/79, pulse 61, temperature 98.6 °F (37 °C), temperature source Oral, resp. rate 16, height 5' (1.524 m), weight 60 kg (132 lb 4.4 oz), SpO2 99 %.body mass index is 25.83 kg/m².    Physical Exam  General: No acute distress, well groomed, alert and oriented x 3  HEENT: Normocephalic, atraumatic, EOM's intact bilaterally, external inspection of ears and nose normal, moist mucous membranes, no oral ulcerations/lesions  Neck: Supple, symmetrical, trachea midline, no thyromegaly, no JVD  Respiratory: Clear to auscultation bilaterally, respirations unlabored, no rales/rhonchi/wheezing  Cardiovacular: Regular rate and rhythm, S1, S2 normal, no murmurs, rubs or gallops  Gastrointestinal: Soft, non-tender, bowel sounds normal, no hepatosplenomegaly  LLQ DDRT #2 renal allograft exam: No tenderness, no bruits, staples in place, mild erythema but per patient it is improving; RLQ DDRT #1 no tenderness  Musculoskeletal: No knee or ankle joint tenderness or swelling.    Extremities: No clubbing or cyanosis of bilateral upper extremities; no lower extremity edema bilaterally, radial pulses 2+ bilaterally, symmetric  Skin: warm and dry; no rash on exposed skin  Neurologic: CN grossly intact    Labs:  Lab Results   Component Value Date    WBC 3.26 (L) 2017    HGB 9.1 (L) 2017    HCT 28.3 (L) 2017     2017    K 4.6 2017     2017    CO2 23 2017    BUN 15 2017    CREATININE 0.8 2017    EGFRNONAA >60.0 2017    CALCIUM 8.2 (L) 2017    PHOS 2.5 (L) 2017    MG 1.8 2017    ALBUMIN 3.0 (L) 2017     (H) 11/15/2017     (H) 11/15/2017    UTPCR 1.27 (H) 2017    .0 (H) 2017    TACROLIMUS 8.3 2017       No results found for: EXTANC, EXTWBC, EXTSEGS, EXTPLATELETS, EXTHEMOGLOBI, EXTHEMATOCRI, EXTCREATININ, EXTSODIUM, EXTPOTASSIUM, EXTBUN, EXTCO2, EXTCALCIUM, EXTPHOSPHORU, EXTGLUCOSE, EXTALBUMIN, EXTAST, EXTALT, EXTBILITOTAL, EXTLIPASE, EXTAMYLASE    No results found for: EXTCYCLOSLVL, EXTSIROLIMUS, EXTTACROLVL, EXTPROTCRE, EXTPTHINTACT, EXTPROTEINUA, EXTWBCUA, EXTRBCUA    Labs were reviewed with the patient    Assessment/Plan:     1. S/P kidney transplant    2. At risk for opportunistic infections    3. Essential hypertension    4. Gastroesophageal reflux disease without esophagitis    5. Hypomagnesemia    6. Hypophosphatemia    7. Metabolic acidosis    8. Neutropenia, unspecified type    9. Prophylactic immunotherapy    10. Secondary hyperparathyroidism of renal origin    11. Status post -donor kidney transplantation    12. Immunosuppressive management encounter following kidney transplant        Ms. Rodriguez is a 52 y.o. female with:       # History of ESRD per patient secondary to bilateral congenital nephrocalcinosis who was on PD from  until 3/16/1996 when she received DDRT #1 at [facility] (maintained on CSA/MMF) and then had allograft failure  requiring re-initiation of PD on 4/25/16 until receiving PHS increased risk DDRT #2 (Campath induction, KDPI 50%, WIT 35 minutes, CIT 14 hours and 12 minutes, CMV D+/R+) on 11/7/17: roseann Cr since transplant was 0.8  - last Cr 0.9 from 11/20/17  - last UA with 2+ blood but only 1 RBC from 11/12/17 --> repeat UA today  - last UPC 1.27 gm from 11/6/17 --> repeat UPC today    # Polyuria: likely secondary to increased PO intake   - continue to monitor   - check urine Osm, sodium, potassium, Cr as well as serum Osm    # Immunosuppression:   - decrease Prograf from 6/5 to 5 mg BID, last FK-506 level elevated at 12.2 from 11/20/17 --> will need to contact patient with this change since Prograf level returned after she left from clinic  - continue  mg BID   - will monitor closely for toxicities    # Antimicrobial Prophylaxis:   - continue atovaquone for PJP prophylaxis until 11/7/18  - continue holding Valcyte given leukopenia; CMV D+/R+  - continue weekly CMV PCR monitoring   - continue nystatin for thrush prophylaxis for 1 month     # Infectious Surveillance:   - last CMV negative from 11/13/17  - check serum BK PCR at 1 month post-op    # HTN: BPs mostly stable  - continue Coreg 6.25 mg BID  - continue with home blood pressure monitoring  - low salt and healthy life discussed with the patient    # Metabolic Bone Disease/Secondary Hyperparathyroidism: last calcium/phos normal  - continue  mg BID  - continue sensipar 30 mg daily   - will monitor PTH, calcium, and phosphorus as per our center protocol    # Vitamin D Deficiency: vitamin D level low at 24 from 11/6/17  - continue ergocalciferol 50,000 units weekly     # Metabolic Acidosis: bicarb stable  - continue sodium bicarbonate 650 mg BID    # Hypomagnesemia: Mg level normal  - continue MagOx 400 mg BID    # Anemia of chronic disease: Hb improved slightly from 8.8 (11/17/17) --> 9.1 (11/20/17)  - will continue monitoring as per our center guidelines. No  indication for acute intervention today    # Leukopenia: WBC 3.26  - continue to monitor   - no need for Neupogen at this time   - continue MMF  - continue holding Valcyte     Follow-up:   Clinic: return to transplant clinic weekly for the first month after transplant; every 2 weeks during months 2-3; then at 6-, 9-, 12-, 18-, 24-, and 36- months post-transplant to reassess for complications from immunosuppression toxicity and monitor for rejection.  Annually thereafter.    Labs: since patient remains at high risk for rejection and drug-related complications that warrant close monitoring, labs will be ordered as follows: continue twice weekly CBC, renal panel, and drug level for first month; then same labs once weekly through 3rd month post-transplant.  Urine for UA and protein/creatinine ratio monthly.  Serum BK - PCR at 1-, 3-, 6-, 9-, 12-, 18-, 24-, and 36- months post-transplant.  Hepatic panel at 1-, 2-, 3-, 6-, 9-, 12-, 18-, 24-, and 36- months post-transplant.    Cheryl Fiore MD       Education:   Material provided to the patient.  Patient reminded to call with any health changes since these can be early signs of significant complications.  Also, I advised the patient to be sure any new medications or changes of old medications are discussed with either a pharmacist or physician knowledgeable with transplant to avoid rejection/drug toxicity related to significant drug interactions.

## 2017-11-20 NOTE — LETTER
November 20, 2017        LUDA Raygoza  [address]  [id number]  [facility] [address]  Phone: [phone]  Fax: [phone]             [facility]  [address]  [facility] [address]  Phone: [phone]   Patient: Apurva Rodriguez   MR Number: [id number]   YOB: 1965   Date of Visit: 11/20/2017       Dear Dr. LUDA Raygoza    Thank you for referring Apurva Rodriguez to me for evaluation. Attached you will find relevant portions of my assessment and plan of care.    If you have questions, please do not hesitate to call me. I look forward to following Apurva Rodriguez along with you.    Sincerely,    Cheryl Fiore MD    Enclosure    If you would like to receive this communication electronically, please contact [email or web address] or [phone] to request [employer] access.    [employer] is a tool which provides read-only access to select patient information with whom you have a relationship. Its easy to use and provides real time access to review your patients record including encounter summaries, notes, results, and demographic information.    If you feel you have received this communication in error or would no longer like to receive these types of communications, please e-mail [email or web address]

## 2017-11-20 NOTE — PROGRESS NOTES
LFTs improved - continue to monitor - check LFTs on Friday and if continuing to downtrend then can check LFTs weekly. Prograf elevated --> if true 12 hour trough then decrease Prograf from 6/5 to 5 mg BID. Repeat labs on Friday.

## 2017-11-20 NOTE — TELEPHONE ENCOUNTER
Call placed, labs reviewed by Dr. Fiore. Prograf dose decreased to 5/5. Will repeat labs on Friday.

## 2017-11-20 NOTE — PATIENT INSTRUCTIONS
1. Stay active   2. Keep recording your blood pressures and if the top blood pressure number is >160 call your coordinator   3. Continue current medications, if we need to make a dose change to your Prograf we will call you to let you know

## 2017-11-21 LAB — CMV DNA SERPL NAA+PROBE-ACNC: NORMAL IU/ML

## 2017-11-22 ENCOUNTER — PATIENT MESSAGE (OUTPATIENT)
Dept: TRANSPLANT | Facility: CLINIC | Age: 52
End: 2017-11-22

## 2017-11-24 ENCOUNTER — CLINICAL SUPPORT (OUTPATIENT)
Dept: TRANSPLANT | Facility: CLINIC | Age: 52
End: 2017-11-24
Payer: COMMERCIAL

## 2017-11-24 ENCOUNTER — LAB VISIT (OUTPATIENT)
Dept: LAB | Facility: HOSPITAL | Age: 52
End: 2017-11-24
Attending: INTERNAL MEDICINE
Payer: COMMERCIAL

## 2017-11-24 VITALS
OXYGEN SATURATION: 98 % | RESPIRATION RATE: 16 BRPM | TEMPERATURE: 98 F | WEIGHT: 128.5 LBS | HEIGHT: 60 IN | BODY MASS INDEX: 25.23 KG/M2 | SYSTOLIC BLOOD PRESSURE: 125 MMHG | DIASTOLIC BLOOD PRESSURE: 77 MMHG | HEART RATE: 66 BPM

## 2017-11-24 DIAGNOSIS — D70.9 NEUTROPENIA, UNSPECIFIED TYPE: Primary | ICD-10-CM

## 2017-11-24 DIAGNOSIS — Z94.0 STATUS POST DECEASED-DONOR KIDNEY TRANSPLANTATION: ICD-10-CM

## 2017-11-24 DIAGNOSIS — Z79.60 LONG-TERM USE OF IMMUNOSUPPRESSANT MEDICATION: ICD-10-CM

## 2017-11-24 DIAGNOSIS — Z94.0 KIDNEY REPLACED BY TRANSPLANT: Primary | ICD-10-CM

## 2017-11-24 DIAGNOSIS — Z94.0 KIDNEY REPLACED BY TRANSPLANT: ICD-10-CM

## 2017-11-24 LAB
ALBUMIN SERPL BCP-MCNC: 3.4 G/DL
ALBUMIN SERPL BCP-MCNC: 3.4 G/DL
ALP SERPL-CCNC: 447 U/L
ALT SERPL W/O P-5'-P-CCNC: 108 U/L
ANION GAP SERPL CALC-SCNC: 7 MMOL/L
ANISOCYTOSIS BLD QL SMEAR: SLIGHT
AST SERPL-CCNC: 40 U/L
BASOPHILS NFR BLD: 0 %
BILIRUB DIRECT SERPL-MCNC: 0.2 MG/DL
BILIRUB SERPL-MCNC: 0.5 MG/DL
BUN SERPL-MCNC: 11 MG/DL
CALCIUM SERPL-MCNC: 9 MG/DL
CHLORIDE SERPL-SCNC: 111 MMOL/L
CO2 SERPL-SCNC: 23 MMOL/L
CREAT SERPL-MCNC: 0.9 MG/DL
DIFFERENTIAL METHOD: ABNORMAL
EOSINOPHIL NFR BLD: 0 %
ERYTHROCYTE [DISTWIDTH] IN BLOOD BY AUTOMATED COUNT: 14.9 %
EST. GFR  (AFRICAN AMERICAN): >60 ML/MIN/1.73 M^2
EST. GFR  (NON AFRICAN AMERICAN): >60 ML/MIN/1.73 M^2
GLUCOSE SERPL-MCNC: 103 MG/DL
HCT VFR BLD AUTO: 29.6 %
HGB BLD-MCNC: 9.5 G/DL
HYPOCHROMIA BLD QL SMEAR: ABNORMAL
IMM GRANULOCYTES # BLD AUTO: ABNORMAL K/UL
IMM GRANULOCYTES NFR BLD AUTO: ABNORMAL %
LYMPHOCYTES NFR BLD: 2 %
MAGNESIUM SERPL-MCNC: 1.7 MG/DL
MCH RBC QN AUTO: 32.8 PG
MCHC RBC AUTO-ENTMCNC: 32.1 G/DL
MCV RBC AUTO: 102 FL
MONOCYTES NFR BLD: 10 %
NEUTROPHILS NFR BLD: 88 %
NRBC BLD-RTO: 0 /100 WBC
OVALOCYTES BLD QL SMEAR: ABNORMAL
PHOSPHATE SERPL-MCNC: 2.6 MG/DL
PLATELET # BLD AUTO: 238 K/UL
PLATELET BLD QL SMEAR: ABNORMAL
PMV BLD AUTO: 9.5 FL
POIKILOCYTOSIS BLD QL SMEAR: SLIGHT
POLYCHROMASIA BLD QL SMEAR: ABNORMAL
POTASSIUM SERPL-SCNC: 4.5 MMOL/L
PROT SERPL-MCNC: 7 G/DL
RBC # BLD AUTO: 2.9 M/UL
SODIUM SERPL-SCNC: 141 MMOL/L
TACROLIMUS BLD-MCNC: 12.1 NG/ML
WBC # BLD AUTO: 1.78 K/UL

## 2017-11-24 PROCEDURE — 85027 COMPLETE CBC AUTOMATED: CPT

## 2017-11-24 PROCEDURE — 80197 ASSAY OF TACROLIMUS: CPT

## 2017-11-24 PROCEDURE — 96372 THER/PROPH/DIAG INJ SC/IM: CPT | Mod: S$GLB,,, | Performed by: INTERNAL MEDICINE

## 2017-11-24 PROCEDURE — 82247 BILIRUBIN TOTAL: CPT

## 2017-11-24 PROCEDURE — 99999 PR PBB SHADOW E&M-EST. PATIENT-LVL III: CPT | Mod: PBBFAC,,,

## 2017-11-24 PROCEDURE — 84075 ASSAY ALKALINE PHOSPHATASE: CPT

## 2017-11-24 PROCEDURE — 85007 BL SMEAR W/DIFF WBC COUNT: CPT

## 2017-11-24 PROCEDURE — 83735 ASSAY OF MAGNESIUM: CPT

## 2017-11-24 PROCEDURE — 36415 COLL VENOUS BLD VENIPUNCTURE: CPT

## 2017-11-24 PROCEDURE — 80069 RENAL FUNCTION PANEL: CPT

## 2017-11-24 RX ORDER — TACROLIMUS 1 MG/1
CAPSULE ORAL
Qty: 270 CAPSULE | Refills: 11 | Status: SHIPPED | OUTPATIENT
Start: 2017-11-24 | End: 2017-11-28 | Stop reason: SDUPTHER

## 2017-11-24 NOTE — PROGRESS NOTES
Leukopenia: please hold MMF, off bactrim and valcyte. Negative CMV. neupogen 480 mcg X 1 time.   Transaminitis : pending hepatology consult

## 2017-11-24 NOTE — TELEPHONE ENCOUNTER
Call placed, labs reviewed by Dr. Loredo. Prograf dose decreased to 5/4. Neutropenia noted. Neupogen 480 sq X1dose today.

## 2017-11-24 NOTE — PROGRESS NOTES
Neupogen 480 mcg given subcutaneous to the right thigh, patient tolerated well.  First administration of Neupogen writer instructed patient to sit in clinic lobby for 15 minutes to ensure no reaction from medication.  Patient and caregiver sitting in lobby, writer set timer to reassess patient in 15 minutes.

## 2017-11-27 ENCOUNTER — PATIENT MESSAGE (OUTPATIENT)
Dept: TRANSPLANT | Facility: CLINIC | Age: 52
End: 2017-11-27

## 2017-11-27 ENCOUNTER — LAB VISIT (OUTPATIENT)
Dept: LAB | Facility: HOSPITAL | Age: 52
End: 2017-11-27
Attending: INTERNAL MEDICINE
Payer: COMMERCIAL

## 2017-11-27 DIAGNOSIS — Z94.0 KIDNEY REPLACED BY TRANSPLANT: ICD-10-CM

## 2017-11-27 LAB
ALBUMIN SERPL BCP-MCNC: 3.6 G/DL
ALBUMIN SERPL BCP-MCNC: 3.6 G/DL
ALP SERPL-CCNC: 365 U/L
ALT SERPL W/O P-5'-P-CCNC: 80 U/L
ANION GAP SERPL CALC-SCNC: 8 MMOL/L
ANISOCYTOSIS BLD QL SMEAR: SLIGHT
AST SERPL-CCNC: 31 U/L
BASOPHILS # BLD AUTO: ABNORMAL K/UL
BASOPHILS NFR BLD: 0 %
BILIRUB DIRECT SERPL-MCNC: 0.2 MG/DL
BILIRUB SERPL-MCNC: 0.4 MG/DL
BUN SERPL-MCNC: 15 MG/DL
CALCIUM SERPL-MCNC: 9.1 MG/DL
CHLORIDE SERPL-SCNC: 108 MMOL/L
CO2 SERPL-SCNC: 24 MMOL/L
CREAT SERPL-MCNC: 1.1 MG/DL
DACRYOCYTES BLD QL SMEAR: ABNORMAL
DIFFERENTIAL METHOD: ABNORMAL
EOSINOPHIL # BLD AUTO: ABNORMAL K/UL
EOSINOPHIL NFR BLD: 1 %
ERYTHROCYTE [DISTWIDTH] IN BLOOD BY AUTOMATED COUNT: 14.8 %
EST. GFR  (AFRICAN AMERICAN): >60 ML/MIN/1.73 M^2
EST. GFR  (NON AFRICAN AMERICAN): 57.1 ML/MIN/1.73 M^2
GLUCOSE SERPL-MCNC: 115 MG/DL
HCT VFR BLD AUTO: 30.5 %
HGB BLD-MCNC: 9.5 G/DL
HYPOCHROMIA BLD QL SMEAR: ABNORMAL
IMM GRANULOCYTES # BLD AUTO: ABNORMAL K/UL
IMM GRANULOCYTES NFR BLD AUTO: ABNORMAL %
LYMPHOCYTES # BLD AUTO: ABNORMAL K/UL
LYMPHOCYTES NFR BLD: 3 %
MAGNESIUM SERPL-MCNC: 1.6 MG/DL
MCH RBC QN AUTO: 32 PG
MCHC RBC AUTO-ENTMCNC: 31.1 G/DL
MCV RBC AUTO: 103 FL
MONOCYTES # BLD AUTO: ABNORMAL K/UL
MONOCYTES NFR BLD: 11 %
NEUTROPHILS NFR BLD: 85 %
NRBC BLD-RTO: 1 /100 WBC
OVALOCYTES BLD QL SMEAR: ABNORMAL
PHOSPHATE SERPL-MCNC: 2.8 MG/DL
PLATELET # BLD AUTO: 218 K/UL
PLATELET BLD QL SMEAR: ABNORMAL
PMV BLD AUTO: 10 FL
POIKILOCYTOSIS BLD QL SMEAR: SLIGHT
POLYCHROMASIA BLD QL SMEAR: ABNORMAL
POTASSIUM SERPL-SCNC: 4.8 MMOL/L
PROT SERPL-MCNC: 7 G/DL
RBC # BLD AUTO: 2.97 M/UL
SODIUM SERPL-SCNC: 140 MMOL/L
WBC # BLD AUTO: 5.39 K/UL

## 2017-11-27 PROCEDURE — 80069 RENAL FUNCTION PANEL: CPT

## 2017-11-27 PROCEDURE — 85007 BL SMEAR W/DIFF WBC COUNT: CPT

## 2017-11-27 PROCEDURE — 36415 COLL VENOUS BLD VENIPUNCTURE: CPT | Mod: PO

## 2017-11-27 PROCEDURE — 85027 COMPLETE CBC AUTOMATED: CPT

## 2017-11-27 PROCEDURE — 83735 ASSAY OF MAGNESIUM: CPT

## 2017-11-27 PROCEDURE — 84075 ASSAY ALKALINE PHOSPHATASE: CPT

## 2017-11-27 PROCEDURE — 80197 ASSAY OF TACROLIMUS: CPT

## 2017-11-27 PROCEDURE — 82247 BILIRUBIN TOTAL: CPT

## 2017-11-28 ENCOUNTER — PATIENT MESSAGE (OUTPATIENT)
Dept: TRANSPLANT | Facility: CLINIC | Age: 52
End: 2017-11-28

## 2017-11-28 LAB
CMV DNA SERPL NAA+PROBE-ACNC: NORMAL IU/ML
TACROLIMUS BLD-MCNC: 10.2 NG/ML

## 2017-11-28 RX ORDER — TACROLIMUS 1 MG/1
3 CAPSULE ORAL EVERY 12 HOURS
Qty: 180 CAPSULE | Refills: 11 | Status: SHIPPED | OUTPATIENT
Start: 2017-11-28 | End: 2017-12-01 | Stop reason: SDUPTHER

## 2017-11-28 NOTE — PROGRESS NOTES
Results reviewed and the following message sent to patient via [facility]:  Assuming this tacrolimus level was collected 12 hours after your last dose AND there are no recent med changes (start, stop or change meds); Please decrease Prograf/tacrolimus IR to 3 mg twice daily.

## 2017-11-29 ENCOUNTER — OFFICE VISIT (OUTPATIENT)
Dept: TRANSPLANT | Facility: CLINIC | Age: 52
End: 2017-11-29
Payer: COMMERCIAL

## 2017-11-29 ENCOUNTER — TELEPHONE (OUTPATIENT)
Dept: TRANSPLANT | Facility: CLINIC | Age: 52
End: 2017-11-29

## 2017-11-29 VITALS
TEMPERATURE: 97 F | HEIGHT: 60 IN | WEIGHT: 132.06 LBS | SYSTOLIC BLOOD PRESSURE: 122 MMHG | DIASTOLIC BLOOD PRESSURE: 69 MMHG | HEART RATE: 72 BPM | BODY MASS INDEX: 25.93 KG/M2 | RESPIRATION RATE: 16 BRPM | OXYGEN SATURATION: 100 %

## 2017-11-29 DIAGNOSIS — Z29.89 PROPHYLACTIC IMMUNOTHERAPY: ICD-10-CM

## 2017-11-29 DIAGNOSIS — N18.2 CHRONIC KIDNEY DISEASE (CKD), STAGE II (MILD): Chronic | ICD-10-CM

## 2017-11-29 DIAGNOSIS — L03.90 CELLULITIS, UNSPECIFIED CELLULITIS SITE: ICD-10-CM

## 2017-11-29 DIAGNOSIS — Z94.0 STATUS POST DECEASED-DONOR KIDNEY TRANSPLANTATION: Primary | ICD-10-CM

## 2017-11-29 DIAGNOSIS — Z51.81 ENCOUNTER FOR THERAPEUTIC DRUG MONITORING: ICD-10-CM

## 2017-11-29 DIAGNOSIS — Z94.0 KIDNEY REPLACED BY TRANSPLANT: ICD-10-CM

## 2017-11-29 DIAGNOSIS — E83.39 HYPOPHOSPHATEMIA: ICD-10-CM

## 2017-11-29 DIAGNOSIS — E87.20 METABOLIC ACIDOSIS: ICD-10-CM

## 2017-11-29 DIAGNOSIS — Z79.60 LONG-TERM USE OF IMMUNOSUPPRESSANT MEDICATION: ICD-10-CM

## 2017-11-29 DIAGNOSIS — Z94.0 KIDNEY REPLACED BY TRANSPLANT: Primary | ICD-10-CM

## 2017-11-29 DIAGNOSIS — Z91.89 AT RISK FOR OPPORTUNISTIC INFECTIONS: ICD-10-CM

## 2017-11-29 PROCEDURE — 99999 PR PBB SHADOW E&M-EST. PATIENT-LVL IV: CPT | Mod: PBBFAC,,, | Performed by: INTERNAL MEDICINE

## 2017-11-29 PROCEDURE — 99213 OFFICE O/P EST LOW 20 MIN: CPT | Mod: 24,S$GLB,, | Performed by: TRANSPLANT SURGERY

## 2017-11-29 PROCEDURE — 99999 PR PBB SHADOW E&M-EST. PATIENT-LVL II: CPT | Mod: PBBFAC,,, | Performed by: TRANSPLANT SURGERY

## 2017-11-29 PROCEDURE — 99215 OFFICE O/P EST HI 40 MIN: CPT | Mod: S$GLB,,, | Performed by: INTERNAL MEDICINE

## 2017-11-29 RX ORDER — CEPHALEXIN 500 MG/1
500 CAPSULE ORAL EVERY 8 HOURS
Qty: 15 CAPSULE | Refills: 0 | Status: SHIPPED | OUTPATIENT
Start: 2017-11-29 | End: 2017-12-04

## 2017-11-29 RX ORDER — MYCOPHENOLIC ACID 180 MG/1
360 TABLET, DELAYED RELEASE ORAL 2 TIMES DAILY
Qty: 120 TABLET | Refills: 11 | Status: SHIPPED | OUTPATIENT
Start: 2017-11-29 | End: 2017-12-06

## 2017-11-29 NOTE — TELEPHONE ENCOUNTER
FREDERIC contacted pt to follow up with her clinic appointment after pt met with the rest of the providers. SW left a voicemail and will follow up with pt at a later date. FREDERIC remains available to pt, pt's family, and transplant team at [phone].

## 2017-11-29 NOTE — LETTER
November 29, 2017        LUDA Raygoza  [address]  [id number]  [facility] [address]  Phone: [phone]  Fax: [phone]             [facility]  [address]  [facility] [address]  Phone: [phone]   Patient: Apurva Rodriguez   MR Number: [id number]   YOB: 1965   Date of Visit: 11/29/2017       Dear Dr. LUDA Raygoza    Thank you for referring Apurva Rodriguez to me for evaluation. Attached you will find relevant portions of my assessment and plan of care.    If you have questions, please do not hesitate to call me. I look forward to following Apurva Rodriguez along with you.    Sincerely,    Shreya Waldron MD    Enclosure    If you would like to receive this communication electronically, please contact [email or web address] or [phone] to request [employer] access.    [employer] is a tool which provides read-only access to select patient information with whom you have a relationship. Its easy to use and provides real time access to review your patients record including encounter summaries, notes, results, and demographic information.    If you feel you have received this communication in error or would no longer like to receive these types of communications, please e-mail [email or web address]

## 2017-11-29 NOTE — PATIENT INSTRUCTIONS
"From Dr. Zavala:  It is my privilege to participate in your post-transplant care!  Please be sure to let us know if you have any questions or concerns about your health care - we cannot help you if we do not know.   -See information below for acetaminophen dosing. Don't forget we are on call 24/7 for any emergencies.   -OK to take gabapentin  Best Wishes,  Dr. Zavala Staffeld Coit    Information about pain meds in liver disease (from [email or web address].com):    OPIOIDS -- Opioids should be used cautiously in patients with advanced liver disease or cirrhosis (table 1). Fentanyl appears to be safe in patients with modest hepatic dysfunction. Morphine, oxycodone, and hydromorphone should be used at reduced doses and prolonged intervals of administration. Tramadol may be safe but experience is limited, and thus it should not be used in those with decompensated cirrhosis. The effects of codeine are difficult to predict, and therefore alternatives should be considered. Chronic administration of any opioid may lead to tolerance requiring escalating doses and therefore an increasing risk of hepatic encephalopathy.  A variety of opioids are used for pain control. Opioids exert their analgesic effect through at least four groups of receptors and probably other subpopulations as well. The distribution of these receptors throughout the body, along with their tissue densities within numerous organ systems, account for the global and varied effects of these drugs. (See "Overview of the treatment of chronic non-cancer pain".)  As a general rule, these medications are metabolized through hepatic oxidation and glucuronidation. The clearance of opioids depends upon plasma protein binding, hepatic blood flow, and hepatic enzyme capacity. Oxidative enzyme pathways and opioids clearance are impaired in cirrhosis, which may lead to the accumulation of toxic metabolites [7,8].  ?Morphine undergoes rapid glucuronidation in the liver with subsequent " systemic metabolism. Although glucuronidation is usually preserved despite diminished liver function, multiple studies have demonstrated that the clearance of morphine is delayed in patients with cirrhosis by 35 to 60 percent [7,9-11]. In addition, morphine has increased oral bioavailability in patients with advanced chronic liver disease or cirrhosis secondary to reduced first pass hepatic metabolism [7,11].   As a result, morphine must be used with caution in patients with advanced chronic liver disease or cirrhosis to avoid accumulation. A twofold increase in the interval of administration has been recommended [9]. Furthermore, if oral forms of morphine are used, the dose must be decreased to account for the increased bioavailability. In a patient with cirrhosis with concomitant renal failure, morphine should be avoided as an accumulation of hydrophilic metabolites can lead to seizure activity, respiratory depression, and hepatic encephalopathy [12].  ?Meperidine is metabolized extensively in the liver, and its clearance is significantly affected by hepatic dysfunction. In patients with cirrhosis, the plasma clearance is diminished by 50 percent and the half-life doubles after a single intravenous dose of 0.8 mg/kg [13,14]. In addition, it is highly bound to serum protein and has unpredictable analgesic effects and an increased risk of toxicity in patients with cirrhosis [8]. As a result, we suggest that meperidine be avoided in patients with advanced chronic liver disease or cirrhosis.  ?Other opioids that are metabolized by oxidative systems also have decreased clearance. This has been demonstrated with meperidine, propoxyphene, alfentanil, and tramadol. Similar to morphine, these drugs have increased bioavailability in patients with advanced chronic liver disease or cirrhosis [12]. Thus, repeated administration of these drugs can lead to unwanted accumulation of the drugs and their metabolites.  ?Codeine  requires the oxidative enzyme capacity of the liver to convert it to its active metabolites, potentially decreasing its effectiveness in patients with advanced chronic liver disease or cirrhosis.  ?Similarly, tramadol requires conversion to O-desmethyltramadol by hepatic oxidation, so the analgesic effects of this drug may be unpredictable [7]. However, some hepatologists have had favorable experiences with tramadol in patients with cirrhosis in whom acetaminophen was not effective.  ?Fentanyl is a lipid-soluble synthetic opioid that is approximately 80 to 100 times as potent as morphine. It is converted by hydroxylation and dealkylation in the liver into inactive and nontoxic metabolites. In a study of patients with histologically confirmed cirrhosis, the pharmacokinetics of fentanyl were unchanged when compared with healthy subjects [15]. However, the patients with cirrhosis in this study had normal serum albumin, and the prothrombin times were only slightly abnormal. It is not known if the metabolism of fentanyl is affected in patients with severe hepatic dysfunction.  ?Oxycodone is a derivative of opium that has similar opioid effects to morphine. Oxycodone is metabolized in the liver and excreted by the kidneys. A study of 24 patients with mild to moderate hepatic dysfunction showed that peak plasma concentrations of oxycodone were 50 percent greater compared with those in healthy controls [16]. In addition the t1/2 elimination for oxycodone was increased by 2.3 hours. Oxycodone should be used with caution in patients with advanced chronic liver disease or cirrhosis, and if used, it should be administered at reduced doses and prolonged dosing intervals.  ?Hydromorphone is a hydrogenated ketone of morphine that is metabolized by the liver. This opiate lacks toxic metabolites, but it is approximately five times stronger than morphine. In patients with advanced chronic liver disease or cirrhosis, the elimination  of hydromorphone is impaired and the half-life is prolonged. Hydromorphone, like oxycodone, should be used with caution in patients with advanced chronic liver disease or cirrhosis, and if used, it should be administered at reduced doses and prolonged dosing intervals [16].  ?Methadone is a long-acting opioid that is frequently used to treat heroin addiction. In a study of patients with mild to moderate cirrhosis, the pharmacokinetic profiles of methadone were unchanged compared with healthy individuals [17]. Although the half-life of methadone in patients with severe cirrhosis can be mildly prolonged, drug disposition is not significantly altered [17]. Thus, methadone appears to be safe in patients with advanced chronic liver disease or cirrhosis, at least for short-term administration.  ACETAMINOPHEN (PARACETAMOL) -- It is crucial to dismiss the misconception that acetaminophen should be strictly avoided in patients with liver disease. Acetaminophen is an effective and safe analgesic for patients with chronic liver disease, provided that they do not actively drink alcohol (table 1). While doses up to 4 grams per day appear to be safe, it is frequently recommended that patients with cirrhosis or advanced chronic liver disease limit acetaminophen intake to 2 grams per day, in part because of concern over altered metabolism of acetaminophen in the setting of chronic liver disease [18]. We typically limit acetaminophen intake to 2 grams per day for most patients, while avoiding it entirely in patients with severe alcoholic hepatitis or acute liver injury. In addition, we recommend that patients who drink alcohol take no more than 2 grams per day.  Acetaminophen is a widely available over-the-counter medication used for analgesic and antipyretic purposes. It is well established to be hepatotoxic in large doses. When ingested in suicide attempts at doses greater than 10 grams, it causes severe hepatic necrosis and acute  "liver failure. Doses of less than 6 grams per day are considered non-toxic, and the recommended upper limit for use is 4 grams per day. (See "Acetaminophen (paracetamol) poisoning in adults: Pathophysiology, presentation, and diagnosis".)  Acetaminophen appears to be safe in patients with advanced chronic liver disease or cirrhosis when used at the recommended doses. In one study, six patients with chronic liver disease were given 4 grams per day for five days [19]. There was no evidence of drug accumulation or hepatotoxicity in these subjects. In another trial by the same author, 20 patients with cirrhosis were given 4 grams per day for 13 days [20]. Only one patient developed abnormal liver enzymes. After the labs returned to normal, the same patient was subsequently challenged with 10- and 14-day courses of 4 grams per day of acetaminophen without incident. It was thought that the earlier biochemical deterioration in this patient was not related to acetaminophen use.  Patients who chronically abuse alcohol (even those without cirrhosis) should use acetaminophen with extreme caution. We suggest that patients who actively drink alcohol consume no more than 2 grams per day. In healthy subjects, only a fraction of acetaminophen is metabolized by the cytochrome P-450 enzyme system to NAPQI. In patients who chronically abuse alcohol, the P450 system is induced and therefore more of the toxic metabolite NAPQI is generated. The hepatotoxicity of NAPQI is diminished by glutathione conjugation. However, patients who abuse alcohol have reduced stores of glutathione secondary to impaired production and poor nutritional status.      AGENTS FOR NEUROPATHIC PAIN -- Several medications are used for the treatment of patients with neuropathic pain, including topical lidocaine patches, gabapentin, pregabalin, nortriptyline, and carbamazepine. Gabapentin, pregabalin, and nortriptyline can be used in patients with cirrhosis at lower " "than normal doses, though carbamazepine should be avoided (table 1).  Gabapentin and pregabalin are anticonvulsants that are not hepatically metabolized and are dependent on renal function for clearance [8,18]. They can cause sedation, ataxia, dizziness, and nausea, which may limit their usefulness in patients with advanced chronic liver disease or cirrhosis. Gabapentin can be started at a dose of 300 mg orally per day and pregabalin at 50 mg orally twice per day. The doses can be gradually titrated (this should be done over weeks because the drugs have a delayed onsets of action). Both drugs require dose adjustments in patients with renal dysfunction. Neither drug should be stopped abruptly due to risk of discontinuation syndrome and/or rebound seizures. (See "Overview of the treatment of chronic non-cancer pain", section on 'Gabapentin and pregabalin'.)  Nortriptyline is a tricyclic antidepressant that is also used in the treatment of neuropathic pain. It has extensive first-pass hepatic metabolism and has dose-related anticholinergic and cardiovascular side effects [8,18]. In patients with advanced chronic liver disease or cirrhosis, it should be started at a dose of 10 mg orally each night. The dose can be gradually titrated (as with gabapentin and pregabalin, this should be done over weeks because the drug has a delayed onset of action). Low maintenance doses (eg, 25 to 50 mg) should be used to decrease the risk of drug and metabolite accumulation. (See "Overview of the treatment of chronic non-cancer pain", section on 'Tricyclic antidepressants'.)  Carbamazepine should be avoided in patients with advanced chronic liver disease or cirrhosis because it has been associated with hepatotoxicity and may precipitate rapid decompensation in patients with cirrhosis [8,18].    "

## 2017-11-29 NOTE — PROGRESS NOTES
SW met with pt in clinic. Pt is a kidney transplant recipient from 11/7/2017. SW met with pt to follow up regarding any needs post transplant and assess coping. Pt reports some concerns regarding her care at [facility]. Pt reports that she had a lengthy discussion with one of our post cordinators, VERONICA Ferrara RN regarding these concerns. Pt reports that she has been keeping a list of her concerns that she has had since transplant. Pt did not share the specifics of these written concerns, however pt did report that she felt like she was not being treated as an individual and that she felt like she was not able to voice her opinions about her care. SW asked if pt would be able to transfer her care to [facility]. Pt reports that her insurance has pre-certified everything for transplant with [facility] until Nov. 2018. Pt also asked about her disability paperwork that pt had emailed to SW. SW explained that SW had received it and it would need to be processed through medical records to maintain a chain of command and could take up to 7-10 business days to process. Pt reports that she wishes the information to be faxed to her personal fax at [phone]. SW dropped off pt's paperwork to medical records after leaving this meeting. Pt report no issues with obtaining medications, and reports receiving medications from [facility] and "[facility] mail Order [facility]acies. Pt reports knowing how to contact SW team if any additional needs arise and SW remains available at [phone].

## 2017-11-29 NOTE — PROGRESS NOTES
Results reviewed and the following message sent to patient via [facility]: As discussed earlier today, labs look great! Keep up the great work.

## 2017-11-29 NOTE — PROGRESS NOTES
Transplant Surgery  Kidney Transplant Recipient Follow-up    Referring Physician: LUDA Raygoza  Current Nephrologist: LUDA Raygoza    Subjective:     Chief Complaint: Apurva Rodriguez is a 52 y.o. year old White female who is status post Kidney transplant performed on 2017.    ORGAN:  RIGHT KIDNEY  Disease Etiology: Polycystic Kidneys  Donor Type:   - Brain Death  Donor CMV Status:  Positive  Donor HBcAB:  Negative  Donor HCV Status:  Negative    History of Present Illness: She reports mild incisional pain .  From a transplant perspective, she reports normal urination, pain controlled with medications and no dysuria.  Apurva is here for management of her immunosuppression medication.  Apurva states that her immunosuppression is being well tolerated.  Hypertension is is reportedly well controlled.    Review of Systems   Constitutional: Negative for fever.   Cardiovascular: Negative for leg swelling.   Gastrointestinal: Negative for abdominal distention and abdominal pain.   Skin: Positive for wound.   All other systems reviewed and are negative.      Objective:     Physical Exam   Constitutional: She appears well-developed and well-nourished.   Abdominal: Soft. Normal appearance.       Incisions healing appropriately. Staples in place. Some redness in the Lorenzo incision.    Vitals reviewed.    Lab Results   Component Value Date    CREATININE 1.1 2017    BUN 15 2017     Lab Results   Component Value Date    WBC 5.39 2017    HGB 9.5 (L) 2017    HCT 30.5 (L) 2017    HCT 25 (L) 2017     2017     Lab Results   Component Value Date    TACROLIMUS 10.2 2017         Assessment and Plan:        · S/P Kidney transplant.   · Remove staples. Keflex 500 mg TID x 5 days.  · Dr Busby already addressed other issues.     Follow-up: Patient reminded to call with any health changes, since these can be early signs of significant complications.  Also, I advised  the patient to be sure any new medications or changes of old medications are discussed with either a pharmacist, or physician knowledgeable with transplant to avoid rejection/drug toxicity related to significant drug interactions.    Carlos Foley MD       [facility] Patient Status  Functional Status: 60% - Requires occasional assistance but is able to care for needs  Physical Capacity: No Limitations

## 2017-11-29 NOTE — PROGRESS NOTES
"   Kidney Post-Transplant Assessment    Referring Physician: LUDA Raygoza  Current Nephrologist: LUDA Raygoza    ORGAN: RIGHT KIDNEY  Donor Type:  - brain death  PHS Increased Risk: yes  Cold Ischemia: 852 mins  Induction Medications: campath - alemtuzumab (anti-cd52), steroids (prednisone,methylprednisolone,solumedrol,medrol,decadron)    Subjective:     CC:  Reassessment of renal allograft function and management of chronic immunosuppression.    HPI:  Ms. Rodriguez is a 52 y.o. year old White female who received a  - brain death kidney transplant on 17. Her most recent creatinine is 1.1 (0.9-1.1). She takes tacrolimus and MMF on hold for maintenance immunosuppression. Her post transplant course has been uncomplicated to date.    Pertinent History:  -ESRD at age 23.  Diagnosed with kidney disease at age 6 months  -Nephrocalcinosis diagnosed age 1  -Kidney transplant #1 at [facility] at , failed 2016 when she started dialysis. IS-CSA/MMF  -h/o pancytopenia with a bone marrow biopsy after the first kidney transplant   -AVN requiring hip replacement.  -DDKT #2  (PHS-IR) 17 induced with Campath. KDPI 50%,  CIT 14+ hrs. Required 7 day pollack d/t friable bladder.Seen by surgery 17 (Dr. Barragan): "Scant serous discharge from wound. Redness with minimal blanching. Incision probed with sterile swab, no expression of fluid or pus. Recommend continued observation"  -Developed uvular edema post op with cough and hoarseness (ENT dx hydrops)  -CMV status ++; valcyte held d/t neutropenia  -Neutropenia cause bactrim to be D/C'd. Could not tolerate pentam d/t cough/vocal cord issues.  -Transaminitis suspected to be d/t meds  -MBD: , Vit D 24.  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 Liver with her wife of 25 years.    She was given filgrastim for neutropenia with resulting myalgias/arthralgias for " several days. They are now much better. Cellcept was stopped - WBC better now.    Review of Systems   Constitutional: Negative for fever.   Eyes: Negative for visual disturbance.   Respiratory: Negative for shortness of breath.    Cardiovascular: Negative for chest pain and leg swelling.   Gastrointestinal: Negative.    Genitourinary: Negative for decreased urine volume, difficulty urinating, dysuria and hematuria.   Musculoskeletal: Positive for arthralgias.   Skin: Negative for rash.   Allergic/Immunologic: Positive for immunocompromised state.   Neurological: Negative for tremors.     Objective:   Blood pressure 122/69, pulse 72, temperature 96.6 °F (35.9 °C), temperature source Oral, resp. rate 16, height 5' (1.524 m), weight 59.9 kg (132 lb 0.9 oz), SpO2 100 %.body mass index is 25.79 kg/m².    Physical Exam   Constitutional: No distress.   Cardiovascular: Normal rate and regular rhythm.    Pulmonary/Chest: Effort normal and breath sounds normal. No respiratory distress.   Abdominal: Soft. Bowel sounds are normal. She exhibits no mass. There is no tenderness.   Musculoskeletal: She exhibits no edema.   Psychiatric: She has a normal mood and affect.     Labs:  Lab Results   Component Value Date    WBC 5.39 2017    HGB 9.5 (L) 2017    HCT 30.5 (L) 2017     2017    K 4.8 2017     2017    CO2 24 2017    BUN 15 2017    CREATININE 1.1 2017    EGFRNONAA 57.1 (A) 2017    CALCIUM 9.1 2017    PHOS 2.8 2017    MG 1.6 2017    ALBUMIN 3.6 2017    ALBUMIN 3.6 2017    AST 31 2017    ALT 80 (H) 2017    UTPCR Unable to calculate 2017    .0 (H) 2017    TACROLIMUS 10.2 2017   Labs were reviewed with the patient    Assessment:     1. Status post -donor kidney transplantation    2. Chronic kidney disease (CKD), stage II (mild)    3. Hypophosphatemia    4. Metabolic acidosis    5. At risk  for opportunistic infections    6. Prophylactic immunotherapy    7. Long-term use of immunosuppressant medication        Plan:   -CKD2 s/p kidney transplantation, doing well. Continue to monitor renal function and electrolytes, HTN, secondary hyperparathyroidism and other issues related to underlying ESRD.  -Continue tacrolimus-based immunosuppression.  Will continue to watch signs, symptoms and levels for side effects and drug toxicity.   -Cont mag ox 400 mg BID for hypomagnesemia  -Acidosis corrected on Na HCO3 650 mg BID. Watch on current dose.  -Decrease KPN to 250 mg BID with known hypophosphatemia. -Will tolerate mild asymptomatic low level d/t pill burden, DDI, and adverse SE.   -Transaminitis improving - cont to follow LFTs     Follow-up:   Clinic: return to transplant clinic weekly for the first month after transplant; every 2 weeks during months 2-3; then at 6-, 9-, 12-, 18-, 24-, and 36- months post-transplant to reassess for complications from immunosuppression toxicity and monitor for rejection.  Annually thereafter.    Labs: since patient remains at high risk for rejection and drug-related complications that warrant close monitoring, labs will be ordered as follows: continue twice weekly CBC, renal panel, and drug level for first month; then same labs once weekly through 3rd month post-transplant.  Urine for UA and protein/creatinine ratio monthly.  Urine BK - PCR at 1-, 3-, 6-, 9-, 12-, 18-, 24-, and 36- months post-transplant.  Hepatic panel at 1-, 2-, 3-, 6-, 9-, 12-, 18-, 24-, and 36- months post-transplant.    Shreya Waldron MD       Education:   Material provided to the patient.  Patient reminded to call with any health changes since these can be early signs of significant complications.  Also, I advised the patient to be sure any new medications or changes of old medications are discussed with either a pharmacist or physician knowledgeable with transplant to avoid rejection/drug  toxicity related to significant drug interactions.

## 2017-11-30 ENCOUNTER — LAB VISIT (OUTPATIENT)
Dept: LAB | Facility: HOSPITAL | Age: 52
End: 2017-11-30
Attending: INTERNAL MEDICINE
Payer: COMMERCIAL

## 2017-11-30 DIAGNOSIS — Z94.0 KIDNEY REPLACED BY TRANSPLANT: ICD-10-CM

## 2017-11-30 LAB
ALBUMIN SERPL BCP-MCNC: 3.7 G/DL
ANION GAP SERPL CALC-SCNC: 6 MMOL/L
ANISOCYTOSIS BLD QL SMEAR: SLIGHT
BASOPHILS # BLD AUTO: ABNORMAL K/UL
BASOPHILS NFR BLD: 0 %
BUN SERPL-MCNC: 17 MG/DL
CALCIUM SERPL-MCNC: 9 MG/DL
CHLORIDE SERPL-SCNC: 109 MMOL/L
CO2 SERPL-SCNC: 24 MMOL/L
CREAT SERPL-MCNC: 0.9 MG/DL
DIFFERENTIAL METHOD: ABNORMAL
EOSINOPHIL # BLD AUTO: ABNORMAL K/UL
EOSINOPHIL NFR BLD: 3 %
ERYTHROCYTE [DISTWIDTH] IN BLOOD BY AUTOMATED COUNT: 14.7 %
EST. GFR  (AFRICAN AMERICAN): >60 ML/MIN/1.73 M^2
EST. GFR  (NON AFRICAN AMERICAN): >60 ML/MIN/1.73 M^2
GLUCOSE SERPL-MCNC: 110 MG/DL
HCT VFR BLD AUTO: 31.8 %
HGB BLD-MCNC: 9.9 G/DL
IMM GRANULOCYTES # BLD AUTO: ABNORMAL K/UL
IMM GRANULOCYTES NFR BLD AUTO: ABNORMAL %
LYMPHOCYTES # BLD AUTO: ABNORMAL K/UL
LYMPHOCYTES NFR BLD: 1 %
MAGNESIUM SERPL-MCNC: 1.8 MG/DL
MCH RBC QN AUTO: 31.7 PG
MCHC RBC AUTO-ENTMCNC: 31.1 G/DL
MCV RBC AUTO: 102 FL
METAMYELOCYTES NFR BLD MANUAL: 10 %
MONOCYTES # BLD AUTO: ABNORMAL K/UL
MONOCYTES NFR BLD: 23 %
MYELOCYTES NFR BLD MANUAL: 2 %
NEUTROPHILS NFR BLD: 53 %
NEUTS BAND NFR BLD MANUAL: 7 %
NRBC BLD-RTO: 1 /100 WBC
PHOSPHATE SERPL-MCNC: 3 MG/DL
PLATELET # BLD AUTO: 176 K/UL
PLATELET BLD QL SMEAR: ABNORMAL
PMV BLD AUTO: 10.3 FL
POLYCHROMASIA BLD QL SMEAR: ABNORMAL
POTASSIUM SERPL-SCNC: 4.5 MMOL/L
PROMYELOCYTES NFR BLD MANUAL: 1 %
RBC # BLD AUTO: 3.12 M/UL
SODIUM SERPL-SCNC: 139 MMOL/L
WBC # BLD AUTO: 3.52 K/UL

## 2017-11-30 PROCEDURE — 80197 ASSAY OF TACROLIMUS: CPT

## 2017-11-30 PROCEDURE — 36415 COLL VENOUS BLD VENIPUNCTURE: CPT | Mod: PO

## 2017-11-30 PROCEDURE — 80069 RENAL FUNCTION PANEL: CPT

## 2017-11-30 PROCEDURE — 83735 ASSAY OF MAGNESIUM: CPT

## 2017-11-30 PROCEDURE — 85007 BL SMEAR W/DIFF WBC COUNT: CPT

## 2017-11-30 PROCEDURE — 85027 COMPLETE CBC AUTOMATED: CPT

## 2017-12-01 ENCOUNTER — TELEPHONE (OUTPATIENT)
Dept: TRANSPLANT | Facility: CLINIC | Age: 52
End: 2017-12-01

## 2017-12-01 DIAGNOSIS — Z94.0 KIDNEY REPLACED BY TRANSPLANT: Primary | ICD-10-CM

## 2017-12-01 LAB — TACROLIMUS BLD-MCNC: 10.1 NG/ML

## 2017-12-01 RX ORDER — TACROLIMUS 1 MG/1
CAPSULE ORAL
Qty: 180 CAPSULE | Refills: 11 | Status: SHIPPED | OUTPATIENT
Start: 2017-12-01 | End: 2017-12-08 | Stop reason: SDUPTHER

## 2017-12-01 NOTE — TELEPHONE ENCOUNTER
----- Message from Shreya Waldron MD sent at 12/1/2017  2:34 PM CST -----  Results reviewed and the following message sent to patient via [facility]: Please decrease your tacrolimus to 3 mg in AM and 2 mg in PM. Have a great weekend!

## 2017-12-01 NOTE — PROGRESS NOTES
Results reviewed and the following message sent to patient via [facility]: Please decrease your tacrolimus to 3 mg in AM and 2 mg in PM. Have a great weekend!

## 2017-12-01 NOTE — TELEPHONE ENCOUNTER
SW followed up with pt regarding her clinic visit on Wednesday 11/29/17. Pt reports that she was in a meeting and that she could talk with SW briefly. Pt reports that if the team continues on in the same vein as that appointment then everything will be fine. FREDERIC remains available to pt, pt's family, and transplant team at [phone].

## 2017-12-04 ENCOUNTER — PATIENT MESSAGE (OUTPATIENT)
Dept: TRANSPLANT | Facility: CLINIC | Age: 52
End: 2017-12-04

## 2017-12-04 ENCOUNTER — OFFICE VISIT (OUTPATIENT)
Dept: HEPATOLOGY | Facility: CLINIC | Age: 52
End: 2017-12-04
Payer: COMMERCIAL

## 2017-12-04 ENCOUNTER — HOSPITAL ENCOUNTER (OUTPATIENT)
Dept: RADIOLOGY | Facility: HOSPITAL | Age: 52
Discharge: HOME | End: 2017-12-04
Attending: TRANSPLANT SURGERY
Payer: COMMERCIAL

## 2017-12-04 ENCOUNTER — PATIENT MESSAGE (OUTPATIENT)
Dept: HEPATOLOGY | Facility: CLINIC | Age: 52
End: 2017-12-04

## 2017-12-04 ENCOUNTER — HOSPITAL ENCOUNTER (OUTPATIENT)
Dept: UROLOGY | Facility: HOSPITAL | Age: 52
Discharge: HOME | End: 2017-12-04
Attending: TRANSPLANT SURGERY
Payer: COMMERCIAL

## 2017-12-04 VITALS
OXYGEN SATURATION: 98 % | TEMPERATURE: 98 F | HEIGHT: 60 IN | HEART RATE: 69 BPM | DIASTOLIC BLOOD PRESSURE: 70 MMHG | SYSTOLIC BLOOD PRESSURE: 118 MMHG | WEIGHT: 130.5 LBS | BODY MASS INDEX: 25.62 KG/M2

## 2017-12-04 VITALS
HEART RATE: 59 BPM | HEIGHT: 60 IN | WEIGHT: 130.75 LBS | DIASTOLIC BLOOD PRESSURE: 81 MMHG | RESPIRATION RATE: 18 BRPM | TEMPERATURE: 98 F | SYSTOLIC BLOOD PRESSURE: 151 MMHG | BODY MASS INDEX: 25.67 KG/M2

## 2017-12-04 DIAGNOSIS — Z94.0 KIDNEY REPLACED BY TRANSPLANT: ICD-10-CM

## 2017-12-04 DIAGNOSIS — R74.8 ELEVATED LIVER ENZYMES: Primary | ICD-10-CM

## 2017-12-04 LAB
CLASS I ANTIBODIES - LUMINEX: NEGATIVE
CLASS II ANTIBODIES - LUMINEX: NORMAL
CPRA %: 94
SERUM COLLECTION DT - LUMINEX CLASS I: NORMAL
SERUM COLLECTION DT - LUMINEX CLASS II: NORMAL
SPCL1 TESTING DATE: NORMAL
SPCL2 TESTING DATE: NORMAL
SPCLU TESTING DATE: NORMAL

## 2017-12-04 PROCEDURE — 99204 OFFICE O/P NEW MOD 45 MIN: CPT | Mod: S$GLB,,, | Performed by: PHYSICIAN ASSISTANT

## 2017-12-04 PROCEDURE — 52310 CYSTOSCOPY AND TREATMENT: CPT | Mod: ,,, | Performed by: UROLOGY

## 2017-12-04 PROCEDURE — 76776 US EXAM K TRANSPL W/DOPPLER: CPT | Mod: TC

## 2017-12-04 PROCEDURE — 76776 US EXAM K TRANSPL W/DOPPLER: CPT | Mod: 26,,, | Performed by: INTERNAL MEDICINE

## 2017-12-04 PROCEDURE — 52310 CYSTOSCOPY AND TREATMENT: CPT

## 2017-12-04 PROCEDURE — 99999 PR PBB SHADOW E&M-EST. PATIENT-LVL III: CPT | Mod: PBBFAC,,, | Performed by: PHYSICIAN ASSISTANT

## 2017-12-04 RX ORDER — LIDOCAINE HYDROCHLORIDE 20 MG/ML
JELLY TOPICAL
Status: COMPLETED | OUTPATIENT
Start: 2017-12-04 | End: 2017-12-04

## 2017-12-04 RX ORDER — SEVELAMER CARBONATE 800 MG/1
TABLET, FILM COATED ORAL
COMMUNITY
Start: 2017-09-28 | End: 2017-12-04

## 2017-12-04 RX ADMIN — LIDOCAINE HYDROCHLORIDE: 20 JELLY TOPICAL at 10:12

## 2017-12-04 NOTE — LETTER
December 4, 2017      Liliana Loredo MD  [address]  [place] [facility] [address]           [facility] - Hepatology  [address]  [place] [facility] [address]  Phone: [phone]  Fax: [phone]          Patient: Apurva Rodriguez   MR Number: [id number]   YOB: 1965   Date of Visit: 12/4/2017       Dear Dr. Liliana Loredo:    Thank you for referring Apurva Rodriguez to me for evaluation. Attached you will find relevant portions of my assessment and plan of care.    If you have questions, please do not hesitate to call me. I look forward to following Apurva Rodriguez along with you.    Sincerely,    Rory Cast PA-C    Enclosure  CC:  No Recipients    If you would like to receive this communication electronically, please contact [email or web address] or [phone] to request more information on [facility] access.    For providers and/or their staff who would like to refer a patient to [facility], please contact us through our one-stop-shop provider referral line, [facility], at [phone].    If you feel you have received this communication in error or would no longer like to receive these types of communications, please e-mail [email or web address]

## 2017-12-04 NOTE — PROGRESS NOTES
I have personally performed a face to face diagnostic evaluation on this patient. I have reviewed and agree with today's findings and the care plan outlined by Rory Cast PA-C      My findings are as follows:  Patient presents with mildly elevated LFTs 4 weeks s/p kidney transplant. U/S shows morphologically normal liver. LFTs appear to be normalizing. Will follow trend and consider further diagnostic tests +/- liver biopsy if remain elevated.      she will return to Rory Cast PA-C/  for follow-up.

## 2017-12-04 NOTE — PATIENT INSTRUCTIONS

## 2017-12-04 NOTE — PROCEDURES
Pre Procedure Diagnosis:  Status post renal transplant    Post Procedure Diagnosis:  same    Procedure:  Cystoscopy, transplant stent removal    Anesthesia: 10 cc 2% lidocaine jelly applied per urethra.    14 FR Flexible Olympus cystoscope used.    FINDINGS:  Stent removed in its entirety    The patient was taken to the cystoscopy suite and placed in supine position with legs in frog legged position.  The genitalia was prepped and draped  in the usual sterile fashion.  Two percent lidocaine jelly was inserted in the urethra.  After sufficent time had passed to allow good local anesthesia, the cystoscope was inserted in the urethra then bladder. The stent was visualized, grasped, and removed along with the cystoscope.  The patient was instructed to urinate proir to leaving the office.     Post procedure medication:   Patient is on Keflex for wound concerns.       ASSESSMENT/PLAN: 52 year old  woman status post flexible cystoscopy with transplant stent removal.  1. Push fluids for 24 hours.  2. May see blood in the urine, this should gradually improve over the next 2-3 days.  3. The patient was instructed to return to the office or go to the emergency should fever, chills, cloudy urine, or inability to urinate develop.  4. Follow up in with the transplant service as previously scheduled.

## 2017-12-04 NOTE — H&P (VIEW-ONLY)
Transplant Surgery  Kidney Transplant Recipient Follow-up    Referring Physician: LUDA Raygoza  Current Nephrologist: LUDA Raygoza    Subjective:     Chief Complaint: Apurva Rodriguez is a 52 y.o. year old White female who is status post Kidney transplant performed on 2017.    ORGAN:  RIGHT KIDNEY  Disease Etiology: Polycystic Kidneys  Donor Type:   - Brain Death  Donor CMV Status:  Positive  Donor HBcAB:  Negative  Donor HCV Status:  Negative    History of Present Illness: She reports mild incisional pain .  From a transplant perspective, she reports normal urination, pain controlled with medications and no dysuria.  Apurva is here for management of her immunosuppression medication.  Apurva states that her immunosuppression is being well tolerated.  Hypertension is is reportedly well controlled.    Review of Systems   Constitutional: Negative for fever.   Cardiovascular: Negative for leg swelling.   Gastrointestinal: Negative for abdominal distention and abdominal pain.   Skin: Positive for wound.   All other systems reviewed and are negative.      Objective:     Physical Exam   Constitutional: She appears well-developed and well-nourished.   Abdominal: Soft. Normal appearance.       Incisions healing appropriately. Staples in place. Some redness in the Lorenzo incision.    Vitals reviewed.    Lab Results   Component Value Date    CREATININE 1.1 2017    BUN 15 2017     Lab Results   Component Value Date    WBC 5.39 2017    HGB 9.5 (L) 2017    HCT 30.5 (L) 2017    HCT 25 (L) 2017     2017     Lab Results   Component Value Date    TACROLIMUS 10.2 2017         Assessment and Plan:        · S/P Kidney transplant.   · Remove staples. Keflex 500 mg TID x 5 days.  · Dr Busby already addressed other issues.     Follow-up: Patient reminded to call with any health changes, since these can be early signs of significant complications.  Also, I advised  the patient to be sure any new medications or changes of old medications are discussed with either a pharmacist, or physician knowledgeable with transplant to avoid rejection/drug toxicity related to significant drug interactions.    Carlos Foley MD       [facility] Patient Status  Functional Status: 60% - Requires occasional assistance but is able to care for needs  Physical Capacity: No Limitations

## 2017-12-04 NOTE — INTERVAL H&P NOTE
The patient has been examined and the H&P has been reviewed:    I concur with the findings and no changes have occurred since H&P was written.  Renal transplant 11/7/2017  There are no hospital problems to display for this patient.    For cystoscopy, transplant stent removal.

## 2017-12-04 NOTE — PROGRESS NOTES
"HEPATOLOGY CLINIC VISIT NOTE     REFERRING PROVIDER: Dr. Shreya Waldron     REASON FOR VISIT: elevated liver enzymes    HISTORY: This is a 52 y.o. White female here for elevated liver enzymes, status posted DDKT. Prior to transplant, transaminases were essentially normal, they trended upward during hospitalization and now appear to be trending downward. Her alk phos has also increased and is now trending downward.  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 Donor was HBCAB negative, HCV negative and CMV positive. She takes tacrolimus and MMF on hold for maintenance immunosuppression. Her post transplant course has been complicated by diarrhea, MMF currently on hold. During hospitalization,  U/S done 11/10/2017, no abnormal findings. She reports that she feels well. Denies jaundice, dark urine, hematemesis, melena, slowed mentation, abdominal distention.     Her PMH is listed below. She has never had formal liver evaluation or staging. She has no previous h/o liver disease. She denies FH of kidney disease.     Liver staging:  No formal staging  AST 20, ALT 32  Tbili WNL  Albumin 3.6  PLTs 133, previously WNL  Alk phos 242    Denies jaundice, dark urine, abdominal distention, hematemesis, melena, slowed mentation.   No abnormal skin rashes. No generalized joint pain.                   Past Medical History:   Diagnosis Date    Anemia     Avascular necrosis left hip 12/6/2016    S/p replacement Cord decompression right side Revision of the left hip: with a "larger" hardware placement     Dyslipidemia 12/6/2016    ESRD (end stage renal disease) 12/6/2016    She mentioned that the cause of the kidney failure was associated with several birth defects: PKD Bilateral nephrocalcinosis Hyperuricemia Chronic Icthyosis     Essential hypertension 12/6/2016    Gastroesophageal reflux disease without esophagitis 12/6/2016    Hypertension     Polycystic kidney disease     diagnosed at early age    S/P " bone marrow biopsy 2016    Secondary hyperparathyroidism of renal origin 2016     Past Surgical History:   Procedure Laterality Date    ABDOMINAL HERNIA REPAIR      CHOLECYSTECTOMY      laparoascopic    EYE MUSCLE SURGERY Bilateral     as a baby(1 years)    FRACTURE SURGERY Right     rigt femur :steel karan pl;acement and replacement    KIDNEY TRANSPLANT Right     RLQ transplant,  donor    left hip revision      PARATHYROIDECTOMY      PERITONEAL CATHETER INSERTION      PERITONEAL CATHETER INSERTION      right hip decompression      TONSILLECTOMY      TOTAL HIP ARTHROPLASTY Left      FAMILY HISTORY: Negative for liver disease    SOCIAL HISTORY:   History   Smoking Status    Never Smoker   Smokeless Tobacco    Not on file       History   Alcohol Use No       History   Drug Use No     ROS:   No fever, chills  No chest pain, dyspnea  No abdominal pain, nausea, vomiting, (+) diarrhea   No lower extremity edema  No depression or anxiety      PHYSICAL EXAM:  Friendly White female, in no acute distress; alert and oriented to person, place and time  VITALS: reviewed  HEENT: Sclerae anicteric.   NECK: Supple  CVS: Regular rate and rhythm. No murmurs  LUNGS: Normal respiratory effort. Clear bilaterally  ABDOMEN: Flat, soft, nontender.  SKIN: Warm and dry. No jaundice, No obvious rashes.   EXTREMITIES: No lower extremity edema  NEURO/PSYCH: Normal gate. Memory intact. Thought and speech pattern appropriate. Behavior normal. No depression or anxiety noted.    RECENT LABS:  Lab Results   Component Value Date    WBC 3.52 (L) 2017    HGB 9.9 (L) 2017     2017     Lab Results   Component Value Date    INR 0.9 2017     Lab Results   Component Value Date    AST 31 2017    ALT 80 (H) 2017    BILITOT 0.4 2017    ALBUMIN 3.7 2017    ALKPHOS 365 (H) 2017    CREATININE 0.9 2017    BUN 17 2017     2017    K 4.5  11/30/2017    AFP 3.5 11/13/2017     RECENT IMAGING:  U/S abdomen 11/2017  Narrative     Ultrasound Abdomen     11/10/17 13:00:00    Accession# [id number]    CLINICAL INDICATION: 52 year old F with elevated LFTs. Recent renal transplant on peritoneal dialysis.    TECHNIQUE: Complete abdominal ultrasound with Doppler    COMPARISON: Renal ultrasound, 12/06/2016.    FINDINGS:    The liver is normal in size.  The liver demonstrates homogeneous echotexture.  No focal hepatic lesions.  No dilated intrahepatic biliary radicles.      The gallbladder is surgically absent.  The common duct is mildly prominent, measuring 0.7 cm, likely related to post cholecystectomy status.    The spleen is normal in size with a homogeneous echotexture.    The visualized portion of the pancreas is unremarkable.    The aorta tapers normally.  IVC unremarkable.    The native kidneys atrophic, echogenic, and poorly characterized.      There is small volume ascites. Bilateral pleural effusions noted.    Color-flow and duplex Doppler evaluation demonstrates patency and proper direction of flow within the main portal vein, right portal vein, left portal vein, middle hepatic vein, right hepatic vein, left hepatic vein and SMV.  The IVC demonstrates appropriate flow.  The main hepatic artery resistive index is mildly elevated at 0.84.  Normal evaluation of the aorta and superior mesenteric artery.   Impression       1.  No findings to explain the patient's elevated LFTs. Satisfactory Doppler evaluation.    2. Native renal atrophy and small volume ascites, likely related to recent peritoneal dialysis.    3. Prior cholecystectomy.     ASSESSMENT  52 y.o. White female with:  1. ELEVATED LIVER ENZYMES/ELEVATED ALK PHOS   -- continue to monitor labs, if normalize, no work up needed. May be 2/2 to transplant or medication  -- if increasing, will require work up, possible liver biopsy.     PLAN:  1. Await labs today, continue weekly labs  2. Will monitor  trend, will need full serological work up +/- biopsy should liver enzymes/alk phos increase    Thank you for allowing me to participate in the care of Apurva Cast PA-C

## 2017-12-05 ENCOUNTER — TELEPHONE (OUTPATIENT)
Dept: TRANSPLANT | Facility: HOSPITAL | Age: 52
End: 2017-12-05

## 2017-12-05 RX ORDER — VALGANCICLOVIR 450 MG/1
900 TABLET, FILM COATED ORAL 2 TIMES DAILY
Qty: 120 TABLET | Refills: 3 | Status: SHIPPED | OUTPATIENT
Start: 2017-12-05 | End: 2017-12-07 | Stop reason: SDUPTHER

## 2017-12-05 NOTE — TELEPHONE ENCOUNTER
CMV now +. Called patient and notified her to start Valcyte 900 mg BID. Rx sent to her local [facility] as requested. She was advised to let us know ASAP if they are not able to fill it.

## 2017-12-06 ENCOUNTER — PATIENT MESSAGE (OUTPATIENT)
Dept: TRANSPLANT | Facility: CLINIC | Age: 52
End: 2017-12-06

## 2017-12-06 ENCOUNTER — OFFICE VISIT (OUTPATIENT)
Dept: TRANSPLANT | Facility: CLINIC | Age: 52
End: 2017-12-06
Payer: COMMERCIAL

## 2017-12-06 VITALS
BODY MASS INDEX: 25.71 KG/M2 | RESPIRATION RATE: 18 BRPM | OXYGEN SATURATION: 100 % | SYSTOLIC BLOOD PRESSURE: 129 MMHG | HEART RATE: 74 BPM | DIASTOLIC BLOOD PRESSURE: 76 MMHG | HEIGHT: 60 IN | WEIGHT: 130.94 LBS | TEMPERATURE: 98 F

## 2017-12-06 DIAGNOSIS — N25.81 SECONDARY HYPERPARATHYROIDISM OF RENAL ORIGIN: ICD-10-CM

## 2017-12-06 DIAGNOSIS — R74.01 TRANSAMINITIS: ICD-10-CM

## 2017-12-06 DIAGNOSIS — Z79.60 LONG-TERM USE OF IMMUNOSUPPRESSANT MEDICATION: Primary | ICD-10-CM

## 2017-12-06 DIAGNOSIS — B25.9 CYTOMEGALOVIRUS (CMV) VIREMIA: ICD-10-CM

## 2017-12-06 DIAGNOSIS — Z94.0 STATUS POST DECEASED-DONOR KIDNEY TRANSPLANTATION: ICD-10-CM

## 2017-12-06 DIAGNOSIS — D70.9 NEUTROPENIA, UNSPECIFIED TYPE: ICD-10-CM

## 2017-12-06 PROCEDURE — 99215 OFFICE O/P EST HI 40 MIN: CPT | Mod: S$GLB,,, | Performed by: INTERNAL MEDICINE

## 2017-12-06 PROCEDURE — 99999 PR PBB SHADOW E&M-EST. PATIENT-LVL III: CPT | Mod: PBBFAC,,, | Performed by: INTERNAL MEDICINE

## 2017-12-06 RX ORDER — SODIUM BICARBONATE 650 MG/1
650 TABLET ORAL 2 TIMES DAILY
Qty: 60 TABLET | Refills: 11 | Status: SHIPPED | OUTPATIENT
Start: 2017-12-06 | End: 2017-12-07 | Stop reason: SDUPTHER

## 2017-12-06 RX ORDER — ERGOCALCIFEROL 1.25 MG/1
50000 CAPSULE ORAL
Qty: 4 CAPSULE | Refills: 3 | Status: SHIPPED | OUTPATIENT
Start: 2017-12-06 | End: 2018-03-06 | Stop reason: SDUPTHER

## 2017-12-06 RX ORDER — CARVEDILOL 6.25 MG/1
6.25 TABLET ORAL 2 TIMES DAILY
Qty: 60 TABLET | Refills: 11 | Status: SHIPPED | OUTPATIENT
Start: 2017-12-06 | End: 2018-12-28 | Stop reason: SDUPTHER

## 2017-12-06 RX ORDER — FAMOTIDINE 20 MG/1
20 TABLET, FILM COATED ORAL NIGHTLY
Qty: 30 TABLET | Refills: 11 | Status: SHIPPED | OUTPATIENT
Start: 2017-12-06 | End: 2018-11-08 | Stop reason: SDUPTHER

## 2017-12-06 RX ORDER — CINACALCET 30 MG/1
30 TABLET, FILM COATED ORAL
Qty: 30 TABLET | Refills: 11 | Status: SHIPPED | OUTPATIENT
Start: 2017-12-06 | End: 2018-07-03

## 2017-12-06 RX ORDER — LANOLIN ALCOHOL/MO/W.PET/CERES
400 CREAM (GRAM) TOPICAL 2 TIMES DAILY
Qty: 60 TABLET | Refills: 11 | Status: SHIPPED | OUTPATIENT
Start: 2017-12-06 | End: 2017-12-18 | Stop reason: ALTCHOICE

## 2017-12-06 RX ORDER — ATOVAQUONE 750 MG/5ML
1500 SUSPENSION ORAL DAILY
Qty: 300 ML | Refills: 11 | Status: SHIPPED | OUTPATIENT
Start: 2017-12-06 | End: 2018-11-07 | Stop reason: ALTCHOICE

## 2017-12-06 NOTE — PROGRESS NOTES
"   Kidney Post-Transplant Assessment    Referring Physician: LUDA Raygoza  Current Nephrologist: LUDA Raygoza    ORGAN: RIGHT KIDNEY  Donor Type:  - brain death  Donor Information: 52 y.o. year old White female  PHS Increased Risk: yes  Cold Ischemia: 852 mins  Induction Medications: campath - alemtuzumab (anti-cd52), steroids (prednisone,methylprednisolone,solumedrol,medrol,decadron)    Subjective:     CC:  Reassessment of renal allograft function and management of chronic immunosuppression.    Kidney History:    Ms. Rodriguez is a 52 y.o. year old White female who received a  - brain death kidney transplant on 17. Her most recent creatinine is 0.9 (0.9-1.1). She takes tacrolimus for maintenance immunosuppression. MMF is on hold due to CMV viremia and neutropenia. Her post transplant course has been uncomplicated to date.     Pertinent History:  -ESRD at age 23.  Diagnosed with kidney disease at age 6 months  -Nephrocalcinosis diagnosed age 1  -Kidney transplant #1 at [facility] at , failed 2016 when she started dialysis. IS-CSA/MMF  -h/o pancytopenia with a bone marrow biopsy after the first kidney transplant   -AVN requiring hip replacement.  -DDKT #2  (PHS-IR) 17 induced with Campath. KDPI 50%,  CIT 14+ hrs. Required 7 day pollack d/t friable bladder.Seen by surgery 17 (Dr. Barragan): "Scant serous discharge from wound. Redness with minimal blanching. Incision probed with sterile swab, no expression of fluid or pus. Recommend continued observation"  -Developed uvular edema post op with cough and hoarseness (ENT dx hydrops)  -Transaminitis suspected to be d/t meds, resolving  -MBD: , Vit D 24.  Cinacalcet and ergocalciferol started post op  -Neutropenia cause bactrim to be D/C'd. Could not tolerate pentam d/t cough/vocal cord issues.  -CMV viremia , valcyte with therapeutic dose started on 17          She was given filgrastim for neutropenia with resulting " myalgias/arthralgias for several days. They are now much better. Cellcept was stopped . developed CMV viremia for which she started on valcyte 900 mg BID on 12/5/17. she denies any chest pain, shortness of breath, ENT symptoms, nausea/vomiting, dysuria, frequency, urgency, or pain over the allograft.      Current Medication  Current Outpatient Prescriptions   Medication Sig Dispense Refill    multivitamin (THERAGRAN) tablet Take 1 tablet by mouth once daily.      nystatin (MYCOSTATIN) 100,000 unit/mL suspension Take 5 mLs (500,000 Units total) by mouth 2 hours after meals and at bedtime. STOP 12/6/17 473 mL 0    tacrolimus (PROGRAF) 1 MG Cap Take 3 mg am and 2 mg pm Z94.0 Kidney Transplant 11/7/2017. 180 capsule 11    valGANciclovir (VALCYTE) 450 mg Tab Take 2 tablets (900 mg total) by mouth 2 (two) times daily. 120 tablet 3    atovaquone (MEPRON) 750 mg/5 mL Susp Take 10 mLs (1,500 mg total) by mouth once daily. STOP 11/7/18 300 mL 11    carvedilol (COREG) 6.25 MG tablet Take 1 tablet (6.25 mg total) by mouth 2 (two) times daily. 60 tablet 11    cinacalcet (SENSIPAR) 30 MG Tab Take 1 tablet (30 mg total) by mouth daily with breakfast. 30 tablet 11    ergocalciferol (ERGOCALCIFEROL) 50,000 unit Cap Take 1 capsule (50,000 Units total) by mouth every 7 days. Wednesday 4 capsule 3    famotidine (PEPCID) 20 MG tablet Take 1 tablet (20 mg total) by mouth every evening. 30 tablet 11    k phos di & mono-sod phos mono (K-PHOS-NEUTRAL) 250 mg Tab Take 1 tablet by mouth 2 (two) times daily. 60 tablet 11    magnesium oxide (MAG-OX) 400 mg tablet Take 1 tablet (400 mg total) by mouth 2 (two) times daily. 60 tablet 11    sodium bicarbonate 650 MG tablet Take 1 tablet (650 mg total) by mouth 2 (two) times daily. 60 tablet 11     No current facility-administered medications for this visit.          Review of Systems    Constitutional: Negative for fever.   Eyes: Negative for visual disturbance.   Respiratory: Negative  for shortness of breath.    Cardiovascular: Negative for chest pain and leg swelling.   Gastrointestinal: Negative.    Genitourinary: Negative for decreased urine volume, difficulty urinating, dysuria and hematuria.   Musculoskeletal: Negative back pain, arthralgia  Skin: Negative for rash.   Allergic/Immunologic: Positive for immunocompromised state.   Neurological: Negative for tremors.       Objective:     Blood pressure 129/76, pulse 74, temperature 97.7 °F (36.5 °C), temperature source Oral, resp. rate 18, height 5' (1.524 m), weight 59.4 kg (130 lb 15.3 oz), SpO2 100 %.  body mass index is 25.58 kg/m².    Physical Exam     General: No acute distress, well groomed  Respiratory: Clear to auscultation bilaterally, respirations unlabored, no rales/rhonchi/wheezing  Cardiovacular: Regular rate and rhythm, S1, S2 normal, no murmurs, rubs or gallops, no JVD, no carotid bruit  Gastrointestinal: Soft, non-tender, bowel sounds normal, no hepatosplenomegaly  Renal allograft exam: No tenderness, no bruits, normal exam  Musculoskeletal: No knee or ankle joint tenderness or swelling.   Extremities: No clubbing or cyanosis of bilateral upper extremities; no lower extremity edema bilaterally, radial pulses 2+ bilaterally, symmetric  Skin: warm and dry; no rash on exposed skin  Neurologic: CN grossly intact,  alert and oriented x 3    Labs:  Lab Results   Component Value Date    WBC 3.27 (L) 12/04/2017    HGB 10.0 (L) 12/04/2017    HCT 30.8 (L) 12/04/2017     12/04/2017    K 4.4 12/04/2017     12/04/2017    CO2 23 12/04/2017    BUN 16 12/04/2017    CREATININE 0.9 12/04/2017    EGFRNONAA >60.0 12/04/2017    CALCIUM 8.9 12/04/2017    PHOS 2.8 12/04/2017    MG 1.7 12/04/2017    ALBUMIN 3.6 12/04/2017    ALBUMIN 3.6 12/04/2017    AST 20 12/04/2017    ALT 32 12/04/2017    UTPCR Unable to calculate 12/04/2017    .0 (H) 12/04/2017    TACROLIMUS 7.6 12/04/2017       No results found for: EXTANC, EXTWBC, EXTSEGS,  EXTPLATELETS, EXTHEMOGLOBI, EXTHEMATOCRI, EXTCREATININ, EXTSODIUM, EXTPOTASSIUM, EXTBUN, EXTCO2, EXTCALCIUM, EXTPHOSPHORU, EXTGLUCOSE, EXTALBUMIN, EXTAST, EXTALT, EXTBILITOTAL, EXTLIPASE, EXTAMYLASE    No results found for: EXTCYCLOSLVL, EXTSIROLIMUS, EXTTACROLVL, EXTPROTCRE, EXTPTHINTACT, EXTPROTEINUA, EXTWBCUA, EXTRBCUA    Labs were reviewed with the patient    Assessment/Plan:     1. Long-term use of immunosuppressant medication    2. Transaminitis    3. Secondary hyperparathyroidism of renal origin    4. Neutropenia, unspecified type    5. Status post -donor kidney transplantation    6. Cytomegalovirus (CMV) viremia        Ms. Rodriguez is a 52 y.o. female with:       # History of ESRD presumed secondary to nephrocalcinosis  - s/p DDKT #2 on 17  - baseline Cr 0.9-1.1  - last Cr 0.9  - minimal proteinuria    # CMV Viremia  - valcyte 900 mg BID  - check CMV weekly   - Off MMF    # Neutropenia with ANC~2600  - s/p neupogen therapy with side effect of myalgia    # Immunosuppression:   - continue Prograf 3/2 mg , the last level 7.6. Will check the level tomorrow  - Off  MMF due to CMV viremia and neutropenia  - will monitor closely for toxicities    # Antimicrobial Prophylaxis:   - continue  Mepron for PJP prophylaxis  - continue nystatin for thrush prophylaxis for 1 month     # HTN:   - on coreg 25 mg BID  - BPs well controlled   - continue with home blood pressure monitoring  - low salt and healthy life discussed with the patient    # Metabolic Bone Disease/Secondary Hyperparathyroidism:  - PTH from 990 to 197, Ca of 8.9  - might consider to stop cinacalcet (defer it to next visit)  - will monitor PTH, calcium, and phosphorus as per our center protocol    # Anemia of chronic disease:   - Hb of 10.0  - will continue monitoring as per our center guidelines.   - No indication for acute intervention today    #Metabolic acidosis  - on NaHCO3        Plan:  - PTH trended down from 996 to 197, please discuss  with Dr. Tian to consider holding cinacalcet  - Given CMV viremia, please discuss tacrolimus target level with Dr. Tian  - CMV PCR weekly  - Lab tomorrow           Liliana Loredo MD

## 2017-12-06 NOTE — LETTER
December 6, 2017        LUDA Raygoza  [address]  [id number]  [facility] [address]  Phone: [phone]  Fax: [phone]             [facility]  [address]  [facility] [address]  Phone: [phone]   Patient: Apurva Rodriguez   MR Number: [id number]   YOB: 1965   Date of Visit: 12/6/2017       Dear Dr. LUDA Raygoza    Thank you for referring Apurva Rodriguez to me for evaluation. Attached you will find relevant portions of my assessment and plan of care.    If you have questions, please do not hesitate to call me. I look forward to following Apurva Rodriguez along with you.    Sincerely,    Liliana Loredo MD    Enclosure    If you would like to receive this communication electronically, please contact [email or web address] or [phone] to request [employer] access.    [employer] is a tool which provides read-only access to select patient information with whom you have a relationship. Its easy to use and provides real time access to review your patients record including encounter summaries, notes, results, and demographic information.    If you feel you have received this communication in error or would no longer like to receive these types of communications, please e-mail [email or web address]

## 2017-12-07 ENCOUNTER — PATIENT MESSAGE (OUTPATIENT)
Dept: TRANSPLANT | Facility: CLINIC | Age: 52
End: 2017-12-07

## 2017-12-07 ENCOUNTER — LAB VISIT (OUTPATIENT)
Dept: LAB | Facility: HOSPITAL | Age: 52
End: 2017-12-07
Attending: INTERNAL MEDICINE
Payer: COMMERCIAL

## 2017-12-07 DIAGNOSIS — R74.8 ELEVATED LIVER ENZYMES: ICD-10-CM

## 2017-12-07 DIAGNOSIS — Z94.0 KIDNEY REPLACED BY TRANSPLANT: ICD-10-CM

## 2017-12-07 LAB
ALBUMIN SERPL BCP-MCNC: 3.8 G/DL
ALBUMIN SERPL BCP-MCNC: 3.8 G/DL
ALP SERPL-CCNC: 215 U/L
ALT SERPL W/O P-5'-P-CCNC: 26 U/L
ANION GAP SERPL CALC-SCNC: 6 MMOL/L
AST SERPL-CCNC: 20 U/L
BASOPHILS # BLD AUTO: 0.01 K/UL
BASOPHILS NFR BLD: 0.3 %
BILIRUB DIRECT SERPL-MCNC: 0.3 MG/DL
BILIRUB SERPL-MCNC: 0.8 MG/DL
BUN SERPL-MCNC: 18 MG/DL
CALCIUM SERPL-MCNC: 9 MG/DL
CHLORIDE SERPL-SCNC: 108 MMOL/L
CO2 SERPL-SCNC: 25 MMOL/L
CREAT SERPL-MCNC: 1 MG/DL
DIFFERENTIAL METHOD: ABNORMAL
EOSINOPHIL # BLD AUTO: 0.1 K/UL
EOSINOPHIL NFR BLD: 2.4 %
ERYTHROCYTE [DISTWIDTH] IN BLOOD BY AUTOMATED COUNT: 14.5 %
EST. GFR  (AFRICAN AMERICAN): >60 ML/MIN/1.73 M^2
EST. GFR  (NON AFRICAN AMERICAN): >60 ML/MIN/1.73 M^2
GLUCOSE SERPL-MCNC: 114 MG/DL
HCT VFR BLD AUTO: 29.5 %
HGB BLD-MCNC: 9.3 G/DL
IMM GRANULOCYTES # BLD AUTO: 0.01 K/UL
IMM GRANULOCYTES NFR BLD AUTO: 0.3 %
LYMPHOCYTES # BLD AUTO: 0.1 K/UL
LYMPHOCYTES NFR BLD: 2.7 %
MAGNESIUM SERPL-MCNC: 1.8 MG/DL
MCH RBC QN AUTO: 31.7 PG
MCHC RBC AUTO-ENTMCNC: 31.5 G/DL
MCV RBC AUTO: 101 FL
MONOCYTES # BLD AUTO: 0.3 K/UL
MONOCYTES NFR BLD: 8.5 %
NEUTROPHILS # BLD AUTO: 2.5 K/UL
NEUTROPHILS NFR BLD: 85.8 %
NRBC BLD-RTO: 0 /100 WBC
PHOSPHATE SERPL-MCNC: 2.6 MG/DL
PLATELET # BLD AUTO: 141 K/UL
PMV BLD AUTO: 10.4 FL
POTASSIUM SERPL-SCNC: 4.2 MMOL/L
PROT SERPL-MCNC: 7.2 G/DL
RBC # BLD AUTO: 2.93 M/UL
SODIUM SERPL-SCNC: 139 MMOL/L
WBC # BLD AUTO: 2.94 K/UL

## 2017-12-07 PROCEDURE — 85025 COMPLETE CBC W/AUTO DIFF WBC: CPT

## 2017-12-07 PROCEDURE — 87340 HEPATITIS B SURFACE AG IA: CPT

## 2017-12-07 PROCEDURE — 80076 HEPATIC FUNCTION PANEL: CPT

## 2017-12-07 PROCEDURE — 80069 RENAL FUNCTION PANEL: CPT

## 2017-12-07 PROCEDURE — 86703 HIV-1/HIV-2 1 RESULT ANTBDY: CPT

## 2017-12-07 PROCEDURE — 80197 ASSAY OF TACROLIMUS: CPT

## 2017-12-07 PROCEDURE — 87522 HEPATITIS C REVRS TRNSCRPJ: CPT

## 2017-12-07 PROCEDURE — 87517 HEPATITIS B DNA QUANT: CPT

## 2017-12-07 PROCEDURE — 83735 ASSAY OF MAGNESIUM: CPT

## 2017-12-07 RX ORDER — SODIUM BICARBONATE 650 MG/1
650 TABLET ORAL 2 TIMES DAILY
Qty: 60 TABLET | Refills: 11 | Status: SHIPPED | OUTPATIENT
Start: 2017-12-07 | End: 2018-01-02 | Stop reason: SDUPTHER

## 2017-12-07 RX ORDER — VALGANCICLOVIR 450 MG/1
900 TABLET, FILM COATED ORAL 2 TIMES DAILY
Qty: 120 TABLET | Refills: 3 | Status: SHIPPED | OUTPATIENT
Start: 2017-12-07 | End: 2018-01-04 | Stop reason: SDUPTHER

## 2017-12-07 NOTE — PATIENT INSTRUCTIONS
Thank you for entrusting your transplant care to us, and visiting me today. Please:    - Feel free to call us with any concerns regarding your health care.  - Monitor your blood pressure and aim to keep < 140/90  - Avoid nonsteroidal anti-inflamatory drugs (NSAIDS): ibuprofen (Advil, Motrin), naproxen (Aleve, Naprosyn), Indomethacin (Indocin).

## 2017-12-08 ENCOUNTER — TELEPHONE (OUTPATIENT)
Dept: TRANSPLANT | Facility: CLINIC | Age: 52
End: 2017-12-08

## 2017-12-08 LAB
HBV SURFACE AG SERPL QL IA: NEGATIVE
HIV 1+2 AB+HIV1 P24 AG SERPL QL IA: NEGATIVE
TACROLIMUS BLD-MCNC: 6 NG/ML

## 2017-12-08 RX ORDER — TACROLIMUS 1 MG/1
CAPSULE ORAL
Qty: 180 CAPSULE | Refills: 11 | Status: SHIPPED | OUTPATIENT
Start: 2017-12-08 | End: 2017-12-18 | Stop reason: SDUPTHER

## 2017-12-08 NOTE — PROGRESS NOTES
Results reviewed and the following message sent to patient via [facility]: If this level is an accurate 12 hr post dose, please increase back to 3 mg twice daily of tacrolimus.

## 2017-12-08 NOTE — TELEPHONE ENCOUNTER
----- Message from Shreya Waldron MD sent at 12/8/2017 11:28 AM CST -----  Results reviewed and the following message sent to patient via [facility]: If this level is an accurate 12 hr post dose, please increase back to 3 mg twice daily of tacrolimus.

## 2017-12-08 NOTE — TELEPHONE ENCOUNTER
----- Message from Bisi Reyna sent at 12/8/2017  3:08 PM CST -----  Contact: Rukhsana from [facility]      ----- Message -----  From: Gifty Ramirez  Sent: 12/7/2017  10:58 AM  To: [facility] Post-Kidney Transplant Clinical    PT is completely out of valGANciclovir (VALCYTE) 450 mg Tab. Needs refill asap. Rukhsana called for Deana.    Please call Rukhsana @ [phone]     Thanks Gifty

## 2017-12-11 ENCOUNTER — LAB VISIT (OUTPATIENT)
Dept: LAB | Facility: HOSPITAL | Age: 52
End: 2017-12-11
Attending: INTERNAL MEDICINE
Payer: COMMERCIAL

## 2017-12-11 DIAGNOSIS — Z94.0 KIDNEY REPLACED BY TRANSPLANT: ICD-10-CM

## 2017-12-11 LAB
ALBUMIN SERPL BCP-MCNC: 3.7 G/DL
ANION GAP SERPL CALC-SCNC: 6 MMOL/L
BASOPHILS # BLD AUTO: 0.01 K/UL
BASOPHILS NFR BLD: 0.5 %
BUN SERPL-MCNC: 14 MG/DL
CALCIUM SERPL-MCNC: 9.3 MG/DL
CHLORIDE SERPL-SCNC: 112 MMOL/L
CO2 SERPL-SCNC: 22 MMOL/L
CREAT SERPL-MCNC: 0.9 MG/DL
DIFFERENTIAL METHOD: ABNORMAL
EOSINOPHIL # BLD AUTO: 0.1 K/UL
EOSINOPHIL NFR BLD: 2.4 %
ERYTHROCYTE [DISTWIDTH] IN BLOOD BY AUTOMATED COUNT: 14.6 %
EST. GFR  (AFRICAN AMERICAN): >60 ML/MIN/1.73 M^2
EST. GFR  (NON AFRICAN AMERICAN): >60 ML/MIN/1.73 M^2
GLUCOSE SERPL-MCNC: 107 MG/DL
HCT VFR BLD AUTO: 29.6 %
HCV LOG: <1.08 LOG (10) IU/ML
HCV RNA QUANT PCR: <12 IU/ML
HCV, QUALITATIVE: NOT DETECTED IU/ML
HGB BLD-MCNC: 9.6 G/DL
IMM GRANULOCYTES # BLD AUTO: 0.01 K/UL
IMM GRANULOCYTES NFR BLD AUTO: 0.5 %
LYMPHOCYTES # BLD AUTO: 0.2 K/UL
LYMPHOCYTES NFR BLD: 7.2 %
MAGNESIUM SERPL-MCNC: 1.8 MG/DL
MCH RBC QN AUTO: 33.2 PG
MCHC RBC AUTO-ENTMCNC: 32.4 G/DL
MCV RBC AUTO: 102 FL
MONOCYTES # BLD AUTO: 0.2 K/UL
MONOCYTES NFR BLD: 10.1 %
NEUTROPHILS # BLD AUTO: 1.6 K/UL
NEUTROPHILS NFR BLD: 79.3 %
NRBC BLD-RTO: 0 /100 WBC
PHOSPHATE SERPL-MCNC: 2.5 MG/DL
PLATELET # BLD AUTO: 153 K/UL
PMV BLD AUTO: 10.1 FL
POTASSIUM SERPL-SCNC: 4.6 MMOL/L
RBC # BLD AUTO: 2.89 M/UL
SODIUM SERPL-SCNC: 140 MMOL/L
WBC # BLD AUTO: 2.07 K/UL

## 2017-12-11 PROCEDURE — 36415 COLL VENOUS BLD VENIPUNCTURE: CPT | Mod: PO

## 2017-12-11 PROCEDURE — 85025 COMPLETE CBC W/AUTO DIFF WBC: CPT

## 2017-12-11 PROCEDURE — 80069 RENAL FUNCTION PANEL: CPT

## 2017-12-11 PROCEDURE — 83735 ASSAY OF MAGNESIUM: CPT

## 2017-12-11 PROCEDURE — 80197 ASSAY OF TACROLIMUS: CPT

## 2017-12-12 ENCOUNTER — PATIENT MESSAGE (OUTPATIENT)
Dept: TRANSPLANT | Facility: CLINIC | Age: 52
End: 2017-12-12

## 2017-12-12 ENCOUNTER — TELEPHONE (OUTPATIENT)
Dept: TRANSPLANT | Facility: CLINIC | Age: 52
End: 2017-12-12

## 2017-12-12 LAB
CMV DNA SERPL NAA+PROBE-ACNC: 1830 IU/ML
HEPATITIS B VIRAL DNA - QUANTITATIVE: <10 IU/ML
HEPATITIS B VIRUS DNA: NOT DETECTED
LOG HBV IU/ML: <1 LOG (10) IU/ML
TACROLIMUS BLD-MCNC: 7.5 NG/ML

## 2017-12-12 NOTE — TELEPHONE ENCOUNTER
Spoke with Pt she is concerned about her incision she will come for a wound check tomorrow for a Doctor to look at this.

## 2017-12-13 ENCOUNTER — OFFICE VISIT (OUTPATIENT)
Dept: TRANSPLANT | Facility: CLINIC | Age: 52
End: 2017-12-13
Payer: COMMERCIAL

## 2017-12-13 VITALS
WEIGHT: 131.19 LBS | HEART RATE: 72 BPM | DIASTOLIC BLOOD PRESSURE: 78 MMHG | SYSTOLIC BLOOD PRESSURE: 126 MMHG | RESPIRATION RATE: 17 BRPM | HEIGHT: 60 IN | TEMPERATURE: 99 F | BODY MASS INDEX: 25.76 KG/M2 | OXYGEN SATURATION: 99 %

## 2017-12-13 DIAGNOSIS — Z94.0 STATUS POST DECEASED-DONOR KIDNEY TRANSPLANTATION: ICD-10-CM

## 2017-12-13 DIAGNOSIS — Z29.89 PROPHYLACTIC IMMUNOTHERAPY: ICD-10-CM

## 2017-12-13 DIAGNOSIS — Z79.60 LONG-TERM USE OF IMMUNOSUPPRESSANT MEDICATION: ICD-10-CM

## 2017-12-13 DIAGNOSIS — Z51.81 ENCOUNTER FOR THERAPEUTIC DRUG MONITORING: ICD-10-CM

## 2017-12-13 DIAGNOSIS — Z94.0 KIDNEY REPLACED BY TRANSPLANT: ICD-10-CM

## 2017-12-13 DIAGNOSIS — Z94.0 S/P KIDNEY TRANSPLANT: Primary | ICD-10-CM

## 2017-12-13 PROBLEM — N18.2 CHRONIC KIDNEY DISEASE (CKD), STAGE II (MILD): Chronic | Status: RESOLVED | Noted: 2017-11-29 | Resolved: 2017-12-13

## 2017-12-13 PROCEDURE — 99999 PR PBB SHADOW E&M-EST. PATIENT-LVL III: CPT | Mod: PBBFAC,,,

## 2017-12-13 PROCEDURE — 99214 OFFICE O/P EST MOD 30 MIN: CPT | Mod: 24,S$GLB,, | Performed by: TRANSPLANT SURGERY

## 2017-12-13 NOTE — PROGRESS NOTES
Transplant Surgery  Kidney Transplant Recipient Follow-up    Referring Physician: LUDA Raygoza  Current Nephrologist: LUDA Raygoza    Subjective:     Chief Complaint: Apurva Rodriguez is a 52 y.o. year old White female who is status post Kidney transplant performed on 2017.    ORGAN:  RIGHT KIDNEY  Disease Etiology: Polycystic Kidneys  Donor Type:   - Brain Death  Donor CMV Status:  Positive  Donor HBcAB:  Negative  Donor HCV Status:  Negative    History of Present Illness: She reports concerns about yellow spot in incision.  From a transplant perspective, she reports normal urination.  Apurva is here for management of her immunosuppression medication.  Apurva states that her immunosuppression is being well tolerated.  Hypertension is not present.    Review of Systems   Constitutional: Negative for activity change, appetite change, chills, fatigue and fever.   HENT: Negative for sore throat and trouble swallowing.    Eyes: Negative for visual disturbance.   Respiratory: Negative for cough, chest tightness and shortness of breath.    Cardiovascular: Negative for chest pain, palpitations and leg swelling.   Gastrointestinal: Negative for abdominal distention, abdominal pain, blood in stool, constipation, diarrhea, nausea and vomiting.   Endocrine: Negative for polyuria.   Genitourinary: Negative for decreased urine volume, difficulty urinating, dysuria, flank pain, frequency and hematuria.   Musculoskeletal: Negative for gait problem, myalgias and neck stiffness.   Skin: Negative for rash and wound.   Neurological: Negative for dizziness, tremors, seizures, weakness, light-headedness and headaches.   Hematological: Negative for adenopathy.   Psychiatric/Behavioral: Negative for agitation, confusion and sleep disturbance.       Objective:     Physical Exam   Constitutional: She is oriented to person, place, and time. She appears well-developed and well-nourished. No distress.   HENT:   Head:  Normocephalic.   Mouth/Throat: Oropharynx is clear and moist.   Eyes: Conjunctivae are normal. Pupils are equal, round, and reactive to light. No scleral icterus.   Neck: Normal range of motion. Neck supple. No tracheal deviation present. No thyromegaly present.   Cardiovascular: Normal rate, regular rhythm, normal heart sounds and intact distal pulses.    No murmur heard.  Pulmonary/Chest: Effort normal and breath sounds normal. No respiratory distress.   No supraclavicular adenopathy   Abdominal: Soft. Bowel sounds are normal. She exhibits no distension and no mass. There is no tenderness. There is no rebound and no guarding.       No inguinal adenopathy   Musculoskeletal: Normal range of motion. She exhibits no edema.   No clubbing or cyanosis   Lymphadenopathy:     She has no cervical adenopathy.   Neurological: She is alert and oriented to person, place, and time.   Skin: Skin is warm and dry. No rash noted. No erythema.   Psychiatric: She has a normal mood and affect. Her behavior is normal.   Vitals reviewed.    Lab Results   Component Value Date    CREATININE 0.9 12/11/2017    BUN 14 12/11/2017     Lab Results   Component Value Date    WBC 2.07 (L) 12/11/2017    HGB 9.6 (L) 12/11/2017    HCT 29.6 (L) 12/11/2017    HCT 25 (L) 11/07/2017     12/11/2017     Lab Results   Component Value Date    TACROLIMUS 7.5 12/11/2017         Assessment and Plan:        · S/P Kidney transplant.  · Chronic immunosuppressive medications for rejection prophylaxis at target.  Plan: no adjustment needed.  · Continue monitoring symptoms, labs and drug levels for drug-related toxicity and side effects.  · Renal hypertension at target.  · Fibrin plug debrided today with a small underlying area that is expected to scab and heal in.      Follow-up: Patient reminded to call with any health changes, since these can be early signs of significant complications.  Also, I advised the patient to be sure any new medications or changes  of old medications are discussed with either a pharmacist, or physician knowledgeable with transplant to avoid rejection/drug toxicity related to significant drug interactions.    Marisol Null MD       [facility] Patient Status  Functional Status: 60% - Requires occasional assistance but is able to care for needs  Physical Capacity: No Limitations

## 2017-12-14 ENCOUNTER — TELEPHONE (OUTPATIENT)
Dept: TRANSPLANT | Facility: CLINIC | Age: 52
End: 2017-12-14

## 2017-12-14 ENCOUNTER — DOCUMENTATION ONLY (OUTPATIENT)
Dept: TRANSPLANT | Facility: CLINIC | Age: 52
End: 2017-12-14

## 2017-12-14 ENCOUNTER — LAB VISIT (OUTPATIENT)
Dept: LAB | Facility: HOSPITAL | Age: 52
End: 2017-12-14
Attending: INTERNAL MEDICINE
Payer: COMMERCIAL

## 2017-12-14 ENCOUNTER — PATIENT MESSAGE (OUTPATIENT)
Dept: TRANSPLANT | Facility: CLINIC | Age: 52
End: 2017-12-14

## 2017-12-14 DIAGNOSIS — R74.8 ELEVATED LIVER ENZYMES: ICD-10-CM

## 2017-12-14 DIAGNOSIS — Z94.0 KIDNEY REPLACED BY TRANSPLANT: ICD-10-CM

## 2017-12-14 LAB
ALBUMIN SERPL BCP-MCNC: 3.8 G/DL
ALBUMIN SERPL BCP-MCNC: 3.8 G/DL
ALP SERPL-CCNC: 194 U/L
ALT SERPL W/O P-5'-P-CCNC: 28 U/L
ANION GAP SERPL CALC-SCNC: 7 MMOL/L
ANISOCYTOSIS BLD QL SMEAR: SLIGHT
AST SERPL-CCNC: 21 U/L
BASOPHILS # BLD AUTO: ABNORMAL K/UL
BASOPHILS NFR BLD: 0 %
BILIRUB DIRECT SERPL-MCNC: 0.2 MG/DL
BILIRUB SERPL-MCNC: 0.7 MG/DL
BUN SERPL-MCNC: 21 MG/DL
CALCIUM SERPL-MCNC: 9.3 MG/DL
CHLORIDE SERPL-SCNC: 110 MMOL/L
CO2 SERPL-SCNC: 23 MMOL/L
CREAT SERPL-MCNC: 0.9 MG/DL
DIFFERENTIAL METHOD: ABNORMAL
EOSINOPHIL # BLD AUTO: ABNORMAL K/UL
EOSINOPHIL NFR BLD: 3 %
ERYTHROCYTE [DISTWIDTH] IN BLOOD BY AUTOMATED COUNT: 14.8 %
EST. GFR  (AFRICAN AMERICAN): >60 ML/MIN/1.73 M^2
EST. GFR  (NON AFRICAN AMERICAN): >60 ML/MIN/1.73 M^2
GLUCOSE SERPL-MCNC: 110 MG/DL
HCT VFR BLD AUTO: 30 %
HGB BLD-MCNC: 10 G/DL
IMM GRANULOCYTES # BLD AUTO: ABNORMAL K/UL
IMM GRANULOCYTES NFR BLD AUTO: ABNORMAL %
LYMPHOCYTES # BLD AUTO: ABNORMAL K/UL
LYMPHOCYTES NFR BLD: 12 %
MAGNESIUM SERPL-MCNC: 1.9 MG/DL
MCH RBC QN AUTO: 33.1 PG
MCHC RBC AUTO-ENTMCNC: 33.3 G/DL
MCV RBC AUTO: 99 FL
MONOCYTES # BLD AUTO: ABNORMAL K/UL
MONOCYTES NFR BLD: 1 %
NEUTROPHILS NFR BLD: 83 %
NEUTS BAND NFR BLD MANUAL: 1 %
NRBC BLD-RTO: 0 /100 WBC
OVALOCYTES BLD QL SMEAR: ABNORMAL
PHOSPHATE SERPL-MCNC: 3 MG/DL
PLATELET # BLD AUTO: 166 K/UL
PMV BLD AUTO: 10 FL
POIKILOCYTOSIS BLD QL SMEAR: SLIGHT
POLYCHROMASIA BLD QL SMEAR: ABNORMAL
POTASSIUM SERPL-SCNC: 4.5 MMOL/L
PROT SERPL-MCNC: 7.1 G/DL
RBC # BLD AUTO: 3.02 M/UL
SODIUM SERPL-SCNC: 140 MMOL/L
WBC # BLD AUTO: 1.73 K/UL

## 2017-12-14 PROCEDURE — 85007 BL SMEAR W/DIFF WBC COUNT: CPT

## 2017-12-14 PROCEDURE — 36415 COLL VENOUS BLD VENIPUNCTURE: CPT | Mod: PO

## 2017-12-14 PROCEDURE — 80076 HEPATIC FUNCTION PANEL: CPT

## 2017-12-14 PROCEDURE — 83735 ASSAY OF MAGNESIUM: CPT

## 2017-12-14 PROCEDURE — 85027 COMPLETE CBC AUTOMATED: CPT

## 2017-12-14 PROCEDURE — 80197 ASSAY OF TACROLIMUS: CPT

## 2017-12-14 PROCEDURE — 80069 RENAL FUNCTION PANEL: CPT

## 2017-12-14 NOTE — TELEPHONE ENCOUNTER
----- Message from Freddy Barbosa sent at 12/14/2017  4:13 PM CST -----  Contact: PT  PT is returning Deana lock, call her @ [phone]

## 2017-12-14 NOTE — PROGRESS NOTES
WBC decreased; CMV PCR decreased --> continue treatment dose Valcyte. Already off Bactrim and MMF. Neupogen 480 mcg SC x1

## 2017-12-15 ENCOUNTER — LAB VISIT (OUTPATIENT)
Dept: LAB | Facility: HOSPITAL | Age: 52
End: 2017-12-15
Attending: INTERNAL MEDICINE
Payer: COMMERCIAL

## 2017-12-15 ENCOUNTER — TELEPHONE (OUTPATIENT)
Dept: TRANSPLANT | Facility: CLINIC | Age: 52
End: 2017-12-15

## 2017-12-15 DIAGNOSIS — Z94.0 KIDNEY REPLACED BY TRANSPLANT: ICD-10-CM

## 2017-12-15 LAB
ANISOCYTOSIS BLD QL SMEAR: SLIGHT
BASOPHILS # BLD AUTO: 0.01 K/UL
BASOPHILS NFR BLD: 0.7 %
DIFFERENTIAL METHOD: ABNORMAL
EOSINOPHIL # BLD AUTO: 0 K/UL
EOSINOPHIL NFR BLD: 2.7 %
ERYTHROCYTE [DISTWIDTH] IN BLOOD BY AUTOMATED COUNT: 14.9 %
HCT VFR BLD AUTO: 29.6 %
HGB BLD-MCNC: 9.5 G/DL
IMM GRANULOCYTES # BLD AUTO: 0.01 K/UL
IMM GRANULOCYTES NFR BLD AUTO: 0.7 %
LYMPHOCYTES # BLD AUTO: 0.1 K/UL
LYMPHOCYTES NFR BLD: 8.8 %
MCH RBC QN AUTO: 32 PG
MCHC RBC AUTO-ENTMCNC: 32.1 G/DL
MCV RBC AUTO: 100 FL
MONOCYTES # BLD AUTO: 0.1 K/UL
MONOCYTES NFR BLD: 4.8 %
NEUTROPHILS # BLD AUTO: 1.2 K/UL
NEUTROPHILS NFR BLD: 82.3 %
NRBC BLD-RTO: 0 /100 WBC
OVALOCYTES BLD QL SMEAR: ABNORMAL
PLATELET # BLD AUTO: 165 K/UL
PLATELET BLD QL SMEAR: ABNORMAL
PMV BLD AUTO: 9.8 FL
POIKILOCYTOSIS BLD QL SMEAR: SLIGHT
RBC # BLD AUTO: 2.97 M/UL
TACROLIMUS BLD-MCNC: 7.7 NG/ML
WBC # BLD AUTO: 1.47 K/UL

## 2017-12-15 PROCEDURE — 36415 COLL VENOUS BLD VENIPUNCTURE: CPT | Mod: PO

## 2017-12-15 PROCEDURE — 85025 COMPLETE CBC W/AUTO DIFF WBC: CPT

## 2017-12-15 NOTE — TELEPHONE ENCOUNTER
----- Message from Liliana Loredo MD sent at 12/15/2017  3:32 PM CST -----  Wbc of 1.47 with ANC ~ 1200. No need for Neupogen  Today. Please check CBC on Saturday and Monday . Thank u!

## 2017-12-15 NOTE — PROGRESS NOTES
Wbc of 1.47 with ANC ~ 1200. No need for Neupogen  Today. Please check CBC on Saturday and Monday . Thank u!

## 2017-12-18 ENCOUNTER — OFFICE VISIT (OUTPATIENT)
Dept: TRANSPLANT | Facility: CLINIC | Age: 52
End: 2017-12-18
Payer: MEDICARE

## 2017-12-18 ENCOUNTER — LAB VISIT (OUTPATIENT)
Dept: LAB | Facility: HOSPITAL | Age: 52
End: 2017-12-18
Attending: INTERNAL MEDICINE
Payer: COMMERCIAL

## 2017-12-18 VITALS
TEMPERATURE: 97 F | WEIGHT: 130.06 LBS | OXYGEN SATURATION: 99 % | SYSTOLIC BLOOD PRESSURE: 140 MMHG | HEART RATE: 68 BPM | BODY MASS INDEX: 25.53 KG/M2 | DIASTOLIC BLOOD PRESSURE: 68 MMHG | RESPIRATION RATE: 16 BRPM | HEIGHT: 60 IN

## 2017-12-18 DIAGNOSIS — Z94.0 STATUS POST DECEASED-DONOR KIDNEY TRANSPLANTATION: ICD-10-CM

## 2017-12-18 DIAGNOSIS — Z91.89 AT RISK FOR OPPORTUNISTIC INFECTIONS: ICD-10-CM

## 2017-12-18 DIAGNOSIS — E83.42 HYPOMAGNESEMIA: ICD-10-CM

## 2017-12-18 DIAGNOSIS — Z79.60 LONG-TERM USE OF IMMUNOSUPPRESSANT MEDICATION: ICD-10-CM

## 2017-12-18 DIAGNOSIS — N18.2 CHRONIC KIDNEY DISEASE (CKD), STAGE II (MILD): Primary | Chronic | ICD-10-CM

## 2017-12-18 DIAGNOSIS — E83.39 HYPOPHOSPHATEMIA: ICD-10-CM

## 2017-12-18 DIAGNOSIS — E87.20 METABOLIC ACIDOSIS: ICD-10-CM

## 2017-12-18 DIAGNOSIS — Z29.89 PROPHYLACTIC IMMUNOTHERAPY: ICD-10-CM

## 2017-12-18 DIAGNOSIS — Z94.0 KIDNEY REPLACED BY TRANSPLANT: ICD-10-CM

## 2017-12-18 DIAGNOSIS — D70.2 OTHER DRUG-INDUCED NEUTROPENIA: ICD-10-CM

## 2017-12-18 LAB
ALBUMIN SERPL BCP-MCNC: 3.8 G/DL
ANION GAP SERPL CALC-SCNC: 7 MMOL/L
BASOPHILS # BLD AUTO: 0.01 K/UL
BASOPHILS NFR BLD: 0.5 %
BUN SERPL-MCNC: 19 MG/DL
CALCIUM SERPL-MCNC: 9.1 MG/DL
CHLORIDE SERPL-SCNC: 110 MMOL/L
CO2 SERPL-SCNC: 24 MMOL/L
CREAT SERPL-MCNC: 0.9 MG/DL
DIFFERENTIAL METHOD: ABNORMAL
EOSINOPHIL # BLD AUTO: 0.1 K/UL
EOSINOPHIL NFR BLD: 3.7 %
ERYTHROCYTE [DISTWIDTH] IN BLOOD BY AUTOMATED COUNT: 15.1 %
EST. GFR  (AFRICAN AMERICAN): >60 ML/MIN/1.73 M^2
EST. GFR  (NON AFRICAN AMERICAN): >60 ML/MIN/1.73 M^2
GLUCOSE SERPL-MCNC: 102 MG/DL
HCT VFR BLD AUTO: 29.8 %
HGB BLD-MCNC: 9.7 G/DL
IMM GRANULOCYTES # BLD AUTO: 0.02 K/UL
IMM GRANULOCYTES NFR BLD AUTO: 1 %
LYMPHOCYTES # BLD AUTO: 0.1 K/UL
LYMPHOCYTES NFR BLD: 5.2 %
MAGNESIUM SERPL-MCNC: 2 MG/DL
MCH RBC QN AUTO: 32.3 PG
MCHC RBC AUTO-ENTMCNC: 32.6 G/DL
MCV RBC AUTO: 99 FL
MONOCYTES # BLD AUTO: 0 K/UL
MONOCYTES NFR BLD: 1 %
NEUTROPHILS # BLD AUTO: 1.7 K/UL
NEUTROPHILS NFR BLD: 88.6 %
NRBC BLD-RTO: 0 /100 WBC
PHOSPHATE SERPL-MCNC: 3.1 MG/DL
PLATELET # BLD AUTO: 189 K/UL
PLATELET BLD QL SMEAR: ABNORMAL
PMV BLD AUTO: 9.5 FL
POTASSIUM SERPL-SCNC: 4.6 MMOL/L
RBC # BLD AUTO: 3 M/UL
SODIUM SERPL-SCNC: 141 MMOL/L
TACROLIMUS BLD-MCNC: 9.6 NG/ML
WBC # BLD AUTO: 1.91 K/UL

## 2017-12-18 PROCEDURE — 99213 OFFICE O/P EST LOW 20 MIN: CPT | Mod: PBBFAC | Performed by: INTERNAL MEDICINE

## 2017-12-18 PROCEDURE — 83735 ASSAY OF MAGNESIUM: CPT

## 2017-12-18 PROCEDURE — 99215 OFFICE O/P EST HI 40 MIN: CPT | Mod: S$PBB,,, | Performed by: INTERNAL MEDICINE

## 2017-12-18 PROCEDURE — 85025 COMPLETE CBC W/AUTO DIFF WBC: CPT

## 2017-12-18 PROCEDURE — 80197 ASSAY OF TACROLIMUS: CPT

## 2017-12-18 PROCEDURE — 99999 PR PBB SHADOW E&M-EST. PATIENT-LVL III: CPT | Mod: PBBFAC,,, | Performed by: INTERNAL MEDICINE

## 2017-12-18 PROCEDURE — 80069 RENAL FUNCTION PANEL: CPT

## 2017-12-18 RX ORDER — TACROLIMUS 1 MG/1
CAPSULE ORAL
Qty: 150 CAPSULE | Refills: 11 | Status: SHIPPED | OUTPATIENT
Start: 2017-12-18 | End: 2018-01-03 | Stop reason: SDUPTHER

## 2017-12-18 NOTE — PROGRESS NOTES
Results reviewed and the following message sent to patient via [facility]: The break down of white count (ANC or absolute neutrophil count) was 1692, which is very good. No need for filgrastim/neupogen!

## 2017-12-18 NOTE — PROGRESS NOTES
"   Kidney Post-Transplant Assessment    Referring Physician: LUDA Raygoza  Current Nephrologist: LUDA Raygoza    ORGAN: RIGHT KIDNEY  Donor Type:  - brain death  [facility] Increased Risk: yes  Cold Ischemia: 852 mins  Induction Medications: campath - alemtuzumab (anti-cd52), steroids (prednisone,methylprednisolone,solumedrol,medrol,decadron)    Subjective:     CC:  Reassessment of renal allograft function and management of chronic immunosuppression.    HPI:  Ms. Rodriguez is a 52 y.o. year old White female who received a  - brain death kidney transplant on 17. Her most recent creatinine is 1.1 (0.9-1.1). She takes tacrolimus and MMF on hold for maintenance immunosuppression. Her post transplant course has been uncomplicated to date.    Pertinent History:  -ESRD at age 23.  Diagnosed with kidney disease at age 6 months  -Nephrocalcinosis diagnosed age 1  -Kidney transplant #1 at [facility] at , failed 2016 when she started dialysis. IS-CSA/MMF  -h/o pancytopenia with a bone marrow biopsy after the first kidney transplant   -AVN requiring hip replacement.  -DDKT #2  ([facility]-IR) 17 induced with Campath. cPRA pre txp 94%. KDPI 50%,  CIT 14+ hrs. Required 7 day pollack d/t friable bladder.Seen by surgery 17 (Dr. Barragan): "Scant serous discharge from wound. Redness with minimal blanching. Incision probed with sterile swab, no expression of fluid or pus. Recommend continued observation"  -Developed uvular edema post op with cough and hoarseness (ENT dx hydrops)  -CMV status ++; valcyte held d/t neutropenia  -Neutropenia cause bactrim to be D/C'd. Could not tolerate pentam d/t cough/vocal cord issues.  -Transaminitis suspected to be d/t meds  -MBD: , Vit D 24.  Cinacalcet and ergocalciferol started post op    She feels like she is improving and slowly returning to normal.  She was found + for CMV 17 and started back on Valcyte (held for low WBC). She notes insomnia and high UO the first " few hrs after lying down. She received neupogen last on 11/24/17. She denies any kidney-related complaints, such as pain over allograft, flank pain, dysuria, frequency, or other LUTS.     Review of Systems   Constitutional: Negative for fever.   Eyes: Negative for visual disturbance.   Respiratory: Negative for shortness of breath.    Cardiovascular: Negative for chest pain and leg swelling.   Gastrointestinal: Negative.    Genitourinary: Negative for decreased urine volume, difficulty urinating, dysuria and hematuria.   Musculoskeletal: Positive for arthralgias.   Skin: Negative for rash.   Allergic/Immunologic: Positive for immunocompromised state.   Neurological: Negative for tremors.     Objective:   Blood pressure (!) 140/68, pulse 68, temperature 97 °F (36.1 °C), temperature source Oral, resp. rate 16, height 5' (1.524 m), weight 59 kg (130 lb 1.1 oz), SpO2 99 %.body mass index is 25.4 kg/m².    Physical Exam   Constitutional: No distress.   Cardiovascular: Normal rate and regular rhythm.    Pulmonary/Chest: Effort normal and breath sounds normal. No respiratory distress.   Abdominal: Soft. Bowel sounds are normal. She exhibits no mass. There is no tenderness.   Musculoskeletal: She exhibits no edema.   Skin:   Incision with 5mm area that is healing by secondary intention; it appears clean and dry w/o drainage or erythema or infection.   Psychiatric: She has a normal mood and affect.     Labs:  Lab Results   Component Value Date    WBC 1.91 (LL) 12/18/2017    HGB 9.7 (L) 12/18/2017    HCT 29.8 (L) 12/18/2017     12/18/2017    K 4.6 12/18/2017     12/18/2017    CO2 24 12/18/2017    BUN 19 12/18/2017    CREATININE 0.9 12/18/2017    EGFRNONAA >60.0 12/18/2017    CALCIUM 9.1 12/18/2017    PHOS 3.1 12/18/2017    MG 2.0 12/18/2017    ALBUMIN 3.8 12/18/2017    AST 21 12/14/2017    ALT 28 12/14/2017    UTPCR Unable to calculate 12/04/2017    .0 (H) 12/04/2017    TACROLIMUS 9.6 12/18/2017   Labs were  reviewed with the patient    Assessment:     1. Chronic kidney disease (CKD), stage II (mild)    2. Other drug-induced neutropenia    3. Status post -donor kidney transplantation    4. Hypophosphatemia    5. Metabolic acidosis    6. At risk for opportunistic infections    7. Prophylactic immunotherapy    8. Long-term use of immunosuppressant medication    9. Hypomagnesemia        Plan:   -CKD2 s/p kidney transplantation, doing well. Continue to monitor renal function and electrolytes, HTN, secondary hyperparathyroidism and other issues related to underlying ESRD.  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 Follow PCRs weekly  -Continue tacrolimus-based immunosuppression.  Will continue to watch signs, symptoms and levels for side effects and drug toxicity.   -Neutropenia persists - keep watching closely. No need for filgrastim.  -Hypomagnesemia - resolved. Stop mag oxide  -Acidosis corrected on Na HCO3 650 mg BID. Watch on current dose.  -Hypophosphatemia. - resolved; stop KPN  -Transaminitis back to normal - cont to follow LFTs per guidelines    Follow-up:   Clinic: return to transplant clinic weekly for the first month after transplant; every 2 weeks during months 2-3; then at 6-, 9-, 12-, 18-, 24-, and 36- months post-transplant to reassess for complications from immunosuppression toxicity and monitor for rejection.  Annually thereafter.    Labs: since patient remains at high risk for rejection and drug-related complications that warrant close monitoring, labs will be ordered as follows: continue twice weekly CBC, renal panel, and drug level for first month; then same labs once weekly through 3rd month post-transplant.  Urine for UA and protein/creatinine ratio monthly.  Urine BK - PCR at 1-, 3-, 6-, 9-, 12-, 18-, 24-, and 36- months post-transplant.  Hepatic panel at 1-, 2-, 3-, 6-, 9-, 12-, 18-, 24-, and 36- months post-transplant.    Shreya Waldron MD       Education:   Material  provided to the patient.  Patient reminded to call with any health changes since these can be early signs of significant complications.  Also, I advised the patient to be sure any new medications or changes of old medications are discussed with either a pharmacist or physician knowledgeable with transplant to avoid rejection/drug toxicity related to significant drug interactions.

## 2017-12-18 NOTE — LETTER
December 18, 2017        LUDA Raygoza  [address]  [id number]  [facility] [address]  Phone: [phone]  Fax: [phone]             [facility]  [address]  [facility] [address]  Phone: [phone]   Patient: Apurva Rodriguez   MR Number: [id number]   YOB: 1965   Date of Visit: 12/18/2017       Dear Dr. LUDA Raygoza    Thank you for referring Apurva Rodriguez to me for evaluation. Attached you will find relevant portions of my assessment and plan of care.    If you have questions, please do not hesitate to call me. I look forward to following Apurva Rodriguez along with you.    Sincerely,    Shreya Waldron MD    Enclosure    If you would like to receive this communication electronically, please contact [email or web address] or [phone] to request [email or web address] Link access.    [email or web address] Link is a tool which provides read-only access to select patient information with whom you have a relationship. Its easy to use and provides real time access to review your patients record including encounter summaries, notes, results, and demographic information.    If you feel you have received this communication in error or would no longer like to receive these types of communications, please e-mail [email or web address]

## 2017-12-19 ENCOUNTER — TELEPHONE (OUTPATIENT)
Dept: TRANSPLANT | Facility: CLINIC | Age: 52
End: 2017-12-19

## 2017-12-19 LAB — CMV DNA SERPL NAA+PROBE-ACNC: 995 IU/ML

## 2017-12-19 NOTE — TELEPHONE ENCOUNTER
Late Entry for 12/18/17.  Received phone call from LEBRON Hills RN. Critical Low WBC 1.91.  Diff pending.Dr Tian and DREA Chauhan RN notified in Clinic.Dakshanet presently been seen by Dr Tian in clinic.

## 2017-12-20 NOTE — PROGRESS NOTES
Results reviewed and the following message sent to patient via [facility]: CMV count is dropping. Continue present treatment.

## 2017-12-20 NOTE — PATIENT INSTRUCTIONS
Thank you for entrusting your transplant care to us, and visiting me today.  Happy Holidays!  Please feel free to ask any questions and raise any concerns regarding your health care.  Warmest Regards,  Dr. Sally Waldron

## 2017-12-26 ENCOUNTER — LAB VISIT (OUTPATIENT)
Dept: LAB | Facility: HOSPITAL | Age: 52
End: 2017-12-26
Attending: INTERNAL MEDICINE
Payer: COMMERCIAL

## 2017-12-26 DIAGNOSIS — Z94.0 KIDNEY REPLACED BY TRANSPLANT: ICD-10-CM

## 2017-12-26 DIAGNOSIS — R74.8 ELEVATED LIVER ENZYMES: ICD-10-CM

## 2017-12-26 LAB
ALBUMIN SERPL BCP-MCNC: 4 G/DL
ALBUMIN SERPL BCP-MCNC: 4 G/DL
ALP SERPL-CCNC: 204 U/L
ALT SERPL W/O P-5'-P-CCNC: 37 U/L
ANION GAP SERPL CALC-SCNC: 9 MMOL/L
AST SERPL-CCNC: 26 U/L
BASOPHILS # BLD AUTO: 0.01 K/UL
BASOPHILS NFR BLD: 0.5 %
BILIRUB DIRECT SERPL-MCNC: 0.2 MG/DL
BILIRUB SERPL-MCNC: 0.5 MG/DL
BUN SERPL-MCNC: 20 MG/DL
CALCIUM SERPL-MCNC: 9.4 MG/DL
CHLORIDE SERPL-SCNC: 107 MMOL/L
CO2 SERPL-SCNC: 24 MMOL/L
CREAT SERPL-MCNC: 0.9 MG/DL
DIFFERENTIAL METHOD: ABNORMAL
EOSINOPHIL # BLD AUTO: 0.1 K/UL
EOSINOPHIL NFR BLD: 2.4 %
ERYTHROCYTE [DISTWIDTH] IN BLOOD BY AUTOMATED COUNT: 15.7 %
EST. GFR  (AFRICAN AMERICAN): >60 ML/MIN/1.73 M^2
EST. GFR  (NON AFRICAN AMERICAN): >60 ML/MIN/1.73 M^2
GLUCOSE SERPL-MCNC: 109 MG/DL
HCT VFR BLD AUTO: 33.3 %
HGB BLD-MCNC: 11 G/DL
LYMPHOCYTES # BLD AUTO: 0.1 K/UL
LYMPHOCYTES NFR BLD: 3.4 %
MAGNESIUM SERPL-MCNC: 1.8 MG/DL
MCH RBC QN AUTO: 32.9 PG
MCHC RBC AUTO-ENTMCNC: 33 G/DL
MCV RBC AUTO: 100 FL
MONOCYTES # BLD AUTO: 0 K/UL
MONOCYTES NFR BLD: 1 %
NEUTROPHILS # BLD AUTO: 1.9 K/UL
NEUTROPHILS NFR BLD: 92.7 %
PHOSPHATE SERPL-MCNC: 3.3 MG/DL
PLATELET # BLD AUTO: 139 K/UL
PMV BLD AUTO: 8.8 FL
POTASSIUM SERPL-SCNC: 4.5 MMOL/L
PROT SERPL-MCNC: 7.4 G/DL
RBC # BLD AUTO: 3.34 M/UL
SODIUM SERPL-SCNC: 140 MMOL/L
WBC # BLD AUTO: 2.08 K/UL

## 2017-12-26 PROCEDURE — 80069 RENAL FUNCTION PANEL: CPT

## 2017-12-26 PROCEDURE — 80197 ASSAY OF TACROLIMUS: CPT

## 2017-12-26 PROCEDURE — 83735 ASSAY OF MAGNESIUM: CPT

## 2017-12-26 PROCEDURE — 85025 COMPLETE CBC W/AUTO DIFF WBC: CPT

## 2017-12-26 PROCEDURE — 80076 HEPATIC FUNCTION PANEL: CPT

## 2017-12-27 LAB
CMV DNA SERPL NAA+PROBE-ACNC: <137 IU/ML
TACROLIMUS BLD-MCNC: 8.5 NG/ML

## 2018-01-02 ENCOUNTER — DOCUMENTATION ONLY (OUTPATIENT)
Dept: TRANSPLANT | Facility: CLINIC | Age: 53
End: 2018-01-02

## 2018-01-02 ENCOUNTER — LAB VISIT (OUTPATIENT)
Dept: LAB | Facility: HOSPITAL | Age: 53
End: 2018-01-02
Attending: INTERNAL MEDICINE
Payer: COMMERCIAL

## 2018-01-02 DIAGNOSIS — Z94.0 KIDNEY REPLACED BY TRANSPLANT: ICD-10-CM

## 2018-01-02 LAB
ALBUMIN SERPL BCP-MCNC: 3.9 G/DL
ALBUMIN SERPL BCP-MCNC: 3.9 G/DL
ALP SERPL-CCNC: 200 U/L
ALT SERPL W/O P-5'-P-CCNC: 72 U/L
ANION GAP SERPL CALC-SCNC: 7 MMOL/L
ANISOCYTOSIS BLD QL SMEAR: SLIGHT
AST SERPL-CCNC: 39 U/L
BASOPHILS # BLD AUTO: ABNORMAL K/UL
BASOPHILS NFR BLD: 3 %
BILIRUB DIRECT SERPL-MCNC: 0.2 MG/DL
BILIRUB SERPL-MCNC: 0.6 MG/DL
BUN SERPL-MCNC: 20 MG/DL
CALCIUM SERPL-MCNC: 8.8 MG/DL
CHLORIDE SERPL-SCNC: 108 MMOL/L
CO2 SERPL-SCNC: 25 MMOL/L
CREAT SERPL-MCNC: 0.9 MG/DL
DIFFERENTIAL METHOD: ABNORMAL
EOSINOPHIL # BLD AUTO: ABNORMAL K/UL
EOSINOPHIL NFR BLD: 2 %
ERYTHROCYTE [DISTWIDTH] IN BLOOD BY AUTOMATED COUNT: 15.6 %
EST. GFR  (AFRICAN AMERICAN): >60 ML/MIN/1.73 M^2
EST. GFR  (NON AFRICAN AMERICAN): >60 ML/MIN/1.73 M^2
GLUCOSE SERPL-MCNC: 100 MG/DL
HCT VFR BLD AUTO: 33.4 %
HGB BLD-MCNC: 11.1 G/DL
HYPOCHROMIA BLD QL SMEAR: ABNORMAL
IMM GRANULOCYTES # BLD AUTO: ABNORMAL K/UL
IMM GRANULOCYTES NFR BLD AUTO: ABNORMAL %
LYMPHOCYTES # BLD AUTO: ABNORMAL K/UL
LYMPHOCYTES NFR BLD: 7 %
MAGNESIUM SERPL-MCNC: 1.8 MG/DL
MCH RBC QN AUTO: 34.2 PG
MCHC RBC AUTO-ENTMCNC: 33.2 G/DL
MCV RBC AUTO: 103 FL
MONOCYTES # BLD AUTO: ABNORMAL K/UL
MONOCYTES NFR BLD: 1 %
NEUTROPHILS NFR BLD: 87 %
NRBC BLD-RTO: 0 /100 WBC
OVALOCYTES BLD QL SMEAR: ABNORMAL
PHOSPHATE SERPL-MCNC: 3.5 MG/DL
PLATELET # BLD AUTO: 150 K/UL
PLATELET BLD QL SMEAR: ABNORMAL
PMV BLD AUTO: 9.9 FL
POIKILOCYTOSIS BLD QL SMEAR: SLIGHT
POLYCHROMASIA BLD QL SMEAR: ABNORMAL
POTASSIUM SERPL-SCNC: 4.2 MMOL/L
PROT SERPL-MCNC: 7.2 G/DL
RBC # BLD AUTO: 3.25 M/UL
SODIUM SERPL-SCNC: 140 MMOL/L
WBC # BLD AUTO: 1.66 K/UL

## 2018-01-02 PROCEDURE — 85027 COMPLETE CBC AUTOMATED: CPT

## 2018-01-02 PROCEDURE — 84075 ASSAY ALKALINE PHOSPHATASE: CPT

## 2018-01-02 PROCEDURE — 36415 COLL VENOUS BLD VENIPUNCTURE: CPT | Mod: PO

## 2018-01-02 PROCEDURE — 83735 ASSAY OF MAGNESIUM: CPT

## 2018-01-02 PROCEDURE — 80069 RENAL FUNCTION PANEL: CPT

## 2018-01-02 PROCEDURE — 85007 BL SMEAR W/DIFF WBC COUNT: CPT

## 2018-01-02 PROCEDURE — 87799 DETECT AGENT NOS DNA QUANT: CPT

## 2018-01-02 PROCEDURE — 80197 ASSAY OF TACROLIMUS: CPT

## 2018-01-03 ENCOUNTER — PATIENT MESSAGE (OUTPATIENT)
Dept: TRANSPLANT | Facility: CLINIC | Age: 53
End: 2018-01-03

## 2018-01-03 ENCOUNTER — OFFICE VISIT (OUTPATIENT)
Dept: TRANSPLANT | Facility: CLINIC | Age: 53
End: 2018-01-03
Payer: COMMERCIAL

## 2018-01-03 VITALS
DIASTOLIC BLOOD PRESSURE: 86 MMHG | RESPIRATION RATE: 16 BRPM | HEIGHT: 60 IN | SYSTOLIC BLOOD PRESSURE: 164 MMHG | TEMPERATURE: 97 F | OXYGEN SATURATION: 100 % | HEART RATE: 74 BPM | BODY MASS INDEX: 25.97 KG/M2 | WEIGHT: 132.25 LBS

## 2018-01-03 DIAGNOSIS — Z29.89 PROPHYLACTIC IMMUNOTHERAPY: ICD-10-CM

## 2018-01-03 DIAGNOSIS — N18.2 CHRONIC KIDNEY DISEASE (CKD), STAGE II (MILD): Primary | Chronic | ICD-10-CM

## 2018-01-03 DIAGNOSIS — Z94.0 STATUS POST DECEASED-DONOR KIDNEY TRANSPLANTATION: ICD-10-CM

## 2018-01-03 DIAGNOSIS — E87.20 METABOLIC ACIDOSIS: ICD-10-CM

## 2018-01-03 DIAGNOSIS — D70.2 OTHER DRUG-INDUCED NEUTROPENIA: ICD-10-CM

## 2018-01-03 DIAGNOSIS — N25.81 SECONDARY HYPERPARATHYROIDISM OF RENAL ORIGIN: ICD-10-CM

## 2018-01-03 DIAGNOSIS — Z91.89 AT RISK FOR OPPORTUNISTIC INFECTIONS: ICD-10-CM

## 2018-01-03 DIAGNOSIS — R35.0 URINARY FREQUENCY: ICD-10-CM

## 2018-01-03 LAB
CMV DNA SERPL NAA+PROBE-ACNC: <137 IU/ML
TACROLIMUS BLD-MCNC: 10.1 NG/ML

## 2018-01-03 PROCEDURE — 99999 PR PBB SHADOW E&M-EST. PATIENT-LVL IV: CPT | Mod: PBBFAC,,, | Performed by: INTERNAL MEDICINE

## 2018-01-03 PROCEDURE — 99215 OFFICE O/P EST HI 40 MIN: CPT | Mod: S$GLB,,, | Performed by: INTERNAL MEDICINE

## 2018-01-03 RX ORDER — SODIUM BICARBONATE 650 MG/1
650 TABLET ORAL DAILY
Qty: 30 TABLET | Refills: 11 | Status: SHIPPED | OUTPATIENT
Start: 2018-01-03 | End: 2018-01-17 | Stop reason: ALTCHOICE

## 2018-01-03 RX ORDER — TACROLIMUS 1 MG/1
CAPSULE ORAL
Qty: 120 CAPSULE | Refills: 11 | Status: SHIPPED | OUTPATIENT
Start: 2018-01-03 | End: 2018-01-12 | Stop reason: SDUPTHER

## 2018-01-03 NOTE — PROGRESS NOTES
"   Kidney Post-Transplant Assessment    Referring Physician: LUDA Raygoza  Current Nephrologist: LUDA Raygoza    ORGAN: RIGHT KIDNEY  Donor Type:  - brain death  [facility] Increased Risk: yes  Cold Ischemia: 852 mins  Induction Medications: campath - alemtuzumab (anti-cd52), steroids (prednisone,methylprednisolone,solumedrol,medrol,decadron)    Subjective:     CC:  Reassessment of renal allograft function and management of chronic immunosuppression.    HPI:  Ms. Rodriguez is a 52 y.o. year old White female who received a  - brain death kidney transplant on 17. Her creatinines range 0.9-1.1. She takes tacrolimus and MMF on hold for maintenance immunosuppression. Her post transplant course has been uncomplicated to date.    Pertinent History:  -ESRD at age 23.  Diagnosed with kidney disease at age 6 months  -Nephrocalcinosis diagnosed age 1  -Kidney transplant #1 at [facility] at , failed 2016 when she started dialysis. IS-CSA/MMF  -h/o pancytopenia with a bone marrow biopsy after the first kidney transplant   -AVN requiring hip replacement.  -DDKT #2  ([facility]) 17 induced with Campath. cPRA pre txp 94%. KDPI 50%,  CIT 14+ hrs. Required 7 day pollack d/t friable bladder.Seen by surgery 17 (Dr. Barragan): "Scant serous discharge from wound. Redness with minimal blanching. Incision probed with sterile swab, no expression of fluid or pus. Recommend continued observation"  -Developed uvular edema post op with cough and hoarseness (ENT dx hydrops)  -CMV status ++; valcyte held d/t neutropenia  -Neutropenia cause bactrim to be D/C'd. Could not tolerate pentam d/t cough/vocal cord issues.  -Transaminitis suspected to be d/t meds  -MBD: , Vit D 24.  Cinacalcet and ergocalciferol started post op    She feels like she is improving and slowly returning to normal.  She was found + for CMV 17 and started back on Valcyte (held for low WBC). She notes insomnia and high UO the first few hrs after " lying down. She received neupogen last on 11/24/17. She denies any kidney-related complaints, such as pain over allograft, flank pain, dysuria, frequency, or other LUTS.     Review of Systems   Constitutional: Negative for fever.   Eyes: Negative for visual disturbance.   Respiratory: Negative for shortness of breath.    Cardiovascular: Negative for chest pain and leg swelling.   Gastrointestinal: Negative.    Genitourinary: Negative for decreased urine volume, difficulty urinating, dysuria and hematuria.   Musculoskeletal: Positive for arthralgias.   Skin: Negative for rash.   Allergic/Immunologic: Positive for immunocompromised state.   Neurological: Negative for tremors.     Objective:   Blood pressure (!) 164/86, pulse 74, temperature 97.1 °F (36.2 °C), temperature source Oral, resp. rate 16, height 5' (1.524 m), weight 60 kg (132 lb 4.4 oz), SpO2 100 %.body mass index is 25.83 kg/m².    Physical Exam   Constitutional: No distress.   Cardiovascular: Normal rate and regular rhythm.    Pulmonary/Chest: Effort normal and breath sounds normal. No respiratory distress.   Abdominal: Soft. Bowel sounds are normal. She exhibits no mass. There is no tenderness.   Musculoskeletal: She exhibits no edema.   Skin:   Well healed incision lower abd post transplant   Psychiatric: She has a normal mood and affect.     Labs:  Lab Results   Component Value Date    WBC 1.66 (LL) 01/02/2018    HGB 11.1 (L) 01/02/2018    HCT 33.4 (L) 01/02/2018     01/02/2018    K 4.2 01/02/2018     01/02/2018    CO2 25 01/02/2018    BUN 20 01/02/2018    CREATININE 0.9 01/02/2018    EGFRNONAA >60.0 01/02/2018    CALCIUM 8.8 01/02/2018    PHOS 3.5 01/02/2018    MG 1.8 01/02/2018    ALBUMIN 3.9 01/02/2018    ALBUMIN 3.9 01/02/2018    AST 39 01/02/2018    ALT 72 (H) 01/02/2018    UTPCR Unable to calculate 01/02/2018    .0 (H) 12/04/2017    TACROLIMUS 10.1 01/02/2018   Labs were reviewed with the patient    Assessment:     1. Chronic  kidney disease (CKD), stage II (mild)    2. Status post -donor kidney transplantation    3. Metabolic acidosis    4. Prophylactic immunotherapy    5. At risk for opportunistic infections    6. Secondary hyperparathyroidism of renal origin    7. Other drug-induced neutropenia    8. Urinary frequency        Plan:   -CKD2 s/p kidney transplantation, doing well. Continue to monitor renal function and electrolytes, HTN, secondary hyperparathyroidism and other issues related to underlying ESRD.  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 Follow PCRs weekly; if pending lab undetectable, will lower dose to prophylaxis  -Continue tacrolimus-based immunosuppression.  Will continue to watch signs, symptoms and levels for side effects and drug toxicity.   -Neutropenia persists - keep watching closely. Continue to hold MMF for now. No need for filgrastim.  -Hypomagnesemia - resolved. Can stop checking Mg levels.  -Acidosis corrected on Na HCO3 650 mg BID. Will drop to 650 mg daily.  -Transaminitis waxes and wanes - cont to follow LFTs with help from hepatology. Segun MEAD aware of labs today.  -Urinary frequency may be r/t bladder spasms; I suggested uroGyn thang and placed consult. She is aware she can call and make appt.    Follow-up:   Clinic: return to transplant clinic weekly for the first month after transplant; every 2 weeks during months 2-3; then at 6-, 9-, 12-, 18-, 24-, and 36- months post-transplant to reassess for complications from immunosuppression toxicity and monitor for rejection.  Annually thereafter.    Labs: since patient remains at high risk for rejection and drug-related complications that warrant close monitoring, labs will be ordered as follows: continue twice weekly CBC, renal panel, and drug level for first month; then same labs once weekly through 3rd month post-transplant.  Urine for UA and protein/creatinine ratio monthly.  Urine BK - PCR at 1-, 3-, 6-, 9-, 12-, 18-, 24-, and 36-  months post-transplant.  Hepatic panel at 1-, 2-, 3-, 6-, 9-, 12-, 18-, 24-, and 36- months post-transplant.    Shreya Waldron MD       Education:   Material provided to the patient.  Patient reminded to call with any health changes since these can be early signs of significant complications.  Also, I advised the patient to be sure any new medications or changes of old medications are discussed with either a pharmacist or physician knowledgeable with transplant to avoid rejection/drug toxicity related to significant drug interactions.

## 2018-01-03 NOTE — PROGRESS NOTES
Results reviewed and the following message sent to patient via [facility]: Decrease tacrolimus to 2 mg twice daily.

## 2018-01-03 NOTE — LETTER
January 3, 2018        LUDA Raygoza  [address]  [id number]  [place] [address]  Phone: [phone]  Fax: [phone]             [facility]  [address]  [facility] [address]  Phone: [phone]   Patient: Apurva Rodriguez   MR Number: [id number]   YOB: 1965   Date of Visit: 1/3/2018       Dear Dr. LUDA Raygoza    Thank you for referring Apurva Rodriguez to me for evaluation. Attached you will find relevant portions of my assessment and plan of care.    If you have questions, please do not hesitate to call me. I look forward to following Apurva Rodriguez along with you.    Sincerely,    Shreya Waldron MD    Enclosure    If you would like to receive this communication electronically, please contact [email or web address] or [phone] to request [employer] Link access.    [employer] Link is a tool which provides read-only access to select patient information with whom you have a relationship. Its easy to use and provides real time access to review your patients record including encounter summaries, notes, results, and demographic information.    If you feel you have received this communication in error or would no longer like to receive these types of communications, please e-mail [email or web address]

## 2018-01-04 ENCOUNTER — TELEPHONE (OUTPATIENT)
Dept: TRANSPLANT | Facility: CLINIC | Age: 53
End: 2018-01-04

## 2018-01-04 LAB
BK VIRUS DNA PCR, QUANT, BLOOD: <125 COPIES/ML
BK VIRUS DNA, BLOOD: NOT DETECTED
LOG BKV COPIES/ML: <2.1 LOG (10) COPIES/ML

## 2018-01-04 RX ORDER — VALGANCICLOVIR 450 MG/1
900 TABLET, FILM COATED ORAL DAILY
Qty: 60 TABLET | Refills: 3
Start: 2018-01-04 | End: 2018-05-23 | Stop reason: ALTCHOICE

## 2018-01-04 NOTE — TELEPHONE ENCOUNTER
----- Message from Shreya Waldron MD sent at 1/4/2018 11:49 AM CST -----  Results reviewed and the following message sent to patient via [facility]: Since CMV level is not quantifiable, let's lower your Valcyte dose to 900 mg daily.

## 2018-01-04 NOTE — PROGRESS NOTES
Results reviewed and the following message sent to patient via [facility]: Since CMV level is not quantifiable, let's lower your Valcyte dose to 900 mg daily.

## 2018-01-08 ENCOUNTER — DOCUMENTATION ONLY (OUTPATIENT)
Dept: TRANSPLANT | Facility: CLINIC | Age: 53
End: 2018-01-08

## 2018-01-08 ENCOUNTER — LAB VISIT (OUTPATIENT)
Dept: LAB | Facility: HOSPITAL | Age: 53
End: 2018-01-08
Attending: INTERNAL MEDICINE
Payer: COMMERCIAL

## 2018-01-08 DIAGNOSIS — Z94.0 KIDNEY REPLACED BY TRANSPLANT: ICD-10-CM

## 2018-01-08 DIAGNOSIS — R74.8 ELEVATED LIVER ENZYMES: ICD-10-CM

## 2018-01-08 LAB
ALBUMIN SERPL BCP-MCNC: 4 G/DL
ALBUMIN SERPL BCP-MCNC: 4 G/DL
ALP SERPL-CCNC: 211 U/L
ALT SERPL W/O P-5'-P-CCNC: 80 U/L
ANION GAP SERPL CALC-SCNC: 8 MMOL/L
ANISOCYTOSIS BLD QL SMEAR: SLIGHT
AST SERPL-CCNC: 42 U/L
BASOPHILS # BLD AUTO: 0.04 K/UL
BASOPHILS NFR BLD: 2.5 %
BILIRUB DIRECT SERPL-MCNC: 0.2 MG/DL
BILIRUB SERPL-MCNC: 0.7 MG/DL
BUN SERPL-MCNC: 18 MG/DL
CALCIUM SERPL-MCNC: 9.2 MG/DL
CHLORIDE SERPL-SCNC: 109 MMOL/L
CO2 SERPL-SCNC: 25 MMOL/L
CREAT SERPL-MCNC: 0.9 MG/DL
DIFFERENTIAL METHOD: ABNORMAL
EOSINOPHIL # BLD AUTO: 0 K/UL
EOSINOPHIL NFR BLD: 1.9 %
ERYTHROCYTE [DISTWIDTH] IN BLOOD BY AUTOMATED COUNT: 15.7 %
EST. GFR  (AFRICAN AMERICAN): >60 ML/MIN/1.73 M^2
EST. GFR  (NON AFRICAN AMERICAN): >60 ML/MIN/1.73 M^2
GLUCOSE SERPL-MCNC: 94 MG/DL
HCT VFR BLD AUTO: 35.9 %
HGB BLD-MCNC: 11.7 G/DL
HYPOCHROMIA BLD QL SMEAR: ABNORMAL
IMM GRANULOCYTES # BLD AUTO: 0.01 K/UL
IMM GRANULOCYTES NFR BLD AUTO: 0.6 %
LYMPHOCYTES # BLD AUTO: 0.1 K/UL
LYMPHOCYTES NFR BLD: 4.3 %
MAGNESIUM SERPL-MCNC: 1.5 MG/DL
MCH RBC QN AUTO: 33.3 PG
MCHC RBC AUTO-ENTMCNC: 32.6 G/DL
MCV RBC AUTO: 102 FL
MONOCYTES # BLD AUTO: 0.1 K/UL
MONOCYTES NFR BLD: 3.1 %
NEUTROPHILS # BLD AUTO: 1.4 K/UL
NEUTROPHILS NFR BLD: 87.6 %
NRBC BLD-RTO: 0 /100 WBC
OVALOCYTES BLD QL SMEAR: ABNORMAL
PHOSPHATE SERPL-MCNC: 3.3 MG/DL
PLATELET # BLD AUTO: 163 K/UL
PLATELET BLD QL SMEAR: ABNORMAL
PMV BLD AUTO: 10.3 FL
POIKILOCYTOSIS BLD QL SMEAR: SLIGHT
POLYCHROMASIA BLD QL SMEAR: ABNORMAL
POTASSIUM SERPL-SCNC: 4.2 MMOL/L
PROT SERPL-MCNC: 7.5 G/DL
RBC # BLD AUTO: 3.51 M/UL
SODIUM SERPL-SCNC: 142 MMOL/L
WBC # BLD AUTO: 1.61 K/UL

## 2018-01-08 PROCEDURE — 85027 COMPLETE CBC AUTOMATED: CPT

## 2018-01-08 PROCEDURE — 83735 ASSAY OF MAGNESIUM: CPT

## 2018-01-08 PROCEDURE — 80069 RENAL FUNCTION PANEL: CPT

## 2018-01-08 PROCEDURE — 36415 COLL VENOUS BLD VENIPUNCTURE: CPT | Mod: PO

## 2018-01-08 PROCEDURE — 85007 BL SMEAR W/DIFF WBC COUNT: CPT | Mod: NCS

## 2018-01-08 PROCEDURE — 85025 COMPLETE CBC W/AUTO DIFF WBC: CPT

## 2018-01-08 PROCEDURE — 80197 ASSAY OF TACROLIMUS: CPT

## 2018-01-08 PROCEDURE — 80076 HEPATIC FUNCTION PANEL: CPT

## 2018-01-08 NOTE — PROGRESS NOTES
Results reviewed and the following message sent to patient via [facility]: ANC 1410 is acceptable. No need for filgrastim.

## 2018-01-09 ENCOUNTER — PATIENT MESSAGE (OUTPATIENT)
Dept: TRANSPLANT | Facility: CLINIC | Age: 53
End: 2018-01-09

## 2018-01-09 LAB
CMV DNA SERPL NAA+PROBE-ACNC: NORMAL IU/ML
TACROLIMUS BLD-MCNC: 6.5 NG/ML

## 2018-01-09 NOTE — PROGRESS NOTES
Results reviewed and the following message sent to patient via [facility]: Isacc Mixon, I recommend going up on tacrolimus to 2.5 mg twice daily; until you get 0.5 mg caps, go ahead and take 3 mg in AM and 2 mg in PM.

## 2018-01-10 DIAGNOSIS — Z94.0 KIDNEY REPLACED BY TRANSPLANT: Primary | ICD-10-CM

## 2018-01-10 NOTE — TELEPHONE ENCOUNTER
----- Message from Shreya Waldron MD sent at 1/9/2018  5:01 PM CST -----  Results reviewed and the following message sent to patient via [facility]: Isacc Mixon, I recommend going up on tacrolimus to 2.5 mg twice daily; until you get 0.5 mg caps, go ahead and take 3 mg in AM and 2 mg in PM.

## 2018-01-11 RX ORDER — TACROLIMUS 0.5 MG/1
0.5 CAPSULE ORAL EVERY 12 HOURS
Qty: 60 CAPSULE | Refills: 11 | Status: SHIPPED | OUTPATIENT
Start: 2018-01-11 | End: 2018-02-20 | Stop reason: SDUPTHER

## 2018-01-12 RX ORDER — TACROLIMUS 1 MG/1
CAPSULE ORAL
Qty: 60 CAPSULE | Refills: 11
Start: 2018-01-12 | End: 2018-02-20 | Stop reason: SDUPTHER

## 2018-01-15 ENCOUNTER — LAB VISIT (OUTPATIENT)
Dept: LAB | Facility: HOSPITAL | Age: 53
End: 2018-01-15
Attending: INTERNAL MEDICINE
Payer: COMMERCIAL

## 2018-01-15 DIAGNOSIS — Z94.0 KIDNEY REPLACED BY TRANSPLANT: ICD-10-CM

## 2018-01-15 LAB
ALBUMIN SERPL BCP-MCNC: 3.9 G/DL
ANION GAP SERPL CALC-SCNC: 7 MMOL/L
BASOPHILS # BLD AUTO: 0.05 K/UL
BASOPHILS NFR BLD: 2.5 %
BUN SERPL-MCNC: 18 MG/DL
CALCIUM SERPL-MCNC: 9.1 MG/DL
CHLORIDE SERPL-SCNC: 109 MMOL/L
CO2 SERPL-SCNC: 25 MMOL/L
CREAT SERPL-MCNC: 0.9 MG/DL
DIFFERENTIAL METHOD: ABNORMAL
EOSINOPHIL # BLD AUTO: 0 K/UL
EOSINOPHIL NFR BLD: 1.5 %
ERYTHROCYTE [DISTWIDTH] IN BLOOD BY AUTOMATED COUNT: 15 %
EST. GFR  (AFRICAN AMERICAN): >60 ML/MIN/1.73 M^2
EST. GFR  (NON AFRICAN AMERICAN): >60 ML/MIN/1.73 M^2
GLUCOSE SERPL-MCNC: 106 MG/DL
HCT VFR BLD AUTO: 35.7 %
HGB BLD-MCNC: 11.8 G/DL
IMM GRANULOCYTES # BLD AUTO: 0 K/UL
IMM GRANULOCYTES NFR BLD AUTO: 0 %
LYMPHOCYTES # BLD AUTO: 0.1 K/UL
LYMPHOCYTES NFR BLD: 5 %
MAGNESIUM SERPL-MCNC: 1.8 MG/DL
MCH RBC QN AUTO: 33.6 PG
MCHC RBC AUTO-ENTMCNC: 33.1 G/DL
MCV RBC AUTO: 102 FL
MONOCYTES # BLD AUTO: 0.2 K/UL
MONOCYTES NFR BLD: 10.9 %
NEUTROPHILS # BLD AUTO: 1.6 K/UL
NEUTROPHILS NFR BLD: 80.1 %
NRBC BLD-RTO: 0 /100 WBC
PHOSPHATE SERPL-MCNC: 3.3 MG/DL
PLATELET # BLD AUTO: 128 K/UL
PMV BLD AUTO: 10.5 FL
POTASSIUM SERPL-SCNC: 4.3 MMOL/L
RBC # BLD AUTO: 3.51 M/UL
SODIUM SERPL-SCNC: 141 MMOL/L
WBC # BLD AUTO: 2.01 K/UL

## 2018-01-15 PROCEDURE — 80069 RENAL FUNCTION PANEL: CPT

## 2018-01-15 PROCEDURE — 80197 ASSAY OF TACROLIMUS: CPT

## 2018-01-15 PROCEDURE — 83735 ASSAY OF MAGNESIUM: CPT

## 2018-01-15 PROCEDURE — 36415 COLL VENOUS BLD VENIPUNCTURE: CPT | Mod: PO

## 2018-01-15 PROCEDURE — 85025 COMPLETE CBC W/AUTO DIFF WBC: CPT

## 2018-01-16 LAB — TACROLIMUS BLD-MCNC: 8.6 NG/ML

## 2018-01-17 ENCOUNTER — TELEPHONE (OUTPATIENT)
Dept: TRANSPLANT | Facility: CLINIC | Age: 53
End: 2018-01-17

## 2018-01-17 ENCOUNTER — OFFICE VISIT (OUTPATIENT)
Dept: TRANSPLANT | Facility: CLINIC | Age: 53
End: 2018-01-17
Payer: COMMERCIAL

## 2018-01-17 VITALS
HEIGHT: 60 IN | DIASTOLIC BLOOD PRESSURE: 80 MMHG | RESPIRATION RATE: 16 BRPM | BODY MASS INDEX: 26.05 KG/M2 | WEIGHT: 132.69 LBS | TEMPERATURE: 98 F | HEART RATE: 68 BPM | SYSTOLIC BLOOD PRESSURE: 118 MMHG | OXYGEN SATURATION: 100 %

## 2018-01-17 DIAGNOSIS — N25.81 SECONDARY HYPERPARATHYROIDISM OF RENAL ORIGIN: ICD-10-CM

## 2018-01-17 DIAGNOSIS — N18.2 CHRONIC KIDNEY DISEASE (CKD), STAGE II (MILD): Primary | Chronic | ICD-10-CM

## 2018-01-17 DIAGNOSIS — E87.20 METABOLIC ACIDOSIS: ICD-10-CM

## 2018-01-17 DIAGNOSIS — R30.0 DYSURIA: ICD-10-CM

## 2018-01-17 DIAGNOSIS — Z29.89 PROPHYLACTIC IMMUNOTHERAPY: ICD-10-CM

## 2018-01-17 DIAGNOSIS — D70.2 OTHER DRUG-INDUCED NEUTROPENIA: ICD-10-CM

## 2018-01-17 DIAGNOSIS — Z94.0 STATUS POST DECEASED-DONOR KIDNEY TRANSPLANTATION: ICD-10-CM

## 2018-01-17 DIAGNOSIS — Z79.60 LONG-TERM USE OF IMMUNOSUPPRESSANT MEDICATION: ICD-10-CM

## 2018-01-17 DIAGNOSIS — Z91.89 AT RISK FOR OPPORTUNISTIC INFECTIONS: ICD-10-CM

## 2018-01-17 PROBLEM — E83.42 HYPOMAGNESEMIA: Status: RESOLVED | Noted: 2017-11-13 | Resolved: 2018-01-17

## 2018-01-17 PROBLEM — E87.6 HYPOKALEMIA: Status: RESOLVED | Noted: 2017-11-13 | Resolved: 2018-01-17

## 2018-01-17 PROBLEM — E83.39 HYPOPHOSPHATEMIA: Status: RESOLVED | Noted: 2017-11-09 | Resolved: 2018-01-17

## 2018-01-17 LAB
BILIRUB UR QL STRIP: NEGATIVE
CLARITY UR REFRACT.AUTO: CLEAR
COLOR UR AUTO: YELLOW
GLUCOSE UR QL STRIP: NEGATIVE
HGB UR QL STRIP: NEGATIVE
KETONES UR QL STRIP: NEGATIVE
LEUKOCYTE ESTERASE UR QL STRIP: NEGATIVE
NITRITE UR QL STRIP: NEGATIVE
PH UR STRIP: 8 [PH] (ref 5–8)
PROT UR QL STRIP: NEGATIVE
SP GR UR STRIP: 1.01 (ref 1–1.03)
URN SPEC COLLECT METH UR: NORMAL
UROBILINOGEN UR STRIP-ACNC: NEGATIVE EU/DL

## 2018-01-17 PROCEDURE — 81003 URINALYSIS AUTO W/O SCOPE: CPT

## 2018-01-17 PROCEDURE — 99215 OFFICE O/P EST HI 40 MIN: CPT | Mod: S$GLB,,, | Performed by: INTERNAL MEDICINE

## 2018-01-17 PROCEDURE — 99999 PR PBB SHADOW E&M-EST. PATIENT-LVL IV: CPT | Mod: PBBFAC,,, | Performed by: INTERNAL MEDICINE

## 2018-01-17 PROCEDURE — 87086 URINE CULTURE/COLONY COUNT: CPT

## 2018-01-17 NOTE — LETTER
January 17, 2018        LUDA Raygoza  [address]  [id number]  [facility] [address]  Phone: [phone]  Fax: [phone]             [facility]  [address]  [facility] [address]  Phone: [phone]   Patient: Apurva Rodriguez   MR Number: [id number]   YOB: 1965   Date of Visit: 1/17/2018       Dear Dr. LUDA Raygoza    Thank you for referring Apurva Rodriguez to me for evaluation. Attached you will find relevant portions of my assessment and plan of care.    If you have questions, please do not hesitate to call me. I look forward to following Apurva Rodriguez along with you.    Sincerely,    Shreya Waldron MD    Enclosure    If you would like to receive this communication electronically, please contact [email or web address] or [phone] to request [employer] Link access.    [employer] Link is a tool which provides read-only access to select patient information with whom you have a relationship. Its easy to use and provides real time access to review your patients record including encounter summaries, notes, results, and demographic information.    If you feel you have received this communication in error or would no longer like to receive these types of communications, please e-mail [email or web address]

## 2018-01-17 NOTE — PROGRESS NOTES
Results reviewed and the following message sent to patient via [facility]: Urinalysis suggests no evidence of infection. Will await culture.

## 2018-01-17 NOTE — PROGRESS NOTES
"   Kidney Post-Transplant Assessment    Referring Physician: LUDA Raygoza  Current Nephrologist: LUDA Raygoza    ORGAN: RIGHT KIDNEY  Donor Type:  - brain death  PHS Increased Risk: yes  Cold Ischemia: 852 mins  Induction Medications: campath - alemtuzumab (anti-cd52), steroids (prednisone,methylprednisolone,solumedrol,medrol,decadron)    Subjective:     CC:  Reassessment of renal allograft function and management of chronic immunosuppression.    HPI:  Ms. Rodriguez is a 52 y.o. year old White female who received a  - brain death kidney transplant on 17. Her creatinines range 0.9-1.1. She takes tacrolimus and MMF on hold for maintenance immunosuppression. Her post transplant course has been uncomplicated to date.    Pertinent History:  -ESRD at age 23.  Diagnosed with kidney disease at age 6 months  -Nephrocalcinosis diagnosed age 1  -Kidney transplant #1 at [facility] at , failed 2016 when she started dialysis. IS-CSA/MMF  -h/o pancytopenia with a bone marrow biopsy after the first kidney transplant   -AVN requiring hip replacement.  -DDKT #2  (PHS-IR) 17 induced with Campath. cPRA pre txp 94%. KDPI 50%,  CIT 14+ hrs. Required 7 day pollack d/t friable bladder.Seen by surgery 17 (Dr. Barragan): "Scant serous discharge from wound. Redness with minimal blanching. Incision probed with sterile swab, no expression of fluid or pus. Recommend continued observation"  -Developed uvular edema post op with cough and hoarseness (ENT dx hydrops)  -CMV status ++; valcyte held d/t neutropenia  -Neutropenia cause bactrim to be D/C'd. Could not tolerate pentam d/t cough/vocal cord issues.  -Transaminitis suspected to be d/t meds  -MBD: , Vit D 24.  Cinacalcet and ergocalciferol started post op    Apurva notes some burning with urination this AM, but otherwise denies complaints. She reports no fever, URI symptoms, N/V/D, or other LUTS. She notes no pain over allograft.    Review of Systems "   Constitutional: Negative for fever.   Eyes: Negative for visual disturbance.   Respiratory: Negative for shortness of breath.    Cardiovascular: Negative for chest pain and leg swelling.   Gastrointestinal: Negative.    Genitourinary: Positive for dysuria. Negative for difficulty urinating and hematuria.   Skin: Negative for rash.   Allergic/Immunologic: Positive for immunocompromised state.   Neurological: Negative for tremors.     Objective:   Blood pressure 118/80, pulse 68, temperature 97.8 °F (36.6 °C), temperature source Oral, resp. rate 16, height 5' (1.524 m), weight 60.2 kg (132 lb 11.5 oz), SpO2 100 %.body mass index is 25.92 kg/m².    Physical Exam   Constitutional: No distress.   Cardiovascular: Normal rate and regular rhythm.    Pulmonary/Chest: Effort normal and breath sounds normal. No respiratory distress.   Abdominal: Soft. Bowel sounds are normal. She exhibits no mass. There is no tenderness.   Musculoskeletal: She exhibits no edema.   Skin:   Well healed incision lower abd post transplant   Psychiatric: She has a normal mood and affect.     Labs:  Lab Results   Component Value Date    WBC 2.01 (L) 01/15/2018    HGB 11.8 (L) 01/15/2018    HCT 35.7 (L) 01/15/2018     01/15/2018    K 4.3 01/15/2018     01/15/2018    CO2 25 01/15/2018    BUN 18 01/15/2018    CREATININE 0.9 01/15/2018    EGFRNONAA >60.0 01/15/2018    CALCIUM 9.1 01/15/2018    PHOS 3.3 01/15/2018    MG 1.8 01/15/2018    ALBUMIN 3.9 01/15/2018    AST 42 (H) 2018    ALT 80 (H) 2018    UTPCR Unable to calculate 2018    .0 (H) 2017    TACROLIMUS 8.6 01/15/2018   Labs were reviewed with the patient    Assessment:     1. Chronic kidney disease (CKD), stage II (mild)    2. Other drug-induced neutropenia    3. Status post -donor kidney transplantation    4. Hypophosphatemia    5. Secondary hyperparathyroidism of renal origin    6. Hypomagnesemia    7. Metabolic acidosis    8. At risk for  opportunistic infections    9. Prophylactic immunotherapy    10. Long-term use of immunosuppressant medication        Plan:   -CKD2 s/p kidney transplantation, doing well. Continue to monitor renal function and electrolytes, HTN, secondary hyperparathyroidism and other issues related to underlying ESRD.  -Reactivation of CMV viremia - No complaints; await repeat viral load.  -Continue tacrolimus-based immunosuppression. Level at target. Will continue to watch signs, symptoms and levels for side effects and drug toxicity.   -Drug induced neutropenia still noted, but ANC adequate. Cont to watch. Continue to hold MMF for now. No need for filgrastim.  -Hypomagnesemia - resolved. Can stop checking Mg levels.  -Acidosis corrected on Na HCO3 650 mg daily. Stop today.  -Transaminitis waxes and wanes - cont to follow LFTs with help from hepatology. Segun MEAD aware of trend and is  following.  -Dysuria- Check UA and culture today. Await uroGYN appt next week.   -reviewed reasons to call and plan if she develops flu symptoms.      Follow-up:   Clinic: return to transplant clinic weekly for the first month after transplant; every 2 weeks during months 2-3; then at 6-, 9-, 12-, 18-, 24-, and 36- months post-transplant to reassess for complications from immunosuppression toxicity and monitor for rejection.  Annually thereafter.    Labs: since patient remains at high risk for rejection and drug-related complications that warrant close monitoring, labs will be ordered as follows: continue twice weekly CBC, renal panel, and drug level for first month; then same labs once weekly through 3rd month post-transplant.  Urine for UA and protein/creatinine ratio monthly.  Urine BK - PCR at 1-, 3-, 6-, 9-, 12-, 18-, 24-, and 36- months post-transplant.  Hepatic panel at 1-, 2-, 3-, 6-, 9-, 12-, 18-, 24-, and 36- months post-transplant.    Shreya Waldron MD       Education:   Material provided to the patient.  Patient reminded to  call with any health changes since these can be early signs of significant complications.  Also, I advised the patient to be sure any new medications or changes of old medications are discussed with either a pharmacist or physician knowledgeable with transplant to avoid rejection/drug toxicity related to significant drug interactions.

## 2018-01-17 NOTE — PROGRESS NOTES
Results reviewed and the following message sent to patient via [facility]: Labs except CMV all look fine.

## 2018-01-18 LAB
BACTERIA UR CULT: NORMAL
BACTERIA UR CULT: NORMAL

## 2018-01-19 DIAGNOSIS — R74.8 ELEVATED LIVER ENZYMES: Primary | ICD-10-CM

## 2018-01-19 LAB — CMV DNA SERPL NAA+PROBE-ACNC: NORMAL IU/ML

## 2018-01-19 NOTE — PROGRESS NOTES
Results reviewed and the following message sent to patient via [facility]: Good news: no evidence of urinary infection.

## 2018-01-22 ENCOUNTER — DOCUMENTATION ONLY (OUTPATIENT)
Dept: TRANSPLANT | Facility: CLINIC | Age: 53
End: 2018-01-22

## 2018-01-22 ENCOUNTER — LAB VISIT (OUTPATIENT)
Dept: LAB | Facility: HOSPITAL | Age: 53
End: 2018-01-22
Attending: INTERNAL MEDICINE
Payer: COMMERCIAL

## 2018-01-22 DIAGNOSIS — R74.8 ELEVATED LIVER ENZYMES: ICD-10-CM

## 2018-01-22 DIAGNOSIS — Z94.0 KIDNEY REPLACED BY TRANSPLANT: ICD-10-CM

## 2018-01-22 LAB
ALBUMIN SERPL BCP-MCNC: 3.8 G/DL
ALBUMIN SERPL BCP-MCNC: 3.8 G/DL
ALP SERPL-CCNC: 230 U/L
ALT SERPL W/O P-5'-P-CCNC: 133 U/L
ANION GAP SERPL CALC-SCNC: 8 MMOL/L
ANISOCYTOSIS BLD QL SMEAR: SLIGHT
AST SERPL-CCNC: 60 U/L
BASOPHILS # BLD AUTO: ABNORMAL K/UL
BASOPHILS NFR BLD: 0 %
BILIRUB DIRECT SERPL-MCNC: 0.2 MG/DL
BILIRUB SERPL-MCNC: 0.5 MG/DL
BUN SERPL-MCNC: 23 MG/DL
CALCIUM SERPL-MCNC: 9 MG/DL
CHLORIDE SERPL-SCNC: 108 MMOL/L
CO2 SERPL-SCNC: 25 MMOL/L
CREAT SERPL-MCNC: 1 MG/DL
DIFFERENTIAL METHOD: ABNORMAL
EOSINOPHIL # BLD AUTO: ABNORMAL K/UL
EOSINOPHIL NFR BLD: 2.7 %
ERYTHROCYTE [DISTWIDTH] IN BLOOD BY AUTOMATED COUNT: 14.7 %
EST. GFR  (AFRICAN AMERICAN): >60 ML/MIN/1.73 M^2
EST. GFR  (NON AFRICAN AMERICAN): >60 ML/MIN/1.73 M^2
GLUCOSE SERPL-MCNC: 101 MG/DL
HCT VFR BLD AUTO: 35.3 %
HGB BLD-MCNC: 11.6 G/DL
HYPOCHROMIA BLD QL SMEAR: ABNORMAL
IMM GRANULOCYTES # BLD AUTO: ABNORMAL K/UL
IMM GRANULOCYTES NFR BLD AUTO: ABNORMAL %
LYMPHOCYTES # BLD AUTO: ABNORMAL K/UL
LYMPHOCYTES NFR BLD: 5.5 %
MAGNESIUM SERPL-MCNC: 1.6 MG/DL
MCH RBC QN AUTO: 33.3 PG
MCHC RBC AUTO-ENTMCNC: 32.9 G/DL
MCV RBC AUTO: 101 FL
MONOCYTES # BLD AUTO: ABNORMAL K/UL
MONOCYTES NFR BLD: 8.2 %
NEUTROPHILS NFR BLD: 83.6 %
NRBC BLD-RTO: 0 /100 WBC
OVALOCYTES BLD QL SMEAR: ABNORMAL
PHOSPHATE SERPL-MCNC: 3.5 MG/DL
PLATELET # BLD AUTO: 143 K/UL
PLATELET BLD QL SMEAR: ABNORMAL
PMV BLD AUTO: 10.5 FL
POIKILOCYTOSIS BLD QL SMEAR: SLIGHT
POLYCHROMASIA BLD QL SMEAR: ABNORMAL
POTASSIUM SERPL-SCNC: 4.1 MMOL/L
PROT SERPL-MCNC: 7.3 G/DL
RBC # BLD AUTO: 3.48 M/UL
SODIUM SERPL-SCNC: 141 MMOL/L
TACROLIMUS BLD-MCNC: 8.5 NG/ML
WBC # BLD AUTO: 1.73 K/UL

## 2018-01-22 PROCEDURE — 85027 COMPLETE CBC AUTOMATED: CPT

## 2018-01-22 PROCEDURE — 80069 RENAL FUNCTION PANEL: CPT

## 2018-01-22 PROCEDURE — 80076 HEPATIC FUNCTION PANEL: CPT

## 2018-01-22 PROCEDURE — 83735 ASSAY OF MAGNESIUM: CPT

## 2018-01-22 PROCEDURE — 36415 COLL VENOUS BLD VENIPUNCTURE: CPT | Mod: PO

## 2018-01-22 PROCEDURE — 80197 ASSAY OF TACROLIMUS: CPT

## 2018-01-22 PROCEDURE — 85007 BL SMEAR W/DIFF WBC COUNT: CPT

## 2018-01-22 NOTE — PROGRESS NOTES
Results reviewed and the following message sent to patient via [facility]: ANC 1446 is safe, and requires no neupogen.

## 2018-01-23 LAB — CMV DNA SERPL NAA+PROBE-ACNC: NORMAL IU/ML

## 2018-01-24 ENCOUNTER — TELEPHONE (OUTPATIENT)
Dept: HEPATOLOGY | Facility: CLINIC | Age: 53
End: 2018-01-24

## 2018-01-24 ENCOUNTER — PATIENT MESSAGE (OUTPATIENT)
Dept: TRANSPLANT | Facility: CLINIC | Age: 53
End: 2018-01-24

## 2018-01-24 DIAGNOSIS — R74.8 ELEVATED LIVER ENZYMES: Primary | ICD-10-CM

## 2018-01-24 NOTE — TELEPHONE ENCOUNTER
----- Message from Rory Cast PA-C sent at 1/23/2018  4:13 PM CST -----  Please let her know that her liver enzymes continue to be elevated and are mildly increased. I would like her to return for full serological work up. Please schedule clinic visit    Thanks

## 2018-01-24 NOTE — TELEPHONE ENCOUNTER
MA called patient inform her of her lab results patient understood she schedule her follow up visit 1/31/18.

## 2018-01-29 ENCOUNTER — LAB VISIT (OUTPATIENT)
Dept: LAB | Facility: HOSPITAL | Age: 53
End: 2018-01-29
Attending: INTERNAL MEDICINE
Payer: COMMERCIAL

## 2018-01-29 DIAGNOSIS — R74.8 ELEVATED LIVER ENZYMES: ICD-10-CM

## 2018-01-29 DIAGNOSIS — Z94.0 KIDNEY REPLACED BY TRANSPLANT: ICD-10-CM

## 2018-01-29 LAB
ALBUMIN SERPL BCP-MCNC: 3.7 G/DL
ALBUMIN SERPL BCP-MCNC: 3.7 G/DL
ALP SERPL-CCNC: 222 U/L
ALT SERPL W/O P-5'-P-CCNC: 96 U/L
ANION GAP SERPL CALC-SCNC: 9 MMOL/L
AST SERPL-CCNC: 49 U/L
BASOPHILS # BLD AUTO: 0.03 K/UL
BASOPHILS NFR BLD: 1.4 %
BILIRUB DIRECT SERPL-MCNC: 0.2 MG/DL
BILIRUB DIRECT SERPL-MCNC: 0.2 MG/DL
BILIRUB SERPL-MCNC: 0.5 MG/DL
BUN SERPL-MCNC: 19 MG/DL
CALCIUM SERPL-MCNC: 9.1 MG/DL
CHLORIDE SERPL-SCNC: 110 MMOL/L
CHOLEST SERPL-MCNC: 230 MG/DL
CHOLEST/HDLC SERPL: 3.5 {RATIO}
CO2 SERPL-SCNC: 23 MMOL/L
CREAT SERPL-MCNC: 0.9 MG/DL
DIFFERENTIAL METHOD: ABNORMAL
EOSINOPHIL # BLD AUTO: 0.1 K/UL
EOSINOPHIL NFR BLD: 2.3 %
ERYTHROCYTE [DISTWIDTH] IN BLOOD BY AUTOMATED COUNT: 14 %
EST. GFR  (AFRICAN AMERICAN): >60 ML/MIN/1.73 M^2
EST. GFR  (NON AFRICAN AMERICAN): >60 ML/MIN/1.73 M^2
FERRITIN SERPL-MCNC: 2132 NG/ML
GLUCOSE SERPL-MCNC: 108 MG/DL
HAV IGM SERPL QL IA: NEGATIVE
HBV CORE AB SERPL QL IA: NEGATIVE
HBV CORE IGM SERPL QL IA: NEGATIVE
HBV SURFACE AB SER-ACNC: POSITIVE M[IU]/ML
HBV SURFACE AG SERPL QL IA: NEGATIVE
HCT VFR BLD AUTO: 36.8 %
HDLC SERPL-MCNC: 66 MG/DL
HDLC SERPL: 28.7 %
HEPATITIS A ANTIBODY, IGG: POSITIVE
HGB BLD-MCNC: 11.9 G/DL
HIV 1+2 AB+HIV1 P24 AG SERPL QL IA: NEGATIVE
IGG SERPL-MCNC: 855 MG/DL
IMM GRANULOCYTES # BLD AUTO: 0.01 K/UL
IMM GRANULOCYTES NFR BLD AUTO: 0.5 %
INR PPP: 0.9
IRON SERPL-MCNC: 79 UG/DL
LDLC SERPL CALC-MCNC: 132.8 MG/DL
LYMPHOCYTES # BLD AUTO: 0.1 K/UL
LYMPHOCYTES NFR BLD: 5.6 %
MAGNESIUM SERPL-MCNC: 1.7 MG/DL
MCH RBC QN AUTO: 33.6 PG
MCHC RBC AUTO-ENTMCNC: 32.3 G/DL
MCV RBC AUTO: 104 FL
MONOCYTES # BLD AUTO: 0.1 K/UL
MONOCYTES NFR BLD: 6.6 %
NEUTROPHILS # BLD AUTO: 1.8 K/UL
NEUTROPHILS NFR BLD: 83.6 %
NONHDLC SERPL-MCNC: 164 MG/DL
NRBC BLD-RTO: 0 /100 WBC
PHOSPHATE SERPL-MCNC: 3.2 MG/DL
PLATELET # BLD AUTO: 129 K/UL
PMV BLD AUTO: 10.6 FL
POTASSIUM SERPL-SCNC: 4.8 MMOL/L
PROT SERPL-MCNC: 7.1 G/DL
PROTHROMBIN TIME: 9.8 SEC
RBC # BLD AUTO: 3.54 M/UL
SATURATED IRON: 23 %
SODIUM SERPL-SCNC: 142 MMOL/L
TACROLIMUS BLD-MCNC: 7.7 NG/ML
TOTAL IRON BINDING CAPACITY: 340 UG/DL
TRANSFERRIN SERPL-MCNC: 230 MG/DL
TRIGL SERPL-MCNC: 156 MG/DL
WBC # BLD AUTO: 2.13 K/UL

## 2018-01-29 PROCEDURE — 80321 ALCOHOLS BIOMARKERS 1OR 2: CPT

## 2018-01-29 PROCEDURE — 84075 ASSAY ALKALINE PHOSPHATASE: CPT

## 2018-01-29 PROCEDURE — 86705 HEP B CORE ANTIBODY IGM: CPT

## 2018-01-29 PROCEDURE — 86703 HIV-1/HIV-2 1 RESULT ANTBDY: CPT

## 2018-01-29 PROCEDURE — 84080 ASSAY ALKALINE PHOSPHATASES: CPT

## 2018-01-29 PROCEDURE — 86235 NUCLEAR ANTIGEN ANTIBODY: CPT

## 2018-01-29 PROCEDURE — 83540 ASSAY OF IRON: CPT

## 2018-01-29 PROCEDURE — 87522 HEPATITIS C REVRS TRNSCRPJ: CPT

## 2018-01-29 PROCEDURE — 82728 ASSAY OF FERRITIN: CPT

## 2018-01-29 PROCEDURE — 80197 ASSAY OF TACROLIMUS: CPT

## 2018-01-29 PROCEDURE — 82784 ASSAY IGA/IGD/IGG/IGM EACH: CPT

## 2018-01-29 PROCEDURE — 86704 HEP B CORE ANTIBODY TOTAL: CPT

## 2018-01-29 PROCEDURE — 84075 ASSAY ALKALINE PHOSPHATASE: CPT | Mod: 91

## 2018-01-29 PROCEDURE — 80061 LIPID PANEL: CPT

## 2018-01-29 PROCEDURE — 80307 DRUG TEST PRSMV CHEM ANLYZR: CPT

## 2018-01-29 PROCEDURE — 82390 ASSAY OF CERULOPLASMIN: CPT

## 2018-01-29 PROCEDURE — 86709 HEPATITIS A IGM ANTIBODY: CPT

## 2018-01-29 PROCEDURE — 86706 HEP B SURFACE ANTIBODY: CPT

## 2018-01-29 PROCEDURE — 87340 HEPATITIS B SURFACE AG IA: CPT

## 2018-01-29 PROCEDURE — 85610 PROTHROMBIN TIME: CPT

## 2018-01-29 PROCEDURE — 86790 VIRUS ANTIBODY NOS: CPT

## 2018-01-29 PROCEDURE — 80069 RENAL FUNCTION PANEL: CPT

## 2018-01-29 PROCEDURE — 85025 COMPLETE CBC W/AUTO DIFF WBC: CPT

## 2018-01-29 PROCEDURE — 83735 ASSAY OF MAGNESIUM: CPT

## 2018-01-29 PROCEDURE — 86038 ANTINUCLEAR ANTIBODIES: CPT

## 2018-01-29 PROCEDURE — 87799 DETECT AGENT NOS DNA QUANT: CPT

## 2018-01-29 PROCEDURE — 86256 FLUORESCENT ANTIBODY TITER: CPT | Mod: 91

## 2018-01-30 ENCOUNTER — INITIAL CONSULT (OUTPATIENT)
Dept: UROGYNECOLOGY | Facility: CLINIC | Age: 53
End: 2018-01-30
Attending: INTERNAL MEDICINE
Payer: COMMERCIAL

## 2018-01-30 VITALS
DIASTOLIC BLOOD PRESSURE: 70 MMHG | SYSTOLIC BLOOD PRESSURE: 100 MMHG | BODY MASS INDEX: 25.97 KG/M2 | WEIGHT: 132.25 LBS | HEIGHT: 60 IN

## 2018-01-30 DIAGNOSIS — R10.31 PAIN, ABDOMINAL, RLQ: ICD-10-CM

## 2018-01-30 DIAGNOSIS — R35.1 NOCTURIA: ICD-10-CM

## 2018-01-30 DIAGNOSIS — N39.0 RECURRENT UTI: ICD-10-CM

## 2018-01-30 DIAGNOSIS — N39.46 MIXED URGE AND STRESS INCONTINENCE: ICD-10-CM

## 2018-01-30 DIAGNOSIS — N95.2 ATROPHIC VAGINITIS: Primary | ICD-10-CM

## 2018-01-30 LAB
ANA SER QL IF: NORMAL
CERULOPLASMIN SERPL-MCNC: 27 MG/DL
CMV DNA SERPL NAA+PROBE-ACNC: NORMAL IU/ML
MITOCHONDRIA AB TITR SER IF: NORMAL {TITER}
SMOOTH MUSCLE AB TITR SER IF: NORMAL {TITER}

## 2018-01-30 PROCEDURE — 99999 PR PBB SHADOW E&M-EST. PATIENT-LVL IV: CPT | Mod: PBBFAC,,, | Performed by: OBSTETRICS & GYNECOLOGY

## 2018-01-30 PROCEDURE — 3008F BODY MASS INDEX DOCD: CPT | Mod: S$GLB,,, | Performed by: OBSTETRICS & GYNECOLOGY

## 2018-01-30 PROCEDURE — 99205 OFFICE O/P NEW HI 60 MIN: CPT | Mod: S$GLB,,, | Performed by: OBSTETRICS & GYNECOLOGY

## 2018-01-30 PROCEDURE — 87086 URINE CULTURE/COLONY COUNT: CPT

## 2018-01-30 RX ORDER — PIMECROLIMUS 10 MG/G
CREAM TOPICAL
Refills: 12 | COMMUNITY
Start: 2017-12-23 | End: 2018-05-01

## 2018-01-30 RX ORDER — GABAPENTIN 100 MG/1
CAPSULE ORAL
Refills: 3 | COMMUNITY
Start: 2017-12-21 | End: 2018-05-23 | Stop reason: ALTCHOICE

## 2018-01-30 RX ORDER — AZELASTINE HYDROCHLORIDE 0.5 MG/ML
SOLUTION/ DROPS OPHTHALMIC
COMMUNITY
Start: 2018-01-09 | End: 2018-05-01

## 2018-01-30 NOTE — PROGRESS NOTES
Subjective:       Patient ID: Apurva Rodriguez is a 52 y.o. female.    Chief Complaint:  Urinary Incontinence; Urinary Tract Infection (recurrent); and BLADDER SPASM      History of Present Illness  HPI 52 Y O F P0 with history or recurrent UTI, s/p DDKT   has a past medical history of Anemia; Avascular necrosis left hip (12/6/2016); Dyslipidemia (12/6/2016); ESRD (end stage renal disease) (12/6/2016); Essential hypertension (12/6/2016); Gastroesophageal reflux disease without esophagitis (12/6/2016); Hypertension; Polycystic kidney disease; S/P bone marrow biopsy (12/6/2016); and Secondary hyperparathyroidism of renal origin (12/6/2016).Has history of recurrent UTI as a child and was treated with long term ppx bactrim.     [facility]     Question 1/30/2018  8:50 AM CST   General Urogynecology: Are you experiencing the following?    Dysuria (painful urination) No   Nocturia:  waking up at night to empty your bladder  Yes   If you answered yes to the previous question, how many times does this happen per night? 5-6,    Enuresis (urine loss during sleep) Yes   Dribbling urine after you urinate Yes   Hematuria (urine appears red) No   Type of stream Strong   Urinary frequency: How often a day are you going to the bathroom per day?  About  10   Urinary Tract Infections: How many Urinary Tract Infections have you had in the past year? I have not had a UTI in the past year   If you have had a UTI in the past year, what treatments have you had so far?  Prophylactic antibiotics: bactrim    Urinary Incontinence (General): Are you experiencing the following?    Past consultation for incontinence: Have you ever seen someone for the evaluation of incontinence? Yes   If you answered yes to the previous question, please select all the therapies you have tried.  Tahmina    Pelvic floor physical therapy    biofeed back    Please note the effectiveness of the therapies. Somewhat effective   Need to wear protection to keep  "clothes dry  Yes   If you answered yes to the previous question, please savage the protection you use.  Panty liner   If you wear protection, how much wetness is typically on each pad? Scant   If you wear protection, how often do you have to change per day, if applicable?  1   Stress Symptoms: Are you experiencing the following?    Leakage of urine with cough, laugh and/or sneeze Yes   If you answered yes to the previous question, what is the frequency in days, weeks and/or months? Daily   Leakage of urine with sex Yes, only with orgasm    Leakage of urine with bending/ lifting No   Leakage of urine with briskly walking or jogging Yes   If you lose urine for any other reason not previously mentioned, please note it below, if applicable.  traveling   Urge Symptoms: Are you experiencing the following?    Urgency ("got to go" feeling) Yes   Urge: How frequently do you feel an urge to urinate (feeling like you "gotta go" to the bathroom and can't wait) Several times a day   Do you experience a leakage of urine when you have a feeling of urgency?  Yes   Leakage of urine when unaware No   Past use of anticholinergics (medications used to treat overactive bladder) No   If you answered yes to the previous question, please savage the anticholinergics you have used:     Have you ever used Mirbetriq (aka Mirabegron)?  No   Prolapse Symptoms: Are you experiencing any of the following?     Falling out/ Bulging/ Heaviness in the vagina No   Vaginal/ Abdominal Pain/ Pressure No   Need to strain/ Push to void No   Need to wait on the toilet before you void No   Unusual position to urinate (using your hands to push back the vaginal bulge) No   Sensation of incomplete emptying Yes   Past use of pessary device No   If you answered yes to the previous question, please list the devices you have used below.     Bowel Symptoms: Are you experiencing any of the following?    Constipation No   Diarrhea  No   Hematochezia (bloody stool) No "   Incomplete evacuation of stool No   Involuntary loss of formed stool No   Fecal smearing/urgency No   Involuntary loss of gas Yes   Vaginal Symptoms: Are you experiencing any of the following?     Abnormal vaginal bleeding  No   Vaginal dryness Yes   Sexually active  Yes   Dyspareunia (painful intercourse) No   Estrogen use  No     GYN & OB History  No LMP recorded. Patient is postmenopausal.   Date of Last Pap: No result found    OB History    Para Term  AB Living   0 0 0 0 0 0   SAB TAB Ectopic Multiple Live Births   0 0 0 0 0             Review of Systems  Review of Systems   Constitutional: Negative.  Negative for activity change, appetite change, chills, diaphoresis, fatigue, fever and unexpected weight change.   HENT: Negative.    Eyes: Negative.    Respiratory: Negative.  Negative for cough.    Cardiovascular: Negative.    Gastrointestinal: Negative for abdominal distention, abdominal pain, anal bleeding, blood in stool, constipation, diarrhea, nausea, rectal pain and vomiting.   Endocrine: Negative.    Genitourinary: Positive for dysuria, hematuria and urgency. Negative for decreased urine volume, difficulty urinating, dyspareunia, enuresis, flank pain, frequency, genital sores, menstrual problem, pelvic pain, vaginal bleeding, vaginal discharge and vaginal pain.   Musculoskeletal: Negative for back pain.   Skin: Negative for color change, pallor, rash and wound.   Allergic/Immunologic: Negative for environmental allergies, food allergies and immunocompromised state.   Hematological: Negative for adenopathy. Does not bruise/bleed easily.   Psychiatric/Behavioral: Negative for agitation, behavioral problems, confusion and sleep disturbance.           Objective:     Physical Exam   Constitutional: She is oriented to person, place, and time. She appears well-developed.   HENT:   Head: Normocephalic and atraumatic.   Eyes: Conjunctivae and EOM are normal.   Neck: Normal range of motion. Neck  supple.   Cardiovascular: Normal rate, regular rhythm, S1 normal, S2 normal, normal heart sounds and intact distal pulses.    Pulmonary/Chest: Effort normal and breath sounds normal. She exhibits no tenderness.   Abdominal: Soft. Bowel sounds are normal. She exhibits no distension and no mass. There is no hepatosplenomegaly, splenomegaly or hepatomegaly. There is no tenderness. There is no rigidity, no rebound, no guarding and no CVA tenderness. No hernia.   Genitourinary: Pelvic exam was performed with patient supine. Rectum normal, vagina normal, uterus normal, cervix normal, skenes normal and bartholins normal. Right labia normal and left labia normal. Urethra exhibits hypermobility. Urethra exhibits no urethral caruncle, no urethral diverticulum and no urethral mass. Right bartholin is not enlarged and not tender. Left bartholin is not enlarged and not tender. Rectal exam shows resting tone normal and active tone normal. Rectal exam shows no external hemorrhoid, no fissure, no tenderness, anal tone normal and no dovetailing. Guaiac negative stool. There is atrophy in the vagina. No foreign body, tenderness, bleeding, unspecified prolapse of vaginal walls, fistula, mesh exposure or lavator tenderness in the vagina. No vaginal discharge found. Right adnexum displays no mass and no tenderness. Left adnexum displays no mass and no tenderness. Cervix exhibits no motion tenderness and no discharge. Uterus is not tender and not experiencing uterine prolapse.   PVR: 20 ML  Empty cough stress test: Positive.  Kegel: 1/5    POP-Q  Aa: -3 Ba: -3 C: -7   GH: 2 PB: 2 TVL: 9   Ap: -2 Bp: -2 D: -8                     Musculoskeletal: Normal range of motion.   Lymphadenopathy:     She has no axillary adenopathy.        Right: No inguinal adenopathy present.        Left: No inguinal adenopathy present.   Neurological: She is alert and oriented to person, place, and time. She has normal strength and normal reflexes. Cranial  nerves II through XII intact. No cranial nerve deficit.   Skin: Skin is warm, dry and intact.   Psychiatric: She has a normal mood and affect. Her speech is normal and behavior is normal. Judgment normal. Cognition and memory are normal.          Assessment:        1. Atrophic vaginitis    2. Mixed urge and stress incontinence    3. Nocturia    4. Pain, abdominal, RLQ    5. Recurrent UTI             Plan:      1. Recurrent UTI   --UA and Culture  --Vaginal estrogen has been shown to decrease the recurrence of urinary tract infections in post menopausal women  --Change pads often: Q 2-3 hours and add barrier cream daily (Damon's butt paste vs dessitin)   --Add Cranberry supplementation and Probiotics  --Discussed long term treatment options including: Antibiotic ppx vs self treatment    2.  Mixed urinary incontinence, urge > stress:   --Bladder diary for 3-7 days, write the number of times you go to the rest room and associated symptoms of urgency or leakage.   --Empty bladder every 3 hours.  Empty well: wait a minute, lean forward on toilet.    --Avoid dietary irritants (see sheet).  Keep diary x 3-5 days to determine your irritants.  --KEGELS: do 10 in AM and 10 in PM, holding each x 10 seconds.  When you feel urge to go, STOP, KEGEL, and when urge has passed, then go to bathroom.  Start pelvic floor PT     --URGE: Consider Ditropan 10 mg daily or Myrbetriq 50 mg daily.  SE profile reviewed.  Takes 2-4 weeks to see if will have effect.  For dry mouth: get sour, sugar free lozenge or gum.    --STRESS:  Pelvic floor PT vs Pessary vs. Sling.     3. Vaginal atrophy (dryness):  Use 1 gram of estrogen cream in vagina nightly x 2 weeks, then twice a week thereafter.      4. RLQ pain: history of ovarian cysts   --TVS     Approximately 55 min were spent in consult, 90 % in discussion.     Thank you for requesting consultation of your patient.  I look forward to participating in her care.    Kendy Asif  DO  Female Pelvic Medicine and Reconstructive Surgery  [facility] [place], [place]

## 2018-01-30 NOTE — PATIENT INSTRUCTIONS
1. Recurrent UTI   --UA and Culture  --Vaginal estrogen has been shown to decrease the recurrence of urinary tract infections in post menopausal women  --Change pads often: Q 2-3 hours and add barrier cream daily (Damon's butt paste vs dessitin)   --Add Cranberry supplementation and Probiotics  --Discussed long term treatment options including: Antibiotic ppx vs self treatment    2.  Mixed urinary incontinence, urge > stress:   --Bladder diary for 3-7 days, write the number of times you go to the rest room and associated symptoms of urgency or leakage.   --Empty bladder every 3 hours.  Empty well: wait a minute, lean forward on toilet.    --Avoid dietary irritants (see sheet).  Keep diary x 3-5 days to determine your irritants.  --KEGELS: do 10 in AM and 10 in PM, holding each x 10 seconds.  When you feel urge to go, STOP, KEGEL, and when urge has passed, then go to bathroom.  Start pelvic floor PT     --URGE: Consider Ditropan 10 mg daily or Myrbetriq 50 mg daily.  SE profile reviewed.  Takes 2-4 weeks to see if will have effect.  For dry mouth: get sour, sugar free lozenge or gum.    --STRESS:  Pelvic floor PT vs Pessary vs. Sling.     3. Vaginal atrophy (dryness):  Use 1 gram of estrogen cream in vagina nightly x 2 weeks, then twice a week thereafter.      4. RLQ pain: history of ovarian cysts   --TVS

## 2018-01-31 ENCOUNTER — OFFICE VISIT (OUTPATIENT)
Dept: HEPATOLOGY | Facility: CLINIC | Age: 53
End: 2018-01-31
Payer: COMMERCIAL

## 2018-01-31 VITALS
HEIGHT: 60 IN | RESPIRATION RATE: 18 BRPM | OXYGEN SATURATION: 95 % | HEART RATE: 70 BPM | DIASTOLIC BLOOD PRESSURE: 78 MMHG | TEMPERATURE: 97 F | WEIGHT: 130.06 LBS | BODY MASS INDEX: 25.53 KG/M2 | SYSTOLIC BLOOD PRESSURE: 143 MMHG

## 2018-01-31 DIAGNOSIS — R74.8 ELEVATED LIVER ENZYMES: Primary | ICD-10-CM

## 2018-01-31 DIAGNOSIS — R74.8 ELEVATED ALKALINE PHOSPHATASE LEVEL: ICD-10-CM

## 2018-01-31 LAB
AMPHETAMINES SERPL QL: NEGATIVE
BACTERIA UR CULT: NORMAL
BARBITURATES SERPL QL SCN: NEGATIVE
BENZODIAZ SERPL QL: NEGATIVE
CANNABINOIDS SERPL QL: NEGATIVE
COCAINE, BLOOD: NEGATIVE
ETHANOL SERPL-MCNC: NEGATIVE MG/DL
METHADONE SERPL QL SCN: NEGATIVE
OPIATES SERPL QL SCN: NEGATIVE
PCP SERPL QL SCN: NEGATIVE
PROPOXYPH SERPL QL: NEGATIVE

## 2018-01-31 PROCEDURE — 99214 OFFICE O/P EST MOD 30 MIN: CPT | Mod: S$GLB,,, | Performed by: PHYSICIAN ASSISTANT

## 2018-01-31 PROCEDURE — 99999 PR PBB SHADOW E&M-EST. PATIENT-LVL IV: CPT | Mod: PBBFAC,,, | Performed by: PHYSICIAN ASSISTANT

## 2018-01-31 PROCEDURE — 3008F BODY MASS INDEX DOCD: CPT | Mod: S$GLB,,, | Performed by: PHYSICIAN ASSISTANT

## 2018-01-31 NOTE — Clinical Note
Pt needs liver biopsy, 02/23, 03/06 and 03/09 are good dates for her She will need updated U/S, uncertain if this can be done on WB but patient has u/s scheduled and it could be done on the same day if radiology allows.

## 2018-01-31 NOTE — PROGRESS NOTES
"HEPATOLOGY CLINIC VISIT NOTE     REFERRING PROVIDER: Dr. Shreya Waldron     REASON FOR VISIT: elevated liver enzymes    HISTORY: This is a 52 y.o. White female here for follow up of elevated liver enzymes, status posted DDKT. Prior to transplant, transaminases were essentially normal, they trended upward during hospitalization and   Appeared to be trending downward. We monitored for >1 month and alk phos remained elevated and now transaminase elevated as well. Most recently, AST 49, ALT 96; alk phos 222. Pt did serological work up, alk phos isoenzymes are pending, autoimmune markers WNL, negative Wilsons, Ferritin elevated, negative viral hepatitis. Her ESRD is due to  bilateral congenital nephrocalcinosis . Donor was HBCAB negative, HCV negative and CMV positive. She takes tacrolimus and MMF on hold for maintenance immunosuppression. Her post transplant course has been complicated by diarrhea, MMF currently on hold. During hospitalization,  U/S done 11/10/2017, no abnormal findings. She reports that she feels well. Denies jaundice, dark urine, hematemesis, melena, slowed mentation, abdominal distention.     Her PMH is listed below. She has never had formal liver evaluation or staging. She has no previous h/o liver disease. She denies FH of kidney disease.     Liver staging:  No formal staging  AST 20, ALT 32  Tbili WNL  Albumin 3.6  PLTs 133, previously WNL  Alk phos 242    Denies jaundice, dark urine, abdominal distention, hematemesis, melena, slowed mentation.   No abnormal skin rashes. No generalized joint pain.                   Past Medical History:   Diagnosis Date    Anemia     Avascular necrosis left hip 12/6/2016    S/p replacement Cord decompression right side Revision of the left hip: with a "larger" hardware placement     Dyslipidemia 12/6/2016    ESRD (end stage renal disease) 12/6/2016    She mentioned that the cause of the kidney failure was associated with several birth defects: PKD " Bilateral nephrocalcinosis Hyperuricemia Chronic Icthyosis     Essential hypertension 2016    Gastroesophageal reflux disease without esophagitis 2016    Hypertension     Polycystic kidney disease     diagnosed at early age    S/P bone marrow biopsy 2016    Secondary hyperparathyroidism of renal origin 2016     Past Surgical History:   Procedure Laterality Date    ABDOMINAL HERNIA REPAIR      CHOLECYSTECTOMY      laparoascopic    EYE MUSCLE SURGERY Bilateral     as a baby(1 years)    FRACTURE SURGERY Right     rigt femur :steel karan pl;acement and replacement    KIDNEY TRANSPLANT Right     RLQ transplant,  donor    left hip revision      PARATHYROIDECTOMY      PERITONEAL CATHETER INSERTION      PERITONEAL CATHETER INSERTION      right hip decompression      TONSILLECTOMY      TOTAL HIP ARTHROPLASTY Left      FAMILY HISTORY: Negative for liver disease    SOCIAL HISTORY:   History   Smoking Status    Never Smoker   Smokeless Tobacco    Never Used       History   Alcohol Use No       History   Drug Use No     ROS:   No fever, chills  No chest pain, dyspnea  No abdominal pain, nausea, vomiting, (+) diarrhea   No lower extremity edema  No depression or anxiety      PHYSICAL EXAM:  Friendly White female, in no acute distress; alert and oriented to person, place and time  VITALS: reviewed  HEENT: Sclerae anicteric.   NECK: Supple  CVS: Regular rate and rhythm. No murmurs  LUNGS: Normal respiratory effort.   ABDOMEN: Flat, soft, nontender.  SKIN: Warm and dry. No jaundice, No obvious rashes.   EXTREMITIES: No lower extremity edema  NEURO/PSYCH: Normal gate. Memory intact. Thought and speech pattern appropriate. Behavior normal. No depression or anxiety noted.    RECENT LABS:  Lab Results   Component Value Date    WBC 2.13 (L) 2018    HGB 11.9 (L) 2018     (L) 2018     Lab Results   Component Value Date    INR 0.9 2018     Lab Results    Component Value Date    AST 49 (H) 01/29/2018    ALT 96 (H) 01/29/2018    BILITOT 0.5 01/29/2018    ALBUMIN 3.7 01/29/2018    ALBUMIN 3.7 01/29/2018    ALKPHOS 222 (H) 01/29/2018    CREATININE 0.9 01/29/2018    BUN 19 01/29/2018     01/29/2018    K 4.8 01/29/2018    AFP 3.5 11/13/2017     RECENT IMAGING:  U/S abdomen 11/2017  Narrative     Ultrasound Abdomen     11/10/17 13:00:00    Accession# [id number]    CLINICAL INDICATION: 52 year old F with elevated LFTs. Recent renal transplant on peritoneal dialysis.    TECHNIQUE: Complete abdominal ultrasound with Doppler    COMPARISON: Renal ultrasound, 12/06/2016.    FINDINGS:    The liver is normal in size.  The liver demonstrates homogeneous echotexture.  No focal hepatic lesions.  No dilated intrahepatic biliary radicles.      The gallbladder is surgically absent.  The common duct is mildly prominent, measuring 0.7 cm, likely related to post cholecystectomy status.    The spleen is normal in size with a homogeneous echotexture.    The visualized portion of the pancreas is unremarkable.    The aorta tapers normally.  IVC unremarkable.    The native kidneys atrophic, echogenic, and poorly characterized.      There is small volume ascites. Bilateral pleural effusions noted.    Color-flow and duplex Doppler evaluation demonstrates patency and proper direction of flow within the main portal vein, right portal vein, left portal vein, middle hepatic vein, right hepatic vein, left hepatic vein and SMV.  The IVC demonstrates appropriate flow.  The main hepatic artery resistive index is mildly elevated at 0.84.  Normal evaluation of the aorta and superior mesenteric artery.   Impression       1.  No findings to explain the patient's elevated LFTs. Satisfactory Doppler evaluation.    2. Native renal atrophy and small volume ascites, likely related to recent peritoneal dialysis.    3. Prior cholecystectomy.     ASSESSMENT  52 y.o. White female with:  1. ELEVATED LIVER  ENZYMES/ELEVATED ALK PHOS   -- Etiology uncertain; may be 2/2 to transplant or medication  -- serological work up essentially normal, alk phos isoenzymes pending  -- will proceed with transcutaneous liver biopsy.   -- PT/INR linked to next set of labs, will need U/S    PLAN:  1. Will need updated U/S  2. PT/INR prior to biopsy  3. Schedule biopsy  4. Follow up TBD based on biopsy     Thank you for allowing me to participate in the care of Apurva Cast PA-C

## 2018-02-01 LAB
BK VIRUS DNA PCR, QUANT, BLOOD: <125 COPIES/ML
BK VIRUS DNA, BLOOD: NOT DETECTED
HCV LOG: <1.08 LOG (10) IU/ML
HCV RNA QUANT PCR: <12 IU/ML
HCV, QUALITATIVE: NOT DETECTED IU/ML
LOG BKV COPIES/ML: <2.1 LOG (10) COPIES/ML
PHOSPHATIDYLETHANOL (PETH): NEGATIVE NG/ML

## 2018-02-02 PROBLEM — R74.8 ELEVATED LIVER ENZYMES: Status: ACTIVE | Noted: 2018-02-02

## 2018-02-02 LAB
ALP BONE CFR SERPL: 53 U/L (ref 5–58)
ALP BONE SERPL-CCNC: 29 % (ref 16–56)
ALP INTEST CFR SERPL: 5 U/L
ALP INTEST SERPL-CCNC: 3 %
ALP LIVER CFR SERPL: 124 U/L (ref 5–93)
ALP LIVER SERPL-CCNC: 68 % (ref 44–84)
ALP SERPL-CCNC: 182 U/L (ref 33–130)

## 2018-02-05 ENCOUNTER — LAB VISIT (OUTPATIENT)
Dept: LAB | Facility: HOSPITAL | Age: 53
End: 2018-02-05
Attending: INTERNAL MEDICINE
Payer: COMMERCIAL

## 2018-02-05 ENCOUNTER — PATIENT MESSAGE (OUTPATIENT)
Dept: UROGYNECOLOGY | Facility: CLINIC | Age: 53
End: 2018-02-05

## 2018-02-05 DIAGNOSIS — Z94.0 KIDNEY REPLACED BY TRANSPLANT: ICD-10-CM

## 2018-02-05 DIAGNOSIS — R74.8 ELEVATED LIVER ENZYMES: ICD-10-CM

## 2018-02-05 LAB
ALBUMIN SERPL BCP-MCNC: 3.6 G/DL
ALBUMIN SERPL BCP-MCNC: 3.6 G/DL
ALP SERPL-CCNC: 230 U/L
ALT SERPL W/O P-5'-P-CCNC: 126 U/L
ANION GAP SERPL CALC-SCNC: 7 MMOL/L
AST SERPL-CCNC: 68 U/L
BASOPHILS # BLD AUTO: 0.03 K/UL
BASOPHILS NFR BLD: 1.3 %
BILIRUB DIRECT SERPL-MCNC: 0.2 MG/DL
BILIRUB SERPL-MCNC: 0.5 MG/DL
BUN SERPL-MCNC: 19 MG/DL
CALCIUM SERPL-MCNC: 8.9 MG/DL
CHLORIDE SERPL-SCNC: 109 MMOL/L
CO2 SERPL-SCNC: 26 MMOL/L
CREAT SERPL-MCNC: 0.9 MG/DL
DIFFERENTIAL METHOD: ABNORMAL
EOSINOPHIL # BLD AUTO: 0.1 K/UL
EOSINOPHIL NFR BLD: 2.2 %
ERYTHROCYTE [DISTWIDTH] IN BLOOD BY AUTOMATED COUNT: 13.8 %
EST. GFR  (AFRICAN AMERICAN): >60 ML/MIN/1.73 M^2
EST. GFR  (NON AFRICAN AMERICAN): >60 ML/MIN/1.73 M^2
GLUCOSE SERPL-MCNC: 94 MG/DL
HCT VFR BLD AUTO: 35.9 %
HGB BLD-MCNC: 11.7 G/DL
IMM GRANULOCYTES # BLD AUTO: 0.01 K/UL
IMM GRANULOCYTES NFR BLD AUTO: 0.4 %
INR PPP: 0.9
LYMPHOCYTES # BLD AUTO: 0.1 K/UL
LYMPHOCYTES NFR BLD: 5.3 %
MAGNESIUM SERPL-MCNC: 1.6 MG/DL
MCH RBC QN AUTO: 33.1 PG
MCHC RBC AUTO-ENTMCNC: 32.6 G/DL
MCV RBC AUTO: 102 FL
MONOCYTES # BLD AUTO: 0.2 K/UL
MONOCYTES NFR BLD: 9.3 %
NEUTROPHILS # BLD AUTO: 1.8 K/UL
NEUTROPHILS NFR BLD: 81.5 %
NRBC BLD-RTO: 0 /100 WBC
PHOSPHATE SERPL-MCNC: 3.1 MG/DL
PLATELET # BLD AUTO: 109 K/UL
PMV BLD AUTO: 10.6 FL
POTASSIUM SERPL-SCNC: 4.6 MMOL/L
PROT SERPL-MCNC: 6.8 G/DL
PROTHROMBIN TIME: 10 SEC
RBC # BLD AUTO: 3.53 M/UL
SODIUM SERPL-SCNC: 142 MMOL/L
WBC # BLD AUTO: 2.26 K/UL

## 2018-02-05 PROCEDURE — 80069 RENAL FUNCTION PANEL: CPT

## 2018-02-05 PROCEDURE — 80197 ASSAY OF TACROLIMUS: CPT

## 2018-02-05 PROCEDURE — 87799 DETECT AGENT NOS DNA QUANT: CPT

## 2018-02-05 PROCEDURE — 85025 COMPLETE CBC W/AUTO DIFF WBC: CPT

## 2018-02-05 PROCEDURE — 85610 PROTHROMBIN TIME: CPT

## 2018-02-05 PROCEDURE — 82247 BILIRUBIN TOTAL: CPT

## 2018-02-05 PROCEDURE — 83735 ASSAY OF MAGNESIUM: CPT

## 2018-02-05 PROCEDURE — 84075 ASSAY ALKALINE PHOSPHATASE: CPT

## 2018-02-06 LAB — TACROLIMUS BLD-MCNC: 9.1 NG/ML

## 2018-02-06 NOTE — PROGRESS NOTES
Results reviewed and the following message sent to patient via [facility]: White count acceptable, though low 9it's in safe range). Kidney function remains stable. Will follow plan per hepatology for liver issue.

## 2018-02-07 ENCOUNTER — PATIENT MESSAGE (OUTPATIENT)
Dept: TRANSPLANT | Facility: CLINIC | Age: 53
End: 2018-02-07

## 2018-02-07 ENCOUNTER — OFFICE VISIT (OUTPATIENT)
Dept: TRANSPLANT | Facility: CLINIC | Age: 53
End: 2018-02-07
Payer: COMMERCIAL

## 2018-02-07 ENCOUNTER — TELEPHONE (OUTPATIENT)
Dept: HEPATOLOGY | Facility: CLINIC | Age: 53
End: 2018-02-07

## 2018-02-07 VITALS
WEIGHT: 131.38 LBS | RESPIRATION RATE: 18 BRPM | OXYGEN SATURATION: 98 % | HEART RATE: 74 BPM | TEMPERATURE: 98 F | HEIGHT: 60 IN | BODY MASS INDEX: 25.79 KG/M2 | SYSTOLIC BLOOD PRESSURE: 114 MMHG | DIASTOLIC BLOOD PRESSURE: 68 MMHG

## 2018-02-07 DIAGNOSIS — Z79.60 LONG-TERM USE OF IMMUNOSUPPRESSANT MEDICATION: ICD-10-CM

## 2018-02-07 DIAGNOSIS — Z91.89 AT RISK FOR OPPORTUNISTIC INFECTIONS: ICD-10-CM

## 2018-02-07 DIAGNOSIS — Z29.89 PROPHYLACTIC IMMUNOTHERAPY: ICD-10-CM

## 2018-02-07 DIAGNOSIS — N25.81 SECONDARY HYPERPARATHYROIDISM OF RENAL ORIGIN: ICD-10-CM

## 2018-02-07 DIAGNOSIS — E87.20 METABOLIC ACIDOSIS: ICD-10-CM

## 2018-02-07 DIAGNOSIS — Z94.0 STATUS POST DECEASED-DONOR KIDNEY TRANSPLANTATION: ICD-10-CM

## 2018-02-07 DIAGNOSIS — D70.2 OTHER DRUG-INDUCED NEUTROPENIA: ICD-10-CM

## 2018-02-07 DIAGNOSIS — N18.2 CHRONIC KIDNEY DISEASE (CKD), STAGE II (MILD): Primary | Chronic | ICD-10-CM

## 2018-02-07 DIAGNOSIS — R74.01 TRANSAMINITIS: ICD-10-CM

## 2018-02-07 LAB — CMV DNA SERPL NAA+PROBE-ACNC: NORMAL IU/ML

## 2018-02-07 PROCEDURE — 99999 PR PBB SHADOW E&M-EST. PATIENT-LVL III: CPT | Mod: PBBFAC,,, | Performed by: INTERNAL MEDICINE

## 2018-02-07 PROCEDURE — 99215 OFFICE O/P EST HI 40 MIN: CPT | Mod: S$GLB,,, | Performed by: INTERNAL MEDICINE

## 2018-02-07 PROCEDURE — 3008F BODY MASS INDEX DOCD: CPT | Mod: S$GLB,,, | Performed by: INTERNAL MEDICINE

## 2018-02-07 NOTE — TELEPHONE ENCOUNTER
Patient came by the clinic after an appointment in [facility]. She stated she is ready to do her Liver Biopsy.  Pre and post procedure teaching done with the patient. Written instructions given to the patient also.  Pt had labs current. Will do U/S Abd on 2/23/18.  Would like to do the Liver Biopsy on any Friday except 3/16 and 3/24.  Verbalized understanding and agreed.

## 2018-02-08 ENCOUNTER — PATIENT MESSAGE (OUTPATIENT)
Dept: TRANSPLANT | Facility: CLINIC | Age: 53
End: 2018-02-08

## 2018-02-09 ENCOUNTER — TELEPHONE (OUTPATIENT)
Dept: TRANSPLANT | Facility: CLINIC | Age: 53
End: 2018-02-09

## 2018-02-09 NOTE — TELEPHONE ENCOUNTER
----- Message from Shreya Waldron MD sent at 2/8/2018  3:09 PM CST -----  Change labs to every other week. I messaged pt that hepatology approved.    ----- Message -----  From: Rory Cast PA-C  Sent: 2/8/2018  10:17 AM  To: Shreya Waldron MD    No, she can proceed with labs every other week.     ----- Message -----  From: Shreya Waldron MD  Sent: 2/7/2018   2:44 PM  To: Rory Cast PA-C    Hi,  I want to change Apurva's labs to every other week. Do you need weekly labs to monitor LFTs?  Sally

## 2018-02-10 NOTE — PROGRESS NOTES
"   Kidney Post-Transplant Assessment    Referring Physician: LUDA Raygoza  Current Nephrologist: LUDA Raygoza    ORGAN: RIGHT KIDNEY  Donor Type:  - brain death  PHS Increased Risk: yes  Cold Ischemia: 852 mins  Induction Medications: campath - alemtuzumab (anti-cd52), steroids (prednisone,methylprednisolone,solumedrol,medrol,decadron)    Subjective:     CC:  Reassessment of renal allograft function and management of chronic immunosuppression.    HPI:  Ms. Rodriguez is a 52 y.o. year old White female who received a  - brain death kidney transplant on 17. Her creatinines range 0.9-1.1. She takes tacrolimus and MMF on hold for maintenance immunosuppression. Her post transplant course has been uncomplicated to date.    Pertinent History:  -ESRD at age 23.  Diagnosed with kidney disease at age 6 months  -Nephrocalcinosis diagnosed age 1  -Kidney transplant #1 at [facility] at , failed 2016 when she started dialysis. IS-CSA/MMF  -h/o pancytopenia with a bone marrow biopsy after the first kidney transplant   -AVN requiring hip replacement.  -DDKT #2  (PHS-IR) 15 induced with Campath. cPRA pre txp 94%. KDPI 50%,  CIT 14+ hrs. Required 7 day pollack d/t friable bladder.Seen by surgery 17 (Dr. Barragan): "Scant serous discharge from wound. Redness with minimal blanching. Incision probed with sterile swab, no expression of fluid or pus. Recommend continued observation"  -Developed uvular edema post op with cough and hoarseness (ENT dx hydrops)  -CMV status ++; valcyte held d/t neutropenia  -Neutropenia cause bactrim to be D/C'd. Could not tolerate pentam d/t cough/vocal cord issues.  -Transaminitis suspected to be d/t meds  -MBD: , Vit D 24.  Cinacalcet and ergocalciferol started post op    Apurva states she has been doing well. She recently saw hepatology who recommended a liver biopsy. She also saw urogyn and will start pelvic PT soon. She noted her cholesterol has been up a bit and is " worried about that. She notes she was on statin pre txp.  Last CMV was negative (prios were +). She remains on treatment dose Valcyte.    Review of Systems   Constitutional: Negative for fever.   Eyes: Negative for visual disturbance.   Respiratory: Negative for shortness of breath.    Cardiovascular: Negative for chest pain and leg swelling.   Gastrointestinal: Negative.    Genitourinary: Positive for dysuria (intermiitent, better now). Negative for hematuria.   Skin: Negative for rash.   Allergic/Immunologic: Positive for immunocompromised state.   Neurological: Negative for tremors.     Objective:   Blood pressure 114/68, pulse 74, temperature 98.4 °F (36.9 °C), temperature source Oral, resp. rate 18, height 5' (1.524 m), weight 59.6 kg (131 lb 6.3 oz), SpO2 98 %.body mass index is 25.66 kg/m².    Physical Exam   Constitutional: No distress.   Cardiovascular: Normal rate and regular rhythm.    Pulmonary/Chest: Effort normal and breath sounds normal. No respiratory distress.   Abdominal: Soft. Bowel sounds are normal. She exhibits no mass. There is no tenderness.   Musculoskeletal: She exhibits no edema.   Skin:   Well healed incision lower abd post transplant   Psychiatric: She has a normal mood and affect.     Labs:  Lab Results   Component Value Date    WBC 2.26 (L) 2018    HGB 11.7 (L) 2018    HCT 35.9 (L) 2018     2018    K 4.6 2018     2018    CO2 26 2018    BUN 19 2018    CREATININE 0.9 2018    EGFRNONAA >60.0 2018    CALCIUM 8.9 2018    PHOS 3.1 2018    MG 1.6 2018    ALBUMIN 3.6 2018    ALBUMIN 3.6 2018    AST 68 (H) 2018     (H) 2018    UTPCR Unable to calculate 2018    .0 (H) 2017    TACROLIMUS 9.1 2018   Labs were reviewed with the patient    Assessment:     1. Chronic kidney disease (CKD), stage II (mild)    2. Status post -donor kidney  transplantation    3. Metabolic acidosis    4. Other drug-induced neutropenia    5. Secondary hyperparathyroidism of renal origin    6. Transaminitis    7. At risk for opportunistic infections    8. Long-term use of immunosuppressant medication    9. Prophylactic immunotherapy        Plan:   -CKD2 s/p kidney transplantation, doing well. Continue to monitor renal function and electrolytes, HTN, secondary hyperparathyroidism and other issues related to underlying ESRD.  -Reactivation of CMV viremia resolving -  If repeat PCR low, I will decrease to prophylaxis dose or stop.  -Continue tacrolimus-based immunosuppression. Level within target. Will continue to watch signs, symptoms and levels for side effects and drug toxicity.   -Drug induced neutropenia still noted, but ANC adequate and stable (ie not dropping). Cont to watch closely. Continue to hold Myfortic for now; hope to restart once WBC > 4. No need for filgrastim.  -Hypomagnesemia - remains WNL. No need to recheck  -Acidosis resolved off Na HCO3. Monitor  -Transaminitis waxes and wanes - cont to follow LFTs with help from hepatology. eSgun MEAD aware of trend and is  following.  -Dysuria- per urogyn  -Hypercholesterolemia - we discussed to hold statin for now until liver biopsy and hepatology eval complete. Will readdress need for chol lowering therapy and best option later. I assured her a few months of mildly high chol while we sort out liver issues is OK.    Continue to monitor labs q2 week    Follow-up:   Clinic: return to transplant clinic weekly for the first month after transplant; every 2 weeks during months 2-3; then at 6-, 9-, 12-, 18-, 24-, and 36- months post-transplant to reassess for complications from immunosuppression toxicity and monitor for rejection.  Annually thereafter.    Labs: since patient remains at high risk for rejection and drug-related complications that warrant close monitoring, labs will be ordered as follows: continue twice  weekly CBC, renal panel, and drug level for first month; then same labs once weekly through 3rd month post-transplant.  Urine for UA and protein/creatinine ratio monthly.  Urine BK - PCR at 1-, 3-, 6-, 9-, 12-, 18-, 24-, and 36- months post-transplant.  Hepatic panel at 1-, 2-, 3-, 6-, 9-, 12-, 18-, 24-, and 36- months post-transplant.    Shreya Waldron MD       Education:   Material provided to the patient.  Patient reminded to call with any health changes since these can be early signs of significant complications.  Also, I advised the patient to be sure any new medications or changes of old medications are discussed with either a pharmacist or physician knowledgeable with transplant to avoid rejection/drug toxicity related to significant drug interactions.

## 2018-02-19 ENCOUNTER — HOSPITAL ENCOUNTER (OUTPATIENT)
Dept: RADIOLOGY | Facility: HOSPITAL | Age: 53
Discharge: HOME | End: 2018-02-19
Attending: OBSTETRICS & GYNECOLOGY
Payer: COMMERCIAL

## 2018-02-19 ENCOUNTER — PATIENT MESSAGE (OUTPATIENT)
Dept: TRANSPLANT | Facility: CLINIC | Age: 53
End: 2018-02-19

## 2018-02-19 DIAGNOSIS — R10.31 PAIN, ABDOMINAL, RLQ: ICD-10-CM

## 2018-02-19 PROCEDURE — 76830 TRANSVAGINAL US NON-OB: CPT | Mod: 26,,, | Performed by: RADIOLOGY

## 2018-02-19 PROCEDURE — 76830 TRANSVAGINAL US NON-OB: CPT | Mod: TC

## 2018-02-19 PROCEDURE — 76856 US EXAM PELVIC COMPLETE: CPT | Mod: 26,,, | Performed by: RADIOLOGY

## 2018-02-20 ENCOUNTER — PATIENT MESSAGE (OUTPATIENT)
Dept: TRANSPLANT | Facility: CLINIC | Age: 53
End: 2018-02-20

## 2018-02-20 DIAGNOSIS — Z94.0 KIDNEY REPLACED BY TRANSPLANT: ICD-10-CM

## 2018-02-20 RX ORDER — TACROLIMUS 0.5 MG/1
0.5 CAPSULE ORAL EVERY 12 HOURS
Qty: 60 CAPSULE | Refills: 11
Start: 2018-02-20 | End: 2018-02-20 | Stop reason: DRUGHIGH

## 2018-02-20 RX ORDER — TACROLIMUS 1 MG/1
CAPSULE ORAL
Qty: 60 CAPSULE | Refills: 11
Start: 2018-02-20 | End: 2018-06-13 | Stop reason: DRUGHIGH

## 2018-02-20 NOTE — PATIENT INSTRUCTIONS
Hi, let's lower tacrolimus to 2 mg twice daily. I think a level of ~8 is enough for you. If you drop too low, I will try 2.5 in AM and 2 in PM.  Thanks,  Dr. Sally Lawlereld

## 2018-02-20 NOTE — TELEPHONE ENCOUNTER
Sent this thru [facility].    Hi, let's lower tacrolimus to 2 mg twice daily. I think a level of ~8 is enough for you. If you drop too low, I will try 2.5 in AM and 2 in PM.  Thanks,  Dr. Sally Tian

## 2018-02-21 ENCOUNTER — TELEPHONE (OUTPATIENT)
Dept: UROGYNECOLOGY | Facility: CLINIC | Age: 53
End: 2018-02-21

## 2018-02-21 ENCOUNTER — PATIENT MESSAGE (OUTPATIENT)
Dept: UROGYNECOLOGY | Facility: CLINIC | Age: 53
End: 2018-02-21

## 2018-02-21 NOTE — TELEPHONE ENCOUNTER
Pt stated she was returning a missed call from Dr Asif. Informed pt Dr Asif is un-avalible but I'll relay this message to her. Pt voiced understanding and call ended.

## 2018-02-22 ENCOUNTER — TELEPHONE (OUTPATIENT)
Dept: UROLOGY | Facility: HOSPITAL | Age: 53
End: 2018-02-22

## 2018-02-23 ENCOUNTER — HOSPITAL ENCOUNTER (OUTPATIENT)
Dept: RADIOLOGY | Facility: HOSPITAL | Age: 53
Discharge: HOME | End: 2018-02-23
Attending: PHYSICIAN ASSISTANT
Payer: COMMERCIAL

## 2018-02-23 ENCOUNTER — TELEPHONE (OUTPATIENT)
Dept: HEPATOLOGY | Facility: CLINIC | Age: 53
End: 2018-02-23

## 2018-02-23 DIAGNOSIS — R74.8 ELEVATED LIVER ENZYMES: ICD-10-CM

## 2018-02-23 PROCEDURE — 76700 US EXAM ABDOM COMPLETE: CPT | Mod: 26,,, | Performed by: RADIOLOGY

## 2018-02-23 PROCEDURE — 76700 US EXAM ABDOM COMPLETE: CPT | Mod: TC

## 2018-02-23 NOTE — TELEPHONE ENCOUNTER
Patient completed pre procedure U/S today for the U/S Guided Liver Biopsy.  Patient is requesting No Sedation, any Friday in March except 3/16 and 3/24.    Request faxed to Radiology for an appointment.

## 2018-02-26 ENCOUNTER — TELEPHONE (OUTPATIENT)
Dept: HEPATOLOGY | Facility: CLINIC | Age: 53
End: 2018-02-26

## 2018-02-26 ENCOUNTER — PATIENT MESSAGE (OUTPATIENT)
Dept: UROGYNECOLOGY | Facility: CLINIC | Age: 53
End: 2018-02-26

## 2018-02-26 DIAGNOSIS — K77 LIVER DISORDERS IN DISEASES CLASSIFIED ELSEWHERE: ICD-10-CM

## 2018-02-26 DIAGNOSIS — R74.01 TRANSAMINITIS: Primary | ICD-10-CM

## 2018-02-26 NOTE — TELEPHONE ENCOUNTER
Received a call from Radiology/[facility] with a tentative date for the U/S Guided Liver Biopsy. Patient requested any Friday in March except 3/16 and 3/24/18.  Tentative date available is for Friday 3/9/18. The patient will need Pre Procedure PT/INR. Previous labs on 18 . Pt had some labs repeated on 18 but pt/inr was not done.  Will send the patient a My Chart message.

## 2018-02-26 NOTE — TELEPHONE ENCOUNTER
Received a call from Radiology/[facility] with a tentative date for the U/S Guided Liver Biopsy. Patient requested any Friday in March except 3/16 and 3/24/18.  Tentative date available is for Friday 3/9/18. The patient will need Pre Procedure PT/INR. Previous labs on 18 . Pt had some labs repeated on 18 but pt/inr was not done.  Will send the patient a My Chart message.

## 2018-02-27 ENCOUNTER — PATIENT MESSAGE (OUTPATIENT)
Dept: HEPATOLOGY | Facility: CLINIC | Age: 53
End: 2018-02-27

## 2018-02-27 DIAGNOSIS — R74.8 ELEVATED LIVER ENZYMES: Primary | ICD-10-CM

## 2018-02-28 ENCOUNTER — CLINICAL SUPPORT (OUTPATIENT)
Dept: REHABILITATION | Facility: HOSPITAL | Age: 53
End: 2018-02-28
Attending: OBSTETRICS & GYNECOLOGY
Payer: MEDICARE

## 2018-02-28 DIAGNOSIS — R35.1 NOCTURIA: Primary | ICD-10-CM

## 2018-02-28 DIAGNOSIS — N39.46 MIXED STRESS AND URGE URINARY INCONTINENCE: ICD-10-CM

## 2018-02-28 DIAGNOSIS — M62.89 PELVIC FLOOR DYSFUNCTION: ICD-10-CM

## 2018-02-28 PROCEDURE — G8990 OTHER PT/OT CURRENT STATUS: HCPCS | Mod: CK,PN

## 2018-02-28 PROCEDURE — 97162 PT EVAL MOD COMPLEX 30 MIN: CPT | Mod: PN

## 2018-02-28 PROCEDURE — 97110 THERAPEUTIC EXERCISES: CPT | Mod: PN

## 2018-02-28 PROCEDURE — G8991 OTHER PT/OT GOAL STATUS: HCPCS | Mod: CK,PN
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
## 2018-03-05 ENCOUNTER — CLINICAL SUPPORT (OUTPATIENT)
Dept: REHABILITATION | Facility: HOSPITAL | Age: 53
End: 2018-03-05
Attending: OBSTETRICS & GYNECOLOGY
Payer: COMMERCIAL

## 2018-03-05 ENCOUNTER — LAB VISIT (OUTPATIENT)
Dept: LAB | Facility: HOSPITAL | Age: 53
End: 2018-03-05
Attending: INTERNAL MEDICINE
Payer: COMMERCIAL

## 2018-03-05 DIAGNOSIS — N39.46 MIXED STRESS AND URGE URINARY INCONTINENCE: ICD-10-CM

## 2018-03-05 DIAGNOSIS — Z94.0 KIDNEY REPLACED BY TRANSPLANT: ICD-10-CM

## 2018-03-05 DIAGNOSIS — R74.01 TRANSAMINITIS: ICD-10-CM

## 2018-03-05 DIAGNOSIS — K77 LIVER DISORDERS IN DISEASES CLASSIFIED ELSEWHERE: ICD-10-CM

## 2018-03-05 LAB
ALBUMIN SERPL BCP-MCNC: 4 G/DL
ANION GAP SERPL CALC-SCNC: 7 MMOL/L
BASOPHILS # BLD AUTO: 0.03 K/UL
BASOPHILS NFR BLD: 1.5 %
BUN SERPL-MCNC: 21 MG/DL
CALCIUM SERPL-MCNC: 9.1 MG/DL
CHLORIDE SERPL-SCNC: 108 MMOL/L
CO2 SERPL-SCNC: 25 MMOL/L
CREAT SERPL-MCNC: 0.9 MG/DL
DIFFERENTIAL METHOD: ABNORMAL
EOSINOPHIL # BLD AUTO: 0.1 K/UL
EOSINOPHIL NFR BLD: 2.4 %
ERYTHROCYTE [DISTWIDTH] IN BLOOD BY AUTOMATED COUNT: 12.8 %
EST. GFR  (AFRICAN AMERICAN): >60 ML/MIN/1.73 M^2
EST. GFR  (NON AFRICAN AMERICAN): >60 ML/MIN/1.73 M^2
GLUCOSE SERPL-MCNC: 104 MG/DL
HCT VFR BLD AUTO: 39.6 %
HGB BLD-MCNC: 13.4 G/DL
IMM GRANULOCYTES # BLD AUTO: 0.01 K/UL
IMM GRANULOCYTES NFR BLD AUTO: 0.5 %
INR PPP: 1
LYMPHOCYTES # BLD AUTO: 0.2 K/UL
LYMPHOCYTES NFR BLD: 9.7 %
MAGNESIUM SERPL-MCNC: 1.7 MG/DL
MCH RBC QN AUTO: 33.9 PG
MCHC RBC AUTO-ENTMCNC: 33.8 G/DL
MCV RBC AUTO: 100 FL
MONOCYTES # BLD AUTO: 0.2 K/UL
MONOCYTES NFR BLD: 8.7 %
NEUTROPHILS # BLD AUTO: 1.6 K/UL
NEUTROPHILS NFR BLD: 77.2 %
NRBC BLD-RTO: 0 /100 WBC
PHOSPHATE SERPL-MCNC: 3.7 MG/DL
PLATELET # BLD AUTO: 132 K/UL
PMV BLD AUTO: 10.4 FL
POTASSIUM SERPL-SCNC: 4 MMOL/L
PROTHROMBIN TIME: 10.1 SEC
RBC # BLD AUTO: 3.95 M/UL
SODIUM SERPL-SCNC: 140 MMOL/L
WBC # BLD AUTO: 2.06 K/UL

## 2018-03-05 PROCEDURE — 80069 RENAL FUNCTION PANEL: CPT

## 2018-03-05 PROCEDURE — 87799 DETECT AGENT NOS DNA QUANT: CPT

## 2018-03-05 PROCEDURE — 97112 NEUROMUSCULAR REEDUCATION: CPT | Mod: PN

## 2018-03-05 PROCEDURE — 85025 COMPLETE CBC W/AUTO DIFF WBC: CPT

## 2018-03-05 PROCEDURE — 80197 ASSAY OF TACROLIMUS: CPT

## 2018-03-05 PROCEDURE — 83735 ASSAY OF MAGNESIUM: CPT

## 2018-03-05 PROCEDURE — 85610 PROTHROMBIN TIME: CPT

## 2018-03-05 PROCEDURE — 36415 COLL VENOUS BLD VENIPUNCTURE: CPT | Mod: PO

## 2018-03-05 NOTE — PROGRESS NOTES
"Patient: Apurva Rodriguez   [facility] #: [id number]   Diagnosis:   Encounter Diagnosis   Name Primary?    Mixed stress and urge urinary incontinence       Date of start of care: 2/28/18  Date of treatment: 03/05/2018   Time in: 10:00  Time out: 10:58  Total treatment time: 58 minutes  # Visits: 2/12  Auth expiration: 12/31/18  POC expiration: 5/23/18  Outpatient Physical Therapy   Daily Note    Subjective     Pt reports she is doing well today, nothing new to report.    Pain: Current: 0/10    Patient's Goals: to be able to stand through a 3 hour presentation without having to use the restroom    Objective     ABDOMINALS  Scarring: fair mobility, adhesions     Diastasis: none   Abdominal strength: Rectus: 3-/5     Transverse: unable to palpate; pt states her kidney transplants are in her lower quadrants       VAGINAL PELVIC FLOOR EXAM  EXTERNAL ASSESSMENT  Skin Condition: WNL  Scarring: none  Sensation: WNL  Pain: none  Introitus: WNL   Voluntary Contraction: nil  Voluntary Relaxation: nil  Involuntary Contraction: nil    INTERNAL ASSESSMENT  Pain: none  Sensation: able to localize pressure appropriately, able to distinguish pressure from side to side, and able to feel the difference between lift and drop    Vaginal Vault: fragile  Muscle Bulk: atrophy  Muscle Power: 1/5  Muscle Endurance: NA  # Reps To Fatigue: NA    Fast Contractions: NA    Quality of Contraction: decreased hold  Specificity: patient contracts: gluts, adductors   Coordination: pt with good overflow to PFM with TA activation    TREATMENT    Pt received individual neuromuscular control for 58 minutes including:    - Diaphragmatic breathing with verbal/tactile cueing; pt displays lengthy exhale due to 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  - Tahmina; pt displays some activity at her introitus when cued to "squeeze as if trying not to pee;" pt with some activation of layer two muscles, minimal activity " of deeper muscles  - TA activation 5 x 5'' with two deep breaths between; pt displays some carry over to layer 3 muscles  - DB with pressure applied to perineal body (minimal excursion), pt instructed to practice at home with full palm vulvar contact and perineal body pressure      Education: Discussed progression of plan of care with patient; educated pt in activity modification; pt instructed in HEP including diaphragmatic breathing, TA set, and diaphragmatic breathing with tactile feedback to vulva/perineal body; pt demonstrated and verbalized understanding and was provided with a handout.    Assessment     Pt tolerated session well without visible adverse effects. PFM assessment reveals minimal PFM at layer 3, slightly improved at layers 1 and 2. Pt presents with history of two kidney transplants, increased UI following her recent surgery, decreased central stabilization. Pt also presents with history of constipation, TIGIST, and multiple abdominal hernia repairs. Pt will continue to benefit from physcial therapy services in order to maximize pain free functional independence.    Pt is making good progress towards established goals.   No educational, cultural, or spiritual barriers to learning identified.    GOALS  Short Term Goals: 4 weeks (3/28/18)  1. Pt will verbalize improved awareness of PFM activity as palpated by PT in order to improve activity involvement with HEP.  2. Pt will demonstrate decreased overflow muscle activity during PF muscle contraction.  3. Pt will display integration or respiratory and pelvic diaphragm in order to improve inner core stability and support.  4. Pt will report incorporation of voiding schedule into daily routine to manage frequency, incontinence etc.  5. Pt will tolerate HEP to improve impairments and independence with ADL's.     Long Term Goals: 12 weeks (5/23/18)  1. Pt will be able to lift, drop, and bear down in order to feel improved excursion of her PFM to improve  function  2. Pt will be able to perform 5 x 5'' kegals in order to improve PFM strength to decrease urinary frequency.  3. Pt will report improvement in holding ability.   4. Pt will be independent with HEP and self management.     CMS Impairment/Limitation/Restriction for Urinary Problem Survey  Status Limitation G-Code CMS Severity Modifier  Intake 49% 51% Current Status CK - At least 40 percent but less than 60 percent  Predicted 58% 42% Goal Status+ CK - At least 40 percent but less than 60 percent       Plan     Continue with established POC, working toward PT goals.

## 2018-03-06 ENCOUNTER — PATIENT MESSAGE (OUTPATIENT)
Dept: TRANSPLANT | Facility: CLINIC | Age: 53
End: 2018-03-06

## 2018-03-06 LAB
CMV DNA SERPL NAA+PROBE-ACNC: NORMAL IU/ML
TACROLIMUS BLD-MCNC: 7.6 NG/ML

## 2018-03-06 RX ORDER — ERGOCALCIFEROL 1.25 MG/1
50000 CAPSULE ORAL
Qty: 4 CAPSULE | Refills: 11 | Status: SHIPPED | OUTPATIENT
Start: 2018-03-06 | End: 2018-05-01

## 2018-03-08 ENCOUNTER — PATIENT MESSAGE (OUTPATIENT)
Dept: UROGYNECOLOGY | Facility: CLINIC | Age: 53
End: 2018-03-08

## 2018-03-09 ENCOUNTER — HOSPITAL ENCOUNTER (OUTPATIENT)
Facility: HOSPITAL | Age: 53
Discharge: HOME | End: 2018-03-09
Attending: INTERNAL MEDICINE | Admitting: INTERNAL MEDICINE
Payer: COMMERCIAL

## 2018-03-09 ENCOUNTER — SURGERY (OUTPATIENT)
Age: 53
End: 2018-03-09

## 2018-03-09 VITALS
TEMPERATURE: 97 F | RESPIRATION RATE: 18 BRPM | DIASTOLIC BLOOD PRESSURE: 84 MMHG | OXYGEN SATURATION: 99 % | HEIGHT: 60 IN | WEIGHT: 127 LBS | SYSTOLIC BLOOD PRESSURE: 132 MMHG | BODY MASS INDEX: 24.94 KG/M2 | HEART RATE: 65 BPM

## 2018-03-09 DIAGNOSIS — N28.9 RENAL DYSFUNCTION: ICD-10-CM

## 2018-03-09 DIAGNOSIS — R74.8 ELEVATED LIVER ENZYMES: ICD-10-CM

## 2018-03-09 LAB
BKV DNA SERPL NAA+PROBE-ACNC: <125 COPIES/ML
BKV DNA SERPL NAA+PROBE-LOG#: <2.1 LOG (10) COPIES/ML
BKV DNA SERPL QL NAA+PROBE: NOT DETECTED

## 2018-03-09 PROCEDURE — 25000003 PHARM REV CODE 250: Performed by: STUDENT IN AN ORGANIZED HEALTH CARE EDUCATION/TRAINING PROGRAM

## 2018-03-09 RX ORDER — ACETAMINOPHEN 325 MG/1
325 TABLET ORAL EVERY 6 HOURS PRN
COMMUNITY
End: 2018-05-01

## 2018-03-09 RX ORDER — SODIUM CHLORIDE 9 MG/ML
INJECTION, SOLUTION INTRAVENOUS CONTINUOUS
Status: DISCONTINUED | OUTPATIENT
Start: 2018-03-09 | End: 2018-03-09 | Stop reason: HOSPADM

## 2018-03-09 RX ORDER — DIPHENHYDRAMINE HCL 25 MG
25 TABLET ORAL NIGHTLY PRN
COMMUNITY
End: 2018-03-20

## 2018-03-09 RX ADMIN — SODIUM CHLORIDE 500 ML: 9 INJECTION, SOLUTION INTRAVENOUS at 08:03

## 2018-03-09 NOTE — H&P
"Radiology History & Physical      SUBJECTIVE:     Chief Complaint: Elevated LFTs    History of Present Illness:  Apurva Rodriguez is a 52 y.o. female who presents for US guided random liver biopsy. Pt is s/p kidney tx.   Past Medical History:   Diagnosis Date    Anemia     Avascular necrosis left hip 2016    S/p replacement Cord decompression right side Revision of the left hip: with a "larger" hardware placement     Dyslipidemia 2016    ESRD (end stage renal disease) 2016    She mentioned that the cause of the kidney failure was associated with several birth defects: PKD Bilateral nephrocalcinosis Hyperuricemia Chronic Icthyosis     Essential hypertension 2016    Gastroesophageal reflux disease without esophagitis 2016    Hypertension     Polycystic kidney disease     diagnosed at early age    S/P bone marrow biopsy 2016    Secondary hyperparathyroidism of renal origin 2016     Past Surgical History:   Procedure Laterality Date    ABDOMINAL HERNIA REPAIR      CHOLECYSTECTOMY      laparoascopic    EYE MUSCLE SURGERY Bilateral     as a baby(1 years)    FRACTURE SURGERY Right     rigt femur :steel karan pl;acement and replacement    KIDNEY TRANSPLANT Right     RLQ transplant,  donor    left hip revision      PARATHYROIDECTOMY      PERITONEAL CATHETER INSERTION      PERITONEAL CATHETER INSERTION      right hip decompression      TONSILLECTOMY      TOTAL HIP ARTHROPLASTY Left        Home Meds:   Prior to Admission medications    Medication Sig Start Date End Date Taking? Authorizing Provider   atovaquone (MEPRON) 750 mg/5 mL Susp Take 10 mLs (1,500 mg total) by mouth once daily. STOP 18 Yes Liliana Loredo MD   azelastine (OPTIVAR) 0.05 % ophthalmic solution  18  Yes Historical Provider, MD   carvedilol (COREG) 6.25 MG tablet Take 1 tablet (6.25 mg total) by mouth 2 (two) times daily. 17 Yes Liliana Loredo, " MD   cinacalcet (SENSIPAR) 30 MG Tab Take 1 tablet (30 mg total) by mouth daily with breakfast. 12/6/17 12/6/18 Yes Liliana Loredo MD   conjugated estrogens (PREMARIN) vaginal cream 1 g with applicator or dime-sized amt with finger in vagina nightly x 2 weeks, then twice a week thereafter 1/30/18  Yes Kendy Laya,    famotidine (PEPCID) 20 MG tablet Take 1 tablet (20 mg total) by mouth every evening. 12/6/17 12/6/18 Yes Liliana Loredo MD   gabapentin (NEURONTIN) 100 MG capsule TK ONE C PO QHS 12/21/17  Yes Historical Provider, MD   multivitamin (THERAGRAN) tablet Take 1 tablet by mouth once daily. 11/11/17  Yes Rei Cartagena MD   tacrolimus (PROGRAF) 1 MG Cap Take 2 mg AM twice daily mouth Z94.0 Kidney Transplant 11/7/2017. 2/20/18  Yes Shreya Waldron MD   valGANciclovir (VALCYTE) 450 mg Tab Take 2 tablets (900 mg total) by mouth once daily. 1/4/18 1/4/19 Yes Shreya Waldron MD   ELIDEL 1 % cream APPLY DAILY 12/23/17   Historical Provider, MD   ergocalciferol (ERGOCALCIFEROL) 50,000 unit Cap Take 1 capsule (50,000 Units total) by mouth every 7 days. Wednesday 3/6/18   Liliana Loredo MD     Anticoagulants/Antiplatelets: no anticoagulation    Allergies:   Review of patient's allergies indicates:   Allergen Reactions    Pentamidine Shortness Of Breath    Valium [diazepam] Anxiety    Amphotericin b      Peritonitis reaction in dialysis solution while on peritoneal dialysis    Azelex [azelaic acid] Hives    Cleocin [clindamycin hcl] Hives    Decadron [dexamethasone]      Highly anxious    Morphine Swelling     lips    Reglan [metoclopramide hcl] Other (See Comments)    Valumag      Vallium//Highly anxious    Vancomycin analogues Itching     Hair areas     Sedation History:  no adverse reactions    Review of Systems:   Hematological: no known coagulopathies  Respiratory: no shortness of breath  Cardiovascular: no chest pain  Gastrointestinal: no abdominal  pain  Genito-Urinary: no dysuria  Musculoskeletal: negative  Neurological: no TIA or stroke symptoms         OBJECTIVE:     Vital Signs (Most Recent)       Physical Exam:      General: no acute distress  Mental Status: alert and oriented to person, place and time  HEENT: normocephalic, atraumatic  Chest: unlabored breathing  Heart: regular heart rate  Abdomen: nondistended  Extremity: moves all extremities    Laboratory  Lab Results   Component Value Date    INR 1.0 03/05/2018       Lab Results   Component Value Date    WBC 2.06 (L) 03/05/2018    HGB 13.4 03/05/2018    HCT 39.6 03/05/2018     (H) 03/05/2018     (L) 03/05/2018      Lab Results   Component Value Date     03/05/2018     03/05/2018    K 4.0 03/05/2018     03/05/2018    CO2 25 03/05/2018    BUN 21 (H) 03/05/2018    CREATININE 0.9 03/05/2018    CALCIUM 9.1 03/05/2018    MG 1.7 03/05/2018     (H) 02/05/2018    AST 68 (H) 02/05/2018    ALBUMIN 4.0 03/05/2018    BILITOT 0.5 02/05/2018    BILIDIR 0.2 02/05/2018       ASSESSMENT/PLAN:     Sedation Plan: local. Pt doesn't want moderate sedation.   Patient will undergo US guided random liver biopsy.    Kathia Raines M.D. 8:02 AM 3/9/2018

## 2018-03-09 NOTE — PROCEDURES
Radiology Post-Procedure Note    Pre Op Diagnosis: Abnormal LFTs    Post Op Diagnosis: Same    Procedure: Ultrasound guided liver biopsy    Procedure performed by: MD Olu; Don Elaine MD    Written Informed Consent Obtained: Yes    Specimen Removed: Yes: Single 16 gauge core biopsy of liver    Estimated Blood Loss: Minimal    Findings: Moderate sedation and local anesthesia were used.    A 16-gauge Monopty biopsy device was used to remove 1 random right hepatic lobe specimen.  This specimen was sent to pathology for further evaluation.      The patient tolerated the procedure well and there were no complications.  Please see Imaging report for further details.    Kathia Raines M.D. 8:57 AM 3/9/2018

## 2018-03-09 NOTE — DISCHARGE SUMMARY
Radiology Discharge Summary      Hospital Course: No complications    Admit Date: 3/9/2018  Discharge Date: 03/09/2018     Instructions Given to Patient: Yes  Diet: Resume prior diet  Activity: activity as tolerated    Description of Condition on Discharge: Stable  Vital Signs (Most Recent): Temp: 97.6 °F (36.4 °C) (03/09/18 0806)  Pulse: 64 (03/09/18 0806)  Resp: 17 (03/09/18 0806)  BP: 135/78 (03/09/18 0806)  SpO2: 100 % (03/09/18 0806)    Discharge Disposition: Home    Discharge Diagnosis: Elevated LFTs     Follow-up: per primary team

## 2018-03-09 NOTE — DISCHARGE INSTRUCTIONS
[facility] [phone] (MON-FRI 8 AM- 5PM). Radiology Resident on call [phone].

## 2018-03-09 NOTE — PROGRESS NOTES
Recovery completed, pt tolerated well. No apparent distress noted. Dressing CDI. Discharge instructions reviewed and acknowledged. Pt discharged via ambulation, steady gait observed and private vehicle, accompanied by family.

## 2018-03-14 ENCOUNTER — TELEPHONE (OUTPATIENT)
Dept: HEPATOLOGY | Facility: CLINIC | Age: 53
End: 2018-03-14

## 2018-03-14 ENCOUNTER — PATIENT MESSAGE (OUTPATIENT)
Dept: HEPATOLOGY | Facility: CLINIC | Age: 53
End: 2018-03-14

## 2018-03-14 DIAGNOSIS — R74.8 ELEVATED LIVER ENZYMES: Primary | ICD-10-CM

## 2018-03-14 NOTE — TELEPHONE ENCOUNTER
----- Message from Rory Cast PA-C sent at 3/14/2018 11:49 AM CDT -----  Please schedule patient for 2d echo

## 2018-03-14 NOTE — TELEPHONE ENCOUNTER
MA attempted to call patient to schedule her 2D echo she is unable to reached left her VM to please give us a callback. HOANG

## 2018-03-19 ENCOUNTER — LAB VISIT (OUTPATIENT)
Dept: LAB | Facility: HOSPITAL | Age: 53
End: 2018-03-19
Attending: INTERNAL MEDICINE
Payer: COMMERCIAL

## 2018-03-19 ENCOUNTER — PATIENT MESSAGE (OUTPATIENT)
Dept: TRANSPLANT | Facility: CLINIC | Age: 53
End: 2018-03-19

## 2018-03-19 DIAGNOSIS — Z94.0 KIDNEY REPLACED BY TRANSPLANT: ICD-10-CM

## 2018-03-19 DIAGNOSIS — R74.8 ELEVATED LIVER ENZYMES: ICD-10-CM

## 2018-03-19 LAB
ALBUMIN SERPL BCP-MCNC: 3.9 G/DL
ANION GAP SERPL CALC-SCNC: 12 MMOL/L
BASOPHILS # BLD AUTO: 0.02 K/UL
BASOPHILS NFR BLD: 0.8 %
BUN SERPL-MCNC: 19 MG/DL
CALCIUM SERPL-MCNC: 9.2 MG/DL
CHLORIDE SERPL-SCNC: 108 MMOL/L
CO2 SERPL-SCNC: 21 MMOL/L
CREAT SERPL-MCNC: 1 MG/DL
DIFFERENTIAL METHOD: ABNORMAL
EOSINOPHIL # BLD AUTO: 0.1 K/UL
EOSINOPHIL NFR BLD: 2.4 %
ERYTHROCYTE [DISTWIDTH] IN BLOOD BY AUTOMATED COUNT: 12.6 %
EST. GFR  (AFRICAN AMERICAN): >60 ML/MIN/1.73 M^2
EST. GFR  (NON AFRICAN AMERICAN): >60 ML/MIN/1.73 M^2
GLUCOSE SERPL-MCNC: 104 MG/DL
HCT VFR BLD AUTO: 40.2 %
HGB BLD-MCNC: 13.3 G/DL
IMM GRANULOCYTES # BLD AUTO: 0.04 K/UL
IMM GRANULOCYTES NFR BLD AUTO: 1.6 %
LYMPHOCYTES # BLD AUTO: 0.2 K/UL
LYMPHOCYTES NFR BLD: 8.3 %
MAGNESIUM SERPL-MCNC: 1.6 MG/DL
MCH RBC QN AUTO: 34.2 PG
MCHC RBC AUTO-ENTMCNC: 33.1 G/DL
MCV RBC AUTO: 103 FL
MONOCYTES # BLD AUTO: 0.2 K/UL
MONOCYTES NFR BLD: 6.7 %
NEUTROPHILS # BLD AUTO: 2 K/UL
NEUTROPHILS NFR BLD: 80.2 %
NRBC BLD-RTO: 0 /100 WBC
PHOSPHATE SERPL-MCNC: 4 MG/DL
PLATELET # BLD AUTO: 123 K/UL
PMV BLD AUTO: 10.4 FL
POTASSIUM SERPL-SCNC: 4.1 MMOL/L
RBC # BLD AUTO: 3.89 M/UL
SODIUM SERPL-SCNC: 141 MMOL/L
TACROLIMUS BLD-MCNC: 10.1 NG/ML
WBC # BLD AUTO: 2.53 K/UL

## 2018-03-19 PROCEDURE — 36415 COLL VENOUS BLD VENIPUNCTURE: CPT | Mod: PO

## 2018-03-19 PROCEDURE — 80069 RENAL FUNCTION PANEL: CPT

## 2018-03-19 PROCEDURE — 80197 ASSAY OF TACROLIMUS: CPT

## 2018-03-19 PROCEDURE — 83020 HEMOGLOBIN ELECTROPHORESIS: CPT

## 2018-03-19 PROCEDURE — 83735 ASSAY OF MAGNESIUM: CPT

## 2018-03-19 PROCEDURE — 85025 COMPLETE CBC W/AUTO DIFF WBC: CPT

## 2018-03-19 NOTE — PROGRESS NOTES
Results reviewed and the following message sent to patient via [facility]: Hi. Kidney function looks great, but tacrolimus level is higher than in past. I would like to see next level before deciding whether we need to lower your dose.

## 2018-03-20 ENCOUNTER — PATIENT MESSAGE (OUTPATIENT)
Dept: TRANSPLANT | Facility: CLINIC | Age: 53
End: 2018-03-20

## 2018-03-20 ENCOUNTER — OFFICE VISIT (OUTPATIENT)
Dept: UROGYNECOLOGY | Facility: CLINIC | Age: 53
End: 2018-03-20
Payer: COMMERCIAL

## 2018-03-20 VITALS
WEIGHT: 131.63 LBS | HEIGHT: 60 IN | BODY MASS INDEX: 25.84 KG/M2 | SYSTOLIC BLOOD PRESSURE: 120 MMHG | DIASTOLIC BLOOD PRESSURE: 78 MMHG

## 2018-03-20 DIAGNOSIS — N39.41 URGE INCONTINENCE: Primary | ICD-10-CM

## 2018-03-20 DIAGNOSIS — N39.46 MIXED URGE AND STRESS INCONTINENCE: ICD-10-CM

## 2018-03-20 DIAGNOSIS — R35.1 NOCTURIA: ICD-10-CM

## 2018-03-20 PROCEDURE — 99999 PR PBB SHADOW E&M-EST. PATIENT-LVL III: CPT | Mod: PBBFAC,,, | Performed by: OBSTETRICS & GYNECOLOGY

## 2018-03-20 PROCEDURE — 99213 OFFICE O/P EST LOW 20 MIN: CPT | Mod: S$GLB,,, | Performed by: OBSTETRICS & GYNECOLOGY

## 2018-03-20 PROCEDURE — 99213 OFFICE O/P EST LOW 20 MIN: CPT | Mod: PBBFAC | Performed by: OBSTETRICS & GYNECOLOGY

## 2018-03-20 RX ORDER — OXYBUTYNIN CHLORIDE 5 MG/1
5 TABLET, EXTENDED RELEASE ORAL DAILY
Qty: 30 TABLET | Refills: 3 | Status: SHIPPED | OUTPATIENT
Start: 2018-03-20 | End: 2018-05-23

## 2018-03-20 NOTE — PROGRESS NOTES
Subjective:       Patient ID: Apurva Rodriguez is a 52 y.o. female.    Chief Complaint:  Urinary Incontinence (follow up)      History of Present Illness  HPI 52 Y O F P0 with history or recurrent UTI, s/p DDKT   has a past medical history of Anemia; Avascular necrosis left hip (12/6/2016); Dyslipidemia (12/6/2016); ESRD (end stage renal disease) (12/6/2016); Essential hypertension (12/6/2016); Gastroesophageal reflux disease without esophagitis (12/6/2016); Hypertension; Polycystic kidney disease; S/P bone marrow biopsy (12/6/2016); and Secondary hyperparathyroidism of renal origin (12/6/2016).Has history of recurrent UTI as a child and was treated with long term ppx bactrim. Here for follow up.    [facility] Urogyn [facility]     Question 1/30/2018  8:50 AM CST   General Urogynecology: Are you experiencing the following?    Dysuria (painful urination) No   Nocturia:  waking up at night to empty your bladder  Yes   If you answered yes to the previous question, how many times does this happen per night? 5-6,    Enuresis (urine loss during sleep) Yes   Dribbling urine after you urinate Yes   Hematuria (urine appears red) No   Type of stream Strong   Urinary frequency: How often a day are you going to the bathroom per day?  About  10   Urinary Tract Infections: How many Urinary Tract Infections have you had in the past year? I have not had a UTI in the past year   If you have had a UTI in the past year, what treatments have you had so far?  Prophylactic antibiotics: bactrim    Urinary Incontinence (General): Are you experiencing the following?    Past consultation for incontinence: Have you ever seen someone for the evaluation of incontinence? Yes   If you answered yes to the previous question, please select all the therapies you have tried.  Tahmina    Pelvic floor physical therapy    biofeed back    Please note the effectiveness of the therapies. Somewhat effective   Need to wear protection to keep clothes dry  Yes   If you  "answered yes to the previous question, please savage the protection you use.  Panty liner   If you wear protection, how much wetness is typically on each pad? Scant   If you wear protection, how often do you have to change per day, if applicable?  1   Stress Symptoms: Are you experiencing the following?    Leakage of urine with cough, laugh and/or sneeze Yes   If you answered yes to the previous question, what is the frequency in days, weeks and/or months? Daily   Leakage of urine with sex Yes, only with orgasm    Leakage of urine with bending/ lifting No   Leakage of urine with briskly walking or jogging Yes   If you lose urine for any other reason not previously mentioned, please note it below, if applicable.  traveling   Urge Symptoms: Are you experiencing the following?    Urgency ("got to go" feeling) Yes   Urge: How frequently do you feel an urge to urinate (feeling like you "gotta go" to the bathroom and can't wait) Several times a day   Do you experience a leakage of urine when you have a feeling of urgency?  Yes   Leakage of urine when unaware No   Past use of anticholinergics (medications used to treat overactive bladder) No   If you answered yes to the previous question, please savage the anticholinergics you have used:     Have you ever used Mirbetriq (aka Mirabegron)?  No   Prolapse Symptoms: Are you experiencing any of the following?     Falling out/ Bulging/ Heaviness in the vagina No   Vaginal/ Abdominal Pain/ Pressure No   Need to strain/ Push to void No   Need to wait on the toilet before you void No   Unusual position to urinate (using your hands to push back the vaginal bulge) No   Sensation of incomplete emptying Yes   Past use of pessary device No   If you answered yes to the previous question, please list the devices you have used below.     Bowel Symptoms: Are you experiencing any of the following?    Constipation No   Diarrhea  No   Hematochezia (bloody stool) No   Incomplete evacuation of stool " No   Involuntary loss of formed stool No   Fecal smearing/urgency No   Involuntary loss of gas Yes   Vaginal Symptoms: Are you experiencing any of the following?     Abnormal vaginal bleeding  No   Vaginal dryness Yes   Sexually active  Yes   Dyspareunia (painful intercourse) No   Estrogen use  No     GYN & OB History  No LMP recorded. Patient is postmenopausal.   Date of Last Pap: No result found    OB History    Para Term  AB Living   0 0 0 0 0 0   SAB TAB Ectopic Multiple Live Births   0 0 0 0 0             Review of Systems  Review of Systems   Constitutional: Negative.  Negative for activity change, appetite change, chills, diaphoresis, fatigue, fever and unexpected weight change.   HENT: Negative.    Eyes: Negative.    Respiratory: Negative.  Negative for cough.    Cardiovascular: Negative.    Gastrointestinal: Negative for abdominal distention, abdominal pain, anal bleeding, blood in stool, constipation, diarrhea, nausea, rectal pain and vomiting.   Endocrine: Negative.    Genitourinary: Positive for dysuria, hematuria and urgency. Negative for decreased urine volume, difficulty urinating, dyspareunia, enuresis, flank pain, frequency, genital sores, menstrual problem, pelvic pain, vaginal bleeding, vaginal discharge and vaginal pain.   Musculoskeletal: Negative for back pain.   Skin: Negative for color change, pallor, rash and wound.   Allergic/Immunologic: Negative for environmental allergies, food allergies and immunocompromised state.   Hematological: Negative for adenopathy. Does not bruise/bleed easily.   Psychiatric/Behavioral: Negative for agitation, behavioral problems, confusion and sleep disturbance.           Objective:     Physical Exam   Constitutional: She is oriented to person, place, and time. She appears well-developed and well-nourished.   HENT:   Head: Normocephalic and atraumatic.   Neck: Normal range of motion. Neck supple.   Pulmonary/Chest: No respiratory distress. She has  no wheezes.   Musculoskeletal: Normal range of motion.   Neurological: She is alert and oriented to person, place, and time.   Skin: No cyanosis. Nails show no clubbing.   Psychiatric: She has a normal mood and affect. Her behavior is normal. Judgment and thought content normal.          Assessment:        1. Urge incontinence    2. Mixed urge and stress incontinence    3. Nocturia             Plan:      1. Recurrent UTI   --Vaginal estrogen has been shown to decrease the recurrence of urinary tract infections in post menopausal women  --Change pads often: Q 2-3 hours and add barrier cream daily (Damon's butt paste vs dessitin)   --Cranberry supplementation and Probiotics   --Discussed long term treatment options including: Antibiotic ppx vs self treatment    2.  Mixed urinary incontinence, urge > stress:   --Bladder diary for 3-7 days, write the number of times you go to the rest room and associated symptoms of urgency or leakage.   --Empty bladder every 3 hours.  Empty well: wait a minute, lean forward on toilet.    --Avoid dietary irritants (see sheet).  Keep diary x 3-5 days to determine your irritants.  --KEGELS: do 10 in AM and 10 in PM, holding each x 10 seconds.  When you feel urge to go, STOP, KEGEL, and when urge has passed, then go to bathroom.  Start pelvic floor PT     --URGE: Start Ditropan 5 mg daily.  SE profile reviewed.  Takes 2-4 weeks to see if will have effect.  For dry mouth: get sour, sugar free lozenge or gum.    --STRESS:  Pelvic floor PT vs Pessary vs. Sling.     3. Vaginal atrophy (dryness):  Continue Use 1 gram of estrogen cream in vaginatwice a week     4. RLQ pain:  --TVS; reviewed with patient     Approximately 30 min were spent in consult, 90 % in discussion.     Kendy Asif DO  Female Pelvic Medicine and Reconstructive Surgery  [facility] [place], [place]

## 2018-03-22 ENCOUNTER — HOSPITAL ENCOUNTER (OUTPATIENT)
Dept: CARDIOLOGY | Facility: CLINIC | Age: 53
Discharge: HOME | End: 2018-03-22
Attending: PHYSICIAN ASSISTANT
Payer: COMMERCIAL

## 2018-03-22 DIAGNOSIS — R74.8 ELEVATED LIVER ENZYMES: ICD-10-CM

## 2018-03-22 DIAGNOSIS — I36.9 NONRHEUMATIC TRICUSPID VALVE DISORDER: ICD-10-CM

## 2018-03-22 DIAGNOSIS — R79.89 ELEVATED FERRITIN: Primary | ICD-10-CM

## 2018-03-22 LAB
DIASTOLIC DYSFUNCTION: NO
ESTIMATED PA SYSTOLIC PRESSURE: 16.99
HGB A2 MFR BLD HPLC: 3 %
HGB FRACT BLD ELPH-IMP: NORMAL
HGB FRACT BLD ELPH-IMP: NORMAL
RETIRED EF AND QEF - SEE NOTES: 65 (ref 55–65)
TRICUSPID VALVE REGURGITATION: NORMAL

## 2018-03-22 PROCEDURE — 93306 TTE W/DOPPLER COMPLETE: CPT | Mod: PBBFAC | Performed by: INTERNAL MEDICINE

## 2018-03-23 RX ORDER — ERGOCALCIFEROL 1.25 MG/1
CAPSULE ORAL
Qty: 4 CAPSULE | Refills: 0 | Status: SHIPPED | OUTPATIENT
Start: 2018-03-23 | End: 2018-05-01

## 2018-03-26 ENCOUNTER — CLINICAL SUPPORT (OUTPATIENT)
Dept: REHABILITATION | Facility: HOSPITAL | Age: 53
End: 2018-03-26
Attending: OBSTETRICS & GYNECOLOGY
Payer: COMMERCIAL

## 2018-03-26 DIAGNOSIS — N39.46 MIXED STRESS AND URGE URINARY INCONTINENCE: ICD-10-CM

## 2018-03-26 PROCEDURE — 97112 NEUROMUSCULAR REEDUCATION: CPT | Mod: PN

## 2018-03-26 NOTE — PROGRESS NOTES
Patient: Apurva Rodriguez   [facility] #: [id number]   Diagnosis:   Encounter Diagnosis   Name Primary?    Mixed stress and urge urinary incontinence       Date of start of care: 2/28/18  Date of treatment: 03/26/2018   Time in: 9:00  Time out: 9:55  Total treatment time: 55 minutes  # Visits: 3/12  Auth expiration: 12/31/18  POC expiration: 5/23/18  Outpatient Physical Therapy   Daily Note    Subjective     Pt reports she has been doing breathing for homework. Has started taking oxybutynin and thinks it helped her with her first of a few 3 hour presentations - she didn't leak and worked it out within the presentation to use the bathroom once before and once or twice in between. She says she was more confident in her ability.     Pain: Current: 0/10    Patient's Goals: to be able to stand through a 3 hour presentation without having to use the restroom    Objective     TREATMENT    Pt received individual neuromuscular control for 55 minutes including:    - Body scan mindfulness training  - TA activation with DB x 10  - PFM activation with DB 2 x 5; pt noted to have improved activation of layer 3 muscles today  - Sit to stand squat coordination with respiratory diaphragm x 6    Education: Discussed progression of plan of care with patient; educated pt in activity modification; pt instructed in HEP including diaphragmatic breathing, TA activation with DB, PFM activation with DB, sit to stand squat; pt demonstrated and verbalized understanding and was provided with a handout.    Assessment     Pt tolerated session well without visible adverse effects. Pt noted to have improved deep PFM activation today, still trace, good coordination with DB. Pt presents with decreased PFM strength and decreased central stabilization. Pt presents with history of two kidney transplants, increased UI following her recent surgery,  history of constipation, TIGIST, and multiple abdominal hernia repairs. Pt will continue to benefit from physcial  therapy services in order to maximize pain free functional independence.   FOTO next time    Pt is making good progress towards established goals.   No educational, cultural, or spiritual barriers to learning identified.    GOALS  Short Term Goals: 4 weeks (3/28/18)  1. Pt will verbalize improved awareness of PFM activity as palpated by PT in order to improve activity involvement with HEP. Met 3/26/18  2. Pt will demonstrate decreased overflow muscle activity during PF muscle contraction. Continue  3. Pt will display integration or respiratory and pelvic diaphragm in order to improve inner core stability and support. Met 3/26/18  4. Pt will report incorporation of voiding schedule into daily routine to manage frequency, incontinence etc. Pt reports routine with this  5. Pt will tolerate HEP to improve impairments and independence with ADL's. Met 3/26/18     Long Term Goals: 12 weeks (5/23/18)  1. Pt will be able to lift, drop, and bear down in order to feel improved excursion of her PFM to improve function  2. Pt will be able to perform 5 x 5'' kegals in order to improve PFM strength to decrease urinary frequency.  3. Pt will report improvement in holding ability.   4. Pt will be independent with HEP and self management.     CMS Impairment/Limitation/Restriction for Urinary Problem Survey  Status Limitation G-Code CMS Severity Modifier  Intake 49% 51% Current Status CK - At least 40 percent but less than 60 percent  Predicted 58% 42% Goal Status+ CK - At least 40 percent but less than 60 percent       Plan     Continue with established POC, working toward PT goals.

## 2018-04-02 ENCOUNTER — CLINICAL SUPPORT (OUTPATIENT)
Dept: REHABILITATION | Facility: HOSPITAL | Age: 53
End: 2018-04-02
Attending: OBSTETRICS & GYNECOLOGY
Payer: COMMERCIAL

## 2018-04-02 ENCOUNTER — PATIENT MESSAGE (OUTPATIENT)
Dept: HEPATOLOGY | Facility: CLINIC | Age: 53
End: 2018-04-02

## 2018-04-02 ENCOUNTER — LAB VISIT (OUTPATIENT)
Dept: LAB | Facility: HOSPITAL | Age: 53
End: 2018-04-02
Attending: INTERNAL MEDICINE
Payer: COMMERCIAL

## 2018-04-02 DIAGNOSIS — Z94.0 KIDNEY REPLACED BY TRANSPLANT: ICD-10-CM

## 2018-04-02 DIAGNOSIS — R79.89 ELEVATED FERRITIN: ICD-10-CM

## 2018-04-02 DIAGNOSIS — R35.1 NOCTURIA: Primary | ICD-10-CM

## 2018-04-02 DIAGNOSIS — N39.46 MIXED STRESS AND URGE URINARY INCONTINENCE: ICD-10-CM

## 2018-04-02 DIAGNOSIS — M62.89 PELVIC FLOOR DYSFUNCTION: ICD-10-CM

## 2018-04-02 LAB
ALBUMIN SERPL BCP-MCNC: 3.9 G/DL
ANION GAP SERPL CALC-SCNC: 6 MMOL/L
BASOPHILS # BLD AUTO: 0.03 K/UL
BASOPHILS NFR BLD: 1.5 %
BUN SERPL-MCNC: 26 MG/DL
CALCIUM SERPL-MCNC: 9.2 MG/DL
CHLORIDE SERPL-SCNC: 107 MMOL/L
CO2 SERPL-SCNC: 27 MMOL/L
CREAT SERPL-MCNC: 0.9 MG/DL
DIFFERENTIAL METHOD: ABNORMAL
EOSINOPHIL # BLD AUTO: 0 K/UL
EOSINOPHIL NFR BLD: 1.9 %
ERYTHROCYTE [DISTWIDTH] IN BLOOD BY AUTOMATED COUNT: 12.3 %
EST. GFR  (AFRICAN AMERICAN): >60 ML/MIN/1.73 M^2
EST. GFR  (NON AFRICAN AMERICAN): >60 ML/MIN/1.73 M^2
GLUCOSE SERPL-MCNC: 102 MG/DL
HCT VFR BLD AUTO: 38.6 %
HGB BLD-MCNC: 13 G/DL
IMM GRANULOCYTES # BLD AUTO: 0.04 K/UL
IMM GRANULOCYTES NFR BLD AUTO: 1.9 %
LYMPHOCYTES # BLD AUTO: 0.2 K/UL
LYMPHOCYTES NFR BLD: 10.2 %
MAGNESIUM SERPL-MCNC: 1.8 MG/DL
MCH RBC QN AUTO: 33.6 PG
MCHC RBC AUTO-ENTMCNC: 33.7 G/DL
MCV RBC AUTO: 100 FL
MONOCYTES # BLD AUTO: 0.2 K/UL
MONOCYTES NFR BLD: 7.3 %
NEUTROPHILS # BLD AUTO: 1.6 K/UL
NEUTROPHILS NFR BLD: 77.2 %
NRBC BLD-RTO: 0 /100 WBC
PHOSPHATE SERPL-MCNC: 3.9 MG/DL
PLATELET # BLD AUTO: 132 K/UL
PMV BLD AUTO: 10.6 FL
POTASSIUM SERPL-SCNC: 4.2 MMOL/L
RBC # BLD AUTO: 3.87 M/UL
SODIUM SERPL-SCNC: 140 MMOL/L
TACROLIMUS BLD-MCNC: 7.7 NG/ML
WBC # BLD AUTO: 2.06 K/UL

## 2018-04-02 PROCEDURE — 83735 ASSAY OF MAGNESIUM: CPT

## 2018-04-02 PROCEDURE — 80197 ASSAY OF TACROLIMUS: CPT

## 2018-04-02 PROCEDURE — 97112 NEUROMUSCULAR REEDUCATION: CPT | Mod: PN

## 2018-04-02 PROCEDURE — G8991 OTHER PT/OT GOAL STATUS: HCPCS | Mod: CK,PN

## 2018-04-02 PROCEDURE — 36415 COLL VENOUS BLD VENIPUNCTURE: CPT | Mod: PO

## 2018-04-02 PROCEDURE — 80069 RENAL FUNCTION PANEL: CPT

## 2018-04-02 PROCEDURE — G8990 OTHER PT/OT CURRENT STATUS: HCPCS | Mod: CK,PN

## 2018-04-02 PROCEDURE — 85025 COMPLETE CBC W/AUTO DIFF WBC: CPT

## 2018-04-02 PROCEDURE — 87799 DETECT AGENT NOS DNA QUANT: CPT

## 2018-04-02 NOTE — PROGRESS NOTES
Patient: Apurva Rodriguez   [facility] #: [id number]   Diagnosis:   Encounter Diagnoses   Name Primary?    Mixed stress and urge urinary incontinence     Nocturia Yes    Pelvic floor dysfunction       Date of start of care: 2/28/18  Date of treatment: 04/02/2018   Time in: 8:46  Time out: 9:50  Total treatment time: 64 minutes  # Visits: 4/12  Auth expiration: 12/31/18  POC expiration: 5/23/18  Outpatient Physical Therapy   Daily Note    Subjective     Pt reports she is doing well this morning. She states she is tired. Pt states she had a hard time holding it on Sunday, likely due to drinking iced tea on Saturday night.    Pain: Current: 0/10    Patient's Goals: to be able to stand through a 3 hour presentation without having to use the restroom    Objective     TREATMENT    Pt received individual neuromuscular control for 60 minutes including:    - Legs up the wall x 10 min  - TA activation with DB x 10 with tactile cueing  - Supine march with TA activation using breath x 10 ea  - Side lying hip abduction x 10 ea using breath   - Supine bridge with ball squeeze and breath x 10  - Diaphragmatic breathing on 1/2 foam  - Snow vy on half foam x 10    Not performed:  - Body scan mindfulness training  - PFM activation with DB 2 x 5; pt noted to have improved activation of layer 3 muscles today  - Sit to stand squat coordination with respiratory diaphragm x 6    Education: Discussed progression of plan of care with patient; educated pt in activity modification; pt instructed to continue with diaphragmatic breathing, TA activation with DB, PFM activation with DB, sit to stand squat; added legs up the wall, supine march, side lying hip abduction, supine bridge today; pt demonstrated and verbalized understanding and was provided with a handout.    Assessment     Pt tolerated session well without visible adverse effects. Pt with good tolerate to progression of core work. Pt presents with decreased PFM strength and decreased  central stabilization. Pt presents with history of two kidney transplants, increased UI following her recent surgery,  history of constipation, TIGIST, and multiple abdominal hernia repairs. Pt will continue to benefit from physcial therapy services in order to maximize pain free functional independence. Will provide myofascial mobilization to upper abdomen and follow up on kegals at pt's next visit.    Pt is making good progress towards established goals.   No educational, cultural, or spiritual barriers to learning identified.    GOALS  Short Term Goals: 4 weeks (3/28/18)  1. Pt will verbalize improved awareness of PFM activity as palpated by PT in order to improve activity involvement with HEP. Met 3/26/18  2. Pt will demonstrate decreased overflow muscle activity during PF muscle contraction. Continue  3. Pt will display integration or respiratory and pelvic diaphragm in order to improve inner core stability and support. Met 3/26/18  4. Pt will report incorporation of voiding schedule into daily routine to manage frequency, incontinence etc. Pt reports routine with this  5. Pt will tolerate HEP to improve impairments and independence with ADL's. Met 3/26/18     Long Term Goals: 12 weeks (5/23/18)  1. Pt will be able to lift, drop, and bear down in order to feel improved excursion of her PFM to improve function  2. Pt will be able to perform 5 x 5'' kegals in order to improve PFM strength to decrease urinary frequency.  3. Pt will report improvement in holding ability.   4. Pt will be independent with HEP and self management.     CMS Impairment/Limitation/Restriction for Urinary Problem Survey  Status Limitation G-Code CMS Severity Modifier  Intake 49% 51%  Predicted 58% 42% Goal Status+ CK - At least 40 percent but less than 60 percent  4/2/2018 53% 47% Current Status CK - At least 40 percent but less than 60 percent    Plan     Continue with established POC, working toward PT goals.

## 2018-04-03 LAB — CMV DNA SERPL NAA+PROBE-ACNC: NORMAL IU/ML

## 2018-04-06 ENCOUNTER — PATIENT MESSAGE (OUTPATIENT)
Dept: TRANSPLANT | Facility: CLINIC | Age: 53
End: 2018-04-06

## 2018-04-10 LAB
GENETICIST REVIEW: NORMAL
HFE GENE MUT ANL BLD/T: NORMAL
HFE RELEASED BY: NORMAL
HFE RESULT SUMMARY: NORMAL
REF LAB TEST METHOD: NORMAL
SPECIMEN SOURCE: NORMAL
SPECIMEN,  HEMOCHROMATOSIS: NORMAL

## 2018-04-16 ENCOUNTER — PATIENT MESSAGE (OUTPATIENT)
Dept: ADMINISTRATIVE | Facility: OTHER | Age: 53
End: 2018-04-16

## 2018-04-16 ENCOUNTER — TELEPHONE (OUTPATIENT)
Dept: HEPATOLOGY | Facility: CLINIC | Age: 53
End: 2018-04-16

## 2018-04-16 ENCOUNTER — PATIENT MESSAGE (OUTPATIENT)
Dept: HEPATOLOGY | Facility: CLINIC | Age: 53
End: 2018-04-16

## 2018-04-16 ENCOUNTER — LAB VISIT (OUTPATIENT)
Dept: LAB | Facility: HOSPITAL | Age: 53
End: 2018-04-16
Attending: INTERNAL MEDICINE
Payer: COMMERCIAL

## 2018-04-16 DIAGNOSIS — D64.9 ANEMIA, UNSPECIFIED TYPE: Primary | ICD-10-CM

## 2018-04-16 DIAGNOSIS — Z94.0 KIDNEY REPLACED BY TRANSPLANT: ICD-10-CM

## 2018-04-16 DIAGNOSIS — R79.89 ELEVATED FERRITIN: ICD-10-CM

## 2018-04-16 LAB
ALBUMIN SERPL BCP-MCNC: 3.9 G/DL
ANION GAP SERPL CALC-SCNC: 9 MMOL/L
BASOPHILS # BLD AUTO: 0.02 K/UL
BASOPHILS NFR BLD: 0.9 %
BUN SERPL-MCNC: 23 MG/DL
CALCIUM SERPL-MCNC: 9.4 MG/DL
CHLORIDE SERPL-SCNC: 107 MMOL/L
CO2 SERPL-SCNC: 24 MMOL/L
CREAT SERPL-MCNC: 0.9 MG/DL
DIFFERENTIAL METHOD: ABNORMAL
EOSINOPHIL # BLD AUTO: 0.1 K/UL
EOSINOPHIL NFR BLD: 2.6 %
ERYTHROCYTE [DISTWIDTH] IN BLOOD BY AUTOMATED COUNT: 12.5 %
EST. GFR  (AFRICAN AMERICAN): >60 ML/MIN/1.73 M^2
EST. GFR  (NON AFRICAN AMERICAN): >60 ML/MIN/1.73 M^2
GLUCOSE SERPL-MCNC: 103 MG/DL
HCT VFR BLD AUTO: 38.9 %
HGB BLD-MCNC: 13.3 G/DL
IMM GRANULOCYTES # BLD AUTO: 0.01 K/UL
IMM GRANULOCYTES NFR BLD AUTO: 0.4 %
LYMPHOCYTES # BLD AUTO: 0.2 K/UL
LYMPHOCYTES NFR BLD: 10.5 %
MAGNESIUM SERPL-MCNC: 1.5 MG/DL
MCH RBC QN AUTO: 34.2 PG
MCHC RBC AUTO-ENTMCNC: 34.2 G/DL
MCV RBC AUTO: 100 FL
MONOCYTES # BLD AUTO: 0.2 K/UL
MONOCYTES NFR BLD: 7.4 %
NEUTROPHILS # BLD AUTO: 1.8 K/UL
NEUTROPHILS NFR BLD: 78.2 %
NRBC BLD-RTO: 0 /100 WBC
PHOSPHATE SERPL-MCNC: 4 MG/DL
PLATELET # BLD AUTO: 121 K/UL
PMV BLD AUTO: 10 FL
POTASSIUM SERPL-SCNC: 4.3 MMOL/L
RBC # BLD AUTO: 3.89 M/UL
SODIUM SERPL-SCNC: 140 MMOL/L
TACROLIMUS BLD-MCNC: 8.4 NG/ML
WBC # BLD AUTO: 2.29 K/UL

## 2018-04-16 PROCEDURE — 80069 RENAL FUNCTION PANEL: CPT

## 2018-04-16 PROCEDURE — 83735 ASSAY OF MAGNESIUM: CPT

## 2018-04-16 PROCEDURE — 80197 ASSAY OF TACROLIMUS: CPT

## 2018-04-16 PROCEDURE — 36415 COLL VENOUS BLD VENIPUNCTURE: CPT | Mod: PO

## 2018-04-16 PROCEDURE — 85025 COMPLETE CBC W/AUTO DIFF WBC: CPT

## 2018-04-16 NOTE — TELEPHONE ENCOUNTER
HH DNA C282Y and H63D not detected     Liver biopsy noting +2 Mild to moderate siderosis,    Will send biopsy for  total iron weight and hepatic iron index    Please schedule pt with hematology; longstanding h/o anemia, elevated ferritin in absence of HH.

## 2018-04-18 ENCOUNTER — TELEPHONE (OUTPATIENT)
Dept: HEMATOLOGY/ONCOLOGY | Facility: CLINIC | Age: 53
End: 2018-04-18

## 2018-04-18 ENCOUNTER — CLINICAL SUPPORT (OUTPATIENT)
Dept: REHABILITATION | Facility: HOSPITAL | Age: 53
End: 2018-04-18
Attending: OBSTETRICS & GYNECOLOGY
Payer: COMMERCIAL

## 2018-04-18 DIAGNOSIS — N39.46 MIXED STRESS AND URGE URINARY INCONTINENCE: ICD-10-CM

## 2018-04-18 PROCEDURE — 97140 MANUAL THERAPY 1/> REGIONS: CPT | Mod: PN

## 2018-04-18 PROCEDURE — 97112 NEUROMUSCULAR REEDUCATION: CPT | Mod: PN

## 2018-04-18 NOTE — PROGRESS NOTES
"Patient: Apurva Rodriguez   [facility] #: [id number]   Diagnosis:   Encounter Diagnosis   Name Primary?    Mixed stress and urge urinary incontinence       Date of start of care: 2/28/18  Date of treatment: 04/18/2018   Time in: 8:05  Time out: 8:55  Total treatment time: 50 minutes  # Visits: 5/12  Auth expiration: 12/31/18  POC expiration: 5/23/18  Outpatient Physical Therapy   Daily Note    Subjective     Pt reports she had a great vacation, did her HEP while away on most days. "Hit or miss" whether or not I'm going to hold or not hold."    Pain: Current: 0/10    Patient's Goals: to be able to stand through a 3 hour presentation without having to use the restroom    Objective     TREATMENT    Pt received individual neuromuscular control for 20 minutes including:    - PFM activation with DB; pt displays good carry over and recruitment of layer 3 mm  - PFM training for anterior musculature/periurethral muscles (urethral straw), verbal and manual cueing provided    Not performed:  - Legs up the wall x 10 min  - TA activation with DB x 10 with tactile cueing  - Supine march with TA activation using breath x 10 ea  - Side lying hip abduction x 10 ea using breath   - Supine bridge with ball squeeze and breath x 10  - Diaphragmatic breathing on 1/2 foam  - Snow vy on half foam x 10  - Body scan mindfulness training  - Sit to stand squat coordination with respiratory diaphragm x 6    Pt received manual therapy for 30 minutes including: gentle soft tissue mobilization applied to upper and lower abdomen, colon massage; instructions in self abdominal massage for improved GI mobility     Education: Discussed progression of plan of care with patient; educated pt in activity modification; pt instructed to continue with diaphragmatic breathing, TA activation with DB, PFM activation with DB, sit to stand squat; added legs up the wall, supine march, side lying hip abduction, supine bridge today; pt demonstrated and verbalized " understanding and was provided with a handout.    Assessment     Pt tolerated session well without visible adverse effects. Pt displays good return demonstration of activities and good neuromuscular control of her PFM/good awareness; she continues to present with weakness and decreased stability. Pt presents with history of two kidney transplants, increased UI following her recent surgery,  history of constipation, TIGIST, and multiple abdominal hernia repairs. Pt will continue to benefit from physcial therapy services in order to maximize pain free functional independence.     Pt is making good progress towards established goals.   No educational, cultural, or spiritual barriers to learning identified.    GOALS  Short Term Goals: 4 weeks (3/28/18)  1. Pt will verbalize improved awareness of PFM activity as palpated by PT in order to improve activity involvement with HEP. Met 3/26/18  2. Pt will demonstrate decreased overflow muscle activity during PF muscle contraction. Continue  3. Pt will display integration or respiratory and pelvic diaphragm in order to improve inner core stability and support. Met 3/26/18  4. Pt will report incorporation of voiding schedule into daily routine to manage frequency, incontinence etc. Pt reports routine with this  5. Pt will tolerate HEP to improve impairments and independence with ADL's. Met 3/26/18     Long Term Goals: 12 weeks (5/23/18)  1. Pt will be able to lift, drop, and bear down in order to feel improved excursion of her PFM to improve function  2. Pt will be able to perform 5 x 5'' kegals in order to improve PFM strength to decrease urinary frequency.  3. Pt will report improvement in holding ability.   4. Pt will be independent with HEP and self management.     CMS Impairment/Limitation/Restriction for Urinary Problem Survey  Status Limitation G-Code CMS Severity Modifier  Intake 49% 51%  Predicted 58% 42% Goal Status+ CK - At least 40 percent but less than 60  percent  4/2/2018 53% 47% Current Status CK - At least 40 percent but less than 60 percent    Plan     Continue with established POC, working toward PT goals.

## 2018-04-20 ENCOUNTER — TELEPHONE (OUTPATIENT)
Dept: HEMATOLOGY/ONCOLOGY | Facility: CLINIC | Age: 53
End: 2018-04-20

## 2018-04-23 ENCOUNTER — CLINICAL SUPPORT (OUTPATIENT)
Dept: REHABILITATION | Facility: HOSPITAL | Age: 53
End: 2018-04-23
Attending: OBSTETRICS & GYNECOLOGY
Payer: COMMERCIAL

## 2018-04-23 DIAGNOSIS — M62.89 PELVIC FLOOR DYSFUNCTION: Primary | ICD-10-CM

## 2018-04-23 DIAGNOSIS — N39.46 MIXED STRESS AND URGE URINARY INCONTINENCE: ICD-10-CM

## 2018-04-23 PROCEDURE — 97112 NEUROMUSCULAR REEDUCATION: CPT | Mod: PN

## 2018-04-23 NOTE — PROGRESS NOTES
Patient: Apurva Rodriguez   [facility] #: [id number]   Diagnosis:   Encounter Diagnoses   Name Primary?    Mixed stress and urge urinary incontinence     Pelvic floor dysfunction Yes      Date of start of care: 2/28/18  Date of treatment: 04/23/2018   Time in: 9:00  Time out: 9:58  Total treatment time: 58 minutes  # Visits: 6/12  Auth expiration: 12/31/18  POC expiration: 5/23/18  Outpatient Physical Therapy   Daily Note    Subjective     Pt reports she is doing well today, nothing new to report.    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, she was instructed to increase speed to assess ability to recruit PFM more quickly, this was somewhat strained, improved with reps, will continue to practice  - 10 x 5'' kegals x 2 sets in supine with SEMG assist; pt displays normal resting baseline, good holding, complete derecruitment    Not performed:  - Legs up the wall x 10 min  - TA activation with DB x 10 with tactile cueing  - Supine march with TA activation using breath x 10 ea  - Side lying hip abduction x 10 ea using breath   - Supine bridge with ball squeeze and breath x 10  - Diaphragmatic breathing on 1/2 foam  - Snow vy on half foam x 10  - Body scan mindfulness training  - Sit to stand squat coordination with respiratory diaphragm x 6    Pt received manual therapy for 00 minutes including: gentle soft tissue mobilization applied to upper and lower abdomen, colon massage; instructions in self abdominal massage for improved GI mobility     Pt tolerated treatment well with no  visible adverse affects. No skin irritation, errythemia, or other adverse affects observed from electrode placement.    Education: Discussed progression of plan of care with patient; educated pt in activity modification; pt instructed to continue with diaphragmatic breathing, TA activation with DB, PFM activation with DB, sit to stand squat; added legs up the wall, supine march, side lying hip abduction, supine bridge; pt verbalized understanding.    Assessment     Pt tolerated session well without visible adverse effects. Pt continues to display good return demonstration and good control. She presents with decreased PFM strength, endurance, and decreased central stability. Pt presents with history of two kidney transplants, increased UI following her recent surgery,  history of constipation, TIGIST, and multiple abdominal hernia repairs. Pt will continue to benefit from physcial therapy services in order to maximize pain free functional independence. Will continue to progress PFM training and therapeutic exercise (pelvic tilts supine, SB).    Pt is making good progress towards established goals.   No educational, cultural, or spiritual barriers to learning identified.    GOALS  Short Term Goals: 4 weeks (3/28/18)  1. Pt will verbalize improved awareness of PFM activity as palpated by PT in order to improve activity involvement with HEP. Met 3/26/18  2. Pt will demonstrate decreased overflow muscle activity during PF muscle contraction. Continue  3. Pt will display integration or respiratory and pelvic diaphragm in order to improve inner core stability and support. Met 3/26/18  4. Pt will report incorporation of voiding schedule into daily routine to manage frequency, incontinence etc. Pt reports routine with this  5. Pt will tolerate HEP to improve impairments and independence with ADL's. Met 3/26/18     Long Term Goals: 12 weeks (5/23/18)  1. Pt will be able to lift, drop, and bear down in order to feel improved  excursion of her PFM to improve function  2. Pt will be able to perform 5 x 5'' kegals in order to improve PFM strength to decrease urinary frequency.  3. Pt will report improvement in holding ability.   4. Pt will be independent with HEP and self management.     CMS Impairment/Limitation/Restriction for Urinary Problem Survey  Status Limitation G-Code CMS Severity Modifier  Intake 49% 51%  Predicted 58% 42% Goal Status+ CK - At least 40 percent but less than 60 percent  4/2/2018 53% 47% Current Status CK - At least 40 percent but less than 60 percent    Plan     Continue with established POC, working toward PT goals.

## 2018-04-30 ENCOUNTER — CLINICAL SUPPORT (OUTPATIENT)
Dept: REHABILITATION | Facility: HOSPITAL | Age: 53
End: 2018-04-30
Attending: OBSTETRICS & GYNECOLOGY
Payer: COMMERCIAL

## 2018-04-30 ENCOUNTER — LAB VISIT (OUTPATIENT)
Dept: LAB | Facility: HOSPITAL | Age: 53
End: 2018-04-30
Attending: INTERNAL MEDICINE
Payer: COMMERCIAL

## 2018-04-30 DIAGNOSIS — R35.1 NOCTURIA: ICD-10-CM

## 2018-04-30 DIAGNOSIS — M62.89 PELVIC FLOOR DYSFUNCTION: Primary | ICD-10-CM

## 2018-04-30 DIAGNOSIS — N39.46 MIXED STRESS AND URGE URINARY INCONTINENCE: ICD-10-CM

## 2018-04-30 DIAGNOSIS — Z94.0 KIDNEY REPLACED BY TRANSPLANT: ICD-10-CM

## 2018-04-30 LAB
ALBUMIN SERPL BCP-MCNC: 4 G/DL
ANION GAP SERPL CALC-SCNC: 8 MMOL/L
BASOPHILS # BLD AUTO: 0.02 K/UL
BASOPHILS NFR BLD: 0.8 %
BUN SERPL-MCNC: 29 MG/DL
CALCIUM SERPL-MCNC: 9.4 MG/DL
CHLORIDE SERPL-SCNC: 106 MMOL/L
CO2 SERPL-SCNC: 27 MMOL/L
CREAT SERPL-MCNC: 1 MG/DL
DIFFERENTIAL METHOD: ABNORMAL
EOSINOPHIL # BLD AUTO: 0.1 K/UL
EOSINOPHIL NFR BLD: 1.9 %
ERYTHROCYTE [DISTWIDTH] IN BLOOD BY AUTOMATED COUNT: 12.6 %
EST. GFR  (AFRICAN AMERICAN): >60 ML/MIN/1.73 M^2
EST. GFR  (NON AFRICAN AMERICAN): >60 ML/MIN/1.73 M^2
GLUCOSE SERPL-MCNC: 106 MG/DL
HCT VFR BLD AUTO: 39.7 %
HGB BLD-MCNC: 13.1 G/DL
IMM GRANULOCYTES # BLD AUTO: 0.01 K/UL
IMM GRANULOCYTES NFR BLD AUTO: 0.4 %
LYMPHOCYTES # BLD AUTO: 0.2 K/UL
LYMPHOCYTES NFR BLD: 8.8 %
MAGNESIUM SERPL-MCNC: 1.8 MG/DL
MCH RBC QN AUTO: 33.9 PG
MCHC RBC AUTO-ENTMCNC: 33 G/DL
MCV RBC AUTO: 103 FL
MONOCYTES # BLD AUTO: 0.2 K/UL
MONOCYTES NFR BLD: 6.2 %
NEUTROPHILS # BLD AUTO: 2.1 K/UL
NEUTROPHILS NFR BLD: 81.9 %
NRBC BLD-RTO: 0 /100 WBC
PHOSPHATE SERPL-MCNC: 4.1 MG/DL
PLATELET # BLD AUTO: 137 K/UL
PMV BLD AUTO: 10.4 FL
POTASSIUM SERPL-SCNC: 4.5 MMOL/L
RBC # BLD AUTO: 3.86 M/UL
SODIUM SERPL-SCNC: 141 MMOL/L
TACROLIMUS BLD-MCNC: 8.6 NG/ML
WBC # BLD AUTO: 2.6 K/UL

## 2018-04-30 PROCEDURE — 36415 COLL VENOUS BLD VENIPUNCTURE: CPT | Mod: PO

## 2018-04-30 PROCEDURE — 80197 ASSAY OF TACROLIMUS: CPT

## 2018-04-30 PROCEDURE — 83735 ASSAY OF MAGNESIUM: CPT

## 2018-04-30 PROCEDURE — 97110 THERAPEUTIC EXERCISES: CPT | Mod: PN

## 2018-04-30 PROCEDURE — 85025 COMPLETE CBC W/AUTO DIFF WBC: CPT

## 2018-04-30 PROCEDURE — 80069 RENAL FUNCTION PANEL: CPT

## 2018-04-30 NOTE — PROGRESS NOTES
Patient: Apurva Rodriguez   [facility] #: [id number]   Diagnosis:   Encounter Diagnoses   Name Primary?    Mixed stress and urge urinary incontinence     Pelvic floor dysfunction Yes    Nocturia       Date of start of care: 2/28/18  Date of treatment: 04/30/2018   Time in: 10:00  Time out: 11:00  Total treatment time: 60 minutes  # Visits: 7/12  Auth expiration: 12/31/18  POC expiration: 5/23/18  Outpatient Physical Therapy   Daily Note    Subjective     Pt reports hasn't really done exercises since last Wednesday, she has been busy, and feels a bit set back (urge incontinence is showing up). Has also identified pineapple juice as a trigger.     Pain: Current: 0/10    Patient's Goals: to be able to stand through a 3 hour presentation without having to use the restroom    Objective     TREATMENT    Pt received individual therapeutic exercise control for 60 minutes including:    - Ant/post pelvic tilts on SB  - TA activation on SB x 10  - Marching on SB x 10 ea, unilateral UE support  - Side lying hip abduction x 10 ea  - Supine SLR x 10 ea with PFM/TA activation  - Prone hip extension with glut set x 10 ea  - Prone knee lift x 5 ea  - Quadruped rocking (pt limited to work in quadruped due to R knee pain and arthritis in her hands)  - Cat/cow  - Supine HS stretch with strap x 2 ea side  - Supine hip flexor stretch with yoga deb x 2 ea side    Pt received individual neuromuscular reeducation for 00 minutes including:    - PFM activation with DB in supine with SEMG assist, external perianal electrodes; pt displays normal resting baseline, fair WR rise, partial derecruitment, good coordination and carry over; 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, she was instructed to increase speed to assess ability to recruit PFM more quickly, this was somewhat strained, improved with reps, will continue to practice  - 10 x 5'' kegals x 2 sets in supine with SEMG assist; pt displays normal resting baseline,  good holding, complete derecruitment  - Legs up the wall x 10 min  - TA activation with DB x 10 with tactile cueing  - Supine march with TA activation using breath x 10 ea  - Side lying hip abduction x 10 ea using breath   - Supine bridge with ball squeeze and breath x 10  - Diaphragmatic breathing on 1/2 foam  - Snow vy on half foam x 10  - Body scan mindfulness training  - Sit to stand squat coordination with respiratory diaphragm x 6    Pt received manual therapy for 00 minutes including: gentle soft tissue mobilization applied to upper and lower abdomen, colon massage; instructions in self abdominal massage for improved GI mobility     Pt tolerated treatment well with no visible adverse affects. No skin irritation, errythemia, or other adverse affects observed from electrode placement.    Education: Discussed progression of plan of care with patient; educated pt in activity modification; pt instructed to continue with diaphragmatic breathing, TA activation with DB, PFM activation with DB, sit to stand squat; added legs up the wall, supine march, side lying hip abduction, supine bridge; pt verbalized understanding.    Assessment     Pt tolerated session well without visible adverse effects. Pt with good tolerance for therapeutic exercise today, some limitations to working in quadruped and some difficulty with LE stretching due to surgical history. Pt continues to present with decreased PFM strength, endurance, and decreased central stability. Pt presents with history of two kidney transplants, increased UI following her recent surgery,  history of constipation, TIGIST, and multiple abdominal hernia repairs. Pt will continue to benefit from physcial therapy services in order to maximize pain free functional independence. Will reassess PFM exercises at next session.    Pt is making good progress towards established goals.   No educational, cultural, or spiritual barriers to learning identified.    GOALS  Short Term  Goals: 4 weeks (3/28/18)  1. Pt will verbalize improved awareness of PFM activity as palpated by PT in order to improve activity involvement with HEP. Met 3/26/18  2. Pt will demonstrate decreased overflow muscle activity during PF muscle contraction. Continue  3. Pt will display integration or respiratory and pelvic diaphragm in order to improve inner core stability and support. Met 3/26/18  4. Pt will report incorporation of voiding schedule into daily routine to manage frequency, incontinence etc. Pt reports routine with this  5. Pt will tolerate HEP to improve impairments and independence with ADL's. Met 3/26/18     Long Term Goals: 12 weeks (5/23/18)  1. Pt will be able to lift, drop, and bear down in order to feel improved excursion of her PFM to improve function  2. Pt will be able to perform 5 x 5'' kegals in order to improve PFM strength to decrease urinary frequency.  3. Pt will report improvement in holding ability.   4. Pt will be independent with HEP and self management.     CMS Impairment/Limitation/Restriction for Urinary Problem Survey  Status Limitation G-Code CMS Severity Modifier  Intake 49% 51%  Predicted 58% 42% Goal Status+ CK - At least 40 percent but less than 60 percent  4/2/2018 53% 47% Current Status CK - At least 40 percent but less than 60 percent    Plan     Continue with established POC, working toward PT goals.

## 2018-04-30 NOTE — PROGRESS NOTES
Results reviewed and the following message sent to patient via [facility]: Labs remain stable and require no changes.

## 2018-05-01 ENCOUNTER — INITIAL CONSULT (OUTPATIENT)
Dept: HEMATOLOGY/ONCOLOGY | Facility: CLINIC | Age: 53
End: 2018-05-01
Payer: COMMERCIAL

## 2018-05-01 ENCOUNTER — LAB VISIT (OUTPATIENT)
Dept: LAB | Facility: HOSPITAL | Age: 53
End: 2018-05-01
Attending: INTERNAL MEDICINE
Payer: COMMERCIAL

## 2018-05-01 VITALS
HEIGHT: 60 IN | RESPIRATION RATE: 18 BRPM | WEIGHT: 130.75 LBS | TEMPERATURE: 98 F | SYSTOLIC BLOOD PRESSURE: 127 MMHG | HEART RATE: 61 BPM | BODY MASS INDEX: 25.67 KG/M2 | DIASTOLIC BLOOD PRESSURE: 70 MMHG | OXYGEN SATURATION: 98 %

## 2018-05-01 DIAGNOSIS — N18.2 CHRONIC KIDNEY DISEASE (CKD), STAGE II (MILD): Chronic | ICD-10-CM

## 2018-05-01 DIAGNOSIS — R79.89 ELEVATED FERRITIN LEVEL: ICD-10-CM

## 2018-05-01 DIAGNOSIS — E83.19 IRON OVERLOAD: Primary | ICD-10-CM

## 2018-05-01 DIAGNOSIS — N28.9 RENAL DYSFUNCTION: ICD-10-CM

## 2018-05-01 LAB
BASOPHILS # BLD AUTO: 0.02 K/UL
BASOPHILS NFR BLD: 0.7 %
DIFFERENTIAL METHOD: ABNORMAL
EOSINOPHIL # BLD AUTO: 0 K/UL
EOSINOPHIL NFR BLD: 1.3 %
ERYTHROCYTE [DISTWIDTH] IN BLOOD BY AUTOMATED COUNT: 12.6 %
ESTIMATED AVG GLUCOSE: 97 MG/DL
FERRITIN SERPL-MCNC: 2074 NG/ML
FOLATE SERPL-MCNC: 14.4 NG/ML
HBA1C MFR BLD HPLC: 5 %
HCT VFR BLD AUTO: 38.8 %
HGB BLD-MCNC: 12.7 G/DL
IMM GRANULOCYTES # BLD AUTO: 0.01 K/UL
IMM GRANULOCYTES NFR BLD AUTO: 0.3 %
LYMPHOCYTES # BLD AUTO: 0.3 K/UL
LYMPHOCYTES NFR BLD: 10.7 %
MCH RBC QN AUTO: 33.5 PG
MCHC RBC AUTO-ENTMCNC: 32.7 G/DL
MCV RBC AUTO: 102 FL
MONOCYTES # BLD AUTO: 0.2 K/UL
MONOCYTES NFR BLD: 6.5 %
NEUTROPHILS # BLD AUTO: 2.5 K/UL
NEUTROPHILS NFR BLD: 80.5 %
NRBC BLD-RTO: 0 /100 WBC
PATH REV BLD -IMP: NORMAL
PATH REV BLD -IMP: NORMAL
PLATELET # BLD AUTO: 133 K/UL
PMV BLD AUTO: 10 FL
RBC # BLD AUTO: 3.79 M/UL
VIT B12 SERPL-MCNC: 695 PG/ML
WBC # BLD AUTO: 3.07 K/UL

## 2018-05-01 PROCEDURE — 99245 OFF/OP CONSLTJ NEW/EST HI 55: CPT | Mod: S$GLB,,, | Performed by: INTERNAL MEDICINE

## 2018-05-01 PROCEDURE — 82728 ASSAY OF FERRITIN: CPT

## 2018-05-01 PROCEDURE — 82746 ASSAY OF FOLIC ACID SERUM: CPT

## 2018-05-01 PROCEDURE — 36415 COLL VENOUS BLD VENIPUNCTURE: CPT

## 2018-05-01 PROCEDURE — 83036 HEMOGLOBIN GLYCOSYLATED A1C: CPT

## 2018-05-01 PROCEDURE — 85060 BLOOD SMEAR INTERPRETATION: CPT | Mod: ,,, | Performed by: PATHOLOGY

## 2018-05-01 PROCEDURE — 82525 ASSAY OF COPPER: CPT

## 2018-05-01 PROCEDURE — 85025 COMPLETE CBC W/AUTO DIFF WBC: CPT

## 2018-05-01 PROCEDURE — 82607 VITAMIN B-12: CPT

## 2018-05-01 PROCEDURE — 99999 PR PBB SHADOW E&M-EST. PATIENT-LVL III: CPT | Mod: PBBFAC,,, | Performed by: INTERNAL MEDICINE

## 2018-05-01 RX ORDER — DOCUSATE SODIUM 100 MG/1
100 CAPSULE, LIQUID FILLED ORAL 2 TIMES DAILY
COMMUNITY
End: 2018-05-23 | Stop reason: ALTCHOICE

## 2018-05-01 RX ORDER — TRAMADOL HYDROCHLORIDE 50 MG/1
50 TABLET ORAL EVERY 6 HOURS PRN
COMMUNITY
End: 2018-07-03

## 2018-05-01 RX ORDER — MULTIVITAMIN
1 TABLET ORAL DAILY
COMMUNITY
End: 2020-11-09 | Stop reason: CLARIF

## 2018-05-01 RX ORDER — BISACODYL 5 MG
5 TABLET, DELAYED RELEASE (ENTERIC COATED) ORAL DAILY PRN
COMMUNITY
End: 2018-05-23 | Stop reason: ALTCHOICE

## 2018-05-01 NOTE — LETTER
May 2, 2018      Rory Cast PA-C  [address]  [facility] [id number]           [facility]  [address]  [facility] [address]  Phone: [phone]          Patient: Apurva Rodriguez   MR Number: [id number]   YOB: 1965   Date of Visit: 5/1/2018       Dear Rory Cast:    Thank you for referring Apurva Rodriguez to me for evaluation. Attached you will find relevant portions of my assessment and plan of care.    If you have questions, please do not hesitate to call me. I look forward to following Apurva Rodriguez along with you.    Sincerely,    Gail Roth MD    Enclosure  CC:  No Recipients    If you would like to receive this communication electronically, please contact [email or web address] or [phone] to request more information on [employer] access.    For providers and/or their staff who would like to refer a patient to [facility], please contact us through our one-stop-shop provider referral line, Ryan Esparza, at [phone].    If you feel you have received this communication in error or would no longer like to receive these types of communications, please e-mail [email or web address]

## 2018-05-01 NOTE — PROGRESS NOTES
PATIENT: Apurva Rodriguez  MRN: [id number]  DATE: 5/1/2018      Diagnosis:   1. Iron overload    2. Elevated ferritin level        Chief Complaint: Follow-up      Medical History from transplant evaluation uploaded 4/26/17 in media tab:   5/23/16 echo - EF 55-60%, trace TR, otherwise normal  8/24/16 Stress EKG - [facility] treadmill score 6.  Negative EKG test.  Low 2 year cardiovascular risk.  8/22/16 Lexiscan - normal mycoardial perfusion spect imaging with lexiscan. Normal LV EF by gated spect. Hemodynamic response to pharmacologic stress was normal and ekg changes were not consistent with ischemia.  Low risk study for CAD  and major cardiac events.    11/18/16 Mammogram  Benign, BIRADS 2    Oncologic History    Oncologic History      Oncologic Treatment      Pathology 3/3/17 colonoscopy  Single polyp transverse colon removed by polypectomy.  Mod-severe diverticolosis in sigmoid and descending colon.    Mild diverticulosis in ascending colon  Recommend colonoscopy in 5 years  Pathology: Transverse polyp - hyperplastic, negative for dysplasia and malignancy  Descending polyp - hyperplastic, negative for dysplasia and malignancy    6/4/15 colonoscopy  Stool throughout entire colon.  Single polyp in transverse colon removed by snare cautery polypectomy.  Polyp was not retrieved.  Two other polyps in sigmoid colon removed by cold biopsy polypectomy.  Recommend repeat colonoscopy in 1 year due to poor prep and presence of polyps on exam.  Pathology: Hyperplastic polyps (sigmoid).    6/4/15 EGD  Benign appearing sub-centimeter polyps in fundus and proximal body fo stomach, suspected to be fundic gland polyps.  Pathology : Fundic gland polyp. Negative for h. pylori    12/16/15 Bone marrow biopsy (media tab; transplant evaluation uploaded 4/26/17, page 18)  Variably cellular, overall hypocellular marrow for age (0-40% cellularity, overal 25%) with orderly trilineage maturation, M:E ratio ~2:1, and megakaryocytes proportional  to cellularity.  One tiny interstitial lymphohistiocytic aggregate; histologically benign  Mild to moderate increase in stainable iron; no ring sideroblasts are seen.  Review of marrow aspirate, clot section, and biopsy finds a hypocellular marrow for age with no morphologic or flow cytometric evidence for a hematolymphoid neoplasm.  The one tiny interstitial lymphohistiocytic aggregate is morphologically benign.  Flow cytometry performed on the hemodilute aspirate found no increase or phenotypic aberrancy in blasts, no phenotypic abnormalities of granulocytes, monocytes, or lymphocytes, and no evidence for lymphoid clonality.  Patient is s/p renal transplantation. May consider medication effect or subclinical infectious process as possible etiologies for hypocellular marrow.  No overt evidence for infection is seen (no granulomas, necrosis, fibrosis, viral cytopathic effect); serological studies may be more informative (eg. parvovirus, cmv, other)  Suboptimal sample, hemodilute    10/25/16 Pap smear negative  10/21/16 PCR HPV 16, 18, and other/high risk negative         Subjective:    Interval History: Ms. Rodriguez is here for elevated ferritin.    She is a pleasant 53 year old woman who was diagnosed with nephrocalcinosis since she was a child who most recently had her 2nd  donor kidney transplant 2017 and was referred for elevated ferritin.  She says her ferritin has been high since she was a teenager.  She has never had iron chelation therapy or phlebotomy.  Anemia has not required chronic transfusions and her only transfusions has been in the setting of surgery.  Is not on oral iron.    Workup thus far includes Ultrasound of liver 2018 showing homogenous tissue with mild CBD dilation that is unchanged compared to prior US,  3/2018 Liver biopsy with mild-moderate siderosis 2+, sinusoidal dilation and congestion, and minimal steatosis, 3/19/18 hemoglobin electrophoresis HgF 1.8% peak but normal  "HgA2 3%, and 4/2/18 hemochromatosis panel testing for C282Y and H63D, which were negative.    She feels great since her transplant and is full of energy since her hemoglobin improved from 8 to 13. Current tacrolimus dosing is 2mg BID and she has been maintaining her tacro levels for a goal between 7 and 10.  Currently experiencing bilateral hand tremors and hair loss with tacrolimus.    Brief past medical history includes - born with bilateral nephrocalcinosis and hyperuricemia, was on peritoneal dialysis for 8 years prior to first kidney transplant in 1996 that lasted until 4/2016, was on peritoneal dialysis again until her second transplant 11/2017.  She explicitly states that she does not have history of polycystic kidney disease.  Sister has similar comorbidities as she is on her 2nd transplant, but also has history of anemia requiring transfusions and history of colon cancer diagnosed in her late 40s.  Sister is older than her by about 1.5 years.    Past Medical History:   Past Medical History:   Diagnosis Date    Anemia     Avascular necrosis left hip 12/6/2016    S/p replacement Cord decompression right side Revision of the left hip: with a "larger" hardware placement     Dyslipidemia 12/6/2016    ESRD (end stage renal disease) 12/6/2016    She mentioned that the cause of the kidney failure was associated with several birth defects: PKD Bilateral nephrocalcinosis Hyperuricemia Chronic Icthyosis     Essential hypertension 12/6/2016    Gastroesophageal reflux disease without esophagitis 12/6/2016    Hypertension     Polycystic kidney disease     diagnosed at early age. PT REPORTS DOES NOT HAVE    S/P bone marrow biopsy 12/6/2016    Secondary hyperparathyroidism of renal origin 12/6/2016       Past Surgical HIstory:   Past Surgical History:   Procedure Laterality Date    ABDOMINAL HERNIA REPAIR      bone marrow biospy  12/2015    CHOLECYSTECTOMY      laparoascopic    COLONOSCOPY      Endometrical " abilation hysteroscopy N/A 2005    EYE MUSCLE SURGERY Bilateral     as a baby(2 years)    FRACTURE SURGERY Right     rigt femur :steel karan pl;acement and replacement    KIDNEY TRANSPLANT Right     RLQ transplant,  donor    left hip revision      PARATHYROIDECTOMY      PERITONEAL CATHETER INSERTION      PERITONEAL CATHETER INSERTION      right hip decompression      TONSILLECTOMY      TOTAL HIP ARTHROPLASTY Left        Family History:   Family History   Problem Relation Age of Onset    No Known Problems Mother     COPD Father     Kidney disease Sister      ESRd a/p 2 kidney transplants, diagniosed with  PKD at age 18 months    Cancer Sister      skin cancer and colon cancer at age 52    Cancer Paternal Grandmother      breast cancer       Social History:  reports that she has never smoked. She has never used smokeless tobacco. She reports that she does not drink alcohol or use drugs.    Allergies:  Review of patient's allergies indicates:   Allergen Reactions    Pentamidine Shortness Of Breath    Valium [diazepam] Anxiety    Amphotericin b      Peritonitis reaction in dialysis solution while on peritoneal dialysis    Azelex [azelaic acid] Hives    Cleocin [clindamycin hcl] Hives    Decadron [dexamethasone]      Highly anxious    Morphine Swelling     lips    Reglan [metoclopramide hcl] Other (See Comments)    Valumag      Vallium//Highly anxious    Vancomycin analogues Itching     Hair areas       Medications:  Current Outpatient Prescriptions   Medication Sig Dispense Refill    bisacodyl (DULCOLAX) 5 mg EC tablet Take 5 mg by mouth daily as needed for Constipation.      carvedilol (COREG) 6.25 MG tablet Take 1 tablet (6.25 mg total) by mouth 2 (two) times daily. 60 tablet 11    cinacalcet (SENSIPAR) 30 MG Tab Take 1 tablet (30 mg total) by mouth daily with breakfast. 30 tablet 11    docusate sodium (COLACE) 100 MG capsule Take 100 mg by mouth 2 (two) times daily.       famotidine (PEPCID) 20 MG tablet Take 1 tablet (20 mg total) by mouth every evening. 30 tablet 11    gabapentin (NEURONTIN) 100 MG capsule TK ONE C PO QHS  3    multivitamin (ONE DAILY MULTIVITAMIN) per tablet Take 1 tablet by mouth once daily.      tacrolimus (PROGRAF) 1 MG Cap Take 2 mg AM twice daily mouth Z94.0 Kidney Transplant 11/7/2017. 60 capsule 11    traMADol (ULTRAM) 50 mg tablet Take 50 mg by mouth every 6 (six) hours as needed for Pain.      valGANciclovir (VALCYTE) 450 mg Tab Take 2 tablets (900 mg total) by mouth once daily. 60 tablet 3    atovaquone (MEPRON) 750 mg/5 mL Susp Take 10 mLs (1,500 mg total) by mouth once daily. STOP 11/7/18 300 mL 11    oxybutynin (DITROPAN-XL) 5 MG TR24 Take 1 tablet (5 mg total) by mouth once daily. 30 tablet 3     No current facility-administered medications for this visit.        Review of Systems   Constitutional: Negative for appetite change and unexpected weight change.   Eyes: Negative for visual disturbance.   Respiratory: Negative for cough and shortness of breath.    Cardiovascular: Negative for chest pain.   Gastrointestinal: Negative for abdominal pain and diarrhea.   Genitourinary: Positive for frequency.   Musculoskeletal: Negative for back pain.   Skin: Negative for rash.   Neurological: Negative for headaches.   Hematological: Negative for adenopathy.   Psychiatric/Behavioral: The patient is not nervous/anxious.        ECOG Performance Status: 0   Objective:      Vitals:   Vitals:    05/01/18 0842   BP: 127/70   BP Location: Left arm   Patient Position: Sitting   BP Method: Large (Automatic)   Pulse: 61   Resp: 18   Temp: 97.9 °F (36.6 °C)   TempSrc: Oral   SpO2: 98%   Weight: 59.3 kg (130 lb 11.7 oz)   Height: 5' (1.524 m)     BMI: Body mass index is 25.53 kg/m².    Physical Exam   Constitutional: She appears well-developed.   HENT:   Head: Normocephalic.   Eyes: Pupils are equal, round, and reactive to light. No scleral icterus.   Neck: No  tracheal deviation present.   Cardiovascular: Normal rate, regular rhythm and normal heart sounds.  Exam reveals no gallop and no friction rub.    No murmur heard.  Pulmonary/Chest: Effort normal and breath sounds normal. No respiratory distress. She has no wheezes. She has no rales.   Abdominal: Soft. Bowel sounds are normal. She exhibits no distension. There is no tenderness. There is no guarding.   Musculoskeletal: She exhibits no edema.   Lymphadenopathy:     She has no cervical adenopathy.   Neurological: She is alert.   Skin: Skin is warm and dry.   Psychiatric: She has a normal mood and affect.       Laboratory Data:   Iron   Date Value Ref Range Status   01/29/2018 79 30 - 160 ug/dL Final     Ferritin   Date Value Ref Range Status   01/29/2018 2,132 (H) 20.0 - 300.0 ng/mL Final   TIBC 340, Iron saturation 23, Transferrin 230.    Imaging:   Assessment:       1. Iron overload    2. Elevated ferritin level           Plan:       1. Iron overload and elevated ferritin - An extensive workup has been done thus far with liver biopsy proven hemosiderosis in the setting of elevated ferritin.  Etiology remains unclear but differentials include a more rare variant of hemochromatosis.  She is up to date on cancer screening. Bone marrow biopsy in 2015 hypocellular but also showed mild-moderate increase in stainable iron.  Bone marrow not consistent with potential ddx ineffective erythropoiesis causes such as MDS, and her hemoglobin electrophoresis was not suggestive of underlying beta thalassemia.  Alpha thalassemia could be tested but without history of iron deficiency, microcytic anemia (MCV WNL prior to transplant) it is much less likely especially since her anemia completely recovered post-transplant suggesting her anemia was largely driven by her chronic kidney disease.  -Has hemosiderosis by liver biopsy.  Would likely benefit from treatment regardless of etiology.  --Options include phlebotomy and/or iron chelation  (ie. exjade).  Phlebotomy at minimum can prevent further accumulation, but with transaminitis with the biopsy findings, addition of iron chelation to be considered.  Will discuss with transplant regarding management as exjade has adverse renal effects associated with it.  -For iron overload, will check ferritin, hemoglobin A1c, and copper  -For macrocytosis, will check folate, vitamin b12, and peripheral smear    Orders Placed This Encounter   Procedures    COPPER, SERUM    Hemoglobin A1c    FERRITIN    Vitamin B12    Folate    Pathologist Interpretation Differential           Harish Nesbitt MD  Hematology Oncology Fellow PGY4

## 2018-05-03 ENCOUNTER — TELEPHONE (OUTPATIENT)
Dept: TRANSPLANT | Facility: CLINIC | Age: 53
End: 2018-05-03

## 2018-05-03 LAB — COPPER SERPL-MCNC: 1151 UG/L (ref 810–1990)

## 2018-05-03 NOTE — TELEPHONE ENCOUNTER
Spoke to patient about heme/onc recs about 3/2018 Liver biopsy showing mild-moderate siderosis 2+, sinusoidal dilation and congestion, and minimal steatosis. Dr. Roth recommended phlebotomy vs chelation with exjade.    I called Apurva to discuss these recs. Risk of MICHELLE reviewed with her, and explained it is usually in older pts with CKD, which really does not fit her situation. She is understandably anxious about exjade adversely affecting her kidneys given she is a re transplant.    While she considers Exjade and we explore optimization strategies, I believe it is reasonable to proceed with phlebotomy.     - I will notify Dr. Grant via this note to help facilitate phlebotomy   - Add lipid panel with next May labs since she has been off lipids d/t trasnaminitis

## 2018-05-03 NOTE — TELEPHONE ENCOUNTER
----- Message from Avery Henning PharmD sent at 2018  1:03 PM CDT -----  Regarding: RE: Iron overload  Dr. Tian,    There is defiantly a risk of acute renal failure associated with the use of exjade, but the cause is not fully understood. As far as patient populations, the case reports I reviewed were mostly elderly patients with renal issues to begin with, and most of the cases were reversible. As the cause is not fully understood, prevention of the nephrotoxicity is not known at this time. There is also a drug interaction between exjade and tacrolimus resulting in decreased levels of tacrolimus (Exjade is an inducer of CY) so I would suggest frequent lab monitoring. If you would like to review the case reports, let me know and I can send them to you.    Thanks,  Avery Henning, PharmD. BCPS  ----- Message -----  From: Shreya Waldron MD  Sent: 2018   6:21 PM  To: [facility]  Subject: FW: Iron overload                                What do our pharmDs think of exjade iron chelation? Heme/onc is suggesting it for this lady, who is on second txp. (see below)    ----- Message -----  From: Harish Nesbitt MD  Sent: 2018   1:35 PM  To: Gail Roth MD, #  Subject: Iron overload                                    Hi,  Thank you for your referral to us.    Just wanted to get your thoughts on phlebotomy and iron chelation for her iron overload as treatment options.  We are aware medications like exjade carry risks, particularly to her kidney so we definitely wanted to get your thoughts on it.  Given the iron deposition, we think the phlebotomy might prevent it from progressing but the chelation would really help with the removal.    For non transfusion dependent iron overload, starting dose is 10mg/kg and can be uptitrated if not responding    As far as etiology, it remains unclear.  Alpha thalassemia is unlikely given the robust recovery of her hemoglobin post  "transplant ("alpha globin gene analysis" is the lab order if you want to document that you've ruled it out).  Other options include sending out miscellaneous tests for other variants of hemochromatosis such as ferroportin 1 gene mutations.  I'm only considering hereditary causes more because she mentioned she has had high ferritin since she was a teen.   Not sure if she has had iron supplementation prior as a lot of her treatment records are [facility] at [facility] but currently she is not on iron and has denied need for chronic transfusions.     Thank you for your time.  -Harish Nesbitt MD  Hematology Oncology Fellow PGY4        "

## 2018-05-08 ENCOUNTER — TELEPHONE (OUTPATIENT)
Dept: HEPATOLOGY | Facility: CLINIC | Age: 53
End: 2018-05-08

## 2018-05-08 NOTE — TELEPHONE ENCOUNTER
"----- Message from Itz Melara MD sent at 5/2/2018  4:02 PM CDT -----  Regarding: RE: Iron overload  No action for now  ----- Message -----  From: Rory Cast PA-C  Sent: 5/2/2018   8:41 AM  To: Itz Melara MD  Subject: FW: Iron overload                                What are your thoughts on this? She has no fibrosis but elevated ferritin and 2+ siderosis.  I did sent her biopsy to quantify iron but haven't heard back and I referred her to hematology.     ----- Message -----  From: Harish Nesbitt MD  Sent: 5/1/2018   1:35 PM  To: Gail Roth MD, #  Subject: Iron overload                                    Hi,  Thank you for your referral to us.    Just wanted to get your thoughts on phlebotomy and iron chelation for her iron overload as treatment options.  We are aware medications like exjade carry risks, particularly to her kidney so we definitely wanted to get your thoughts on it.  Given the iron deposition, we think the phlebotomy might prevent it from progressing but the chelation would really help with the removal.    For non transfusion dependent iron overload, starting dose is 10mg/kg and can be uptitrated if not responding    As far as etiology, it remains unclear.  Alpha thalassemia is unlikely given the robust recovery of her hemoglobin post transplant ("alpha globin gene analysis" is the lab order if you want to document that you've ruled it out).  Other options include sending out miscellaneous tests for other variants of hemochromatosis such as ferroportin 1 gene mutations.  I'm only considering hereditary causes more because she mentioned she has had high ferritin since she was a teen.   Not sure if she has had iron supplementation prior as a lot of her treatment records are over at [facility] but currently she is not on iron and has denied need for chronic transfusions.     Thank you for your time.  -Harish Nesbitt MD  Hematology Oncology Fellow PGY4        "

## 2018-05-14 ENCOUNTER — CLINICAL SUPPORT (OUTPATIENT)
Dept: REHABILITATION | Facility: HOSPITAL | Age: 53
End: 2018-05-14
Attending: OBSTETRICS & GYNECOLOGY
Payer: COMMERCIAL

## 2018-05-14 ENCOUNTER — LAB VISIT (OUTPATIENT)
Dept: LAB | Facility: HOSPITAL | Age: 53
End: 2018-05-14
Attending: INTERNAL MEDICINE
Payer: COMMERCIAL

## 2018-05-14 DIAGNOSIS — R35.1 NOCTURIA: ICD-10-CM

## 2018-05-14 DIAGNOSIS — N39.46 MIXED STRESS AND URGE URINARY INCONTINENCE: ICD-10-CM

## 2018-05-14 DIAGNOSIS — M62.89 PELVIC FLOOR DYSFUNCTION: Primary | ICD-10-CM

## 2018-05-14 DIAGNOSIS — Z94.0 KIDNEY REPLACED BY TRANSPLANT: ICD-10-CM

## 2018-05-14 LAB
ALBUMIN SERPL BCP-MCNC: 4 G/DL
ANION GAP SERPL CALC-SCNC: 7 MMOL/L
BASOPHILS # BLD AUTO: 0.03 K/UL
BASOPHILS NFR BLD: 1.2 %
BUN SERPL-MCNC: 28 MG/DL
CALCIUM SERPL-MCNC: 9.4 MG/DL
CHLORIDE SERPL-SCNC: 107 MMOL/L
CO2 SERPL-SCNC: 25 MMOL/L
CREAT SERPL-MCNC: 1 MG/DL
DIFFERENTIAL METHOD: ABNORMAL
EOSINOPHIL # BLD AUTO: 0.1 K/UL
EOSINOPHIL NFR BLD: 2 %
ERYTHROCYTE [DISTWIDTH] IN BLOOD BY AUTOMATED COUNT: 12.5 %
EST. GFR  (AFRICAN AMERICAN): >60 ML/MIN/1.73 M^2
EST. GFR  (NON AFRICAN AMERICAN): >60 ML/MIN/1.73 M^2
GLUCOSE SERPL-MCNC: 101 MG/DL
HCT VFR BLD AUTO: 37.6 %
HGB BLD-MCNC: 13 G/DL
IMM GRANULOCYTES # BLD AUTO: 0.01 K/UL
IMM GRANULOCYTES NFR BLD AUTO: 0.4 %
LYMPHOCYTES # BLD AUTO: 0.3 K/UL
LYMPHOCYTES NFR BLD: 12.2 %
MAGNESIUM SERPL-MCNC: 1.8 MG/DL
MCH RBC QN AUTO: 35.2 PG
MCHC RBC AUTO-ENTMCNC: 34.6 G/DL
MCV RBC AUTO: 102 FL
MONOCYTES # BLD AUTO: 0.2 K/UL
MONOCYTES NFR BLD: 6.3 %
NEUTROPHILS # BLD AUTO: 2 K/UL
NEUTROPHILS NFR BLD: 77.9 %
NRBC BLD-RTO: 0 /100 WBC
PHOSPHATE SERPL-MCNC: 3.3 MG/DL
PLATELET # BLD AUTO: 132 K/UL
PMV BLD AUTO: 10.7 FL
POTASSIUM SERPL-SCNC: 4.2 MMOL/L
RBC # BLD AUTO: 3.69 M/UL
SODIUM SERPL-SCNC: 139 MMOL/L
WBC # BLD AUTO: 2.55 K/UL

## 2018-05-14 PROCEDURE — 97112 NEUROMUSCULAR REEDUCATION: CPT | Mod: PN

## 2018-05-14 PROCEDURE — 36415 COLL VENOUS BLD VENIPUNCTURE: CPT | Mod: PO

## 2018-05-14 PROCEDURE — G8991 OTHER PT/OT GOAL STATUS: HCPCS | Mod: CK,PN

## 2018-05-14 PROCEDURE — G8990 OTHER PT/OT CURRENT STATUS: HCPCS | Mod: CK,PN

## 2018-05-14 PROCEDURE — 83735 ASSAY OF MAGNESIUM: CPT

## 2018-05-14 PROCEDURE — 80069 RENAL FUNCTION PANEL: CPT

## 2018-05-14 PROCEDURE — 85025 COMPLETE CBC W/AUTO DIFF WBC: CPT

## 2018-05-14 PROCEDURE — 80197 ASSAY OF TACROLIMUS: CPT

## 2018-05-14 PROCEDURE — 87799 DETECT AGENT NOS DNA QUANT: CPT

## 2018-05-14 NOTE — PROGRESS NOTES
Patient: Apurva Rodriguez   [facility] #: [id number]   Diagnosis:   Encounter Diagnoses   Name Primary?    Mixed stress and urge urinary incontinence     Pelvic floor dysfunction Yes    Nocturia       Date of start of care: 2/28/18  Date of treatment: 05/14/2018   Time in: 10:02  Time out: 10:55  Total treatment time: 53 minutes  # Visits: 8/12  Auth expiration: 12/31/18  POC expiration: 5/23/18  Outpatient Physical Therapy   Daily Note    Subjective     Pt reports that she is doing well today. She identified a new trigger after attending a [employer] over the weekend. Pt states she has one presentation left, next month.    Pain: Current: 0/10    Patient's Goals: to be able to stand through a 3 hour presentation without having to use the restroom    Objective     TREATMENT    Pt received individual therapeutic exercise control for 00 minutes including:    - Ant/post pelvic tilts on SB  - TA activation on SB x 10  - Marching on SB x 10 ea, unilateral UE support  - Side lying hip abduction x 10 ea  - Supine SLR x 10 ea with PFM/TA activation  - Prone hip extension with glut set x 10 ea  - Prone knee lift x 5 ea  - Quadruped rocking (pt limited to work in quadruped due to R knee pain and arthritis in her hands)  - Cat/cow  - Supine HS stretch with strap x 2 ea side  - Supine hip flexor stretch with yoga deb x 2 ea side    Pt received individual neuromuscular reeducation for 53 minutes including:    - PFM activation with DB, pt with continued improvement in recruitment with strength improvement (2/5 MMT)  - 5'' kegals in supine, good anterior contraction with decreased strength posteriorly  - Legs up the wall x 5 min with verbal cueing    Not performed:  - PFM activation with DB in supine with SEMG assist, external perianal electrodes; pt displays normal resting baseline, fair WR rise, partial derecruitment, good coordination and carry over; 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, she was  instructed to increase speed to assess ability to recruit PFM more quickly, this was somewhat strained, improved with reps, will continue to practice  - 10 x 5'' kegals x 2 sets in supine with SEMG assist; pt displays normal resting baseline, good holding, complete derecruitment  - TA activation with DB x 10 with tactile cueing  - Supine march with TA activation using breath x 10 ea  - Side lying hip abduction x 10 ea using breath   - Supine bridge with ball squeeze and breath x 10  - Diaphragmatic breathing on 1/2 foam  - Snow vy on half foam x 10  - Body scan mindfulness training  - Sit to stand squat coordination with respiratory diaphragm x 6    Pt received manual therapy for 00 minutes including: gentle soft tissue mobilization applied to upper and lower abdomen, colon massage; instructions in self abdominal massage for improved GI mobility     Pt tolerated treatment well with no visible adverse affects. No skin irritation, errythemia, or other adverse affects observed from electrode placement.    Education: Discussed progression of plan of care with patient; educated pt in activity modification; pt instructed to continue with diaphragmatic breathing, TA activation with DB, PFM activation with DB, sit to stand squat, legs up the wall, supine march, side lying hip abduction, supine bridge; added 5'' kegals and instructed pt to perform a set or two using posterior muscles; pt verbalized understanding.    Assessment     Pt tolerated session well without visible adverse effects. Pt continues to display improvement in PFM strength and progress with her ability to hold her urine. Pt with good level of independence with her HEP and good compliance. Will decrease to every three weeks in order to give pt time to work on strengthening. Pt continues to present with decreased PFM strength, endurance, and decreased central stability. Pt presents with history of two kidney transplants, increased UI following her recent  surgery,  history of constipation, TIGIST, and multiple abdominal hernia repairs. Pt will continue to benefit from physcial therapy services in order to maximize pain free functional independence. SEMG next time visit.    Pt is making good progress towards established goals.   No educational, cultural, or spiritual barriers to learning identified.    GOALS  Short Term Goals: 4 weeks (3/28/18)  1. Pt will verbalize improved awareness of PFM activity as palpated by PT in order to improve activity involvement with HEP. Met 3/26/18  2. Pt will demonstrate decreased overflow muscle activity during PF muscle contraction. Met 5/14/18  3. Pt will display integration or respiratory and pelvic diaphragm in order to improve inner core stability and support. Met 3/26/18  4. Pt will report incorporation of voiding schedule into daily routine to manage frequency, incontinence etc. Met 5/14/18  5. Pt will tolerate HEP to improve impairments and independence with ADL's. Met 3/26/18     Long Term Goals: 12 weeks (5/23/18)  1. Pt will be able to lift, drop, and bear down in order to feel improved excursion of her PFM to improve function. Met 5/14/18  2. Pt will be able to perform 5 x 5'' kegals in order to improve PFM strength to decrease urinary frequency. Met 5/14/18  3. Pt will report improvement in holding ability.   4. Pt will be independent with HEP and self management.     CMS Impairment/Limitation/Restriction for Urinary Problem Survey  Status Limitation G-Code CMS Severity Modifier  Intake 49% 51%  Predicted 58% 42% Goal Status+ CK - At least 40 percent but less than 60 percent  4/2/2018 53% 47%  5/14/2018 54% 46% Current Status CK - At least 40 percent but less than 60 percent    Plan     Continue with established POC, working toward PT goals.

## 2018-05-15 ENCOUNTER — PATIENT MESSAGE (OUTPATIENT)
Dept: TRANSPLANT | Facility: CLINIC | Age: 53
End: 2018-05-15

## 2018-05-15 LAB — TACROLIMUS BLD-MCNC: 6.1 NG/ML

## 2018-05-16 LAB — CMV DNA SERPL NAA+PROBE-ACNC: NORMAL IU/ML

## 2018-05-21 ENCOUNTER — PATIENT MESSAGE (OUTPATIENT)
Dept: TRANSPLANT | Facility: CLINIC | Age: 53
End: 2018-05-21

## 2018-05-23 ENCOUNTER — TELEPHONE (OUTPATIENT)
Dept: TRANSPLANT | Facility: CLINIC | Age: 53
End: 2018-05-23

## 2018-05-23 ENCOUNTER — OFFICE VISIT (OUTPATIENT)
Dept: TRANSPLANT | Facility: CLINIC | Age: 53
End: 2018-05-23
Payer: COMMERCIAL

## 2018-05-23 VITALS
DIASTOLIC BLOOD PRESSURE: 80 MMHG | HEART RATE: 62 BPM | BODY MASS INDEX: 25.11 KG/M2 | SYSTOLIC BLOOD PRESSURE: 132 MMHG | WEIGHT: 127.88 LBS | OXYGEN SATURATION: 99 % | RESPIRATION RATE: 16 BRPM | TEMPERATURE: 98 F | HEIGHT: 60 IN

## 2018-05-23 DIAGNOSIS — Z29.89 PROPHYLACTIC IMMUNOTHERAPY: ICD-10-CM

## 2018-05-23 DIAGNOSIS — R74.01 TRANSAMINITIS: ICD-10-CM

## 2018-05-23 DIAGNOSIS — Z91.89 AT RISK FOR OPPORTUNISTIC INFECTIONS: ICD-10-CM

## 2018-05-23 DIAGNOSIS — N18.2 CHRONIC KIDNEY DISEASE (CKD), STAGE II (MILD): Primary | Chronic | ICD-10-CM

## 2018-05-23 DIAGNOSIS — Z94.0 STATUS POST DECEASED-DONOR KIDNEY TRANSPLANTATION: ICD-10-CM

## 2018-05-23 DIAGNOSIS — D70.2 OTHER DRUG-INDUCED NEUTROPENIA: ICD-10-CM

## 2018-05-23 DIAGNOSIS — Z79.60 LONG-TERM USE OF IMMUNOSUPPRESSANT MEDICATION: ICD-10-CM

## 2018-05-23 DIAGNOSIS — N25.81 SECONDARY HYPERPARATHYROIDISM OF RENAL ORIGIN: ICD-10-CM

## 2018-05-23 PROBLEM — E87.20 METABOLIC ACIDOSIS: Status: RESOLVED | Noted: 2017-11-09 | Resolved: 2018-05-23

## 2018-05-23 PROCEDURE — 99999 PR PBB SHADOW E&M-EST. PATIENT-LVL III: CPT | Mod: PBBFAC,,, | Performed by: INTERNAL MEDICINE

## 2018-05-23 PROCEDURE — 3075F SYST BP GE 130 - 139MM HG: CPT | Mod: CPTII,S$GLB,, | Performed by: INTERNAL MEDICINE

## 2018-05-23 PROCEDURE — 3079F DIAST BP 80-89 MM HG: CPT | Mod: CPTII,S$GLB,, | Performed by: INTERNAL MEDICINE

## 2018-05-23 PROCEDURE — 3008F BODY MASS INDEX DOCD: CPT | Mod: CPTII,S$GLB,, | Performed by: INTERNAL MEDICINE

## 2018-05-23 PROCEDURE — 99215 OFFICE O/P EST HI 40 MIN: CPT | Mod: S$GLB,,, | Performed by: INTERNAL MEDICINE

## 2018-05-23 NOTE — LETTER
May 23, 2018        ROCHELLE Saenz Jr.  [address] [place] [address]  Phone: [phone]  Fax: [phone]             [facility]  [address]  [facility] [address]  Phone: [phone]   Patient: Apurva Rodriguez   MR Number: [id number]   YOB: 1965   Date of Visit: 5/23/2018       Dear Dr. ROCHELLE Saenz Jr.    Thank you for referring Apurva Rodriguez to me for evaluation. Attached you will find relevant portions of my assessment and plan of care.    If you have questions, please do not hesitate to call me. I look forward to following Apurva Rodriguez along with you.    Sincerely,    Shreya Waldron MD    Enclosure    If you would like to receive this communication electronically, please contact [email or web address] or [phone] to request "[employer] Link access.    "[employer] Link is a tool which provides read-only access to select patient information with whom you have a relationship. Its easy to use and provides real time access to review your patients record including encounter summaries, notes, results, and demographic information.    If you feel you have received this communication in error or would no longer like to receive these types of communications, please e-mail [email or web address]

## 2018-05-23 NOTE — PROGRESS NOTES
"   Kidney Post-Transplant Assessment    Referring Physician: LUDA Raygoza  Current Nephrologist: LUDA Raygoza    ORGAN: RIGHT KIDNEY  Donor Type:  - brain death  [facility] Increased Risk: yes  Cold Ischemia: 852 mins  Induction Medications: campath - alemtuzumab (anti-cd52), steroids (prednisone,methylprednisolone,solumedrol,medrol,decadron)    Subjective:     CC:  Reassessment of renal allograft function and management of chronic immunosuppression.    HPI:  Ms. Rodriguez is a 53 y.o. year old White female who received a  - brain death kidney transplant on 17. Her creatinines range 0.9-1.1. She takes tacrolimus and MMF on hold for maintenance immunosuppression. Her post transplant course has been uncomplicated to date.    Pertinent History:  -ESRD at age 23 from nephrocalcinosis.  Diagnosed with kidney disease at age 6 months  -Nephrocalcinosis diagnosed age 1 (note old records indicate PKD--this is in error)  -Kidney transplant #1 at [facility] at , failed 2016 when she started dialysis. IS-CSA/MMF  -h/o pancytopenia with a bone marrow biopsy after the first kidney transplant   -AVN requiring hip replacement.  -DDKT #2  ([facility]-IR) 17 induced with Campath. cPRA pre txp 94%. KDPI 50%,  CIT 14+ hrs. Required 7 day pollack d/t friable bladder.Seen by surgery 17 (Dr. Barragan): "Scant serous discharge from wound. Redness with minimal blanching. Incision probed with sterile swab, no expression of fluid or pus. Recommend continued observation"  -Developed uvular edema post op with cough and hoarseness (ENT dx hydrops)  -CMV status ++; valcyte held d/t neutropenia  -Neutropenia cause bactrim to be D/C'd. Could not tolerate pentam d/t cough/vocal cord issues.  -Transaminitis d/t ? 2+ siderosis. No fibrosis on biopsy.    -MBD: , Vit D 24.  Cinacalcet and ergocalciferol started post op    Apurva feels great and has no complaints. She reports no issues with CP, SOB, N/V. Her urinary symptoms have " improved since starting pelvic PT. Since her last visit, liver biopsy was resulted suspicious of iron overload. She then saw heme onc d/t ferritin >2000 who recommedned phlebotomy or chelation. This has not yet been started.    Review of Systems   Constitutional: Negative for fever.   Eyes: Negative for visual disturbance.   Respiratory: Negative for shortness of breath.    Cardiovascular: Negative for chest pain and leg swelling.   Gastrointestinal: Negative.    Genitourinary: Positive for dysuria (intermiitent, better now). Negative for hematuria.   Skin: Negative for rash.   Allergic/Immunologic: Positive for immunocompromised state.   Neurological: Negative for tremors.     Objective:   Blood pressure 132/80, pulse 62, temperature 97.6 °F (36.4 °C), temperature source Oral, resp. rate 16, height 5' (1.524 m), weight 58 kg (127 lb 13.9 oz), SpO2 99 %.body mass index is 24.97 kg/m².    Physical Exam   Constitutional: No distress.   Cardiovascular: Normal rate and regular rhythm.    Pulmonary/Chest: Effort normal and breath sounds normal. No respiratory distress.   Abdominal: Soft. Bowel sounds are normal. She exhibits no mass. There is no tenderness.   Musculoskeletal: She exhibits no edema.   Skin: Skin is warm and dry. No rash noted.   Psychiatric: She has a normal mood and affect.     Labs:  Lab Results   Component Value Date    WBC 2.55 (L) 05/14/2018    HGB 13.0 05/14/2018    HCT 37.6 05/14/2018     05/14/2018    K 4.2 05/14/2018     05/14/2018    CO2 25 05/14/2018    BUN 28 (H) 05/14/2018    CREATININE 1.0 05/14/2018    EGFRNONAA >60.0 05/14/2018    CALCIUM 9.4 05/14/2018    PHOS 3.3 05/14/2018    MG 1.8 05/14/2018    ALBUMIN 4.0 05/14/2018    AST 68 (H) 02/05/2018     (H) 02/05/2018    UTPCR Unable to calculate 05/14/2018    .0 (H) 12/04/2017    TACROLIMUS 6.1 05/14/2018   Labs were reviewed with the patient    Assessment:     1. Chronic kidney disease (CKD), stage II (mild)    2.  Status post -donor kidney transplantation    3. Secondary hyperparathyroidism of renal origin    4. Transaminitis    5. Other drug-induced neutropenia    6. At risk for opportunistic infections    7. Prophylactic immunotherapy    8. Long-term use of immunosuppressant medication        Plan:   -CKD2 s/p kidney transplantation, remains stable. Continue to monitor renal function and electrolytes, HTN, secondary hyperparathyroidism and other issues related to underlying ESRD.  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 Follow CMV monthly x3  -Continue tacrolimus-based immunosuppression. Level a bit low but acceptable. Will continue to watch signs, symptoms and levels for side effects and drug toxicity.   -Drug induced neutropenia remains mild and unchanged, but ANC adequate and stable (ie not dropping). Cont to watch closely. Continue to hold Myfortic for now; hope to restart once WBC > 4. No need for filgrastim.  -Transaminitis thought d/t ? Siderosis. Will recontact heme onc about phleobotomy and  Keep chelation as option for future. Discussed at length previously with Apurva, and she wants to avoid drugs associated with possible MICHELLE. I explained risk is very low, and supported phlebotomy first approach, with chelation as an option to be revisited later if LFTS do not improve. Repeat q month liver profile x3.  -Dysuria- per urogyn, improving  -Hypercholesterolemia - we discussed to hold statin. Repeat lipids with next lab to determine if will need statin (will need discussion with hepatology given LFT abnormalities)   Continue to monitor labs q2 week  -Sec Hyperparathyroidism s/p parathyroidectomy w re implantation (and re-resection pre txp). Recheck PTH to see if needs to continue cinacalcet.    Follow-up:   Clinic: return to transplant clinic weekly for the first month after transplant; every 2 weeks during months 2-3; then at 6-, 9-, 12-, 18-, 24-, and 36- months post-transplant to  reassess for complications from immunosuppression toxicity and monitor for rejection.  Annually thereafter.    Labs: since patient remains at high risk for rejection and drug-related complications that warrant close monitoring, labs will be ordered as follows: continue twice weekly CBC, renal panel, and drug level for first month; then same labs once weekly through 3rd month post-transplant.  Urine for UA and protein/creatinine ratio monthly.  Urine BK - PCR at 1-, 3-, 6-, 9-, 12-, 18-, 24-, and 36- months post-transplant.  Hepatic panel at 1-, 2-, 3-, 6-, 9-, 12-, 18-, 24-, and 36- months post-transplant.    Shreya Waldron MD       Education:   Material provided to the patient.  Patient reminded to call with any health changes since these can be early signs of significant complications.  Also, I advised the patient to be sure any new medications or changes of old medications are discussed with either a pharmacist or physician knowledgeable with transplant to avoid rejection/drug toxicity related to significant drug interactions.

## 2018-05-23 NOTE — TELEPHONE ENCOUNTER
Albert Grant and Delicia,  I have spoken to Ms. Rodriguez and she is reluctant to proceed with chelation d/t fear of hurting kidney function. She is interested in moving forward with phlebotomy.   Would you please help get her set up? I am not clear how often etc she needs them. I will defer to you to help coordinate.  I advised her we should revisit issue of chelation after phlebotomy started.  Thanks in advance,  Sally

## 2018-05-23 NOTE — Clinical Note
Please let her know it's OK to stop Valcyte. Cont to check CMV PCR and LFTS qmonth x3.  And we need to f/u abt phlebotomy...

## 2018-05-23 NOTE — PATIENT INSTRUCTIONS
Thank you for entrusting your transplant care to me. Carmen reached out to Dr. Grant to follow up on phlebotomy question. Please feel free to ask any questions and raise any concerns regarding your health care.  Warmest Regards,  Dr. Sally Waldron

## 2018-05-24 ENCOUNTER — PATIENT MESSAGE (OUTPATIENT)
Dept: TRANSPLANT | Facility: CLINIC | Age: 53
End: 2018-05-24

## 2018-05-25 DIAGNOSIS — R79.89 ELEVATED FERRITIN LEVEL: Primary | ICD-10-CM

## 2018-05-25 DIAGNOSIS — R74.8 ELEVATED LIVER ENZYMES: ICD-10-CM

## 2018-05-25 NOTE — PATIENT INSTRUCTIONS
Please add hepatic panel to next labs.  See below    I spoke with Dr. Dillard from hepatology.  He suggest we recheck your liver enzymes since they were last done in February.  The standard of care is that statins may be used with live issues as long as AST and ALT are less than 3 times the upper limit of normal.  His liver functions are acceptable, we may try pravastatin 20 mg daily, since it has dual metabolism via the liver and kidneys, which limits hepatitis toxicity significantly.   I will ask Deana to add hepatic panel to next labs.  Please note I'm sending this message late on Friday, so she will not be able to act on this until Monday.

## 2018-05-28 ENCOUNTER — TELEPHONE (OUTPATIENT)
Dept: HEMATOLOGY/ONCOLOGY | Facility: CLINIC | Age: 53
End: 2018-05-28

## 2018-05-28 ENCOUNTER — CLINICAL SUPPORT (OUTPATIENT)
Dept: REHABILITATION | Facility: HOSPITAL | Age: 53
End: 2018-05-28
Attending: OBSTETRICS & GYNECOLOGY
Payer: COMMERCIAL

## 2018-05-28 DIAGNOSIS — N39.46 MIXED STRESS AND URGE URINARY INCONTINENCE: ICD-10-CM

## 2018-05-28 DIAGNOSIS — E83.19 IRON OVERLOAD: Primary | ICD-10-CM

## 2018-05-28 PROCEDURE — 97110 THERAPEUTIC EXERCISES: CPT | Mod: PN

## 2018-05-28 NOTE — PROGRESS NOTES
Patient: Apurva Rodriguez   [facility] #: [id number]   Diagnosis:   Encounter Diagnosis   Name Primary?    Mixed stress and urge urinary incontinence       Date of start of care: 2/28/18  Date of treatment: 05/28/2018   Time in: 9:00  Time out: 9:55  Total treatment time: 55 minutes  # Visits: 9/12  Auth expiration: 12/31/18  POC expiration: 5/23/18  Outpatient Physical Therapy   Daily Note    Subjective     Pt reports that things are going ok, no changes.    Pain: Current: 0/10    Patient's Goals: to be able to stand through a 3 hour presentation without having to use the restroom    Objective     TREATMENT    Pt received individual therapeutic exercise control for 55 minutes including:    - 10 x 10'' kegals in supine with SEMG assist, external perianal electrodes; elevated baseline today, good-fair WR rise, fair holding, derecruitment to initial baseline  - 10 x 10'' kegals in sitting with SEMG assist, as above  - 10'' kegals in supported standing with SEMG assist    Not performed:  - Ant/post pelvic tilts on SB  - TA activation on SB x 10  - Marching on SB x 10 ea, unilateral UE support  - Side lying hip abduction x 10 ea  - Supine SLR x 10 ea with PFM/TA activation  - Prone hip extension with glut set x 10 ea  - Prone knee lift x 5 ea  - Quadruped rocking (pt limited to work in quadruped due to R knee pain and arthritis in her hands)  - Cat/cow  - Supine HS stretch with strap x 2 ea side  - Supine hip flexor stretch with yoga deb x 2 ea side    Pt received individual neuromuscular reeducation for 00 minutes including:    - PFM activation with DB, pt with continued improvement in recruitment with strength improvement (2/5 MMT)  - 5'' kegals in supine, good anterior contraction with decreased strength posteriorly  - Legs up the wall x 5 min with verbal cueing  - PFM activation with DB in supine with SEMG assist, external perianal electrodes; pt displays normal resting baseline, fair WR rise, partial derecruitment, good  coordination and carry over; 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, she was instructed to increase speed to assess ability to recruit PFM more quickly, this was somewhat strained, improved with reps, will continue to practice  - TA activation with DB x 10 with tactile cueing  - Supine march with TA activation using breath x 10 ea  - Side lying hip abduction x 10 ea using breath   - Supine bridge with ball squeeze and breath x 10  - Diaphragmatic breathing on 1/2 foam  - Snow vy on half foam x 10  - Body scan mindfulness training  - Sit to stand squat coordination with respiratory diaphragm x 6    Pt received manual therapy for 00 minutes including: gentle soft tissue mobilization applied to upper and lower abdomen, colon massage; instructions in self abdominal massage for improved GI mobility     Education: Discussed progression of plan of care with patient; educated pt in activity modification; pt instructed to continue with diaphragmatic breathing, TA activation with DB, PFM activation with DB, sit to stand squat, legs up the wall, supine march, side lying hip abduction, supine bridge, 10'' kegals (added working in supported standing); pt verbalized understanding.    Assessment     Pt tolerated session well without visible adverse effects. Pt with good recruitment in supported standing today. Baseline per SEMG slightly elevated possibly due to some discomfort with insertion of sensor. Pt continues to present with decreased PFM strength, endurance, and decreased central stability. Pt presents with history of two kidney transplants, increased UI following her recent surgery,  history of constipation, TIGIST, and multiple abdominal hernia repairs. Pt will continue to benefit from physcial therapy services in order to maximize pain free functional independence. Will reassess next visit.    Pt is making good progress towards established goals.   No educational, cultural, or spiritual  barriers to learning identified.    GOALS  Short Term Goals: 4 weeks (3/28/18)  1. Pt will verbalize improved awareness of PFM activity as palpated by PT in order to improve activity involvement with HEP. Met 3/26/18  2. Pt will demonstrate decreased overflow muscle activity during PF muscle contraction. Met 5/14/18  3. Pt will display integration or respiratory and pelvic diaphragm in order to improve inner core stability and support. Met 3/26/18  4. Pt will report incorporation of voiding schedule into daily routine to manage frequency, incontinence etc. Met 5/14/18  5. Pt will tolerate HEP to improve impairments and independence with ADL's. Met 3/26/18     Long Term Goals: 12 weeks (5/23/18)  1. Pt will be able to lift, drop, and bear down in order to feel improved excursion of her PFM to improve function. Met 5/14/18  2. Pt will be able to perform 5 x 5'' kegals in order to improve PFM strength to decrease urinary frequency. Met 5/14/18  3. Pt will report improvement in holding ability.   4. Pt will be independent with HEP and self management.     CMS Impairment/Limitation/Restriction for Urinary Problem Survey  Status Limitation G-Code CMS Severity Modifier  Intake 49% 51%  Predicted 58% 42% Goal Status+ CK - At least 40 percent but less than 60 percent  4/2/2018 53% 47%  5/14/2018 54% 46% Current Status CK - At least 40 percent but less than 60 percent    Plan     Continue with established POC, working toward PT goals.

## 2018-05-28 NOTE — TELEPHONE ENCOUNTER
----- Message from Harish Nesbitt MD sent at 5/25/2018  9:51 AM CDT -----  Regarding: phlebotomy and labs  Hi,  Can you set Mrs. Rodriguez up for phlebotomy weekly with CBC drawn weekly and ferritin drawn every 4 weeks? Thanks    For now  I set the hemoglobin goal to <12  thanks  -E

## 2018-05-31 ENCOUNTER — HOSPITAL ENCOUNTER (OUTPATIENT)
Dept: TRANSFUSION MEDICINE | Facility: HOSPITAL | Age: 53
Discharge: HOME | End: 2018-05-31
Attending: INTERNAL MEDICINE
Payer: COMMERCIAL

## 2018-05-31 ENCOUNTER — PATIENT MESSAGE (OUTPATIENT)
Dept: TRANSPLANT | Facility: CLINIC | Age: 53
End: 2018-05-31

## 2018-05-31 ENCOUNTER — PATIENT MESSAGE (OUTPATIENT)
Dept: HEMATOLOGY/ONCOLOGY | Facility: CLINIC | Age: 53
End: 2018-05-31

## 2018-05-31 PROCEDURE — 99195 PHLEBOTOMY: CPT

## 2018-06-01 ENCOUNTER — PATIENT MESSAGE (OUTPATIENT)
Dept: HEMATOLOGY/ONCOLOGY | Facility: CLINIC | Age: 53
End: 2018-06-01

## 2018-06-04 ENCOUNTER — LAB VISIT (OUTPATIENT)
Dept: LAB | Facility: HOSPITAL | Age: 53
End: 2018-06-04
Attending: STUDENT IN AN ORGANIZED HEALTH CARE EDUCATION/TRAINING PROGRAM
Payer: COMMERCIAL

## 2018-06-04 DIAGNOSIS — R74.8 ELEVATED LIVER ENZYMES: ICD-10-CM

## 2018-06-04 DIAGNOSIS — R79.89 ELEVATED FERRITIN LEVEL: ICD-10-CM

## 2018-06-04 LAB
BASOPHILS # BLD AUTO: 0.03 K/UL
BASOPHILS NFR BLD: 0.7 %
DIFFERENTIAL METHOD: ABNORMAL
EOSINOPHIL # BLD AUTO: 0 K/UL
EOSINOPHIL NFR BLD: 1 %
ERYTHROCYTE [DISTWIDTH] IN BLOOD BY AUTOMATED COUNT: 12.9 %
HCT VFR BLD AUTO: 33.2 %
HGB BLD-MCNC: 10.8 G/DL
IMM GRANULOCYTES # BLD AUTO: 0.01 K/UL
IMM GRANULOCYTES NFR BLD AUTO: 0.2 %
LYMPHOCYTES # BLD AUTO: 0.3 K/UL
LYMPHOCYTES NFR BLD: 8.2 %
MCH RBC QN AUTO: 34 PG
MCHC RBC AUTO-ENTMCNC: 32.5 G/DL
MCV RBC AUTO: 104 FL
MONOCYTES # BLD AUTO: 0.7 K/UL
MONOCYTES NFR BLD: 16.4 %
NEUTROPHILS # BLD AUTO: 3 K/UL
NEUTROPHILS NFR BLD: 73.5 %
NRBC BLD-RTO: 0 /100 WBC
PLATELET # BLD AUTO: 130 K/UL
PMV BLD AUTO: 10.4 FL
RBC # BLD AUTO: 3.18 M/UL
WBC # BLD AUTO: 4.14 K/UL

## 2018-06-04 PROCEDURE — 85025 COMPLETE CBC W/AUTO DIFF WBC: CPT

## 2018-06-04 PROCEDURE — 36415 COLL VENOUS BLD VENIPUNCTURE: CPT | Mod: PO

## 2018-06-06 ENCOUNTER — HOSPITAL ENCOUNTER (OUTPATIENT)
Dept: TRANSFUSION MEDICINE | Facility: HOSPITAL | Age: 53
Discharge: HOME | End: 2018-06-06
Attending: INTERNAL MEDICINE
Payer: COMMERCIAL

## 2018-06-06 DIAGNOSIS — E83.19 IRON OVERLOAD: Primary | ICD-10-CM

## 2018-06-07 ENCOUNTER — TELEPHONE (OUTPATIENT)
Dept: HEMATOLOGY/ONCOLOGY | Facility: CLINIC | Age: 53
End: 2018-06-07

## 2018-06-07 ENCOUNTER — PATIENT MESSAGE (OUTPATIENT)
Dept: TRANSPLANT | Facility: CLINIC | Age: 53
End: 2018-06-07

## 2018-06-07 ENCOUNTER — PATIENT MESSAGE (OUTPATIENT)
Dept: HEMATOLOGY/ONCOLOGY | Facility: CLINIC | Age: 53
End: 2018-06-07

## 2018-06-11 ENCOUNTER — TELEPHONE (OUTPATIENT)
Dept: HEMATOLOGY/ONCOLOGY | Facility: CLINIC | Age: 53
End: 2018-06-11

## 2018-06-11 ENCOUNTER — LAB VISIT (OUTPATIENT)
Dept: LAB | Facility: HOSPITAL | Age: 53
End: 2018-06-11
Attending: INTERNAL MEDICINE
Payer: COMMERCIAL

## 2018-06-11 ENCOUNTER — PATIENT MESSAGE (OUTPATIENT)
Dept: HEMATOLOGY/ONCOLOGY | Facility: CLINIC | Age: 53
End: 2018-06-11

## 2018-06-11 DIAGNOSIS — R74.8 ELEVATED LIVER ENZYMES: ICD-10-CM

## 2018-06-11 DIAGNOSIS — R79.89 ELEVATED FERRITIN LEVEL: ICD-10-CM

## 2018-06-11 DIAGNOSIS — Z94.0 KIDNEY REPLACED BY TRANSPLANT: ICD-10-CM

## 2018-06-11 LAB
ALBUMIN SERPL BCP-MCNC: 3.9 G/DL
ALBUMIN SERPL BCP-MCNC: 3.9 G/DL
ALP SERPL-CCNC: 261 U/L
ALT SERPL W/O P-5'-P-CCNC: 77 U/L
ANION GAP SERPL CALC-SCNC: 7 MMOL/L
AST SERPL-CCNC: 55 U/L
BASOPHILS # BLD AUTO: 0.02 K/UL
BASOPHILS # BLD AUTO: 0.02 K/UL
BASOPHILS NFR BLD: 0.9 %
BASOPHILS NFR BLD: 0.9 %
BILIRUB DIRECT SERPL-MCNC: 0.3 MG/DL
BILIRUB SERPL-MCNC: 0.8 MG/DL
BUN SERPL-MCNC: 22 MG/DL
CALCIUM SERPL-MCNC: 9.9 MG/DL
CHLORIDE SERPL-SCNC: 109 MMOL/L
CO2 SERPL-SCNC: 26 MMOL/L
CREAT SERPL-MCNC: 0.8 MG/DL
DIFFERENTIAL METHOD: ABNORMAL
DIFFERENTIAL METHOD: ABNORMAL
EOSINOPHIL # BLD AUTO: 0.1 K/UL
EOSINOPHIL # BLD AUTO: 0.1 K/UL
EOSINOPHIL NFR BLD: 2.1 %
EOSINOPHIL NFR BLD: 2.1 %
ERYTHROCYTE [DISTWIDTH] IN BLOOD BY AUTOMATED COUNT: 13 %
ERYTHROCYTE [DISTWIDTH] IN BLOOD BY AUTOMATED COUNT: 13 %
EST. GFR  (AFRICAN AMERICAN): >60 ML/MIN/1.73 M^2
EST. GFR  (NON AFRICAN AMERICAN): >60 ML/MIN/1.73 M^2
FERRITIN SERPL-MCNC: 1789 NG/ML
GLUCOSE SERPL-MCNC: 101 MG/DL
HCT VFR BLD AUTO: 33.5 %
HCT VFR BLD AUTO: 33.5 %
HGB BLD-MCNC: 11.1 G/DL
HGB BLD-MCNC: 11.1 G/DL
IMM GRANULOCYTES # BLD AUTO: 0 K/UL
IMM GRANULOCYTES # BLD AUTO: 0 K/UL
IMM GRANULOCYTES NFR BLD AUTO: 0 %
IMM GRANULOCYTES NFR BLD AUTO: 0 %
LYMPHOCYTES # BLD AUTO: 0.4 K/UL
LYMPHOCYTES # BLD AUTO: 0.4 K/UL
LYMPHOCYTES NFR BLD: 15.5 %
LYMPHOCYTES NFR BLD: 15.5 %
MAGNESIUM SERPL-MCNC: 1.8 MG/DL
MCH RBC QN AUTO: 34.7 PG
MCH RBC QN AUTO: 34.7 PG
MCHC RBC AUTO-ENTMCNC: 33.1 G/DL
MCHC RBC AUTO-ENTMCNC: 33.1 G/DL
MCV RBC AUTO: 105 FL
MCV RBC AUTO: 105 FL
MONOCYTES # BLD AUTO: 0.6 K/UL
MONOCYTES # BLD AUTO: 0.6 K/UL
MONOCYTES NFR BLD: 25.3 %
MONOCYTES NFR BLD: 25.3 %
NEUTROPHILS # BLD AUTO: 1.3 K/UL
NEUTROPHILS # BLD AUTO: 1.3 K/UL
NEUTROPHILS NFR BLD: 56.2 %
NEUTROPHILS NFR BLD: 56.2 %
NRBC BLD-RTO: 0 /100 WBC
NRBC BLD-RTO: 0 /100 WBC
PHOSPHATE SERPL-MCNC: 3.4 MG/DL
PLATELET # BLD AUTO: 132 K/UL
PLATELET # BLD AUTO: 132 K/UL
PMV BLD AUTO: 10.7 FL
PMV BLD AUTO: 10.7 FL
POTASSIUM SERPL-SCNC: 4.1 MMOL/L
PROT SERPL-MCNC: 6.9 G/DL
RBC # BLD AUTO: 3.2 M/UL
RBC # BLD AUTO: 3.2 M/UL
SODIUM SERPL-SCNC: 142 MMOL/L
WBC # BLD AUTO: 2.33 K/UL
WBC # BLD AUTO: 2.33 K/UL

## 2018-06-11 PROCEDURE — 82247 BILIRUBIN TOTAL: CPT

## 2018-06-11 PROCEDURE — 84075 ASSAY ALKALINE PHOSPHATASE: CPT

## 2018-06-11 PROCEDURE — 80197 ASSAY OF TACROLIMUS: CPT

## 2018-06-11 PROCEDURE — 36415 COLL VENOUS BLD VENIPUNCTURE: CPT | Mod: PO

## 2018-06-11 PROCEDURE — 83735 ASSAY OF MAGNESIUM: CPT

## 2018-06-11 PROCEDURE — 85025 COMPLETE CBC W/AUTO DIFF WBC: CPT

## 2018-06-11 PROCEDURE — 80069 RENAL FUNCTION PANEL: CPT

## 2018-06-11 PROCEDURE — 82728 ASSAY OF FERRITIN: CPT

## 2018-06-12 ENCOUNTER — PATIENT MESSAGE (OUTPATIENT)
Dept: HEMATOLOGY/ONCOLOGY | Facility: CLINIC | Age: 53
End: 2018-06-12

## 2018-06-12 ENCOUNTER — PATIENT MESSAGE (OUTPATIENT)
Dept: TRANSPLANT | Facility: CLINIC | Age: 53
End: 2018-06-12

## 2018-06-12 ENCOUNTER — TELEPHONE (OUTPATIENT)
Dept: TRANSPLANT | Facility: CLINIC | Age: 53
End: 2018-06-12

## 2018-06-12 LAB — TACROLIMUS BLD-MCNC: 5.7 NG/ML

## 2018-06-12 NOTE — TELEPHONE ENCOUNTER
----- Message from Dakotah Cho MD sent at 6/12/2018  3:31 PM CDT -----  Monotherapy with prograf will increase dose to 3/2, will also order a cylex test to monitor immunity. Thanks  Will repeat prograf level in 10 days. Make sure this is a true trough level. Thanks

## 2018-06-12 NOTE — PROGRESS NOTES
Monotherapy with prograf will increase dose to 3/2, will also order a cylex test to monitor immunity. Thanks  Will repeat prograf level in 10 days. Make sure this is a true trough level. Thanks

## 2018-06-13 DIAGNOSIS — Z94.0 KIDNEY REPLACED BY TRANSPLANT: Primary | ICD-10-CM

## 2018-06-13 RX ORDER — TACROLIMUS 1 MG/1
CAPSULE ORAL
Qty: 150 CAPSULE | Refills: 11 | Status: SHIPPED | OUTPATIENT
Start: 2018-06-13 | End: 2018-06-19 | Stop reason: DRUGHIGH

## 2018-06-13 NOTE — TELEPHONE ENCOUNTER
Nicole Dexter. I heard and read your message. Yes, it was exactly 12 hours. And, I will be complying with labs this Monday, 6/18, [facility] on [address]. If it is possible, please include the additional lab that day. I will monitor this system for notification of the additional test. And, I will adjust my Prograf dosage.   Apurva Rodriguez          You   Apurva Rodriguez 18 hours ago (4:53 PM)         Good evening,   Dr Cho has reviewed your lab results, if your prograf level was a 12hour level, please increase dose to 3mg every morning and 2mg every evening.    Will repeat prograf level in 10 days and add an immuno cylex to the next labs to monitor immunity.   Thank you,   Nicole

## 2018-06-18 ENCOUNTER — LAB VISIT (OUTPATIENT)
Dept: LAB | Facility: HOSPITAL | Age: 53
End: 2018-06-18
Attending: INTERNAL MEDICINE
Payer: COMMERCIAL

## 2018-06-18 DIAGNOSIS — R74.8 ELEVATED LIVER ENZYMES: ICD-10-CM

## 2018-06-18 DIAGNOSIS — Z94.0 KIDNEY REPLACED BY TRANSPLANT: ICD-10-CM

## 2018-06-18 DIAGNOSIS — R79.89 ELEVATED FERRITIN LEVEL: ICD-10-CM

## 2018-06-18 LAB
BASOPHILS # BLD AUTO: 0.02 K/UL
BASOPHILS NFR BLD: 0.7 %
DIFFERENTIAL METHOD: ABNORMAL
EOSINOPHIL # BLD AUTO: 0 K/UL
EOSINOPHIL NFR BLD: 1.5 %
ERYTHROCYTE [DISTWIDTH] IN BLOOD BY AUTOMATED COUNT: 12.6 %
HCT VFR BLD AUTO: 34.7 %
HGB BLD-MCNC: 11.2 G/DL
IMM GRANULOCYTES # BLD AUTO: 0 K/UL
IMM GRANULOCYTES NFR BLD AUTO: 0 %
LYMPHOCYTES # BLD AUTO: 0.5 K/UL
LYMPHOCYTES NFR BLD: 18.7 %
MCH RBC QN AUTO: 33.8 PG
MCHC RBC AUTO-ENTMCNC: 32.3 G/DL
MCV RBC AUTO: 105 FL
MONOCYTES # BLD AUTO: 0.5 K/UL
MONOCYTES NFR BLD: 19 %
NEUTROPHILS # BLD AUTO: 1.6 K/UL
NEUTROPHILS NFR BLD: 60.1 %
NRBC BLD-RTO: 0 /100 WBC
PLATELET # BLD AUTO: 128 K/UL
PMV BLD AUTO: 10.7 FL
RBC # BLD AUTO: 3.31 M/UL
WBC # BLD AUTO: 2.68 K/UL

## 2018-06-18 PROCEDURE — 36415 COLL VENOUS BLD VENIPUNCTURE: CPT | Mod: PO

## 2018-06-18 PROCEDURE — 80197 ASSAY OF TACROLIMUS: CPT

## 2018-06-18 PROCEDURE — 86352 CELL FUNCTION ASSAY W/STIM: CPT

## 2018-06-18 PROCEDURE — 85025 COMPLETE CBC W/AUTO DIFF WBC: CPT

## 2018-06-19 DIAGNOSIS — Z94.0 KIDNEY REPLACED BY TRANSPLANT: ICD-10-CM

## 2018-06-19 LAB — TACROLIMUS BLD-MCNC: 6.3 NG/ML

## 2018-06-19 RX ORDER — TACROLIMUS 1 MG/1
CAPSULE ORAL
Qty: 180 CAPSULE | Refills: 11 | Status: SHIPPED | OUTPATIENT
Start: 2018-06-19 | End: 2018-08-30 | Stop reason: DRUGHIGH

## 2018-06-19 NOTE — PROGRESS NOTES
Results reviewed and the following message sent to patient via [facility]:  Hello. Your tacrolimus level is slightly better after the recent dose increased last week, but I would like to see it over 7 for now given your re--transplant patient.  Please increase tacrolimus back to 3 mg twice daily.

## 2018-06-19 NOTE — TELEPHONE ENCOUNTER
----- Message from Shreya Waldron MD sent at 6/19/2018 11:14 AM CDT -----  Results reviewed and the following message sent to patient via [facility]:  Hello. Your tacrolimus level is slightly better after the recent dose increased last week, but I would like to see it over 7 for now given your re--transplant patient.  Please increase tacrolimus back to 3 mg twice daily.

## 2018-06-20 LAB — IMMUNKNOW (STIMULATED): 116 NG/ML

## 2018-06-20 NOTE — PROGRESS NOTES
Results reviewed and the following message sent to patient via [facility]:  Your Cylex (Immuknow) is low, suggesting more than adequate immunosuppression.  I wish to repeat in 2-4 weeks.   This test is reassuring that maintaining you on tacrolimus single drug therapy is acceptable at this time.

## 2018-06-25 ENCOUNTER — CLINICAL SUPPORT (OUTPATIENT)
Dept: REHABILITATION | Facility: HOSPITAL | Age: 53
End: 2018-06-25
Attending: OBSTETRICS & GYNECOLOGY
Payer: COMMERCIAL

## 2018-06-25 ENCOUNTER — LAB VISIT (OUTPATIENT)
Dept: LAB | Facility: HOSPITAL | Age: 53
End: 2018-06-25
Attending: STUDENT IN AN ORGANIZED HEALTH CARE EDUCATION/TRAINING PROGRAM
Payer: COMMERCIAL

## 2018-06-25 DIAGNOSIS — N39.46 MIXED STRESS AND URGE URINARY INCONTINENCE: ICD-10-CM

## 2018-06-25 DIAGNOSIS — R79.89 ELEVATED FERRITIN LEVEL: ICD-10-CM

## 2018-06-25 DIAGNOSIS — R74.8 ELEVATED LIVER ENZYMES: ICD-10-CM

## 2018-06-25 LAB
BASOPHILS # BLD AUTO: 0.02 K/UL
BASOPHILS NFR BLD: 0.7 %
DIFFERENTIAL METHOD: ABNORMAL
EOSINOPHIL # BLD AUTO: 0.1 K/UL
EOSINOPHIL NFR BLD: 2.3 %
ERYTHROCYTE [DISTWIDTH] IN BLOOD BY AUTOMATED COUNT: 12.1 %
HCT VFR BLD AUTO: 34.6 %
HGB BLD-MCNC: 11.6 G/DL
IMM GRANULOCYTES # BLD AUTO: 0.01 K/UL
IMM GRANULOCYTES NFR BLD AUTO: 0.3 %
LYMPHOCYTES # BLD AUTO: 0.7 K/UL
LYMPHOCYTES NFR BLD: 24 %
MCH RBC QN AUTO: 34.3 PG
MCHC RBC AUTO-ENTMCNC: 33.5 G/DL
MCV RBC AUTO: 102 FL
MONOCYTES # BLD AUTO: 0.4 K/UL
MONOCYTES NFR BLD: 14.3 %
NEUTROPHILS # BLD AUTO: 1.8 K/UL
NEUTROPHILS NFR BLD: 58.4 %
NRBC BLD-RTO: 0 /100 WBC
PLATELET # BLD AUTO: 126 K/UL
PMV BLD AUTO: 10.4 FL
RBC # BLD AUTO: 3.38 M/UL
WBC # BLD AUTO: 3 K/UL

## 2018-06-25 PROCEDURE — 85025 COMPLETE CBC W/AUTO DIFF WBC: CPT

## 2018-06-25 PROCEDURE — 97110 THERAPEUTIC EXERCISES: CPT | Mod: PN

## 2018-06-25 PROCEDURE — G8990 OTHER PT/OT CURRENT STATUS: HCPCS | Mod: CK,PN

## 2018-06-25 PROCEDURE — G8991 OTHER PT/OT GOAL STATUS: HCPCS | Mod: CJ,PN

## 2018-06-25 PROCEDURE — 36415 COLL VENOUS BLD VENIPUNCTURE: CPT | Mod: PO

## 2018-06-25 NOTE — PLAN OF CARE
Patient: Apurva Rodriguez   [facility] #: [id number]   Diagnosis:   Encounter Diagnosis   Name Primary?    Mixed stress and urge urinary incontinence       Date of start of care: 2/28/18  Date of treatment: 06/25/2018   Time in: 8:01  Time out: 8:56  Total treatment time: 55 minutes  # Visits: 10/12  Auth expiration: 12/31/18  POC expiration: 9/23/18  Outpatient Physical Therapy   Updated POC    Subjective     Pt reports she can hold much better from 3:00 on as opposed to earlier in the day. She has also noticed that if she skips a morning of her exercises then she isn't able to hold as well that day. She did her last 3 hour presentation and she did go to the bathroom in the middle but didn't really have to so states that this is an improvement. She did another presentation, 2 hours, in the [facility] and had to go about 20 minutes before the end of the presentation. Pt reports she continues to have to modify her fluid intake if she knows she will be doing errands etc.    Pain: Current: 0/10    Patient's Goals: to be able to stand through a 3 hour presentation without having to use the restroom    Objective     TREATMENT    Pt received individual therapeutic exercise control for 55 minutes including:    - 10 x 10'' kegals, supine; good holding, 2+/5 MMT (pt with small lift inconsistently), increased use of overflow mm (gluts) with increasing reps  - 5 quick flicks in supine, overflow use and fair coordination, added to HEP  - Seated ball squeeze 10 x 10''  - Roll out with RTB 10 x 10''    Not performed:  - 10 x 10'' kegals in supine with SEMG assist, external perianal electrodes; elevated baseline today, good-fair WR rise, fair holding, derecruitment to initial baseline  - 10 x 10'' kegals in sitting with SEMG assist, as above  - 10'' kegals in supported standing with SEMG assist  - Ant/post pelvic tilts on SB  - TA activation on SB x 10  - Marching on SB x 10 ea, unilateral UE support  - Side lying hip abduction x 10 ea  -  Supine SLR x 10 ea with PFM/TA activation  - Prone hip extension with glut set x 10 ea  - Prone knee lift x 5 ea  - Quadruped rocking (pt limited to work in quadruped due to R knee pain and arthritis in her hands)  - Cat/cow  - Supine HS stretch with strap x 2 ea side  - Supine hip flexor stretch with yoga deb x 2 ea side    Pt received individual neuromuscular reeducation for 00 minutes including:    - PFM activation with DB, pt with continued improvement in recruitment with strength improvement (2/5 MMT)  - 5'' kegals in supine, good anterior contraction with decreased strength posteriorly  - Legs up the wall x 5 min with verbal cueing  - PFM activation with DB in supine with SEMG assist, external perianal electrodes; pt displays normal resting baseline, fair WR rise, partial derecruitment, good coordination and carry over; 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, she was instructed to increase speed to assess ability to recruit PFM more quickly, this was somewhat strained, improved with reps, will continue to practice  - TA activation with DB x 10 with tactile cueing  - Supine march with TA activation using breath x 10 ea  - Side lying hip abduction x 10 ea using breath   - Supine bridge with ball squeeze and breath x 10  - Diaphragmatic breathing on 1/2 foam  - Snow vy on half foam x 10  - Body scan mindfulness training  - Sit to stand squat coordination with respiratory diaphragm x 6    Pt received manual therapy for 00 minutes including: gentle soft tissue mobilization applied to upper and lower abdomen, colon massage; instructions in self abdominal massage for improved GI mobility     Education: Discussed progression of plan of care with patient; educated pt in activity modification; pt instructed to continue with diaphragmatic breathing, TA activation with DB, PFM activation with DB, sit to stand squat, legs up the wall, supine march, side lying hip abduction, supine bridge,  10'' kegals (added working in supported standing); added quick flicks, adductor squeeze and roll out; pt demonstrated and verbalized understanding.    Assessment     Pt tolerated session well without visible adverse effects. Pt displays good progress with improvement in PFM strength and endurance as well of report of improved holding ability. Pt with good compliance and motivation. Pt continues to present with decreased PFM strength, endurance, and decreased central stability. Pt presents with history of two kidney transplants, increased UI following her recent surgery,  history of constipation, TIGIST, and multiple abdominal hernia repairs. Pt will continue to benefit from physcial therapy services in order to maximize pain free functional independence. G-codes based on last FOTO per pt report.    Pt is making good progress towards established goals.   No educational, cultural, or spiritual barriers to learning identified.    GOALS  Short Term Goals: 4 weeks (3/28/18)  1. Pt will verbalize improved awareness of PFM activity as palpated by PT in order to improve activity involvement with HEP. Met 3/26/18  2. Pt will demonstrate decreased overflow muscle activity during PF muscle contraction. Met 5/14/18  3. Pt will display integration or respiratory and pelvic diaphragm in order to improve inner core stability and support. Met 3/26/18  4. Pt will report incorporation of voiding schedule into daily routine to manage frequency, incontinence etc. Met 5/14/18  5. Pt will tolerate HEP to improve impairments and independence with ADL's. Met 3/26/18     Long Term Goals: 9/23/18  1. Pt will be able to lift, drop, and bear down in order to feel improved excursion of her PFM to improve function. Met 5/14/18  2. Pt will be able to perform 5 x 5'' kegals in order to improve PFM strength to decrease urinary frequency. Met 5/14/18  3. Pt will report improvement in holding ability.   4. Pt will be independent with HEP and self  management.     CMS Impairment/Limitation/Restriction for Urinary Problem Survey  Status Limitation G-Code CMS Severity Modifier  Intake 49% 51%  Predicted 58% 42% Goal Status+ CK - At least 40 percent but less than 60 percent  4/2/2018 53% 47%  5/14/2018 54% 46% Current Status CK - At least 40 percent but less than 60 percent    Plan     Continue with established POC, working toward PT goals.

## 2018-06-25 NOTE — PROGRESS NOTES
Patient: Apurva Rodriguez   [facility] #: [id number]   Diagnosis:   Encounter Diagnosis   Name Primary?    Mixed stress and urge urinary incontinence       Date of start of care: 2/28/18  Date of treatment: 06/25/2018   Time in: 8:01  Time out: 8:56  Total treatment time: 55 minutes  # Visits: 10/12  Auth expiration: 12/31/18  POC expiration: 9/23/18  Outpatient Physical Therapy   Updated POC    Subjective     Pt reports she can hold much better from 3:00 on as opposed to earlier in the day. She has also noticed that if she skips a morning of her exercises then she isn't able to hold as well that day. She did her last 3 hour presentation and she did go to the bathroom in the middle but didn't really have to so states that this is an improvement. She did another presentation, 2 hours, in the [facility] and had to go about 20 minutes before the end of the presentation. Pt reports she continues to have to modify her fluid intake if she knows she will be doing errands etc.    Pain: Current: 0/10    Patient's Goals: to be able to stand through a 3 hour presentation without having to use the restroom    Objective     TREATMENT    Pt received individual therapeutic exercise control for 55 minutes including:    - 10 x 10'' kegals, supine; good holding, 2+/5 MMT (pt with small lift inconsistently), increased use of overflow mm (gluts) with increasing reps  - 5 quick flicks in supine, overflow use and fair coordination, added to HEP  - Seated ball squeeze 10 x 10''  - Roll out with RTB 10 x 10''    Not performed:  - 10 x 10'' kegals in supine with SEMG assist, external perianal electrodes; elevated baseline today, good-fair WR rise, fair holding, derecruitment to initial baseline  - 10 x 10'' kegals in sitting with SEMG assist, as above  - 10'' kegals in supported standing with SEMG assist  - Ant/post pelvic tilts on SB  - TA activation on SB x 10  - Marching on SB x 10 ea, unilateral UE support  - Side lying hip abduction x 10 ea  -  Supine SLR x 10 ea with PFM/TA activation  - Prone hip extension with glut set x 10 ea  - Prone knee lift x 5 ea  - Quadruped rocking (pt limited to work in quadruped due to R knee pain and arthritis in her hands)  - Cat/cow  - Supine HS stretch with strap x 2 ea side  - Supine hip flexor stretch with yoga deb x 2 ea side    Pt received individual neuromuscular reeducation for 00 minutes including:    - PFM activation with DB, pt with continued improvement in recruitment with strength improvement (2/5 MMT)  - 5'' kegals in supine, good anterior contraction with decreased strength posteriorly  - Legs up the wall x 5 min with verbal cueing  - PFM activation with DB in supine with SEMG assist, external perianal electrodes; pt displays normal resting baseline, fair WR rise, partial derecruitment, good coordination and carry over; 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, she was instructed to increase speed to assess ability to recruit PFM more quickly, this was somewhat strained, improved with reps, will continue to practice  - TA activation with DB x 10 with tactile cueing  - Supine march with TA activation using breath x 10 ea  - Side lying hip abduction x 10 ea using breath   - Supine bridge with ball squeeze and breath x 10  - Diaphragmatic breathing on 1/2 foam  - Snow vy on half foam x 10  - Body scan mindfulness training  - Sit to stand squat coordination with respiratory diaphragm x 6    Pt received manual therapy for 00 minutes including: gentle soft tissue mobilization applied to upper and lower abdomen, colon massage; instructions in self abdominal massage for improved GI mobility     Education: Discussed progression of plan of care with patient; educated pt in activity modification; pt instructed to continue with diaphragmatic breathing, TA activation with DB, PFM activation with DB, sit to stand squat, legs up the wall, supine march, side lying hip abduction, supine bridge,  10'' kegals (added working in supported standing); added quick flicks, adductor squeeze and roll out; pt demonstrated and verbalized understanding.    Assessment     Pt tolerated session well without visible adverse effects. Pt displays good progress with improvement in PFM strength and endurance as well of report of improved holding ability. Pt with good compliance and motivation. Pt continues to present with decreased PFM strength, endurance, and decreased central stability. Pt presents with history of two kidney transplants, increased UI following her recent surgery,  history of constipation, TIGIST, and multiple abdominal hernia repairs. Pt will continue to benefit from physcial therapy services in order to maximize pain free functional independence. G-codes based on last FOTO per pt report.    Pt is making good progress towards established goals.   No educational, cultural, or spiritual barriers to learning identified.    GOALS  Short Term Goals: 4 weeks (3/28/18)  1. Pt will verbalize improved awareness of PFM activity as palpated by PT in order to improve activity involvement with HEP. Met 3/26/18  2. Pt will demonstrate decreased overflow muscle activity during PF muscle contraction. Met 5/14/18  3. Pt will display integration or respiratory and pelvic diaphragm in order to improve inner core stability and support. Met 3/26/18  4. Pt will report incorporation of voiding schedule into daily routine to manage frequency, incontinence etc. Met 5/14/18  5. Pt will tolerate HEP to improve impairments and independence with ADL's. Met 3/26/18     Long Term Goals: 9/23/18  1. Pt will be able to lift, drop, and bear down in order to feel improved excursion of her PFM to improve function. Met 5/14/18  2. Pt will be able to perform 5 x 5'' kegals in order to improve PFM strength to decrease urinary frequency. Met 5/14/18  3. Pt will report improvement in holding ability.   4. Pt will be independent with HEP and self  management.     CMS Impairment/Limitation/Restriction for Urinary Problem Survey  Status Limitation G-Code CMS Severity Modifier  Intake 49% 51%  Predicted 58% 42% Goal Status+ CK - At least 40 percent but less than 60 percent  4/2/2018 53% 47%  5/14/2018 54% 46% Current Status CK - At least 40 percent but less than 60 percent    Plan     Continue with established POC, working toward PT goals.

## 2018-07-03 ENCOUNTER — OFFICE VISIT (OUTPATIENT)
Dept: UROGYNECOLOGY | Facility: CLINIC | Age: 53
End: 2018-07-03
Payer: COMMERCIAL

## 2018-07-03 VITALS
HEIGHT: 60 IN | DIASTOLIC BLOOD PRESSURE: 70 MMHG | WEIGHT: 127.19 LBS | BODY MASS INDEX: 24.97 KG/M2 | SYSTOLIC BLOOD PRESSURE: 110 MMHG

## 2018-07-03 DIAGNOSIS — N39.41 URGE INCONTINENCE: ICD-10-CM

## 2018-07-03 DIAGNOSIS — N39.46 MIXED URGE AND STRESS INCONTINENCE: ICD-10-CM

## 2018-07-03 DIAGNOSIS — N39.0 RECURRENT UTI: Primary | ICD-10-CM

## 2018-07-03 PROCEDURE — 99999 PR PBB SHADOW E&M-EST. PATIENT-LVL III: CPT | Mod: PBBFAC,,, | Performed by: OBSTETRICS & GYNECOLOGY

## 2018-07-03 PROCEDURE — 3008F BODY MASS INDEX DOCD: CPT | Mod: CPTII,S$GLB,, | Performed by: OBSTETRICS & GYNECOLOGY

## 2018-07-03 PROCEDURE — 99214 OFFICE O/P EST MOD 30 MIN: CPT | Mod: S$GLB,,, | Performed by: OBSTETRICS & GYNECOLOGY

## 2018-07-03 RX ORDER — TACROLIMUS 0.5 MG/1
CAPSULE ORAL
COMMUNITY
Start: 2018-05-17 | End: 2018-08-30 | Stop reason: SDUPTHER

## 2018-07-03 RX ORDER — AZELASTINE HYDROCHLORIDE 0.5 MG/ML
SOLUTION/ DROPS OPHTHALMIC
Refills: 0 | COMMUNITY
Start: 2018-06-22 | End: 2020-11-09 | Stop reason: CLARIF

## 2018-07-03 RX ORDER — OXYBUTYNIN CHLORIDE 5 MG/1
TABLET, EXTENDED RELEASE ORAL
Refills: 3 | COMMUNITY
Start: 2018-06-10 | End: 2018-07-09

## 2018-07-03 RX ORDER — ERGOCALCIFEROL 1.25 MG/1
CAPSULE ORAL
Refills: 8 | Status: ON HOLD | COMMUNITY
Start: 2018-06-12 | End: 2020-11-10

## 2018-07-03 NOTE — PATIENT INSTRUCTIONS
1. Recurrent UTI   --Vaginal estrogen has been shown to decrease the recurrence of urinary tract infections in post menopausal women  --Change pads often: Q 2-3 hours and add barrier cream daily (Damon's butt paste vs dessitin)   --Cranberry supplementation and Probiotics   --Discussed long term treatment options including: Antibiotic ppx vs self treatment    2.  Mixed urinary incontinence, urge > stress:   --Empty bladder every 3 hours.  Empty well: wait a minute, lean forward on toilet.    --Avoid dietary irritants (see sheet).  Keep diary x 3-5 days to determine your irritants.  --KEGELS: do 10 in AM and 10 in PM, holding each x 10 seconds.  When you feel urge to go, STOP, KEGEL, and when urge has passed, then go to bathroom.   Continue pelvic floor PT     --URGE: Ditropan 5 mg in the evening.  SE profile reviewed.  Takes 2-4 weeks to see if will have effect.  For dry mouth: get sour, sugar free lozenge or gum.    --STRESS:  Pelvic floor PT vs Pessary vs. Sling.     3. Vaginal atrophy (dryness):  Continue Use 1 gram of estrogen cream in vagina twice a week

## 2018-07-03 NOTE — PROGRESS NOTES
Subjective:       Patient ID: Apurva Rodriguez is a 53 y.o. female.    Chief Complaint:  urge incontinenece (follow up)      History of Present Illness  HPI 52 Y O F P0 with history or recurrent UTI, s/p DDKT   has a past medical history of Anemia; Avascular necrosis left hip (12/6/2016); Dyslipidemia (12/6/2016); ESRD (end stage renal disease) (12/6/2016); Essential hypertension (12/6/2016); Gastroesophageal reflux disease without esophagitis (12/6/2016); Hypertension; S/P bone marrow biopsy (12/6/2016); and Secondary hyperparathyroidism of renal origin (12/6/2016).Has history of recurrent UTI as a child and was treated with long term ppx bactrim has a history of one bactrim a week.  Has been atovaquone 750 mg daily.  Here for follow up feels 60-70 % better.     Interval  HP since the last visit   1)  UI:  (+) LUIS < (+) UUI    (+) pads: panty liner during the day only for work .  Daytime frequency: Q 4 -5 hours. Nocturia: Yes: 2 times    (--) dysuria-  (--) hematuria,  (--) frequent UTIs.  (+) complete bladder emptying.     2)  POP:   Symptoms:(--) .  (--) vaginal bleeding. (--) vaginal discharge. (+) sexually active.  (--) dyspareunia.   (-)  Vaginal dryness.  (+) vaginal estrogen use.     3)  BM:  (+) constipation/straining: improved (prunes )   (--) chronic diarrhea. (--) hematochezia.  (--) fecal incontinence.  (--) fecal smearing/urgency.  (--) incomplete evacuation.      4) Recurrent UTI: No symptoms     [facility] Peq Urogyn Hpi     Question 1/30/2018  8:50 AM CST   General Urogynecology: Are you experiencing the following?    Dysuria (painful urination) No   Nocturia:  waking up at night to empty your bladder  Yes   If you answered yes to the previous question, how many times does this happen per night? 5-6,    Enuresis (urine loss during sleep) Yes   Dribbling urine after you urinate Yes   Hematuria (urine appears red) No   Type of stream Strong   Urinary frequency: How often a day are you going to the bathroom  "per day?  About  10   Urinary Tract Infections: How many Urinary Tract Infections have you had in the past year? I have not had a UTI in the past year   If you have had a UTI in the past year, what treatments have you had so far?  Prophylactic antibiotics: bactrim    Urinary Incontinence (General): Are you experiencing the following?    Past consultation for incontinence: Have you ever seen someone for the evaluation of incontinence? Yes   If you answered yes to the previous question, please select all the therapies you have tried.  [facility]    Pelvic floor physical therapy    biofeed back    Please note the effectiveness of the therapies. Somewhat effective   Need to wear protection to keep clothes dry  Yes   If you answered yes to the previous question, please savage the protection you use.  Panty liner   If you wear protection, how much wetness is typically on each pad? Scant   If you wear protection, how often do you have to change per day, if applicable?  1   Stress Symptoms: Are you experiencing the following?    Leakage of urine with cough, laugh and/or sneeze Yes   If you answered yes to the previous question, what is the frequency in days, weeks and/or months? Daily   Leakage of urine with sex Yes, only with orgasm    Leakage of urine with bending/ lifting No   Leakage of urine with briskly walking or jogging Yes   If you lose urine for any other reason not previously mentioned, please note it below, if applicable.  traveling   Urge Symptoms: Are you experiencing the following?    Urgency ("got to go" feeling) Yes   Urge: How frequently do you feel an urge to urinate (feeling like you "gotta go" to the bathroom and can't wait) Several times a day   Do you experience a leakage of urine when you have a feeling of urgency?  Yes   Leakage of urine when unaware No   Past use of anticholinergics (medications used to treat overactive bladder) No   If you answered yes to the previous question, please savage the " anticholinergics you have used:     Have you ever used Mirbetriq (aka Mirabegron)?  No   Prolapse Symptoms: Are you experiencing any of the following?     Falling out/ Bulging/ Heaviness in the vagina No   Vaginal/ Abdominal Pain/ Pressure No   Need to strain/ Push to void No   Need to wait on the toilet before you void No   Unusual position to urinate (using your hands to push back the vaginal bulge) No   Sensation of incomplete emptying Yes   Past use of pessary device No   If you answered yes to the previous question, please list the devices you have used below.     Bowel Symptoms: Are you experiencing any of the following?    Constipation No   Diarrhea  No   Hematochezia (bloody stool) No   Incomplete evacuation of stool No   Involuntary loss of formed stool No   Fecal smearing/urgency No   Involuntary loss of gas Yes   Vaginal Symptoms: Are you experiencing any of the following?     Abnormal vaginal bleeding  No   Vaginal dryness Yes   Sexually active  Yes   Dyspareunia (painful intercourse) No   Estrogen use  No     GYN & OB History  No LMP recorded. Patient is postmenopausal.   Date of Last Pap: No result found    OB History    Para Term  AB Living   0 0 0 0 0 0   SAB TAB Ectopic Multiple Live Births   0 0 0 0 0             Review of Systems  Review of Systems   Constitutional: Negative.  Negative for activity change, appetite change, chills, diaphoresis, fatigue, fever and unexpected weight change.   HENT: Negative.    Eyes: Negative.    Respiratory: Negative.  Negative for cough.    Cardiovascular: Negative.    Gastrointestinal: Negative for abdominal distention, abdominal pain, anal bleeding, blood in stool, constipation, diarrhea, nausea, rectal pain and vomiting.   Endocrine: Negative.    Genitourinary: Positive for dysuria, hematuria and urgency. Negative for decreased urine volume, difficulty urinating, dyspareunia, enuresis, flank pain, frequency, genital sores, menstrual problem, pelvic  pain, vaginal bleeding, vaginal discharge and vaginal pain.   Musculoskeletal: Negative for back pain.   Skin: Negative for color change, pallor, rash and wound.   Allergic/Immunologic: Negative for environmental allergies, food allergies and immunocompromised state.   Hematological: Negative for adenopathy. Does not bruise/bleed easily.   Psychiatric/Behavioral: Negative for agitation, behavioral problems, confusion and sleep disturbance.           Objective:     Physical Exam   Constitutional: She is oriented to person, place, and time. She appears well-developed and well-nourished.   HENT:   Head: Normocephalic and atraumatic.   Neck: Normal range of motion. Neck supple.   Pulmonary/Chest: No respiratory distress. She has no wheezes.   Musculoskeletal: Normal range of motion.   Neurological: She is alert and oriented to person, place, and time.   Skin: No cyanosis. Nails show no clubbing.   Psychiatric: She has a normal mood and affect. Her behavior is normal. Judgment and thought content normal.          Assessment:        1. Recurrent UTI    2. Mixed urge and stress incontinence    3. Urge incontinence             Plan:      1. Recurrent UTI   --Vaginal estrogen has been shown to decrease the recurrence of urinary tract infections in post menopausal women  --Change pads often: Q 2-3 hours and add barrier cream daily (Damon's butt paste vs dessitin)   -- has never taken Cranberry supplementation and Probiotics and refuses    --Discussed long term treatment options including: currently on daily atovaquin     2.  Mixed urinary incontinence, urge > stress:   --Empty bladder every 3 hours.  Empty well: wait a minute, lean forward on toilet.    --Avoid dietary irritants (see sheet).  Keep diary x 3-5 days to determine your irritants.  --KEGELS: do 10 in AM and 10 in PM, holding each x 10 seconds.  When you feel urge to go, STOP, KEGEL, and when urge has passed, then go to bathroom.   Continue pelvic floor PT      --URGE: Ditropan 5 mg in the evening and add 5 mg in the am.  SE profile reviewed.  Takes 2-4 weeks to see if will have effect.  For dry mouth: get sour, sugar free lozenge or gum.    --STRESS:  Pelvic floor PT vs Pessary vs. Sling.     3. Vaginal atrophy (dryness):  Continue Use 1 gram of estrogen cream in vagina twice a week     Approximately 25 min were spent in consult, 90 % in discussion.     Kendy Asif DO  Female Pelvic Medicine and Reconstructive Surgery  [facility] [place], [place]

## 2018-07-08 ENCOUNTER — PATIENT MESSAGE (OUTPATIENT)
Dept: UROGYNECOLOGY | Facility: CLINIC | Age: 53
End: 2018-07-08

## 2018-07-09 ENCOUNTER — LAB VISIT (OUTPATIENT)
Dept: LAB | Facility: HOSPITAL | Age: 53
End: 2018-07-09
Attending: INTERNAL MEDICINE
Payer: COMMERCIAL

## 2018-07-09 ENCOUNTER — TELEPHONE (OUTPATIENT)
Dept: UROGYNECOLOGY | Facility: CLINIC | Age: 53
End: 2018-07-09

## 2018-07-09 DIAGNOSIS — Z94.0 KIDNEY REPLACED BY TRANSPLANT: ICD-10-CM

## 2018-07-09 DIAGNOSIS — R74.8 ELEVATED LIVER ENZYMES: ICD-10-CM

## 2018-07-09 DIAGNOSIS — R79.89 ELEVATED FERRITIN LEVEL: ICD-10-CM

## 2018-07-09 LAB
ALBUMIN SERPL BCP-MCNC: 4 G/DL
ANION GAP SERPL CALC-SCNC: 6 MMOL/L
BASOPHILS # BLD AUTO: 0.02 K/UL
BASOPHILS # BLD AUTO: 0.02 K/UL
BASOPHILS NFR BLD: 0.6 %
BASOPHILS NFR BLD: 0.6 %
BUN SERPL-MCNC: 18 MG/DL
CALCIUM SERPL-MCNC: 10.1 MG/DL
CHLORIDE SERPL-SCNC: 107 MMOL/L
CO2 SERPL-SCNC: 26 MMOL/L
CREAT SERPL-MCNC: 0.9 MG/DL
DIFFERENTIAL METHOD: ABNORMAL
DIFFERENTIAL METHOD: ABNORMAL
EOSINOPHIL # BLD AUTO: 0.1 K/UL
EOSINOPHIL # BLD AUTO: 0.1 K/UL
EOSINOPHIL NFR BLD: 2.5 %
EOSINOPHIL NFR BLD: 2.5 %
ERYTHROCYTE [DISTWIDTH] IN BLOOD BY AUTOMATED COUNT: 11.7 %
ERYTHROCYTE [DISTWIDTH] IN BLOOD BY AUTOMATED COUNT: 11.7 %
EST. GFR  (AFRICAN AMERICAN): >60 ML/MIN/1.73 M^2
EST. GFR  (NON AFRICAN AMERICAN): >60 ML/MIN/1.73 M^2
FERRITIN SERPL-MCNC: 1570 NG/ML
GLUCOSE SERPL-MCNC: 99 MG/DL
HCT VFR BLD AUTO: 36.3 %
HCT VFR BLD AUTO: 36.3 %
HGB BLD-MCNC: 12 G/DL
HGB BLD-MCNC: 12 G/DL
IMM GRANULOCYTES # BLD AUTO: 0.01 K/UL
IMM GRANULOCYTES # BLD AUTO: 0.01 K/UL
IMM GRANULOCYTES NFR BLD AUTO: 0.3 %
IMM GRANULOCYTES NFR BLD AUTO: 0.3 %
LYMPHOCYTES # BLD AUTO: 0.6 K/UL
LYMPHOCYTES # BLD AUTO: 0.6 K/UL
LYMPHOCYTES NFR BLD: 20.1 %
LYMPHOCYTES NFR BLD: 20.1 %
MAGNESIUM SERPL-MCNC: 1.8 MG/DL
MCH RBC QN AUTO: 33.7 PG
MCH RBC QN AUTO: 33.7 PG
MCHC RBC AUTO-ENTMCNC: 33.1 G/DL
MCHC RBC AUTO-ENTMCNC: 33.1 G/DL
MCV RBC AUTO: 102 FL
MCV RBC AUTO: 102 FL
MONOCYTES # BLD AUTO: 0.4 K/UL
MONOCYTES # BLD AUTO: 0.4 K/UL
MONOCYTES NFR BLD: 12.4 %
MONOCYTES NFR BLD: 12.4 %
NEUTROPHILS # BLD AUTO: 2 K/UL
NEUTROPHILS # BLD AUTO: 2 K/UL
NEUTROPHILS NFR BLD: 64.1 %
NEUTROPHILS NFR BLD: 64.1 %
NRBC BLD-RTO: 0 /100 WBC
NRBC BLD-RTO: 0 /100 WBC
PHOSPHATE SERPL-MCNC: 3.2 MG/DL
PLATELET # BLD AUTO: 124 K/UL
PLATELET # BLD AUTO: 124 K/UL
PMV BLD AUTO: 10.8 FL
PMV BLD AUTO: 10.8 FL
POTASSIUM SERPL-SCNC: 4.3 MMOL/L
RBC # BLD AUTO: 3.56 M/UL
RBC # BLD AUTO: 3.56 M/UL
SODIUM SERPL-SCNC: 139 MMOL/L
TACROLIMUS BLD-MCNC: 7 NG/ML
WBC # BLD AUTO: 3.14 K/UL
WBC # BLD AUTO: 3.14 K/UL

## 2018-07-09 PROCEDURE — 83735 ASSAY OF MAGNESIUM: CPT

## 2018-07-09 PROCEDURE — 80069 RENAL FUNCTION PANEL: CPT

## 2018-07-09 PROCEDURE — 36415 COLL VENOUS BLD VENIPUNCTURE: CPT | Mod: PO

## 2018-07-09 PROCEDURE — 85025 COMPLETE CBC W/AUTO DIFF WBC: CPT

## 2018-07-09 PROCEDURE — 82728 ASSAY OF FERRITIN: CPT

## 2018-07-09 PROCEDURE — 80197 ASSAY OF TACROLIMUS: CPT

## 2018-07-09 PROCEDURE — 86352 CELL FUNCTION ASSAY W/STIM: CPT

## 2018-07-09 RX ORDER — OXYBUTYNIN CHLORIDE 5 MG/1
5 TABLET, EXTENDED RELEASE ORAL 2 TIMES DAILY
Qty: 60 TABLET | Refills: 11 | Status: SHIPPED | OUTPATIENT
Start: 2018-07-09 | End: 2018-08-15

## 2018-07-10 ENCOUNTER — PATIENT MESSAGE (OUTPATIENT)
Dept: HEMATOLOGY/ONCOLOGY | Facility: CLINIC | Age: 53
End: 2018-07-10

## 2018-07-11 LAB — IMMUNKNOW (STIMULATED): 97 NG/ML

## 2018-07-11 NOTE — PROGRESS NOTES
Results reviewed and the following message sent to patient via [facility]: Cylex is lower than usual; let's repeat in a month.  It is usually indicative that you may be over-immunosuppressed, but you are currently not on much immunosuppression at all.  Thus I am hesitant to change her medications at present.  Please report any signs or symptoms suggestive of infection immediately.

## 2018-08-13 ENCOUNTER — LAB VISIT (OUTPATIENT)
Dept: LAB | Facility: HOSPITAL | Age: 53
End: 2018-08-13
Attending: INTERNAL MEDICINE
Payer: COMMERCIAL

## 2018-08-13 DIAGNOSIS — R79.89 ELEVATED FERRITIN LEVEL: ICD-10-CM

## 2018-08-13 DIAGNOSIS — R74.8 ELEVATED LIVER ENZYMES: ICD-10-CM

## 2018-08-13 DIAGNOSIS — Z94.0 KIDNEY REPLACED BY TRANSPLANT: ICD-10-CM

## 2018-08-13 LAB
ALBUMIN SERPL BCP-MCNC: 4 G/DL
ANION GAP SERPL CALC-SCNC: 8 MMOL/L
BASOPHILS # BLD AUTO: 0.01 K/UL
BASOPHILS # BLD AUTO: 0.01 K/UL
BASOPHILS NFR BLD: 0.3 %
BASOPHILS NFR BLD: 0.3 %
BUN SERPL-MCNC: 25 MG/DL
CALCIUM SERPL-MCNC: 10.5 MG/DL
CHLORIDE SERPL-SCNC: 108 MMOL/L
CO2 SERPL-SCNC: 24 MMOL/L
CREAT SERPL-MCNC: 1 MG/DL
DIFFERENTIAL METHOD: ABNORMAL
DIFFERENTIAL METHOD: ABNORMAL
EOSINOPHIL # BLD AUTO: 0.1 K/UL
EOSINOPHIL # BLD AUTO: 0.1 K/UL
EOSINOPHIL NFR BLD: 2.1 %
EOSINOPHIL NFR BLD: 2.1 %
ERYTHROCYTE [DISTWIDTH] IN BLOOD BY AUTOMATED COUNT: 11.7 %
ERYTHROCYTE [DISTWIDTH] IN BLOOD BY AUTOMATED COUNT: 11.7 %
EST. GFR  (AFRICAN AMERICAN): >60 ML/MIN/1.73 M^2
EST. GFR  (NON AFRICAN AMERICAN): >60 ML/MIN/1.73 M^2
FERRITIN SERPL-MCNC: 1369 NG/ML
GLUCOSE SERPL-MCNC: 111 MG/DL
HCT VFR BLD AUTO: 36.2 %
HCT VFR BLD AUTO: 36.2 %
HGB BLD-MCNC: 12.2 G/DL
HGB BLD-MCNC: 12.2 G/DL
IMM GRANULOCYTES # BLD AUTO: 0.01 K/UL
IMM GRANULOCYTES # BLD AUTO: 0.01 K/UL
IMM GRANULOCYTES NFR BLD AUTO: 0.3 %
IMM GRANULOCYTES NFR BLD AUTO: 0.3 %
LYMPHOCYTES # BLD AUTO: 0.7 K/UL
LYMPHOCYTES # BLD AUTO: 0.7 K/UL
LYMPHOCYTES NFR BLD: 24.9 %
LYMPHOCYTES NFR BLD: 24.9 %
MAGNESIUM SERPL-MCNC: 1.9 MG/DL
MCH RBC QN AUTO: 33.4 PG
MCH RBC QN AUTO: 33.4 PG
MCHC RBC AUTO-ENTMCNC: 33.7 G/DL
MCHC RBC AUTO-ENTMCNC: 33.7 G/DL
MCV RBC AUTO: 99 FL
MCV RBC AUTO: 99 FL
MONOCYTES # BLD AUTO: 0.4 K/UL
MONOCYTES # BLD AUTO: 0.4 K/UL
MONOCYTES NFR BLD: 14.2 %
MONOCYTES NFR BLD: 14.2 %
NEUTROPHILS # BLD AUTO: 1.7 K/UL
NEUTROPHILS # BLD AUTO: 1.7 K/UL
NEUTROPHILS NFR BLD: 58.2 %
NEUTROPHILS NFR BLD: 58.2 %
NRBC BLD-RTO: 0 /100 WBC
NRBC BLD-RTO: 0 /100 WBC
PHOSPHATE SERPL-MCNC: 3.4 MG/DL
PLATELET # BLD AUTO: 131 K/UL
PLATELET # BLD AUTO: 131 K/UL
PMV BLD AUTO: 10.7 FL
PMV BLD AUTO: 10.7 FL
POTASSIUM SERPL-SCNC: 4.5 MMOL/L
RBC # BLD AUTO: 3.65 M/UL
RBC # BLD AUTO: 3.65 M/UL
SODIUM SERPL-SCNC: 140 MMOL/L
WBC # BLD AUTO: 2.89 K/UL
WBC # BLD AUTO: 2.89 K/UL

## 2018-08-13 PROCEDURE — 82728 ASSAY OF FERRITIN: CPT

## 2018-08-13 PROCEDURE — 36415 COLL VENOUS BLD VENIPUNCTURE: CPT | Mod: PO

## 2018-08-13 PROCEDURE — 80069 RENAL FUNCTION PANEL: CPT

## 2018-08-13 PROCEDURE — 85025 COMPLETE CBC W/AUTO DIFF WBC: CPT

## 2018-08-13 PROCEDURE — 86352 CELL FUNCTION ASSAY W/STIM: CPT

## 2018-08-13 PROCEDURE — 80197 ASSAY OF TACROLIMUS: CPT

## 2018-08-13 PROCEDURE — 83735 ASSAY OF MAGNESIUM: CPT

## 2018-08-14 LAB — TACROLIMUS BLD-MCNC: 7.8 NG/ML

## 2018-08-15 ENCOUNTER — OFFICE VISIT (OUTPATIENT)
Dept: TRANSPLANT | Facility: CLINIC | Age: 53
End: 2018-08-15
Payer: COMMERCIAL

## 2018-08-15 ENCOUNTER — LAB VISIT (OUTPATIENT)
Dept: LAB | Facility: HOSPITAL | Age: 53
End: 2018-08-15
Payer: COMMERCIAL

## 2018-08-15 VITALS
OXYGEN SATURATION: 98 % | HEIGHT: 63 IN | RESPIRATION RATE: 18 BRPM | HEART RATE: 67 BPM | BODY MASS INDEX: 22.07 KG/M2 | TEMPERATURE: 99 F | WEIGHT: 124.56 LBS | DIASTOLIC BLOOD PRESSURE: 73 MMHG | SYSTOLIC BLOOD PRESSURE: 118 MMHG

## 2018-08-15 DIAGNOSIS — Z91.89 AT RISK FOR OPPORTUNISTIC INFECTIONS: ICD-10-CM

## 2018-08-15 DIAGNOSIS — Z94.0 STATUS POST DECEASED-DONOR KIDNEY TRANSPLANTATION: ICD-10-CM

## 2018-08-15 DIAGNOSIS — D70.2 OTHER DRUG-INDUCED NEUTROPENIA: ICD-10-CM

## 2018-08-15 DIAGNOSIS — R74.01 TRANSAMINITIS: ICD-10-CM

## 2018-08-15 DIAGNOSIS — Z94.0 KIDNEY REPLACED BY TRANSPLANT: ICD-10-CM

## 2018-08-15 DIAGNOSIS — Z29.89 PROPHYLACTIC IMMUNOTHERAPY: ICD-10-CM

## 2018-08-15 DIAGNOSIS — N18.2 CHRONIC KIDNEY DISEASE (CKD), STAGE II (MILD): Primary | Chronic | ICD-10-CM

## 2018-08-15 DIAGNOSIS — Z79.899 IMMUNOSUPPRESSIVE MANAGEMENT ENCOUNTER FOLLOWING KIDNEY TRANSPLANT: ICD-10-CM

## 2018-08-15 DIAGNOSIS — N39.0 URINARY TRACT INFECTION WITHOUT HEMATURIA, SITE UNSPECIFIED: Primary | ICD-10-CM

## 2018-08-15 DIAGNOSIS — N39.0 URINARY TRACT INFECTION WITHOUT HEMATURIA, SITE UNSPECIFIED: ICD-10-CM

## 2018-08-15 DIAGNOSIS — Z94.0 IMMUNOSUPPRESSIVE MANAGEMENT ENCOUNTER FOLLOWING KIDNEY TRANSPLANT: ICD-10-CM

## 2018-08-15 DIAGNOSIS — R10.32 LLQ PAIN: ICD-10-CM

## 2018-08-15 LAB
BILIRUB UR QL STRIP: NEGATIVE
CLARITY UR REFRACT.AUTO: CLEAR
COLOR UR AUTO: NORMAL
CREAT UR-MCNC: 45 MG/DL
GLUCOSE UR QL STRIP: NEGATIVE
HGB UR QL STRIP: NEGATIVE
IMMUNKNOW (STIMULATED): 57 NG/ML
KETONES UR QL STRIP: NEGATIVE
LEUKOCYTE ESTERASE UR QL STRIP: NEGATIVE
NITRITE UR QL STRIP: NEGATIVE
PH UR STRIP: 6 [PH] (ref 5–8)
PROT UR QL STRIP: NEGATIVE
PROT UR-MCNC: <7 MG/DL
PROT/CREAT UR: NORMAL MG/G{CREAT}
SP GR UR STRIP: 1 (ref 1–1.03)
URN SPEC COLLECT METH UR: NORMAL
UROBILINOGEN UR STRIP-ACNC: NEGATIVE EU/DL

## 2018-08-15 PROCEDURE — 3074F SYST BP LT 130 MM HG: CPT | Mod: CPTII,S$GLB,, | Performed by: INTERNAL MEDICINE

## 2018-08-15 PROCEDURE — 81003 URINALYSIS AUTO W/O SCOPE: CPT

## 2018-08-15 PROCEDURE — 99999 PR PBB SHADOW E&M-EST. PATIENT-LVL III: CPT | Mod: PBBFAC,,, | Performed by: INTERNAL MEDICINE

## 2018-08-15 PROCEDURE — 82570 ASSAY OF URINE CREATININE: CPT

## 2018-08-15 PROCEDURE — 99215 OFFICE O/P EST HI 40 MIN: CPT | Mod: S$GLB,,, | Performed by: INTERNAL MEDICINE

## 2018-08-15 PROCEDURE — 3008F BODY MASS INDEX DOCD: CPT | Mod: CPTII,S$GLB,, | Performed by: INTERNAL MEDICINE

## 2018-08-15 PROCEDURE — 87086 URINE CULTURE/COLONY COUNT: CPT

## 2018-08-15 PROCEDURE — 3078F DIAST BP <80 MM HG: CPT | Mod: CPTII,S$GLB,, | Performed by: INTERNAL MEDICINE

## 2018-08-15 NOTE — PROGRESS NOTES
"   Kidney Post-Transplant Assessment    Referring Physician: LUDA Raygoza  Current Nephrologist: LUDA Raygoza    ORGAN: RIGHT KIDNEY  Donor Type:  - brain death  PHS Increased Risk: yes  Cold Ischemia: 852 mins  Induction Medications: campath - alemtuzumab (anti-cd52), steroids (prednisone,methylprednisolone,solumedrol,medrol,decadron)    Subjective:     CC:  Reassessment of renal allograft function and management of chronic immunosuppression.    HPI:  Ms. Rodriguez is a 53 y.o. year old White female who received a  - brain death kidney transplant on 17. Her creatinines range 0.9-1.1. She takes tacrolimus and MMF on hold for maintenance immunosuppression. Her post transplant course has been uncomplicated to date.    Pertinent History:  -ESRD at age 23 from nephrocalcinosis.  Diagnosed with kidney disease at age 6 months  -Nephrocalcinosis diagnosed age 1 (note old records indicate PKD--this is in error)  -Kidney transplant #1 at [facility] at , failed 2016 when she started dialysis. IS-CSA/MMF  -h/o pancytopenia with a bone marrow biopsy after the first kidney transplant   -AVN requiring hip replacement.  -DDKT #2  (PHS-IR) 17 induced with Campath. cPRA pre txp 94%. KDPI 50%,  CIT 14+ hrs. Required 7 day pollack d/t friable bladder.Seen by surgery 17 (Dr. Barragan): "Scant serous discharge from wound. Redness with minimal blanching. Incision probed with sterile swab, no expression of fluid or pus. Recommend continued observation"  -Developed uvular edema post op with cough and hoarseness (ENT dx hydrops)  -CMV status ++; valcyte held d/t neutropenia  -Neutropenia cause bactrim to be D/C'd. Could not tolerate pentam d/t cough/vocal cord issues.  -Transaminitis d/t 2+ siderosis. No fibrosis on biopsy.    -MBD: , Vit D 24.  Cinacalcet and ergocalciferol started post op    Hematology evaluated iron overload given siderosis on liver biopsy. "Phlebotomy at minimum can prevent further " "accumulation, but with transaminitis with the biopsy findings, addition of iron chelation to be considered." Exjade chelation was discussed, but use was not planned at present d/t the small potential of MICHELLE.  She underwent a phlebotomy that left her exhausted with insomnia and loss of concentration.  She discussed her concern with Hematology, and they opted to monitor her hgb.  The most recent of ferritin drawn 08/14/2018 was 1369, lower than before (1570).    Apurva feels fine today.  She has regained her strength as her hemoglobin came back up after the last phlebotomy.  She denies any chest pain, shortness of breath, nausea or vomiting.  She has had a couple of episodes of pain in the left lower abdomen they are very transient in nature, 1 last night and 1 earlier today.  She reports voiding better, with some less frequency compared to what she had prior to starting pelvic PT.    Review of Systems   Constitutional: Negative for fever.   Eyes: Negative for visual disturbance.   Respiratory: Negative for shortness of breath.    Cardiovascular: Negative for chest pain and leg swelling.   Gastrointestinal: Negative.    Genitourinary: Positive for dysuria (intermittent, better now). Negative for hematuria.   Skin: Negative for rash.   Allergic/Immunologic: Positive for immunocompromised state.   Neurological: Negative for tremors.     Objective:   Blood pressure 118/73, pulse 67, temperature 98.5 °F (36.9 °C), temperature source Oral, resp. rate 18, height 5' 2.72" (1.593 m), weight 56.5 kg (124 lb 9 oz), SpO2 98 %.body mass index is 22.26 kg/m².    Physical Exam   Constitutional: No distress.   Cardiovascular: Normal rate and regular rhythm.   Pulmonary/Chest: Effort normal and breath sounds normal. No respiratory distress.   Abdominal: Soft. Bowel sounds are normal. She exhibits no mass. There is no tenderness.   Musculoskeletal: She exhibits no edema.   Skin: Skin is warm and dry. No rash noted.   Psychiatric: She " has a normal mood and affect.     Labs:  Lab Results   Component Value Date    WBC 2.89 (L) 2018    WBC 2.89 (L) 2018    HGB 12.2 2018    HGB 12.2 2018    HCT 36.2 (L) 2018    HCT 36.2 (L) 2018     2018    K 4.5 2018     2018    CO2 24 2018    BUN 25 (H) 2018    CREATININE 1.0 2018    EGFRNONAA >60.0 2018    CALCIUM 10.5 2018    PHOS 3.4 2018    MG 1.9 2018    ALBUMIN 4.0 2018    AST 55 (H) 2018    ALT 77 (H) 2018    UTPCR Unable to calculate 2018    PTH 88.0 (H) 2018    TACROLIMUS 7.8 2018   Labs were reviewed with the patient    Assessment:     1. Chronic kidney disease (CKD), stage II (mild)    2. Status post -donor kidney transplantation    3. Transaminitis from iron overload    4. Other drug-induced neutropenia    5. Prophylactic immunotherapy    6. At risk for opportunistic infections    7. Immunosuppressive management encounter following kidney transplant    8. LLQ pain        Plan:   -CKD2 s/p kidney transplantation, remains excellent. Continue to monitor renal function and electrolytes, HTN, secondary hyperparathyroidism and other issues related to underlying ESRD.  -Continue tacrolimus-based immunosuppression. Level this week at target, however immuno cylex has dropped significantly over the last month without any adjustment in immunosuppression.  No evidence of infection noted, but this will need to be monitored carefully. Will continue to watch for ASE, symptoms and levels for side effects and drug toxicity.   -Drug induced neutropenia remains mild and unchanged. Will keep watching trend.  Continue to hold Myfortic for now; hope to restart once WBC > 4. No need for filgrastim.  -Transaminitis thought d/t ? Siderosis.Ferritin has been improving and being monitored by heme.  -Dysuria- per urogyn, improving with pelvic PT  -h/o Hypercholesterolemia - she has  been holding statin. Will recheck LFTs and lipids with next lab.  -Sec Hyperparathyroidism s/p parathyroidectomy w re implantation (and re-resection pre txp). Last PTH 88 May 2018 with borderline high calcium, normal phos. dayanara lfollow.    In summary, drop in immuno cylex is unexplained and should be repeated.  I also wish to get follow-up lipid panel and hepatic panel.  Thus, plan to repeat labs August 27th in [place].  She is in agreement with this.    Follow-up:   Clinic: return to transplant clinic weekly for the first month after transplant; every 2 weeks during months 2-3; then at 6-, 9-, 12-, 18-, 24-, and 36- months post-transplant to reassess for complications from immunosuppression toxicity and monitor for rejection.  Annually thereafter.    Labs: since patient remains at high risk for rejection and drug-related complications that warrant close monitoring, labs will be ordered as follows: continue twice weekly CBC, renal panel, and drug level for first month; then same labs once weekly through 3rd month post-transplant.  Urine for UA and protein/creatinine ratio monthly.  Urine BK - PCR at 1-, 3-, 6-, 9-, 12-, 18-, 24-, and 36- months post-transplant.  Hepatic panel at 1-, 2-, 3-, 6-, 9-, 12-, 18-, 24-, and 36- months post-transplant.    Shreya Waldron MD

## 2018-08-15 NOTE — PATIENT INSTRUCTIONS
From Dr. Zavala:  It is my privilege to participate in your post-transplant care!      -Plan to repeat lab Aug 27th at [place].  -Please get influenza vaccine this year and every fall in the future.  You can take the killed virus shot in the arm, but not the nasal spray (which is live vaccine).  The shot can be obtained at any pharmacy or health care facility.     Please be sure to let us know if you have any questions or concerns about your health care - we cannot help you if we do not know.  Don't forget we are on call 24/7 for any emergencies.   Best Wishes,  Dr. Sally Waldron

## 2018-08-15 NOTE — LETTER
August 15, 2018        ROCHELLE Saenz Jr.  [address] [place] [address]  Phone: [phone]  Fax: [phone]             [facility]  [address]  [facility] [address]  Phone: [phone]   Patient: Apurva Rodriguez   MR Number: [id number]   YOB: 1965   Date of Visit: 8/15/2018       Dear Dr. ROCHELLE Saenz Jr.    Thank you for referring Apurva Rodriguez to me for evaluation. Attached you will find relevant portions of my assessment and plan of care.    If you have questions, please do not hesitate to call me. I look forward to following Apurva Rodriguez along with you.    Sincerely,    Shreya Waldron MD    Enclosure    If you would like to receive this communication electronically, please contact [email or web address] or [phone] to request P[employer] Link access.    P[employer] Link is a tool which provides read-only access to select patient information with whom you have a relationship. Its easy to use and provides real time access to review your patients record including encounter summaries, notes, results, and demographic information.    If you feel you have received this communication in error or would no longer like to receive these types of communications, please e-mail [email or web address]

## 2018-08-15 NOTE — Clinical Note
In summary, drop in immuno cylex is unexplained and should be repeated.  I also wish to get follow-up lipid panel and hepatic panel.  Thus, plan to repeat labs August 27th in [place].  Please include hepatic panel and lipids along with CBC, tacro level, renal panel.  Thank you

## 2018-08-16 NOTE — PROGRESS NOTES
Results reviewed and the following message sent to patient via [facility]: Urine does not suggest an infection as cause of your abdominal pain. Let us know if discomfort continues.

## 2018-08-17 ENCOUNTER — PATIENT MESSAGE (OUTPATIENT)
Dept: TRANSPLANT | Facility: CLINIC | Age: 53
End: 2018-08-17

## 2018-08-17 LAB — BACTERIA UR CULT: NORMAL

## 2018-08-17 NOTE — PROGRESS NOTES
Results reviewed and the following message sent to patient via [facility]: Your urine culture was negative for bacteria.

## 2018-08-20 ENCOUNTER — CLINICAL SUPPORT (OUTPATIENT)
Dept: REHABILITATION | Facility: HOSPITAL | Age: 53
End: 2018-08-20
Attending: OBSTETRICS & GYNECOLOGY
Payer: COMMERCIAL

## 2018-08-20 DIAGNOSIS — M62.89 PELVIC FLOOR DYSFUNCTION: ICD-10-CM

## 2018-08-20 DIAGNOSIS — N39.46 MIXED STRESS AND URGE URINARY INCONTINENCE: ICD-10-CM

## 2018-08-20 DIAGNOSIS — R35.1 NOCTURIA: ICD-10-CM

## 2018-08-20 PROCEDURE — G8991 OTHER PT/OT GOAL STATUS: HCPCS | Mod: CJ,PN

## 2018-08-20 PROCEDURE — 97110 THERAPEUTIC EXERCISES: CPT | Mod: PN

## 2018-08-20 PROCEDURE — G8990 OTHER PT/OT CURRENT STATUS: HCPCS | Mod: CK,PN

## 2018-08-20 NOTE — PROGRESS NOTES
Patient: Apurva Rodriguez   [facility] #: [id number]   Diagnosis:   Encounter Diagnosis   Name Primary?    Mixed stress and urge urinary incontinence       Date of start of care: 2/28/18  Date of treatment: 08/20/2018   Time in: 10:05  Time out: 11:00  Total treatment time: 55 minutes  # Visits: 11/12  Auth expiration: 12/31/18  POC expiration: 9/23/18  Outpatient Physical Therapy   Daily Note    Subjective     Pt reports she has to do her exercises every morning or can't hold that day. Interested in home biofeedback.    Pain: Current: 0/10    Patient's Goals: to be able to stand through a 3 hour presentation without having to use the restroom    Objective     TREATMENT    Pt received individual therapeutic exercise control for 55 minutes including:    - 10 x 5'' kegals in supine with SEMG assist, vaginal sensor; slightly elevated resting baseline, good-fair WR rise, good holding, complete derecruitment (normalized baseline with pillows under knees, instructions to release arms down by sides)  - PFM activation with breath with SEMG assist in supine, pt guided per her breath patter; good return demonstration, awareness, and control  - Pt education regarding using a home unit; TR-10 discussed, leva discussed    Not performed:  - 10 x 10'' kegals, supine; good holding, 2+/5 MMT (pt with small lift inconsistently), increased use of overflow mm (gluts) with increasing reps  - 5 quick flicks in supine, overflow use and fair coordination  - Seated ball squeeze 10 x 10''  - Roll out with RTB 10 x 10''  - Ant/post pelvic tilts on SB  - TA activation on SB x 10  - Marching on SB x 10 ea, unilateral UE support  - Side lying hip abduction x 10 ea  - Supine SLR x 10 ea with PFM/TA activation  - Prone hip extension with glut set x 10 ea  - Prone knee lift x 5 ea  - Quadruped rocking (pt limited to work in quadruped due to R knee pain and arthritis in her hands)  - Cat/cow  - Supine HS stretch with strap x 2 ea side  - Supine hip flexor  stretch with yoga deb x 2 ea side    Pt received individual neuromuscular reeducation for 00 minutes including:    - PFM activation with DB, pt with continued improvement in recruitment with strength improvement (2/5 MMT)  - 5'' kegals in supine, good anterior contraction with decreased strength posteriorly  - Legs up the wall x 5 min with verbal cueing  - PFM activation with DB in supine with SEMG assist, external perianal electrodes; pt displays normal resting baseline, fair WR rise, partial derecruitment, good coordination and carry over; 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, she was instructed to increase speed to assess ability to recruit PFM more quickly, this was somewhat strained, improved with reps, will continue to practice  - TA activation with DB x 10 with tactile cueing  - Supine march with TA activation using breath x 10 ea  - Side lying hip abduction x 10 ea using breath   - Supine bridge with ball squeeze and breath x 10  - Diaphragmatic breathing on 1/2 foam  - Snow vy on half foam x 10  - Body scan mindfulness training  - Sit to stand squat coordination with respiratory diaphragm x 6    Education: Discussed progression of plan of care with patient; educated pt in activity modification; pt instructed to continue with diaphragmatic breathing, TA activation with DB, PFM activation with DB, sit to stand squat, legs up the wall, supine march, side lying hip abduction, supine bridge, 10'' kegals (supine, seated, supported standing), quick flicks, adductor squeeze and roll out; pt verbalized understanding.    Assessment     Pt tolerated session well without visible adverse effects. Pt continues to display good return demonstration of activities and good PFM awareness and control. Pt has ability to distinguish superficial from deeper layers. Discussed obtaining home biofeedback for visual feedback today, will follow up at her next visit. Pt continues to present with  significant PFM weakness, endurance, and decreased central stability as well as urinary incontinence that has not resolved after > 6 months of treatment with good compliance with her HEP. Pt will benefit from a home unit, recommending leva due to pt's failed improvement in UI with PT.  Pt presents with history of two kidney transplants, increased UI following her recent surgery,  history of constipation, TIGIST, and multiple abdominal hernia repairs. Pt will continue to benefit from physcial therapy services in order to maximize pain free functional independence.     Pt is making good progress towards established goals.   No educational, cultural, or spiritual barriers to learning identified.    GOALS  Short Term Goals: 4 weeks (3/28/18)  1. Pt will verbalize improved awareness of PFM activity as palpated by PT in order to improve activity involvement with HEP. Met 3/26/18  2. Pt will demonstrate decreased overflow muscle activity during PF muscle contraction. Met 5/14/18  3. Pt will display integration or respiratory and pelvic diaphragm in order to improve inner core stability and support. Met 3/26/18  4. Pt will report incorporation of voiding schedule into daily routine to manage frequency, incontinence etc. Met 5/14/18  5. Pt will tolerate HEP to improve impairments and independence with ADL's. Met 3/26/18     Long Term Goals: 9/23/18  1. Pt will be able to lift, drop, and bear down in order to feel improved excursion of her PFM to improve function. Met 5/14/18  2. Pt will be able to perform 5 x 5'' kegals in order to improve PFM strength to decrease urinary frequency. Met 5/14/18  3. Pt will report improvement in holding ability.   4. Pt will be independent with HEP and self management.     CMS Impairment/Limitation/Restriction for Urinary Problem Survey  Status Limitation G-Code CMS Severity Modifier  Intake 49% 51%  Predicted 58% 42% Goal Status+ CK - At least 40 percent but less than 60 percent  4/2/2018 53%  47%  5/14/2018 54% 46%  8/20/2018 56% 44% Current Status CK - At least 40 percent but less than 60 percent    Plan     Continue with established POC, working toward PT goals.

## 2018-08-27 ENCOUNTER — LAB VISIT (OUTPATIENT)
Dept: LAB | Facility: HOSPITAL | Age: 53
End: 2018-08-27
Attending: INTERNAL MEDICINE
Payer: COMMERCIAL

## 2018-08-27 DIAGNOSIS — Z94.0 KIDNEY REPLACED BY TRANSPLANT: ICD-10-CM

## 2018-08-27 LAB
ALBUMIN SERPL BCP-MCNC: 4 G/DL
ALBUMIN SERPL BCP-MCNC: 4 G/DL
ALP SERPL-CCNC: 227 U/L
ALT SERPL W/O P-5'-P-CCNC: 36 U/L
ANION GAP SERPL CALC-SCNC: 8 MMOL/L
AST SERPL-CCNC: 26 U/L
BASOPHILS # BLD AUTO: 0.01 K/UL
BASOPHILS NFR BLD: 0.3 %
BILIRUB DIRECT SERPL-MCNC: 0.2 MG/DL
BILIRUB SERPL-MCNC: 0.7 MG/DL
BUN SERPL-MCNC: 25 MG/DL
CALCIUM SERPL-MCNC: 10.3 MG/DL
CHLORIDE SERPL-SCNC: 106 MMOL/L
CHOLEST SERPL-MCNC: 214 MG/DL
CHOLEST/HDLC SERPL: 3.8 {RATIO}
CO2 SERPL-SCNC: 26 MMOL/L
CREAT SERPL-MCNC: 1 MG/DL
DIFFERENTIAL METHOD: ABNORMAL
EOSINOPHIL # BLD AUTO: 0.1 K/UL
EOSINOPHIL NFR BLD: 2 %
ERYTHROCYTE [DISTWIDTH] IN BLOOD BY AUTOMATED COUNT: 11.7 %
EST. GFR  (AFRICAN AMERICAN): >60 ML/MIN/1.73 M^2
EST. GFR  (NON AFRICAN AMERICAN): >60 ML/MIN/1.73 M^2
GLUCOSE SERPL-MCNC: 107 MG/DL
HCT VFR BLD AUTO: 35.5 %
HDLC SERPL-MCNC: 56 MG/DL
HDLC SERPL: 26.2 %
HGB BLD-MCNC: 12.1 G/DL
IMM GRANULOCYTES # BLD AUTO: 0.01 K/UL
IMM GRANULOCYTES NFR BLD AUTO: 0.3 %
LDLC SERPL CALC-MCNC: 126.6 MG/DL
LYMPHOCYTES # BLD AUTO: 0.7 K/UL
LYMPHOCYTES NFR BLD: 20.4 %
MAGNESIUM SERPL-MCNC: 1.9 MG/DL
MCH RBC QN AUTO: 33.4 PG
MCHC RBC AUTO-ENTMCNC: 34.1 G/DL
MCV RBC AUTO: 98 FL
MONOCYTES # BLD AUTO: 0.4 K/UL
MONOCYTES NFR BLD: 10.8 %
NEUTROPHILS # BLD AUTO: 2.3 K/UL
NEUTROPHILS NFR BLD: 66.2 %
NONHDLC SERPL-MCNC: 158 MG/DL
NRBC BLD-RTO: 0 /100 WBC
PHOSPHATE SERPL-MCNC: 3.8 MG/DL
PLATELET # BLD AUTO: 122 K/UL
PMV BLD AUTO: 10.6 FL
POTASSIUM SERPL-SCNC: 4.4 MMOL/L
PROT SERPL-MCNC: 7.3 G/DL
RBC # BLD AUTO: 3.62 M/UL
SODIUM SERPL-SCNC: 140 MMOL/L
TRIGL SERPL-MCNC: 157 MG/DL
WBC # BLD AUTO: 3.53 K/UL

## 2018-08-27 PROCEDURE — 80197 ASSAY OF TACROLIMUS: CPT

## 2018-08-27 PROCEDURE — 80069 RENAL FUNCTION PANEL: CPT

## 2018-08-27 PROCEDURE — 85025 COMPLETE CBC W/AUTO DIFF WBC: CPT

## 2018-08-27 PROCEDURE — 84155 ASSAY OF PROTEIN SERUM: CPT

## 2018-08-27 PROCEDURE — 36415 COLL VENOUS BLD VENIPUNCTURE: CPT | Mod: PO

## 2018-08-27 PROCEDURE — 80061 LIPID PANEL: CPT

## 2018-08-27 PROCEDURE — 86352 CELL FUNCTION ASSAY W/STIM: CPT

## 2018-08-27 PROCEDURE — 87799 DETECT AGENT NOS DNA QUANT: CPT

## 2018-08-27 PROCEDURE — 83735 ASSAY OF MAGNESIUM: CPT

## 2018-08-28 ENCOUNTER — PATIENT MESSAGE (OUTPATIENT)
Dept: UROGYNECOLOGY | Facility: CLINIC | Age: 53
End: 2018-08-28

## 2018-08-28 ENCOUNTER — PATIENT MESSAGE (OUTPATIENT)
Dept: TRANSPLANT | Facility: CLINIC | Age: 53
End: 2018-08-28

## 2018-08-28 ENCOUNTER — PATIENT MESSAGE (OUTPATIENT)
Dept: REHABILITATION | Facility: HOSPITAL | Age: 53
End: 2018-08-28

## 2018-08-28 ENCOUNTER — TELEPHONE (OUTPATIENT)
Dept: TRANSPLANT | Facility: CLINIC | Age: 53
End: 2018-08-28

## 2018-08-28 ENCOUNTER — TELEPHONE (OUTPATIENT)
Dept: UROGYNECOLOGY | Facility: CLINIC | Age: 53
End: 2018-08-28

## 2018-08-28 LAB — TACROLIMUS BLD-MCNC: 11.4 NG/ML

## 2018-08-28 NOTE — TELEPHONE ENCOUNTER
Attempted to contact pt's [employer] insurance to get clarity on why pt's homes TENS unit was not covered by her insurance.

## 2018-08-28 NOTE — TELEPHONE ENCOUNTER
----- Message from Shreya Waldron MD sent at 8/28/2018  1:59 PM CDT -----  The following message sent to patient via [facility]: Let's repeat tacrolimus level-this seems out of range compared to 2 weeks ago.

## 2018-08-28 NOTE — TELEPHONE ENCOUNTER
----- Message from Veronica Knowles sent at 8/28/2018  8:36 AM CDT -----  Contact: [email or web address]   She is calling to discuss medical equipment that was ordered for the pt. Ramandeep can be reached at [phone] Ref:[phone].

## 2018-08-29 LAB — IMMUNKNOW (STIMULATED): 106 NG/ML

## 2018-08-30 ENCOUNTER — PATIENT MESSAGE (OUTPATIENT)
Dept: TRANSPLANT | Facility: CLINIC | Age: 53
End: 2018-08-30

## 2018-08-30 ENCOUNTER — LAB VISIT (OUTPATIENT)
Dept: LAB | Facility: HOSPITAL | Age: 53
End: 2018-08-30
Attending: INTERNAL MEDICINE
Payer: COMMERCIAL

## 2018-08-30 DIAGNOSIS — Z94.0 KIDNEY REPLACED BY TRANSPLANT: ICD-10-CM

## 2018-08-30 LAB
BKV DNA SERPL NAA+PROBE-ACNC: <125 COPIES/ML
BKV DNA SERPL NAA+PROBE-LOG#: <2.1 LOG (10) COPIES/ML
BKV DNA SERPL QL NAA+PROBE: NOT DETECTED
TACROLIMUS BLD-MCNC: 13.2 NG/ML

## 2018-08-30 PROCEDURE — 36415 COLL VENOUS BLD VENIPUNCTURE: CPT | Mod: PO

## 2018-08-30 PROCEDURE — 80197 ASSAY OF TACROLIMUS: CPT

## 2018-08-30 NOTE — TELEPHONE ENCOUNTER
----- Message from Daniella Lamb RN sent at 8/30/2018  4:03 PM CDT -----      ----- Message -----  From: Shreya Waldron MD  Sent: 8/30/2018   3:34 PM  To: [facility] Post-Kidney Transplant Clinical    The following message sent to patient via [facility]:    Hello.  Your last 2 tacrolimus levels are higher.  I am not sure why.  Regardless, we need to drop your dose.  I believe you are taking 3 mg twice a day?  [Your chart also lists a prescription for 0.5 mg, but I cannot tell if  you are using 0.5 mg capsules.]  Assuming you are on 3/3, please decrease your dose to 3 mg in AM & 2 mg in PM.   If you are on a different dose, let us know so we can update our records.  Thank you

## 2018-08-31 RX ORDER — TACROLIMUS 1 MG/1
CAPSULE ORAL
Qty: 150 CAPSULE | Refills: 11 | Status: SHIPPED | OUTPATIENT
Start: 2018-08-31 | End: 2018-11-07

## 2018-09-05 ENCOUNTER — PATIENT MESSAGE (OUTPATIENT)
Dept: UROGYNECOLOGY | Facility: CLINIC | Age: 53
End: 2018-09-05

## 2018-09-11 ENCOUNTER — PATIENT MESSAGE (OUTPATIENT)
Dept: TRANSPLANT | Facility: CLINIC | Age: 53
End: 2018-09-11

## 2018-09-11 ENCOUNTER — LAB VISIT (OUTPATIENT)
Dept: LAB | Facility: HOSPITAL | Age: 53
End: 2018-09-11
Attending: INTERNAL MEDICINE
Payer: COMMERCIAL

## 2018-09-11 DIAGNOSIS — R79.89 ELEVATED FERRITIN LEVEL: ICD-10-CM

## 2018-09-11 DIAGNOSIS — R74.8 ELEVATED LIVER ENZYMES: ICD-10-CM

## 2018-09-11 DIAGNOSIS — Z94.0 KIDNEY REPLACED BY TRANSPLANT: ICD-10-CM

## 2018-09-11 LAB
ALBUMIN SERPL BCP-MCNC: 4.1 G/DL
ANION GAP SERPL CALC-SCNC: 7 MMOL/L
BASOPHILS # BLD AUTO: 0.02 K/UL
BASOPHILS # BLD AUTO: 0.02 K/UL
BASOPHILS NFR BLD: 0.5 %
BASOPHILS NFR BLD: 0.5 %
BUN SERPL-MCNC: 19 MG/DL
CALCIUM SERPL-MCNC: 10.4 MG/DL
CHLORIDE SERPL-SCNC: 105 MMOL/L
CO2 SERPL-SCNC: 27 MMOL/L
CREAT SERPL-MCNC: 0.9 MG/DL
DIFFERENTIAL METHOD: ABNORMAL
DIFFERENTIAL METHOD: ABNORMAL
EOSINOPHIL # BLD AUTO: 0.1 K/UL
EOSINOPHIL # BLD AUTO: 0.1 K/UL
EOSINOPHIL NFR BLD: 1.9 %
EOSINOPHIL NFR BLD: 1.9 %
ERYTHROCYTE [DISTWIDTH] IN BLOOD BY AUTOMATED COUNT: 12 %
ERYTHROCYTE [DISTWIDTH] IN BLOOD BY AUTOMATED COUNT: 12 %
EST. GFR  (AFRICAN AMERICAN): >60 ML/MIN/1.73 M^2
EST. GFR  (NON AFRICAN AMERICAN): >60 ML/MIN/1.73 M^2
FERRITIN SERPL-MCNC: 1495 NG/ML
GLUCOSE SERPL-MCNC: 101 MG/DL
HCT VFR BLD AUTO: 36.6 %
HCT VFR BLD AUTO: 36.6 %
HGB BLD-MCNC: 12 G/DL
HGB BLD-MCNC: 12 G/DL
IMM GRANULOCYTES # BLD AUTO: 0.01 K/UL
IMM GRANULOCYTES # BLD AUTO: 0.01 K/UL
IMM GRANULOCYTES NFR BLD AUTO: 0.3 %
IMM GRANULOCYTES NFR BLD AUTO: 0.3 %
LYMPHOCYTES # BLD AUTO: 0.7 K/UL
LYMPHOCYTES # BLD AUTO: 0.7 K/UL
LYMPHOCYTES NFR BLD: 20.1 %
LYMPHOCYTES NFR BLD: 20.1 %
MAGNESIUM SERPL-MCNC: 1.7 MG/DL
MCH RBC QN AUTO: 32.3 PG
MCH RBC QN AUTO: 32.3 PG
MCHC RBC AUTO-ENTMCNC: 32.8 G/DL
MCHC RBC AUTO-ENTMCNC: 32.8 G/DL
MCV RBC AUTO: 99 FL
MCV RBC AUTO: 99 FL
MONOCYTES # BLD AUTO: 0.4 K/UL
MONOCYTES # BLD AUTO: 0.4 K/UL
MONOCYTES NFR BLD: 10.9 %
MONOCYTES NFR BLD: 10.9 %
NEUTROPHILS # BLD AUTO: 2.4 K/UL
NEUTROPHILS # BLD AUTO: 2.4 K/UL
NEUTROPHILS NFR BLD: 66.3 %
NEUTROPHILS NFR BLD: 66.3 %
NRBC BLD-RTO: 0 /100 WBC
NRBC BLD-RTO: 0 /100 WBC
PHOSPHATE SERPL-MCNC: 3.1 MG/DL
PLATELET # BLD AUTO: 129 K/UL
PLATELET # BLD AUTO: 129 K/UL
PMV BLD AUTO: 10.6 FL
PMV BLD AUTO: 10.6 FL
POTASSIUM SERPL-SCNC: 4.1 MMOL/L
RBC # BLD AUTO: 3.71 M/UL
RBC # BLD AUTO: 3.71 M/UL
SODIUM SERPL-SCNC: 139 MMOL/L
WBC # BLD AUTO: 3.68 K/UL
WBC # BLD AUTO: 3.68 K/UL

## 2018-09-11 PROCEDURE — 83735 ASSAY OF MAGNESIUM: CPT

## 2018-09-11 PROCEDURE — 82728 ASSAY OF FERRITIN: CPT

## 2018-09-11 PROCEDURE — 80197 ASSAY OF TACROLIMUS: CPT

## 2018-09-11 PROCEDURE — 80069 RENAL FUNCTION PANEL: CPT

## 2018-09-11 PROCEDURE — 85025 COMPLETE CBC W/AUTO DIFF WBC: CPT

## 2018-09-11 PROCEDURE — 36415 COLL VENOUS BLD VENIPUNCTURE: CPT | Mod: PO

## 2018-09-12 ENCOUNTER — PATIENT MESSAGE (OUTPATIENT)
Dept: REHABILITATION | Facility: HOSPITAL | Age: 53
End: 2018-09-12

## 2018-09-12 LAB — TACROLIMUS BLD-MCNC: 7 NG/ML

## 2018-09-24 ENCOUNTER — PATIENT MESSAGE (OUTPATIENT)
Dept: REHABILITATION | Facility: HOSPITAL | Age: 53
End: 2018-09-24

## 2018-09-25 ENCOUNTER — PATIENT MESSAGE (OUTPATIENT)
Dept: UROGYNECOLOGY | Facility: CLINIC | Age: 53
End: 2018-09-25

## 2018-09-27 DIAGNOSIS — Z94.0 KIDNEY REPLACED BY TRANSPLANT: Primary | ICD-10-CM

## 2018-10-01 ENCOUNTER — LAB VISIT (OUTPATIENT)
Dept: LAB | Facility: HOSPITAL | Age: 53
End: 2018-10-01
Attending: INTERNAL MEDICINE
Payer: COMMERCIAL

## 2018-10-01 DIAGNOSIS — R74.8 ELEVATED LIVER ENZYMES: ICD-10-CM

## 2018-10-01 DIAGNOSIS — R79.89 ELEVATED FERRITIN LEVEL: ICD-10-CM

## 2018-10-01 LAB
BASOPHILS # BLD AUTO: 0.03 K/UL
BASOPHILS NFR BLD: 0.7 %
DIFFERENTIAL METHOD: ABNORMAL
EOSINOPHIL # BLD AUTO: 0.1 K/UL
EOSINOPHIL NFR BLD: 1.8 %
ERYTHROCYTE [DISTWIDTH] IN BLOOD BY AUTOMATED COUNT: 12.1 %
HCT VFR BLD AUTO: 36.8 %
HGB BLD-MCNC: 11.6 G/DL
IMM GRANULOCYTES # BLD AUTO: 0.01 K/UL
IMM GRANULOCYTES NFR BLD AUTO: 0.2 %
LYMPHOCYTES # BLD AUTO: 1 K/UL
LYMPHOCYTES NFR BLD: 21.9 %
MCH RBC QN AUTO: 31.5 PG
MCHC RBC AUTO-ENTMCNC: 31.5 G/DL
MCV RBC AUTO: 100 FL
MONOCYTES # BLD AUTO: 0.5 K/UL
MONOCYTES NFR BLD: 11.5 %
NEUTROPHILS # BLD AUTO: 2.8 K/UL
NEUTROPHILS NFR BLD: 63.9 %
NRBC BLD-RTO: 0 /100 WBC
PLATELET # BLD AUTO: 142 K/UL
PMV BLD AUTO: 10.8 FL
RBC # BLD AUTO: 3.68 M/UL
WBC # BLD AUTO: 4.43 K/UL

## 2018-10-01 PROCEDURE — 36415 COLL VENOUS BLD VENIPUNCTURE: CPT | Mod: PO

## 2018-10-01 PROCEDURE — 85025 COMPLETE CBC W/AUTO DIFF WBC: CPT

## 2018-10-08 ENCOUNTER — CLINICAL SUPPORT (OUTPATIENT)
Dept: REHABILITATION | Facility: HOSPITAL | Age: 53
End: 2018-10-08
Attending: OBSTETRICS & GYNECOLOGY
Payer: COMMERCIAL

## 2018-10-08 DIAGNOSIS — N39.46 MIXED STRESS AND URGE URINARY INCONTINENCE: ICD-10-CM

## 2018-10-08 DIAGNOSIS — M62.89 PELVIC FLOOR DYSFUNCTION: ICD-10-CM

## 2018-10-08 DIAGNOSIS — R35.1 NOCTURIA: ICD-10-CM

## 2018-10-08 PROCEDURE — 97110 THERAPEUTIC EXERCISES: CPT | Mod: PN

## 2018-10-08 PROCEDURE — G8991 OTHER PT/OT GOAL STATUS: HCPCS | Mod: CJ,PN

## 2018-10-08 PROCEDURE — G8990 OTHER PT/OT CURRENT STATUS: HCPCS | Mod: CK,PN

## 2018-10-08 NOTE — PROGRESS NOTES
Patient: Apurva Rodriguez   [facility] #: [id number]   Diagnosis:   Encounter Diagnosis   Name Primary?    Mixed stress and urge urinary incontinence       Date of start of care: 2/28/18  Date of treatment: 10/08/2018   Time in: 9:00  Time out: 9:59  Total treatment time: 59 minutes  # Visits: 12 (20 auth)  Auth expiration: 12/31/18  POC expiration: 11/31/18  Outpatient Physical Therapy   Updated POC    Subjective     Pt reports she had a frustrating accident after attending a play (this occurred around 10 pm). She went to another play and it went much better, no accidents, that took place in the afternoon.     Pain: Current: 0/10    Patient's Goals: to be able to stand through a 3 hour presentation without having to use the restroom    Objective     TREATMENT      Pt received individual therapeutic exercise control for 59 minutes including:    - Pt education regarding leva, insurance updates regarding obtaining a home unit  - PFM activation with DB in supine; pt continues with good return demonstration, pt displayed some use of overflow today and slightly strained breathing; she was instructed to modify her breath to create a gentle, easeful exhale and quality of PFM activation improved  - Quick flicks, pt uses overflow with less PFM activation       Not performed:  - 10 x 5'' kegals in supine with SEMG assist, vaginal sensor; slightly elevated resting baseline, good-fair WR rise, good holding, complete derecruitment (normalized baseline with pillows under knees, instructions to release arms down by sides)  - 10 x 10'' kegals, supine; good holding, 2+/5 MMT (pt with small lift inconsistently), increased use of overflow mm (gluts) with increasing reps  - Seated ball squeeze 10 x 10''  - Roll out with RTB 10 x 10''  - Ant/post pelvic tilts on SB  - TA activation on SB x 10  - Marching on SB x 10 ea, unilateral UE support  - Side lying hip abduction x 10 ea  - Supine SLR x 10 ea with PFM/TA activation  - Prone hip extension  with glut set x 10 ea  - Prone knee lift x 5 ea  - Quadruped rocking (pt limited to work in quadruped due to R knee pain and arthritis in her hands)  - Cat/cow  - Supine HS stretch with strap x 2 ea side  - Supine hip flexor stretch with yoga deb x 2 ea side    Education: Discussed progression of plan of care with patient; educated pt in activity modification; pt instructed to continue with diaphragmatic breathing, TA activation with DB, PFM activation with DB, sit to stand squat, legs up the wall, supine march, side lying hip abduction, supine bridge, 10'' kegals (supine, seated, supported standing), quick flicks, adductor squeeze and roll out; pt verbalized understanding.    Assessment     Pt tolerated session well without visible adverse effects. Pt continues with good compliance, return of exercises and maintenance of current PFM strength. She does not display improvement in strength at today's session and continues to present with UI. Pt also noted to have more difficulty today with improvement with verbal cueing. Pt continues to present with significant PFM weakness, endurance, and decreased central stability as well as urinary incontinence that has not resolved after > 6 months of treatment with good compliance with her HEP. Pt will benefit from a home unit, recommending leva due to pt's failed improvement in UI with PT.  Pt presents with history of two kidney transplants, increased UI following her recent surgery,  history of constipation, TIGIST, and multiple abdominal hernia repairs. Pt will continue to benefit from physcial therapy services in order to maximize pain free functional independence.     Pt is making good progress towards established goals.   No educational, cultural, or spiritual barriers to learning identified.    GOALS  Short Term Goals: 4 weeks (3/28/18)  1. Pt will verbalize improved awareness of PFM activity as palpated by PT in order to improve activity involvement with HEP. Met  3/26/18  2. Pt will demonstrate decreased overflow muscle activity during PF muscle contraction. Met 5/14/18  3. Pt will display integration or respiratory and pelvic diaphragm in order to improve inner core stability and support. Met 3/26/18  4. Pt will report incorporation of voiding schedule into daily routine to manage frequency, incontinence etc. Met 5/14/18  5. Pt will tolerate HEP to improve impairments and independence with ADL's. Met 3/26/18     Long Term Goals: continue through 11/31/18  1. Pt will be able to lift, drop, and bear down in order to feel improved excursion of her PFM to improve function. Met 5/14/18  2. Pt will be able to perform 5 x 5'' kegals in order to improve PFM strength to decrease urinary frequency. Met 5/14/18  3. Pt will report improvement in holding ability.   4. Pt will be independent with HEP and self management.     CMS Impairment/Limitation/Restriction for Urinary Problem Survey  Status Limitation G-Code CMS Severity Modifier  Intake 49% 51%  Predicted 58% 42% Goal Status+ CK - At least 40 percent but less than 60 percent  4/2/2018 53% 47%  5/14/2018 54% 46%  8/20/2018 56% 44%  10/8/2018 51% 49% Current Status CK - At least 40 percent but less than 60 percent    Plan     Continue with established POC, working toward PT goals.

## 2018-10-09 ENCOUNTER — LAB VISIT (OUTPATIENT)
Dept: LAB | Facility: HOSPITAL | Age: 53
End: 2018-10-09
Attending: INTERNAL MEDICINE
Payer: MEDICARE

## 2018-10-09 DIAGNOSIS — Z94.0 KIDNEY REPLACED BY TRANSPLANT: ICD-10-CM

## 2018-10-09 LAB
ALBUMIN SERPL BCP-MCNC: 3.9 G/DL
ANION GAP SERPL CALC-SCNC: 7 MMOL/L
BASOPHILS # BLD AUTO: 0.02 K/UL
BASOPHILS NFR BLD: 0.6 %
BUN SERPL-MCNC: 21 MG/DL
CALCIUM SERPL-MCNC: 9.9 MG/DL
CHLORIDE SERPL-SCNC: 108 MMOL/L
CO2 SERPL-SCNC: 24 MMOL/L
CREAT SERPL-MCNC: 1.1 MG/DL
DIFFERENTIAL METHOD: ABNORMAL
EOSINOPHIL # BLD AUTO: 0.1 K/UL
EOSINOPHIL NFR BLD: 2.5 %
ERYTHROCYTE [DISTWIDTH] IN BLOOD BY AUTOMATED COUNT: 12.4 %
EST. GFR  (AFRICAN AMERICAN): >60 ML/MIN/1.73 M^2
EST. GFR  (NON AFRICAN AMERICAN): 56.7 ML/MIN/1.73 M^2
GLUCOSE SERPL-MCNC: 108 MG/DL
HCT VFR BLD AUTO: 35.3 %
HGB BLD-MCNC: 11.5 G/DL
IMM GRANULOCYTES # BLD AUTO: 0.01 K/UL
IMM GRANULOCYTES NFR BLD AUTO: 0.3 %
LYMPHOCYTES # BLD AUTO: 0.7 K/UL
LYMPHOCYTES NFR BLD: 23 %
MAGNESIUM SERPL-MCNC: 1.8 MG/DL
MCH RBC QN AUTO: 31.8 PG
MCHC RBC AUTO-ENTMCNC: 32.6 G/DL
MCV RBC AUTO: 98 FL
MONOCYTES # BLD AUTO: 0.4 K/UL
MONOCYTES NFR BLD: 11.6 %
NEUTROPHILS # BLD AUTO: 2 K/UL
NEUTROPHILS NFR BLD: 62 %
NRBC BLD-RTO: 0 /100 WBC
PHOSPHATE SERPL-MCNC: 3.4 MG/DL
PLATELET # BLD AUTO: 126 K/UL
PMV BLD AUTO: 11 FL
POTASSIUM SERPL-SCNC: 4.4 MMOL/L
RBC # BLD AUTO: 3.62 M/UL
SODIUM SERPL-SCNC: 139 MMOL/L
TACROLIMUS BLD-MCNC: 8.2 NG/ML
WBC # BLD AUTO: 3.18 K/UL

## 2018-10-09 PROCEDURE — 83735 ASSAY OF MAGNESIUM: CPT

## 2018-10-09 PROCEDURE — 80069 RENAL FUNCTION PANEL: CPT

## 2018-10-09 PROCEDURE — 86352 CELL FUNCTION ASSAY W/STIM: CPT

## 2018-10-09 PROCEDURE — 85025 COMPLETE CBC W/AUTO DIFF WBC: CPT

## 2018-10-09 PROCEDURE — 36415 COLL VENOUS BLD VENIPUNCTURE: CPT | Mod: PO

## 2018-10-09 PROCEDURE — 80197 ASSAY OF TACROLIMUS: CPT

## 2018-10-10 ENCOUNTER — PATIENT MESSAGE (OUTPATIENT)
Dept: HEMATOLOGY/ONCOLOGY | Facility: CLINIC | Age: 53
End: 2018-10-10

## 2018-10-11 LAB — IMMUNKNOW (STIMULATED): 81 NG/ML

## 2018-10-17 ENCOUNTER — PATIENT MESSAGE (OUTPATIENT)
Dept: TRANSPLANT | Facility: CLINIC | Age: 53
End: 2018-10-17

## 2018-10-18 ENCOUNTER — PATIENT MESSAGE (OUTPATIENT)
Dept: REHABILITATION | Facility: HOSPITAL | Age: 53
End: 2018-10-18

## 2018-10-29 ENCOUNTER — LAB VISIT (OUTPATIENT)
Dept: LAB | Facility: HOSPITAL | Age: 53
End: 2018-10-29
Attending: INTERNAL MEDICINE
Payer: MEDICARE

## 2018-10-29 DIAGNOSIS — R79.89 ELEVATED FERRITIN LEVEL: ICD-10-CM

## 2018-10-29 DIAGNOSIS — Z94.0 KIDNEY REPLACED BY TRANSPLANT: ICD-10-CM

## 2018-10-29 DIAGNOSIS — R74.8 ELEVATED LIVER ENZYMES: ICD-10-CM

## 2018-10-29 LAB
ALBUMIN SERPL BCP-MCNC: 4 G/DL
ANION GAP SERPL CALC-SCNC: 5 MMOL/L
BASOPHILS # BLD AUTO: 0.02 K/UL
BASOPHILS # BLD AUTO: 0.02 K/UL
BASOPHILS NFR BLD: 0.5 %
BASOPHILS NFR BLD: 0.5 %
BUN SERPL-MCNC: 26 MG/DL
CALCIUM SERPL-MCNC: 10.3 MG/DL
CHLORIDE SERPL-SCNC: 109 MMOL/L
CO2 SERPL-SCNC: 25 MMOL/L
CREAT SERPL-MCNC: 1.2 MG/DL
DIFFERENTIAL METHOD: ABNORMAL
DIFFERENTIAL METHOD: ABNORMAL
EOSINOPHIL # BLD AUTO: 0.1 K/UL
EOSINOPHIL # BLD AUTO: 0.1 K/UL
EOSINOPHIL NFR BLD: 2.3 %
EOSINOPHIL NFR BLD: 2.3 %
ERYTHROCYTE [DISTWIDTH] IN BLOOD BY AUTOMATED COUNT: 12.6 %
ERYTHROCYTE [DISTWIDTH] IN BLOOD BY AUTOMATED COUNT: 12.6 %
EST. GFR  (AFRICAN AMERICAN): 58.8 ML/MIN/1.73 M^2
EST. GFR  (NON AFRICAN AMERICAN): 51 ML/MIN/1.73 M^2
FERRITIN SERPL-MCNC: 1197 NG/ML
GLUCOSE SERPL-MCNC: 107 MG/DL
HCT VFR BLD AUTO: 36.4 %
HCT VFR BLD AUTO: 36.4 %
HGB BLD-MCNC: 11.9 G/DL
HGB BLD-MCNC: 11.9 G/DL
IMM GRANULOCYTES # BLD AUTO: 0.02 K/UL
IMM GRANULOCYTES # BLD AUTO: 0.02 K/UL
IMM GRANULOCYTES NFR BLD AUTO: 0.5 %
IMM GRANULOCYTES NFR BLD AUTO: 0.5 %
LYMPHOCYTES # BLD AUTO: 0.9 K/UL
LYMPHOCYTES # BLD AUTO: 0.9 K/UL
LYMPHOCYTES NFR BLD: 22.4 %
LYMPHOCYTES NFR BLD: 22.4 %
MAGNESIUM SERPL-MCNC: 1.9 MG/DL
MCH RBC QN AUTO: 32 PG
MCH RBC QN AUTO: 32 PG
MCHC RBC AUTO-ENTMCNC: 32.7 G/DL
MCHC RBC AUTO-ENTMCNC: 32.7 G/DL
MCV RBC AUTO: 98 FL
MCV RBC AUTO: 98 FL
MONOCYTES # BLD AUTO: 0.4 K/UL
MONOCYTES # BLD AUTO: 0.4 K/UL
MONOCYTES NFR BLD: 9.5 %
MONOCYTES NFR BLD: 9.5 %
NEUTROPHILS # BLD AUTO: 2.6 K/UL
NEUTROPHILS # BLD AUTO: 2.6 K/UL
NEUTROPHILS NFR BLD: 64.8 %
NEUTROPHILS NFR BLD: 64.8 %
NRBC BLD-RTO: 0 /100 WBC
NRBC BLD-RTO: 0 /100 WBC
PHOSPHATE SERPL-MCNC: 3.1 MG/DL
PLATELET # BLD AUTO: 133 K/UL
PLATELET # BLD AUTO: 133 K/UL
PMV BLD AUTO: 10.8 FL
PMV BLD AUTO: 10.8 FL
POTASSIUM SERPL-SCNC: 4.4 MMOL/L
RBC # BLD AUTO: 3.72 M/UL
RBC # BLD AUTO: 3.72 M/UL
SODIUM SERPL-SCNC: 139 MMOL/L
WBC # BLD AUTO: 3.98 K/UL
WBC # BLD AUTO: 3.98 K/UL

## 2018-10-29 PROCEDURE — 86352 CELL FUNCTION ASSAY W/STIM: CPT

## 2018-10-29 PROCEDURE — 87799 DETECT AGENT NOS DNA QUANT: CPT

## 2018-10-29 PROCEDURE — 85025 COMPLETE CBC W/AUTO DIFF WBC: CPT

## 2018-10-29 PROCEDURE — 80197 ASSAY OF TACROLIMUS: CPT

## 2018-10-29 PROCEDURE — 80069 RENAL FUNCTION PANEL: CPT

## 2018-10-29 PROCEDURE — 82728 ASSAY OF FERRITIN: CPT

## 2018-10-29 PROCEDURE — 36415 COLL VENOUS BLD VENIPUNCTURE: CPT | Mod: PO

## 2018-10-29 PROCEDURE — 83735 ASSAY OF MAGNESIUM: CPT

## 2018-10-30 LAB — TACROLIMUS BLD-MCNC: 7.5 NG/ML

## 2018-10-31 LAB — IMMUNKNOW (STIMULATED): 162 NG/ML

## 2018-11-01 ENCOUNTER — PATIENT MESSAGE (OUTPATIENT)
Dept: REHABILITATION | Facility: HOSPITAL | Age: 53
End: 2018-11-01

## 2018-11-05 ENCOUNTER — OFFICE VISIT (OUTPATIENT)
Dept: UROGYNECOLOGY | Facility: CLINIC | Age: 53
End: 2018-11-05
Payer: MEDICARE

## 2018-11-05 ENCOUNTER — CLINICAL SUPPORT (OUTPATIENT)
Dept: REHABILITATION | Facility: HOSPITAL | Age: 53
End: 2018-11-05
Attending: OBSTETRICS & GYNECOLOGY
Payer: MEDICARE

## 2018-11-05 VITALS
BODY MASS INDEX: 22.52 KG/M2 | SYSTOLIC BLOOD PRESSURE: 100 MMHG | HEIGHT: 62 IN | WEIGHT: 122.38 LBS | DIASTOLIC BLOOD PRESSURE: 60 MMHG

## 2018-11-05 DIAGNOSIS — N39.46 MIXED STRESS AND URGE URINARY INCONTINENCE: ICD-10-CM

## 2018-11-05 DIAGNOSIS — M62.89 PELVIC FLOOR DYSFUNCTION: ICD-10-CM

## 2018-11-05 DIAGNOSIS — N39.46 MIXED URGE AND STRESS INCONTINENCE: Primary | ICD-10-CM

## 2018-11-05 DIAGNOSIS — R35.1 NOCTURIA: Primary | ICD-10-CM

## 2018-11-05 PROCEDURE — 99213 OFFICE O/P EST LOW 20 MIN: CPT | Mod: S$PBB,,, | Performed by: OBSTETRICS & GYNECOLOGY

## 2018-11-05 PROCEDURE — 99213 OFFICE O/P EST LOW 20 MIN: CPT | Mod: PBBFAC | Performed by: OBSTETRICS & GYNECOLOGY

## 2018-11-05 PROCEDURE — G8991 OTHER PT/OT GOAL STATUS: HCPCS | Mod: CK,PN

## 2018-11-05 PROCEDURE — 99999 PR PBB SHADOW E&M-EST. PATIENT-LVL III: CPT | Mod: PBBFAC,,, | Performed by: OBSTETRICS & GYNECOLOGY

## 2018-11-05 PROCEDURE — 97110 THERAPEUTIC EXERCISES: CPT | Mod: PN

## 2018-11-05 PROCEDURE — G8992 OTHER PT/OT  D/C STATUS: HCPCS | Mod: CK,PN

## 2018-11-05 RX ORDER — CONJUGATED ESTROGENS 0.62 MG/G
CREAM VAGINAL
Refills: 12 | COMMUNITY
Start: 2018-10-12

## 2018-11-05 RX ORDER — OXYBUTYNIN CHLORIDE 5 MG/1
TABLET, EXTENDED RELEASE ORAL
Refills: 11 | COMMUNITY
Start: 2018-11-02 | End: 2018-11-05

## 2018-11-05 RX ORDER — OXYBUTYNIN CHLORIDE 5 MG/1
5 TABLET, EXTENDED RELEASE ORAL DAILY
Qty: 30 TABLET | Refills: 11 | Status: ON HOLD | OUTPATIENT
Start: 2018-11-05 | End: 2020-11-10

## 2018-11-05 RX ORDER — OXYBUTYNIN CHLORIDE 10 MG/1
10 TABLET, EXTENDED RELEASE ORAL DAILY
Qty: 30 TABLET | Refills: 11 | Status: SHIPPED | OUTPATIENT
Start: 2018-11-05 | End: 2018-12-05

## 2018-11-05 NOTE — PROGRESS NOTES
Patient: Apurva Rodriguez   [facility] #: [id number]   Diagnosis:   Encounter Diagnoses   Name Primary?    Mixed stress and urge urinary incontinence     Nocturia Yes    Pelvic floor dysfunction       Date of start of care: 2/28/18  Date of treatment: 11/05/2018   Time in: 1:06  Time out: 1:54  Total treatment time: 50 minutes  # Visits: 13 (20 auth)  Auth expiration: 12/31/18  POC expiration: 11/31/18  Outpatient Physical Therapy   Discharge    Subjective     Pt reports she has her home unit and it is registered and working. No questions regarding home use. Ready for discharge.    Pain: Current: 0/10    Patient's Goals: to be able to stand through a 3 hour presentation without having to use the restroom    Objective     TREATMENT      Pt received individual therapeutic exercise control for 50 minutes including:    - Pt education regarding discharge and self management  - Leva used for PFM training 5 x 15'' set; practice mode used to practice in supine and seated    Not performed:  - PFM activation with DB in supine; pt continues with good return demonstration, pt displayed some use of overflow today and slightly strained breathing; she was instructed to modify her breath to create a gentle, easeful exhale and quality of PFM activation improved  - Quick flicks, pt uses overflow with less PFM activation   - 10 x 5'' kegals in supine with SEMG assist, vaginal sensor; slightly elevated resting baseline, good-fair WR rise, good holding, complete derecruitment (normalized baseline with pillows under knees, instructions to release arms down by sides)  - 10 x 10'' kegals, supine; good holding, 2+/5 MMT (pt with small lift inconsistently), increased use of overflow mm (gluts) with increasing reps  - Seated ball squeeze 10 x 10''  - Roll out with RTB 10 x 10''  - Ant/post pelvic tilts on SB  - TA activation on SB x 10  - Marching on SB x 10 ea, unilateral UE support  - Side lying hip abduction x 10 ea  - Supine SLR x 10 ea with  PFM/TA activation  - Prone hip extension with glut set x 10 ea  - Prone knee lift x 5 ea  - Quadruped rocking (pt limited to work in quadruped due to R knee pain and arthritis in her hands)  - Cat/cow  - Supine HS stretch with strap x 2 ea side  - Supine hip flexor stretch with yoga deb x 2 ea side    Assessment     Pt tolerated session well without visible adverse effects. Pt arrives today with her home device to review exercises, discuss self management and address any problems with home unit. Pt is independent with her HEP and self management at this time. Pt no longer requires skilled PT intervention at this time.    GOALS  Short Term Goals: 4 weeks (3/28/18)  1. Pt will verbalize improved awareness of PFM activity as palpated by PT in order to improve activity involvement with HEP. Met 3/26/18  2. Pt will demonstrate decreased overflow muscle activity during PF muscle contraction. Met 5/14/18  3. Pt will display integration or respiratory and pelvic diaphragm in order to improve inner core stability and support. Met 3/26/18  4. Pt will report incorporation of voiding schedule into daily routine to manage frequency, incontinence etc. Met 5/14/18  5. Pt will tolerate HEP to improve impairments and independence with ADL's. Met 3/26/18     Long Term Goals: continue through 11/31/18  1. Pt will be able to lift, drop, and bear down in order to feel improved excursion of her PFM to improve function. Met 5/14/18  2. Pt will be able to perform 5 x 5'' kegals in order to improve PFM strength to decrease urinary frequency. Met 5/14/18  3. Pt will report improvement in holding ability. Partially met 11/5/18  4. Pt will be independent with HEP and self management. Met 11/5/18     CMS Impairment/Limitation/Restriction for Urinary Problem Survey  Status Limitation G-Code CMS Severity Modifier  Intake 49% 51%  Predicted 58% 42% Goal Status+ CK - At least 40 percent but less than 60 percent  4/2/2018 53% 47%  5/14/2018 54%  46%  8/20/2018 56% 44%  10/8/2018 51% 49% Current Status CK - At least 40 percent but less than 60 percent    Plan     Discharge

## 2018-11-05 NOTE — PATIENT INSTRUCTIONS
1. Recurrent UTI   --Vaginal estrogen has been shown to decrease the recurrence of urinary tract infections in post menopausal women  --Change pads often: Q 2-3 hours and add barrier cream daily (Damon's butt paste vs dessitin)   -- has never taken Cranberry supplementation and Probiotics and refuses    --Discussed long term treatment options including: currently on daily atovaquin     2.  Mixed urinary incontinence, urge > stress:   --Empty bladder every 3 hours.  Empty well: wait a minute, lean forward on toilet.    --Avoid dietary irritants (see sheet).  Keep diary x 3-5 days to determine your irritants.  --KEGELS: do 10 in AM and 10 in PM, holding each x 10 seconds.  When you feel urge to go, STOP, KEGEL, and when urge has passed, then go to bathroom.   Continue pelvic floor PT     --URGE: Ditropan 10 mg in the am and 5 mg in the evening.    SE profile reviewed.  Takes 2-4 weeks to see if will have effect.  For dry mouth: get sour, sugar free lozenge or gum.    --STRESS:  Pelvic floor PT vs Pessary     3. Vaginal atrophy (dryness): Continue Use 1 gram of estrogen cream in vagina twice a week

## 2018-11-05 NOTE — PROGRESS NOTES
Subjective:       Patient ID: Apurva Rodriguez is a 53 y.o. female.    Chief Complaint:  Gynecologic Exam (F/U  recurrent uti's)      History of Present Illness  Gynecologic Exam   The patient's pertinent negatives include no pelvic pain or vaginal discharge. Associated symptoms include dysuria, hematuria and urgency. Pertinent negatives include no abdominal pain, back pain, chills, constipation, diarrhea, fever, flank pain, frequency, nausea, rash or vomiting.    52 Y O F P0 with history or recurrent UTI, s/p DDKT   has a past medical history of Anemia, Avascular necrosis left hip (12/6/2016), Dyslipidemia (12/6/2016), ESRD (end stage renal disease) (12/6/2016), Essential hypertension (12/6/2016), Gastroesophageal reflux disease without esophagitis (12/6/2016), Hypertension, S/P bone marrow biopsy (12/6/2016), and Secondary hyperparathyroidism of renal origin (12/6/2016).Has history of recurrent UTI as a child and was treated with long term ppx bactrim has a history of one bactrim a week.  Has been atovaquone 750 mg daily.  Here for follow up has recently started the leva system too early to tell a difference.       Interval  HP since the last visit   1)  UI:  (+) LIUS < (+) UUI    (+) pads: panty liner during the day only for work .  Daytime frequency: Q 4 -5 hours. Nocturia: Yes: 2-3 times    (--) dysuria-  (--) hematuria,  (--) frequent UTIs.  (+) complete bladder emptying.     2)  POP:   Symptoms:(--) .  (--) vaginal bleeding. (--) vaginal discharge. (+) sexually active.  (--) dyspareunia.   (-)  Vaginal dryness.  (+) vaginal estrogen use.     3)  BM:  (+) constipation/straining: improved (prunes )   (--) chronic diarrhea. (--) hematochezia.  (--) fecal incontinence.  (--) fecal smearing/urgency.  (--) incomplete evacuation.      4) Recurrent UTI: No symptoms     Ohs Peq Urogyn Hpi     Question 1/30/2018  8:50 AM CST   General Urogynecology: Are you experiencing the following?    Dysuria (painful urination) No  "  Nocturia:  waking up at night to empty your bladder  Yes   If you answered yes to the previous question, how many times does this happen per night? 5-6,    Enuresis (urine loss during sleep) Yes   Dribbling urine after you urinate Yes   Hematuria (urine appears red) No   Type of stream Strong   Urinary frequency: How often a day are you going to the bathroom per day?  About  10   Urinary Tract Infections: How many Urinary Tract Infections have you had in the past year? I have not had a UTI in the past year   If you have had a UTI in the past year, what treatments have you had so far?  Prophylactic antibiotics: bactrim    Urinary Incontinence (General): Are you experiencing the following?    Past consultation for incontinence: Have you ever seen someone for the evaluation of incontinence? Yes   If you answered yes to the previous question, please select all the therapies you have tried.  Tahmina    Pelvic floor physical therapy    biofeed back    Please note the effectiveness of the therapies. Somewhat effective   Need to wear protection to keep clothes dry  Yes   If you answered yes to the previous question, please savage the protection you use.  Panty liner   If you wear protection, how much wetness is typically on each pad? Scant   If you wear protection, how often do you have to change per day, if applicable?  1   Stress Symptoms: Are you experiencing the following?    Leakage of urine with cough, laugh and/or sneeze Yes   If you answered yes to the previous question, what is the frequency in days, weeks and/or months? Daily   Leakage of urine with sex Yes, only with orgasm    Leakage of urine with bending/ lifting No   Leakage of urine with briskly walking or jogging Yes   If you lose urine for any other reason not previously mentioned, please note it below, if applicable.  traveling   Urge Symptoms: Are you experiencing the following?    Urgency ("got to go" feeling) Yes   Urge: How frequently do you feel an " "urge to urinate (feeling like you "gotta go" to the bathroom and can't wait) Several times a day   Do you experience a leakage of urine when you have a feeling of urgency?  Yes   Leakage of urine when unaware No   Past use of anticholinergics (medications used to treat overactive bladder) No   If you answered yes to the previous question, please savage the anticholinergics you have used:     Have you ever used Mirbetriq (aka Mirabegron)?  No   Prolapse Symptoms: Are you experiencing any of the following?     Falling out/ Bulging/ Heaviness in the vagina No   Vaginal/ Abdominal Pain/ Pressure No   Need to strain/ Push to void No   Need to wait on the toilet before you void No   Unusual position to urinate (using your hands to push back the vaginal bulge) No   Sensation of incomplete emptying Yes   Past use of pessary device No   If you answered yes to the previous question, please list the devices you have used below.     Bowel Symptoms: Are you experiencing any of the following?    Constipation No   Diarrhea  No   Hematochezia (bloody stool) No   Incomplete evacuation of stool No   Involuntary loss of formed stool No   Fecal smearing/urgency No   Involuntary loss of gas Yes   Vaginal Symptoms: Are you experiencing any of the following?     Abnormal vaginal bleeding  No   Vaginal dryness Yes   Sexually active  Yes   Dyspareunia (painful intercourse) No   Estrogen use  No     GYN & OB History  No LMP recorded. Patient is postmenopausal.   Date of Last Pap: No result found    OB History    Para Term  AB Living   0 0 0 0 0 0   SAB TAB Ectopic Multiple Live Births   0 0 0 0 0             Review of Systems  Review of Systems   Constitutional: Negative.  Negative for activity change, appetite change, chills, diaphoresis, fatigue, fever and unexpected weight change.   HENT: Negative.    Eyes: Negative.    Respiratory: Negative.  Negative for cough.    Cardiovascular: Negative.    Gastrointestinal: Negative for " abdominal distention, abdominal pain, anal bleeding, blood in stool, constipation, diarrhea, nausea, rectal pain and vomiting.   Endocrine: Negative.    Genitourinary: Positive for dysuria, hematuria and urgency. Negative for decreased urine volume, difficulty urinating, dyspareunia, enuresis, flank pain, frequency, genital sores, menstrual problem, pelvic pain, vaginal bleeding, vaginal discharge and vaginal pain.   Musculoskeletal: Negative for back pain.   Skin: Negative for color change, pallor, rash and wound.   Allergic/Immunologic: Negative for environmental allergies, food allergies and immunocompromised state.   Hematological: Negative for adenopathy. Does not bruise/bleed easily.   Psychiatric/Behavioral: Negative for agitation, behavioral problems, confusion and sleep disturbance.           Objective:     Physical Exam   Constitutional: She is oriented to person, place, and time. She appears well-developed and well-nourished.   HENT:   Head: Normocephalic and atraumatic.   Neck: Normal range of motion. Neck supple.   Pulmonary/Chest: No respiratory distress. She has no wheezes.   Musculoskeletal: Normal range of motion.   Neurological: She is alert and oriented to person, place, and time.   Skin: No cyanosis. Nails show no clubbing.   Psychiatric: She has a normal mood and affect. Her behavior is normal. Judgment and thought content normal.          Assessment:        1. Mixed urge and stress incontinence             Plan:      1. Recurrent UTI   --Vaginal estrogen has been shown to decrease the recurrence of urinary tract infections in post menopausal women  --Change pads often: Q 2-3 hours and add barrier cream daily (Damon's butt paste vs dessitin)   -- has never taken Cranberry supplementation and Probiotics and refuses    --Discussed long term treatment options including: currently on daily atovaquin;     2.  Mixed urinary incontinence, urge > stress:   --Empty bladder every 3 hours.  Empty well:  wait a minute, lean forward on toilet.    --Avoid dietary irritants (see sheet).  Keep diary x 3-5 days to determine your irritants.  --KEGELS: do 10 in AM and 10 in PM, holding each x 10 seconds.  When you feel urge to go, STOP, KEGEL, and when urge has passed, then go to bathroom.   Continue pelvic floor PT     --URGE: Ditropan 10 mg in the am and 5 mg in the evening.    SE profile reviewed.  Takes 2-4 weeks to see if will have effect.  For dry mouth: get sour, sugar free lozenge or gum.    --STRESS:  Pelvic floor PT and Leva      3. Vaginal atrophy (dryness): Continue Use 1 gram of estrogen cream in vagina twice a week     Patient may be transferring her care to [facility], if so will recommend establish care with Dr. Ericka Zavaleta.    Approximately 20 min were spent in consult, 90 % in discussion.     Kendy Asif DO  Female Pelvic Medicine and Reconstructive Surgery  [facility] [place], [place]

## 2018-11-06 NOTE — PROGRESS NOTES
"   Kidney Post-Transplant Assessment    Referring Physician: LUDA Raygoza  Current Nephrologist: LUDA Raygoza    ORGAN: RIGHT KIDNEY  Donor Type:  - brain death  PHS Increased Risk: yes  Cold Ischemia: 852 mins  Induction Medications: campath - alemtuzumab (anti-cd52), steroids (prednisone,methylprednisolone,solumedrol,medrol,decadron)    Subjective:     CC:  Reassessment of renal allograft function and management of chronic immunosuppression.    HPI:  Ms. Rodriguez is a 53 y.o. year old White female who received a  - brain death kidney transplant on 17. Her creatinines range 0.9-1.1. She takes tacrolimus and MMF on hold for maintenance immunosuppression. Her post transplant course has been uncomplicated to date.    Pertinent History:  -ESRD at age 23 from nephrocalcinosis.  Diagnosed with kidney disease at age 6 months  -Nephrocalcinosis diagnosed age 1 (note old records indicate PKD--this is in error)  -[place] at , failed 2016 when she started dialysis. IS-CSA/MMF  -h/o pancytopenia with a bone marrow biopsy after the first kidney transplant   -AVN requiring hip replacement.  -DDKT #2  (PHS-IR) 17 induced with Campath. cPRA pre txp 94%. KDPI 50%,  CIT 14+ hrs. Required 7 day pollack d/t friable bladder.Seen by surgery 17 (Dr. Barragan): "Scant serous discharge from wound. Redness with minimal blanching. Incision probed with sterile swab, no expression of fluid or pus. Recommend continued observation"  -Developed uvular edema post op with cough and hoarseness (ENT dx hydrops)  -CMV status ++; valcyte held d/t neutropenia  -Neutropenia cause bactrim to be D/C'd. Could not tolerate pentam d/t cough/vocal cord issues.  -Transaminitis d/t 2+ siderosis. No fibrosis on biopsy.    -MBD: , Vit D 24.  Cinacalcet and ergocalciferol started post op    Hematology evaluated iron overload given siderosis on liver biopsy. "Phlebotomy at minimum can prevent further " "accumulation, but with transaminitis with the biopsy findings, addition of iron chelation to be considered." Exjade chelation was discussed, but use was not planned at present d/t the small potential of MICHELLE.  She underwent a phlebotomy that left her exhausted with insomnia and loss of concentration.  She discussed her concern with Hematology, and they opted to monitor her hgb.      Apurva reports no kidney concerns. She states she is having trouble with double vision, r/t being cross eyed.and also having eye irritation. She is seeing her eye doc shortly. She continues to have some LUTS followed by uroGYN. She reports no other complaints.    Review of Systems   Constitutional: Negative for fever.   Eyes: Negative for visual disturbance.   Respiratory: Negative for shortness of breath.    Cardiovascular: Negative for chest pain and leg swelling.   Gastrointestinal: Negative.    Genitourinary: Positive for dysuria (intermittent, better now). Negative for hematuria.   Skin: Negative for rash.   Allergic/Immunologic: Positive for immunocompromised state.   Neurological: Negative for tremors.     Objective:   Blood pressure 135/78, pulse 64, temperature 98.3 °F (36.8 °C), temperature source Oral, resp. rate 16, height 5' (1.524 m), weight 55.4 kg (122 lb 2.2 oz), SpO2 99 %.body mass index is 23.85 kg/m².    Physical Exam   Constitutional: No distress.   Cardiovascular: Normal rate and regular rhythm.   Pulmonary/Chest: Effort normal and breath sounds normal. No respiratory distress.   Abdominal: Soft. Bowel sounds are normal. She exhibits no mass. There is no tenderness.   Musculoskeletal: She exhibits no edema.   Skin: Skin is warm and dry. No rash noted.   Psychiatric: She has a normal mood and affect.     Labs:  Lab Results   Component Value Date    WBC 3.98 10/29/2018    WBC 3.98 10/29/2018    HGB 11.9 (L) 10/29/2018    HGB 11.9 (L) 10/29/2018    HCT 36.4 (L) 10/29/2018    HCT 36.4 (L) 10/29/2018     10/29/2018 "    K 4.4 10/29/2018     10/29/2018    CO2 25 10/29/2018    BUN 26 (H) 10/29/2018    CREATININE 1.2 10/29/2018    EGFRNONAA 51.0 (A) 10/29/2018    CALCIUM 10.3 10/29/2018    PHOS 3.1 10/29/2018    MG 1.9 10/29/2018    ALBUMIN 4.0 10/29/2018    AST 26 2018    ALT 36 2018    UTPCR Unable to calculate 10/29/2018    PTH 88.0 (H) 2018    TACROLIMUS 7.5 10/29/2018   Labs were reviewed with the patient    Assessment:     1. Chronic kidney disease (CKD), stage II (mild)    2. Status post -donor kidney transplantation    3. At risk for opportunistic infections    4. Prophylactic immunotherapy    5. Immunosuppressive management encounter following kidney transplant    6. Other drug-induced neutropenia    7. Kidney replaced by transplant        Plan:   -CKD2 s/p kidney transplantation, remains excellent and stable. Continue to monitor renal function and electrolytes, HTN, secondary hyperparathyroidism and other issues related to underlying ESRD. She plans to return to the care of Dr. Saenz later this week.  -Continue tacrolimus-based immunosuppression. Goal tacro now 5-6 (given she is over a yr post txp). Will decrease to 2/2 (from 3/2). Also resume Myfortic 180 mg BID with close monitoring of CBC. Will continue to watch for ASE, symptoms and levels for side effects and drug toxicity.   -Drug induced neutropenia remains mild and unchanged. Will keep watching trend.     -Transaminitis thought d/t ? Siderosis. Ferritin has steadily gotten better and being monitored by heme.  -Dysuria- per urogyn, improving with pelvic PT  -h/o Hypercholesterolemia - she has been holding statin.Last LFTs better. I wsuggested she get repeat labs and then can resume statin (ideally with dual excretion)  -Sec Hyperparathyroidism s/p parathyroidectomy w re implantation (and re-resection pre txp). Last PTH 88 May 2018. Will defer management to Dr. Saenz.     She plans to resume care with Dr. Saenz at [facility], her old  nephrologist. She will return here prn.    Follow-up:   Clinic: return to transplant clinic weekly for the first month after transplant; every 2 weeks during months 2-3; then at 6-, 9-, 12-, 18-, 24-, and 36- months post-transplant to reassess for complications from immunosuppression toxicity and monitor for rejection.  Annually thereafter.    Labs: since patient remains at high risk for rejection and drug-related complications that warrant close monitoring, labs will be ordered as follows: continue twice weekly CBC, renal panel, and drug level for first month; then same labs once weekly through 3rd month post-transplant.  Urine for UA and protein/creatinine ratio monthly.  Urine BK - PCR at 1-, 3-, 6-, 9-, 12-, 18-, 24-, and 36- months post-transplant.  Hepatic panel at 1-, 2-, 3-, 6-, 9-, 12-, 18-, 24-, and 36- months post-transplant.    Shreya Waldron MD

## 2018-11-07 ENCOUNTER — OFFICE VISIT (OUTPATIENT)
Dept: TRANSPLANT | Facility: CLINIC | Age: 53
End: 2018-11-07
Payer: MEDICARE

## 2018-11-07 VITALS
WEIGHT: 122.13 LBS | BODY MASS INDEX: 23.98 KG/M2 | RESPIRATION RATE: 16 BRPM | HEIGHT: 60 IN | TEMPERATURE: 98 F | SYSTOLIC BLOOD PRESSURE: 135 MMHG | DIASTOLIC BLOOD PRESSURE: 78 MMHG | HEART RATE: 64 BPM | OXYGEN SATURATION: 99 %

## 2018-11-07 DIAGNOSIS — Z94.0 IMMUNOSUPPRESSIVE MANAGEMENT ENCOUNTER FOLLOWING KIDNEY TRANSPLANT: ICD-10-CM

## 2018-11-07 DIAGNOSIS — Z94.0 STATUS POST DECEASED-DONOR KIDNEY TRANSPLANTATION: ICD-10-CM

## 2018-11-07 DIAGNOSIS — N18.2 CHRONIC KIDNEY DISEASE (CKD), STAGE II (MILD): Primary | Chronic | ICD-10-CM

## 2018-11-07 DIAGNOSIS — Z91.89 AT RISK FOR OPPORTUNISTIC INFECTIONS: ICD-10-CM

## 2018-11-07 DIAGNOSIS — Z79.899 IMMUNOSUPPRESSIVE MANAGEMENT ENCOUNTER FOLLOWING KIDNEY TRANSPLANT: ICD-10-CM

## 2018-11-07 DIAGNOSIS — D70.2 OTHER DRUG-INDUCED NEUTROPENIA: ICD-10-CM

## 2018-11-07 DIAGNOSIS — Z29.89 PROPHYLACTIC IMMUNOTHERAPY: ICD-10-CM

## 2018-11-07 DIAGNOSIS — Z94.0 KIDNEY REPLACED BY TRANSPLANT: ICD-10-CM

## 2018-11-07 PROCEDURE — 99215 OFFICE O/P EST HI 40 MIN: CPT | Mod: S$PBB,,, | Performed by: INTERNAL MEDICINE

## 2018-11-07 PROCEDURE — 99999 PR PBB SHADOW E&M-EST. PATIENT-LVL IV: CPT | Mod: PBBFAC,,, | Performed by: INTERNAL MEDICINE

## 2018-11-07 PROCEDURE — 99214 OFFICE O/P EST MOD 30 MIN: CPT | Mod: PBBFAC | Performed by: INTERNAL MEDICINE

## 2018-11-07 RX ORDER — TACROLIMUS 1 MG/1
2 CAPSULE ORAL EVERY 12 HOURS
Qty: 120 CAPSULE | Refills: 11 | Status: SHIPPED | OUTPATIENT
Start: 2018-11-07

## 2018-11-07 RX ORDER — MYCOPHENOLIC ACID 180 MG/1
180 TABLET, DELAYED RELEASE ORAL 2 TIMES DAILY
Qty: 60 TABLET | Refills: 11 | Status: ON HOLD | OUTPATIENT
Start: 2018-11-07 | End: 2020-11-10

## 2018-11-07 NOTE — LETTER
November 7, 2018        ROCHELLE Saenz Jr.  [address] [place] [address]  Phone: [phone]  Fax: [phone]             [facility]  [address]  [facility] [address]  Phone: [phone]   Patient: Apurva Rodriguez   MR Number: [id number]   YOB: 1965   Date of Visit: 11/7/2018       Dear Dr. ROCHELLE Saenz Jr.    Thank you for referring Apurva Rodriguez to me for evaluation. Attached you will find relevant portions of my assessment and plan of care.    If you have questions, please do not hesitate to call me. I look forward to following Apurva Rodriguez along with you.    Sincerely,    Shreya Waldron MD    Enclosure    If you would like to receive this communication electronically, please contact [email or web address] or [phone] to request [employer] access.    [employer] is a tool which provides read-only access to select patient information with whom you have a relationship. Its easy to use and provides real time access to review your patients record including encounter summaries, notes, results, and demographic information.    If you feel you have received this communication in error or would no longer like to receive these types of communications, please e-mail [email or web address]

## 2018-11-07 NOTE — PATIENT INSTRUCTIONS
Thank you for entrusting your transplant care to us, and visiting me today.     -When you start Myfortic, go ahead and drop tacrolimus to 2 mg twice daily.  -Plan repeat white blood count about 1-2 weeks after you start Myfortic.  -Please get influenza vaccine this year and every fall in the future.  You can take the killed virus shot in the arm, but not the nasal spray (which is live vaccine).  The shot can be obtained at any pharmacy or health care facility.     Please feel free to ask any questions and raise any concerns regarding your health care.  Warmest Regards,  Dr. Sally Waldron

## 2018-11-08 RX ORDER — FAMOTIDINE 20 MG/1
TABLET, FILM COATED ORAL
Qty: 30 TABLET | Refills: 0 | Status: SHIPPED | OUTPATIENT
Start: 2018-11-08

## 2018-11-12 ENCOUNTER — PATIENT MESSAGE (OUTPATIENT)
Dept: TRANSPLANT | Facility: CLINIC | Age: 53
End: 2018-11-12

## 2018-12-08 ENCOUNTER — PATIENT MESSAGE (OUTPATIENT)
Dept: UROGYNECOLOGY | Facility: CLINIC | Age: 53
End: 2018-12-08

## 2018-12-28 RX ORDER — CARVEDILOL 6.25 MG/1
TABLET ORAL
Qty: 60 TABLET | Refills: 0 | Status: ON HOLD | OUTPATIENT
Start: 2018-12-28 | End: 2020-11-10

## 2019-06-23 NOTE — LETTER
February 9, 2018        ROCHELLE Saenz Jr.  [address] [place] [address]  Phone: [phone]  Fax: [phone]             [facility]  [address]  [facility] [address]  Phone: [phone]   Patient: Apurva Rodriguez   MR Number: [id number]   YOB: 1965   Date of Visit: 2/7/2018       Dear Dr. ROCHELLE Saenz Jr.    Thank you for referring Apurva Rodriguez to me for evaluation. Attached you will find relevant portions of my assessment and plan of care.    If you have questions, please do not hesitate to call me. I look forward to following Apurva Rodriguez along with you.    Sincerely,    Shreya Waldron MD    Enclosure    If you would like to receive this communication electronically, please contact [email or web address] or [phone] to request [employer] access.    [employer] Link is a tool which provides read-only access to select patient information with whom you have a relationship. Its easy to use and provides real time access to review your patients record including encounter summaries, notes, results, and demographic information.    If you feel you have received this communication in error or would no longer like to receive these types of communications, please e-mail [email or web address]        The procedure was performed independently The procedure was performed independently

## 2020-10-09 ENCOUNTER — PATIENT MESSAGE (OUTPATIENT)
Dept: OPHTHALMOLOGY | Facility: CLINIC | Age: 55
End: 2020-10-09

## 2020-10-23 ENCOUNTER — OFFICE VISIT (OUTPATIENT)
Dept: OPHTHALMOLOGY | Facility: CLINIC | Age: 55
End: 2020-10-23
Payer: MEDICARE

## 2020-10-23 DIAGNOSIS — H50.30 INTERMITTENT ESOTROPIA: Primary | ICD-10-CM

## 2020-10-23 PROCEDURE — 92002 INTRM OPH EXAM NEW PATIENT: CPT | Mod: S$PBB,,, | Performed by: STUDENT IN AN ORGANIZED HEALTH CARE EDUCATION/TRAINING PROGRAM

## 2020-10-23 PROCEDURE — 92002 PR EYE EXAM, NEW PATIENT,INTERMED: ICD-10-PCS | Mod: S$PBB,,, | Performed by: STUDENT IN AN ORGANIZED HEALTH CARE EDUCATION/TRAINING PROGRAM

## 2020-10-23 PROCEDURE — 99999 PR PBB SHADOW E&M-EST. PATIENT-LVL II: CPT | Mod: PBBFAC,,, | Performed by: STUDENT IN AN ORGANIZED HEALTH CARE EDUCATION/TRAINING PROGRAM

## 2020-10-23 PROCEDURE — 99212 OFFICE O/P EST SF 10 MIN: CPT | Mod: PBBFAC,25 | Performed by: STUDENT IN AN ORGANIZED HEALTH CARE EDUCATION/TRAINING PROGRAM

## 2020-10-23 PROCEDURE — 92060 SENSORIMOTOR EXAMINATION: CPT | Mod: PBBFAC | Performed by: STUDENT IN AN ORGANIZED HEALTH CARE EDUCATION/TRAINING PROGRAM

## 2020-10-23 PROCEDURE — 99999 PR PBB SHADOW E&M-EST. PATIENT-LVL II: ICD-10-PCS | Mod: PBBFAC,,, | Performed by: STUDENT IN AN ORGANIZED HEALTH CARE EDUCATION/TRAINING PROGRAM

## 2020-10-23 PROCEDURE — 92060 PR SPECIAL EYE EVAL,SENSORIMOTOR: ICD-10-PCS | Mod: 26,S$PBB,, | Performed by: STUDENT IN AN ORGANIZED HEALTH CARE EDUCATION/TRAINING PROGRAM

## 2020-10-23 PROCEDURE — 92060 SENSORIMOTOR EXAMINATION: CPT | Mod: 26,S$PBB,, | Performed by: STUDENT IN AN ORGANIZED HEALTH CARE EDUCATION/TRAINING PROGRAM

## 2020-10-23 NOTE — LETTER
October 23, 2020      Han Baker MD  [address] [facility] [address]           [facility] [address]st Fl  [address]  [facility] [address]  Phone: [phone]  Fax: [phone]          Patient: Apurva Rodriguez   MR Number: [id number]   YOB: 1965   Date of Visit: 10/23/2020       Dear Dr. Han Baker:    Thank you for referring Apurva Rodriguez to me for evaluation. Attached you will find relevant portions of my assessment and plan of care.    If you have questions, please do not hesitate to call me. I look forward to following Apurva Rodriguez along with you.    Sincerely,    Tamy Duarte MD    Enclosure  CC:  No Recipients    If you would like to receive this communication electronically, please contact [email or web address] or [phone] to request more information on [email or web address] Link access.    For providers and/or their staff who would like to refer a patient to [facility], please contact us through our one-stop-shop provider referral line, [facility], at [phone].    If you feel you have received this communication in error or would no longer like to receive these types of communications, please e-mail [email or web address]

## 2020-10-23 NOTE — PROGRESS NOTES
HPI     54 yo female presents today for esotropia evaluation. Pt states for the   past two and a half years she has noticed that the right eye turns inwards   and she also complains of slight horizontal diplopia. Pt states her   glasses are single vision without prism.     Pt states she would prefer to not have surgery, but believe that is her   only option now. She was prescribed mono vision contacts to correct the   doubling, but states that it did not work. Pt states she has poor depth   perception.     Pt had strabismus surgery at the age of 6 months which then resulted in   her contacting an infection afterwards.     Last edited by Carolann Rao on 10/23/2020  9:03 AM. (History)            Assessment /Plan     For exam results, see Encounter Report.    Intermittent esotropia      Discussed findings with patient today in detail.   Deviation found today and discussed options to correct deviation with patient including patching, prism glasses, surgery. Deviation is small and amenable to glasses however she does not want to rely on glasses to correct double vision. Normally wears contacts so patient has decided to move forward with surgical correction.   Discussed surgical procedure, what to expect post operatively, and risks and benefits.   Answered all patients questions presented in office today. She understands she may still need prism in glasses or additional surgery after the initial surgery. Explained increased risk for retinal injury with high myopic eye. Understands risk of over or undercorrection and possible infection which could cause vision loss.   Patient has decided to move forward with surgery.  Consents signed in office today     Plan for 1 medial rectus recession on an adjustable suture.     RTC 4 weeks post operatively sooner PRN     This service was scribed by Carolann Rao for and in the presence of Dr. Duarte who personally performed this service.    Carolann Rao COA    Tamy Duarte,  MD

## 2020-10-26 ENCOUNTER — PATIENT MESSAGE (OUTPATIENT)
Dept: OPHTHALMOLOGY | Facility: CLINIC | Age: 55
End: 2020-10-26

## 2020-10-27 ENCOUNTER — TELEPHONE (OUTPATIENT)
Dept: OPHTHALMOLOGY | Facility: CLINIC | Age: 55
End: 2020-10-27

## 2020-10-27 DIAGNOSIS — Z13.9 SCREENING PROCEDURE: Primary | ICD-10-CM

## 2020-10-27 DIAGNOSIS — H50.30 INTERMITTENT ESOTROPIA: Primary | ICD-10-CM

## 2020-11-06 ENCOUNTER — LAB VISIT (OUTPATIENT)
Dept: FAMILY MEDICINE | Facility: CLINIC | Age: 55
End: 2020-11-06
Payer: MEDICARE

## 2020-11-06 DIAGNOSIS — Z13.9 SCREENING PROCEDURE: ICD-10-CM

## 2020-11-06 PROCEDURE — U0003 INFECTIOUS AGENT DETECTION BY NUCLEIC ACID (DNA OR RNA); SEVERE ACUTE RESPIRATORY SYNDROME CORONAVIRUS 2 (SARS-COV-2) (CORONAVIRUS DISEASE [COVID-19]), AMPLIFIED PROBE TECHNIQUE, MAKING USE OF HIGH THROUGHPUT TECHNOLOGIES AS DESCRIBED BY CMS-2020-01-R: HCPCS

## 2020-11-07 LAB — SARS-COV-2 RNA RESP QL NAA+PROBE: NOT DETECTED

## 2020-11-09 ENCOUNTER — ANESTHESIA EVENT (OUTPATIENT)
Dept: SURGERY | Facility: HOSPITAL | Age: 55
End: 2020-11-09
Payer: MEDICARE

## 2020-11-09 ENCOUNTER — TELEPHONE (OUTPATIENT)
Dept: OPTOMETRY | Facility: CLINIC | Age: 55
End: 2020-11-09

## 2020-11-09 RX ORDER — SULFAMETHOXAZOLE AND TRIMETHOPRIM 800; 160 MG/1; MG/1
TABLET ORAL
COMMUNITY
Start: 2018-12-01

## 2020-11-09 RX ORDER — AMLODIPINE BESYLATE 10 MG/1
TABLET ORAL
COMMUNITY
Start: 2018-12-01

## 2020-11-09 RX ORDER — MIRABEGRON 25 MG/1
25 TABLET, FILM COATED, EXTENDED RELEASE ORAL NIGHTLY
COMMUNITY
Start: 2020-09-01

## 2020-11-09 RX ORDER — AZATHIOPRINE 50 MG/1
50 TABLET ORAL DAILY
COMMUNITY
Start: 2020-10-23

## 2020-11-09 RX ORDER — ATORVASTATIN CALCIUM 20 MG/1
20 TABLET, FILM COATED ORAL NIGHTLY
COMMUNITY
Start: 2020-10-21

## 2020-11-09 RX ORDER — SODIUM CHLORIDE 0.9 % (FLUSH) 0.9 %
10 SYRINGE (ML) INJECTION
Status: DISCONTINUED | OUTPATIENT
Start: 2020-11-09 | End: 2020-11-10 | Stop reason: HOSPADM

## 2020-11-09 NOTE — PRE-PROCEDURE INSTRUCTIONS
PREOP INSTRUCTIONS:No solid food ,milk or milk products for 8 hours prior to procedure.Clear liquids are allowed up to 2 hours before procedure.Clear liquids are:water,apple juice,gatorade & powerade.Instructed to follow the surgeon's instructions if they differ from these.Shower instructions as well as directions to the Surgery Center were given.Encouraged to wear loose fitting,comfortable clothing.Medication instructions for pm prior to and am of procedure reviewed.Instructed to avoid taking vitamins,supplements,aspirin and ibuprofen the morning of surgery. Patient's questions and concerns addressed .Patient informed of the current visitor policy and advised patient that one visitor may accompany each patient into the hospital and wait (socially distanced) until a member of the medical team provides an update at the conclusion of the procedure.When they enter the hospital both patient and visitor will have their temperature checked.All visitors are asked to arrive with a mask and to keep their mask on throughout the visit.      Patient denies any side effects or issues with anesthesia or sedation.     0545 ARRIVAL TO

## 2020-11-09 NOTE — ANESTHESIA PREPROCEDURE EVALUATION
2020  Apurva Rodriguez is a 55 y.o., female.    Pre-operative evaluation for Procedure(s) (LRB):  STRABISMUS SURGERY WITH ADJUSTABLE SUTURES (Bilateral)    Apurva Rodriguez is a 55 y.o. female     LDA:     Prev airway:     Drips:     Patient Active Problem List   Diagnosis    Monocular esotropia, right eye    History of strabismus surgery    Lattice degeneration of right retina    Status post -donor kidney transplantation    Avascular necrosis left hip    Gastroesophageal reflux disease without esophagitis    Secondary hyperparathyroidism of renal origin    S/P kidney transplant    Dyslipidemia    Essential hypertension    Long-term use of immunosuppressant medication    Prophylactic immunotherapy    At risk for opportunistic infections    Sore throat    Transaminitis from iron overload    Neutropenia    Chronic kidney disease (CKD), stage II (mild)    Elevated liver enzymes    Mixed stress and urge urinary incontinence    Nocturia    Pelvic floor dysfunction    Renal dysfunction    Elevated ferritin level    Iron overload    Intermittent esotropia       Review of patient's allergies indicates:   Allergen Reactions    Pentamidine Shortness Of Breath    Valium [diazepam] Anxiety    Amphotericin b      Peritonitis reaction in dialysis solution while on peritoneal dialysis    Azelex [azelaic acid] Hives    Cleocin [clindamycin hcl] Hives    Decadron [dexamethasone]      Highly anxious    Morphine Swelling     lips    Reglan [metoclopramide hcl] Other (See Comments)    Vancomycin analogues Itching     Hair areas        No current facility-administered medications on file prior to encounter.      Current Outpatient Medications on File Prior to Encounter   Medication Sig Dispense Refill    amLODIPine (NORVASC) 10 MG tablet       atorvastatin (LIPITOR) 20 MG tablet  Take 20 mg by mouth every evening.       azaTHIOprine (IMURAN) 50 mg Tab Take 50 mg by mouth once daily.       famotidine (PEPCID) 20 MG tablet TAKE 1 TABLET BY MOUTH EVERY EVENING 30 tablet 0    MYRBETRIQ 25 mg Tb24 ER tablet Take 25 mg by mouth nightly.       sulfamethoxazole-trimethoprim 800-160mg (BACTRIM DS) 800-160 mg Tab       tacrolimus (PROGRAF) 1 MG Cap Take 2 capsules (2 mg total) by mouth every 12 (twelve) hours. Z94.0 (Patient taking differently: Take 3 mg by mouth nightly. Z94.0) 120 capsule 11    carvedilol (COREG) 6.25 MG tablet TAKE 1 TABLET(6.25 MG) BY MOUTH TWICE DAILY 60 tablet 0    ergocalciferol (ERGOCALCIFEROL) 50,000 unit Cap TK ONE C PO  Q 7 DAYS. WEDNESDAY  8    mycophenolate (MYFORTIC) 180 MG TbEC Take 1 tablet (180 mg total) by mouth 2 (two) times daily. 60 tablet 11    oxybutynin (DITROPAN-XL) 5 MG TR24 Take 1 tablet (5 mg total) by mouth once daily. In the PM 30 tablet 11    PREMARIN vaginal cream TEO 1 GRAM WITH APPLICATOR OR A DIME -SIZED AMOUNT WITH FINGER INTO VAGINA NIGHTLY FOR 2 WEEKS THEN TWICE WEEKLY THEREAFTER  12       Past Surgical History:   Procedure Laterality Date    ABDOMINAL HERNIA REPAIR      bone marrow biospy  2015    CHOLECYSTECTOMY      laparoascopic    COLONOSCOPY      Endometrical abilation hysteroscopy N/A 2005    EYE MUSCLE SURGERY Bilateral     as a baby(2 years)    FRACTURE SURGERY Right     rigt femur :steel karan pl;acement and replacement    KIDNEY TRANSPLANT Right     RLQ transplant,  donor    left hip revision      PARATHYROIDECTOMY      PERITONEAL CATHETER INSERTION      PERITONEAL CATHETER INSERTION      right hip decompression      TONSILLECTOMY      TOTAL HIP ARTHROPLASTY Left                Anesthesia Evaluation          Review of Systems  Anesthesia Hx:  Personal Hx of Anesthesia complications Severe Sore Throat after Anesthesia          Anesthesia Plan  Type of Anesthesia, risks & benefits  discussed:  Anesthesia Type:  general  Patient's Preference:   Intra-op Monitoring Plan: standard ASA monitors  Intra-op Monitoring Plan Comments:   Post Op Pain Control Plan:   Post Op Pain Control Plan Comments:   Induction:   IV  Beta Blocker:         Informed Consent: Patient understands risks and agrees with Anesthesia plan.  Questions answered. Anesthesia consent signed with patient.  ASA Score: 3     Day of Surgery Review of History & Physical:            Ready For Surgery From Anesthesia Perspective.     DIFFICULTY SWALLOWING // PT

## 2020-11-10 ENCOUNTER — HOSPITAL ENCOUNTER (OUTPATIENT)
Facility: HOSPITAL | Age: 55
Discharge: HOME | End: 2020-11-10
Attending: STUDENT IN AN ORGANIZED HEALTH CARE EDUCATION/TRAINING PROGRAM | Admitting: STUDENT IN AN ORGANIZED HEALTH CARE EDUCATION/TRAINING PROGRAM
Payer: MEDICARE

## 2020-11-10 ENCOUNTER — TELEPHONE (OUTPATIENT)
Dept: OPHTHALMOLOGY | Facility: CLINIC | Age: 55
End: 2020-11-10

## 2020-11-10 ENCOUNTER — ANESTHESIA (OUTPATIENT)
Dept: SURGERY | Facility: HOSPITAL | Age: 55
End: 2020-11-10
Payer: MEDICARE

## 2020-11-10 VITALS
WEIGHT: 120 LBS | HEART RATE: 73 BPM | TEMPERATURE: 99 F | RESPIRATION RATE: 20 BRPM | BODY MASS INDEX: 23.56 KG/M2 | DIASTOLIC BLOOD PRESSURE: 76 MMHG | SYSTOLIC BLOOD PRESSURE: 132 MMHG | HEIGHT: 60 IN | OXYGEN SATURATION: 99 %

## 2020-11-10 DIAGNOSIS — H50.30 INTERMITTENT ESOTROPIA: Primary | ICD-10-CM

## 2020-11-10 DIAGNOSIS — H50.00 ESOTROPIA OF BOTH EYES: ICD-10-CM

## 2020-11-10 LAB — SARS-COV-2 RDRP RESP QL NAA+PROBE: NEGATIVE

## 2020-11-10 PROCEDURE — 67335 PR STABISMUS SURG,PLACE ADJUST SUTURE: ICD-10-PCS | Mod: RT,,, | Performed by: STUDENT IN AN ORGANIZED HEALTH CARE EDUCATION/TRAINING PROGRAM

## 2020-11-10 PROCEDURE — 63600175 PHARM REV CODE 636 W HCPCS: Performed by: NURSE ANESTHETIST, CERTIFIED REGISTERED

## 2020-11-10 PROCEDURE — 71000044 HC DOSC ROUTINE RECOVERY FIRST HOUR: Performed by: STUDENT IN AN ORGANIZED HEALTH CARE EDUCATION/TRAINING PROGRAM

## 2020-11-10 PROCEDURE — D9220A PRA ANESTHESIA: ICD-10-PCS | Mod: ANES,,, | Performed by: ANESTHESIOLOGY

## 2020-11-10 PROCEDURE — 36000707: Performed by: STUDENT IN AN ORGANIZED HEALTH CARE EDUCATION/TRAINING PROGRAM

## 2020-11-10 PROCEDURE — 37000009 HC ANESTHESIA EA ADD 15 MINS: Performed by: STUDENT IN AN ORGANIZED HEALTH CARE EDUCATION/TRAINING PROGRAM

## 2020-11-10 PROCEDURE — 71000015 HC POSTOP RECOV 1ST HR: Performed by: STUDENT IN AN ORGANIZED HEALTH CARE EDUCATION/TRAINING PROGRAM

## 2020-11-10 PROCEDURE — D9220A PRA ANESTHESIA: Mod: ANES,,, | Performed by: ANESTHESIOLOGY

## 2020-11-10 PROCEDURE — 67311 REVISE EYE MUSCLE: CPT | Mod: RT,,, | Performed by: STUDENT IN AN ORGANIZED HEALTH CARE EDUCATION/TRAINING PROGRAM

## 2020-11-10 PROCEDURE — 67311 PR STABISMUS SURG,ONE HORIZ MUSCLE: ICD-10-PCS | Mod: RT,,, | Performed by: STUDENT IN AN ORGANIZED HEALTH CARE EDUCATION/TRAINING PROGRAM

## 2020-11-10 PROCEDURE — D9220A PRA ANESTHESIA: ICD-10-PCS | Mod: CRNA,,, | Performed by: NURSE ANESTHETIST, CERTIFIED REGISTERED

## 2020-11-10 PROCEDURE — D9220A PRA ANESTHESIA: Mod: CRNA,,, | Performed by: NURSE ANESTHETIST, CERTIFIED REGISTERED

## 2020-11-10 PROCEDURE — 67335 EYE SUTURE DURING SURGERY: CPT | Mod: RT,,, | Performed by: STUDENT IN AN ORGANIZED HEALTH CARE EDUCATION/TRAINING PROGRAM

## 2020-11-10 PROCEDURE — U0002 COVID-19 LAB TEST NON-CDC: HCPCS

## 2020-11-10 PROCEDURE — 25000003 PHARM REV CODE 250: Performed by: STUDENT IN AN ORGANIZED HEALTH CARE EDUCATION/TRAINING PROGRAM

## 2020-11-10 PROCEDURE — 25000003 PHARM REV CODE 250: Performed by: NURSE ANESTHETIST, CERTIFIED REGISTERED

## 2020-11-10 PROCEDURE — 25000003 PHARM REV CODE 250

## 2020-11-10 PROCEDURE — 36000706: Performed by: STUDENT IN AN ORGANIZED HEALTH CARE EDUCATION/TRAINING PROGRAM

## 2020-11-10 PROCEDURE — 37000008 HC ANESTHESIA 1ST 15 MINUTES: Performed by: STUDENT IN AN ORGANIZED HEALTH CARE EDUCATION/TRAINING PROGRAM

## 2020-11-10 PROCEDURE — 27200651 HC AIRWAY, LMA: Performed by: ANESTHESIOLOGY

## 2020-11-10 RX ORDER — MIDAZOLAM HYDROCHLORIDE 1 MG/ML
INJECTION, SOLUTION INTRAMUSCULAR; INTRAVENOUS
Status: DISCONTINUED | OUTPATIENT
Start: 2020-11-10 | End: 2020-11-10

## 2020-11-10 RX ORDER — ONDANSETRON 2 MG/ML
INJECTION INTRAMUSCULAR; INTRAVENOUS
Status: DISCONTINUED | OUTPATIENT
Start: 2020-11-10 | End: 2020-11-10

## 2020-11-10 RX ORDER — LIDOCAINE HYDROCHLORIDE 10 MG/ML
1 INJECTION, SOLUTION EPIDURAL; INFILTRATION; INTRACAUDAL; PERINEURAL ONCE
Status: DISCONTINUED | OUTPATIENT
Start: 2020-11-10 | End: 2020-11-10 | Stop reason: HOSPADM

## 2020-11-10 RX ORDER — HYDROMORPHONE HYDROCHLORIDE 1 MG/ML
0.2 INJECTION, SOLUTION INTRAMUSCULAR; INTRAVENOUS; SUBCUTANEOUS EVERY 5 MIN PRN
Status: DISCONTINUED | OUTPATIENT
Start: 2020-11-10 | End: 2020-11-10 | Stop reason: HOSPADM

## 2020-11-10 RX ORDER — NEOMYCIN SULFATE, POLYMYXIN B SULFATE, AND DEXAMETHASONE 3.5; 10000; 1 MG/G; [USP'U]/G; MG/G
OINTMENT OPHTHALMIC
Status: DISCONTINUED | OUTPATIENT
Start: 2020-11-10 | End: 2020-11-10 | Stop reason: HOSPADM

## 2020-11-10 RX ORDER — TOBRAMYCIN AND DEXAMETHASONE 3; 1 MG/ML; MG/ML
SUSPENSION/ DROPS OPHTHALMIC
Status: DISCONTINUED
Start: 2020-11-10 | End: 2020-11-10 | Stop reason: HOSPADM

## 2020-11-10 RX ORDER — LIDOCAINE HYDROCHLORIDE 20 MG/ML
INJECTION INTRAVENOUS
Status: DISCONTINUED | OUTPATIENT
Start: 2020-11-10 | End: 2020-11-10

## 2020-11-10 RX ORDER — FENTANYL CITRATE 50 UG/ML
INJECTION, SOLUTION INTRAMUSCULAR; INTRAVENOUS
Status: DISCONTINUED | OUTPATIENT
Start: 2020-11-10 | End: 2020-11-10

## 2020-11-10 RX ORDER — OXYCODONE AND ACETAMINOPHEN 5; 325 MG/1; MG/1
1 TABLET ORAL EVERY 4 HOURS PRN
Qty: 15 TABLET | Refills: 0 | Status: SHIPPED | OUTPATIENT
Start: 2020-11-10 | End: 2020-11-10 | Stop reason: SDUPTHER

## 2020-11-10 RX ORDER — OXYCODONE AND ACETAMINOPHEN 5; 325 MG/1; MG/1
1 TABLET ORAL EVERY 4 HOURS PRN
Qty: 15 TABLET | Refills: 0 | Status: SHIPPED | OUTPATIENT
Start: 2020-11-10 | End: 2020-11-13

## 2020-11-10 RX ORDER — SODIUM CHLORIDE 9 MG/ML
INJECTION, SOLUTION INTRAVENOUS CONTINUOUS PRN
Status: DISCONTINUED | OUTPATIENT
Start: 2020-11-10 | End: 2020-11-10

## 2020-11-10 RX ORDER — PROPOFOL 10 MG/ML
VIAL (ML) INTRAVENOUS
Status: DISCONTINUED | OUTPATIENT
Start: 2020-11-10 | End: 2020-11-10

## 2020-11-10 RX ORDER — PHENYLEPHRINE HYDROCHLORIDE 25 MG/ML
SOLUTION/ DROPS OPHTHALMIC
Status: DISCONTINUED | OUTPATIENT
Start: 2020-11-10 | End: 2020-11-10 | Stop reason: HOSPADM

## 2020-11-10 RX ORDER — PHENYLEPHRINE HYDROCHLORIDE 25 MG/ML
SOLUTION/ DROPS OPHTHALMIC
Status: DISCONTINUED
Start: 2020-11-10 | End: 2020-11-10 | Stop reason: HOSPADM

## 2020-11-10 RX ORDER — NEOMYCIN SULFATE, POLYMYXIN B SULFATE, AND DEXAMETHASONE 3.5; 10000; 1 MG/G; [USP'U]/G; MG/G
OINTMENT OPHTHALMIC
Status: DISCONTINUED
Start: 2020-11-10 | End: 2020-11-10 | Stop reason: HOSPADM

## 2020-11-10 RX ADMIN — FENTANYL CITRATE 50 MCG: 50 INJECTION, SOLUTION INTRAMUSCULAR; INTRAVENOUS at 08:11

## 2020-11-10 RX ADMIN — ONDANSETRON 4 MG: 2 INJECTION, SOLUTION INTRAMUSCULAR; INTRAVENOUS at 08:11

## 2020-11-10 RX ADMIN — LIDOCAINE HYDROCHLORIDE 80 MG: 20 INJECTION, SOLUTION INTRAVENOUS at 07:11

## 2020-11-10 RX ADMIN — FENTANYL CITRATE 50 MCG: 50 INJECTION, SOLUTION INTRAMUSCULAR; INTRAVENOUS at 07:11

## 2020-11-10 RX ADMIN — PROPOFOL 130 MG: 10 INJECTION, EMULSION INTRAVENOUS at 07:11

## 2020-11-10 RX ADMIN — PROPOFOL 70 MG: 10 INJECTION, EMULSION INTRAVENOUS at 07:11

## 2020-11-10 RX ADMIN — FENTANYL CITRATE 25 MCG: 50 INJECTION, SOLUTION INTRAMUSCULAR; INTRAVENOUS at 07:11

## 2020-11-10 RX ADMIN — MIDAZOLAM HYDROCHLORIDE 2 MG: 1 INJECTION, SOLUTION INTRAMUSCULAR; INTRAVENOUS at 07:11

## 2020-11-10 RX ADMIN — SODIUM CHLORIDE: 0.9 INJECTION, SOLUTION INTRAVENOUS at 07:11

## 2020-11-10 NOTE — DISCHARGE INSTRUCTIONS
Start tobradex drops 4 times per day in right eye. Use ointment at night and as needed throughout day if eye feels scratchy. No dirty water in eye for 10 days. Do not use contacts for 2 weeks.

## 2020-11-10 NOTE — TELEPHONE ENCOUNTER
LVM for patient to call back to schedule one month post op       -TD       ----- Message from Victoria Mcclure sent at 11/10/2020 11:16 AM CST -----  Regarding: Appt  Contact: Apurva  Calling to schedule appt.      [phone]

## 2020-11-10 NOTE — ANESTHESIA POSTPROCEDURE EVALUATION
Anesthesia Post Evaluation    Patient: Apurva Rodriguez    Procedure(s) Performed: Procedure(s) (LRB):  STRABISMUS SURGERY WITH ADJUSTABLE SUTURES (Right)    Final Anesthesia Type: general    Patient location during evaluation: PACU  Patient participation: No - Unable to Participate, Coma/Other Inability to Communicate  Level of consciousness: awake and alert  Post-procedure vital signs: reviewed and stable  Pain management: adequate  Airway patency: patent    PONV status at discharge: No PONV  Anesthetic complications: no      Cardiovascular status: blood pressure returned to baseline  Respiratory status: unassisted  Hydration status: euvolemic  Follow-up not needed.          Vitals Value Taken Time   /76 11/10/20 0945   Temp 37 °C (98.6 °F) 11/10/20 0945   Pulse 73 11/10/20 0945   Resp 20 11/10/20 0945   SpO2 99 % 11/10/20 0945         No case tracking events are documented in the log.      Pain/Alissa Score: Presence of Pain: non-verbal indicators absent (11/10/2020  8:35 AM)  Alissa Score: 10 (11/10/2020  9:00 AM)

## 2020-11-10 NOTE — DISCHARGE SUMMARY
Discharge Summary  Ophthalmology Service    Admit Date: 11/10/2020     Discharge Date: 11/10/2020     Attending Physician: Tamy Duarte. MD    Discharged Condition: Good    Reason for Admission: Intermittent esotropia [H50.30]  Esotropia of both eyes [H50.00]     Treatments/Procedures: Procedure(s) (LRB):  STRABISMUS SURGERY WITH ADJUSTABLE SUTURES (Right) (see dictated report for details)    Hospital Course: Stable    Consults: None    Significant Diagnostic Studies: None     Disposition: Home    Patient Instructions:   - Resume same diet as prior to surgery  - No swimming for 1 week, no contact lenses for 2 weeks  - Call MD for severe uncontrolled pain  - Follow-up with ophthalmology as instructed    Patient Instructions:   Discharge Medication List as of 11/10/2020  9:39 AM      CONTINUE these medications which have CHANGED    Details   oxyCODONE-acetaminophen (PERCOCET) 5-325 mg per tablet Take 1 tablet by mouth every 4 (four) hours as needed for Pain., Starting Tue 11/10/2020, Until Fri 11/13/2020, Print         CONTINUE these medications which have NOT CHANGED    Details   amLODIPine (NORVASC) 10 MG tablet Starting Sat 12/1/2018, Historical Med      atorvastatin (LIPITOR) 20 MG tablet Take 20 mg by mouth every evening. , Starting Wed 10/21/2020, Historical Med      azaTHIOprine (IMURAN) 50 mg Tab Take 50 mg by mouth once daily. , Starting Fri 10/23/2020, Historical Med      famotidine (PEPCID) 20 MG tablet TAKE 1 TABLET BY MOUTH EVERY EVENING, Normal      MYRBETRIQ 25 mg Tb24 ER tablet Take 25 mg by mouth nightly. , Starting Tue 9/1/2020, Historical Med      PREMARIN vaginal cream TEO 1 GRAM WITH APPLICATOR OR A DIME -SIZED AMOUNT WITH FINGER INTO VAGINA NIGHTLY FOR 2 WEEKS THEN TWICE WEEKLY THEREAFTER, Historical Med      sulfamethoxazole-trimethoprim 800-160mg (BACTRIM DS) 800-160 mg Tab Starting Sat 12/1/2018, Historical Med      tacrolimus (PROGRAF) 1 MG Cap Take 2 capsules (2 mg total) by mouth every 12  (twelve) hours. Z94.0, Starting Wed 11/7/2018, Normal             Discharge Procedure Orders   No dressing needed

## 2020-11-10 NOTE — H&P
Pre-Operative History & Physical  Ophthalmology      SUBJECTIVE:     History of Present Illness:  Patient is a 55 y.o. female presents with strabismus    MEDICATIONS:   PTA Medications   Medication Sig    amLODIPine (NORVASC) 10 MG tablet     atorvastatin (LIPITOR) 20 MG tablet Take 20 mg by mouth every evening.     azaTHIOprine (IMURAN) 50 mg Tab Take 50 mg by mouth once daily.     famotidine (PEPCID) 20 MG tablet TAKE 1 TABLET BY MOUTH EVERY EVENING    MYRBETRIQ 25 mg Tb24 ER tablet Take 25 mg by mouth nightly.     sulfamethoxazole-trimethoprim 800-160mg (BACTRIM DS) 800-160 mg Tab     tacrolimus (PROGRAF) 1 MG Cap Take 2 capsules (2 mg total) by mouth every 12 (twelve) hours. Z94.0 (Patient taking differently: Take 3 mg by mouth nightly. Z94.0)    carvedilol (COREG) 6.25 MG tablet TAKE 1 TABLET(6.25 MG) BY MOUTH TWICE DAILY    ergocalciferol (ERGOCALCIFEROL) 50,000 unit Cap TK ONE C PO  Q 7 DAYS. WEDNESDAY    mycophenolate (MYFORTIC) 180 MG TbEC Take 1 tablet (180 mg total) by mouth 2 (two) times daily.    oxybutynin (DITROPAN-XL) 5 MG TR24 Take 1 tablet (5 mg total) by mouth once daily. In the PM    PREMARIN vaginal cream TEO 1 GRAM WITH APPLICATOR OR A DIME -SIZED AMOUNT WITH FINGER INTO VAGINA NIGHTLY FOR 2 WEEKS THEN TWICE WEEKLY THEREAFTER       ALLERGIES:   Review of patient's allergies indicates:   Allergen Reactions    Pentamidine Shortness Of Breath    Valium [diazepam] Anxiety    Amphotericin b      Peritonitis reaction in dialysis solution while on peritoneal dialysis    Azelex [azelaic acid] Hives    Cleocin [clindamycin hcl] Hives    Decadron [dexamethasone]      Highly anxious    Morphine Swelling     lips    Reglan [metoclopramide hcl] Other (See Comments)    Vancomycin analogues Itching     Hair areas       PAST MEDICAL HISTORY:   Past Medical History:   Diagnosis Date    Anemia     Avascular necrosis left hip 12/6/2016    S/p replacement Cord decompression right side  "Revision of the left hip: with a "larger" hardware placement     Dyslipidemia 2016    ESRD (end stage renal disease) 2016    She mentioned that the cause of the kidney failure was associated with several birth defects: PKD Bilateral nephrocalcinosis Hyperuricemia Chronic Icthyosis     Essential hypertension 2016    Gastroesophageal reflux disease without esophagitis 2016    Hypertension     S/P bone marrow biopsy 2016    Secondary hyperparathyroidism of renal origin 2016     PAST SURGICAL HISTORY:   Past Surgical History:   Procedure Laterality Date    ABDOMINAL HERNIA REPAIR      bone marrow biospy  2015    CHOLECYSTECTOMY      laparoascopic    COLONOSCOPY      Endometrical abilation hysteroscopy N/A 2005    EYE MUSCLE SURGERY Bilateral     as a baby(2 years)    FRACTURE SURGERY Right     rigt femur :steel karan pl;acement and replacement    KIDNEY TRANSPLANT Right     RLQ transplant,  donor    left hip revision      PARATHYROIDECTOMY      PERITONEAL CATHETER INSERTION      PERITONEAL CATHETER INSERTION      right hip decompression      TONSILLECTOMY      TOTAL HIP ARTHROPLASTY Left      PAST FAMILY HISTORY:   Family History   Problem Relation Age of Onset    No Known Problems Mother     COPD Father     Kidney disease Sister         ESRd a/p 2 kidney transplants, diagniosed with  PKD at age 18 months    Cancer Sister         skin cancer and colon cancer at age 52    Cancer Paternal Grandmother         breast cancer     SOCIAL HISTORY:   Social History     Tobacco Use    Smoking status: Never Smoker    Smokeless tobacco: Never Used   Substance Use Topics    Alcohol use: No    Drug use: No        MENTAL STATUS: Alert    REVIEW OF SYSTEMS: Negative    OBJECTIVE:     Vital Signs (Most Recent)       Physical Exam:  General: NAD  HEENT: Strabismus  Lungs: Adequate respirations  Heart: + pulses  Abdomen: Soft    ASSESSMENT/PLAN:     Patient " is a 55 y.o. female with strabismus     - Risks/benefits/alternatives of the procedure discussed with the patient   - Informed consent obtained prior to surgery and the patient voiced good understanding.   - To OR for surgical correction of strabismus

## 2020-11-10 NOTE — OP NOTE
DATE OF PROCEDURE: 11/10/2020    MRN: [id number]     SURGEON:  Tamy Duarte M.D.     PREOPERATIVE DIAGNOSIS:  Strabismus-alternating esotropia.     POSTOPERATIVE DIAGNOSIS:  Strabismus-alternating esotropia.     PROCEDURE:  Recession, medial rectus, right eyes, 3.5 mm with adjustable suture     COMPLICATIONS:  None.     BLOOD LOSS:  Less than 2 mL.     PROCEDURE IN DETAIL:  The patient was brought to the Operating Suite where general intubation anesthesia was achieved.  Both eyes were prepped and draped in sterile fashion, lid speculum placed in the right eye.  Through an inferior nasal fornix incision, the medial rectus muscle was identified and placed on a muscle hook.  It was cleared of its check ligaments anteriorly and posteriorly and double-armed 6-0 Vicryl suture passed through the muscle belly 1 mm posterior to the insertion.  Locked bites were placed in the middle and upper and lower edge of the muscle.  The muscle was disinserted from the globe and reattached to the sclera at the old insertion site.  A noose suture was placed around the muscle suture and positioned 3.5 mm distally.  The muscle was allowed to retract back this amount.  The conjunctiva was reapproximated with 6-0 plain gut suture and the sutures were buried.  Maxitrol ointment and Betadine solution was placed in the eye.  The patient was brought to the Recovery Room in good condition.

## 2020-11-10 NOTE — PLAN OF CARE
Discharge instructions given and explained to patient with verbalization of understanding all instructions. MD called patient's wife to discuss discharge instructions. Prescription given and explained next time and doses of each medication. Patients v/s stable, denies n/v and tolerating po, rates pain level tolerable, IV removed, and friend notified friend is ready for discharge home. Patient verified all belongings in patient's possession.

## 2020-11-10 NOTE — ANESTHESIA PROCEDURE NOTES
Intubation  Performed by: Emily Han CRNA  Authorized by: Jessee Everett MD     Intubation:     Induction:  Intravenous    Intubated:  Postinduction    Mask Ventilation:  Easy mask    Attempts:  1    Attempted By:  CRNA    Difficult Airway Encountered?: No      Complications:  None    Airway Device:  Supraglottic airway/LMA    Airway Device Size:  3.5    Secured at:  The lips    Placement Verified By:  Capnometry    Complicating Factors:  None    Findings Post-Intubation:  BS equal bilateral and atraumatic/condition of teeth unchanged

## 2020-11-11 ENCOUNTER — PATIENT MESSAGE (OUTPATIENT)
Dept: OPHTHALMOLOGY | Facility: CLINIC | Age: 55
End: 2020-11-11

## 2020-11-16 ENCOUNTER — PATIENT MESSAGE (OUTPATIENT)
Dept: OPHTHALMOLOGY | Facility: CLINIC | Age: 55
End: 2020-11-16

## 2020-12-02 ENCOUNTER — OFFICE VISIT (OUTPATIENT)
Dept: OPHTHALMOLOGY | Facility: CLINIC | Age: 55
End: 2020-12-02
Payer: COMMERCIAL

## 2020-12-02 ENCOUNTER — PATIENT MESSAGE (OUTPATIENT)
Dept: OPHTHALMOLOGY | Facility: CLINIC | Age: 55
End: 2020-12-02

## 2020-12-02 DIAGNOSIS — Z98.890 POST-OPERATIVE STATE: Primary | ICD-10-CM

## 2020-12-02 PROCEDURE — 99024 POSTOP FOLLOW-UP VISIT: CPT | Mod: POP,,, | Performed by: STUDENT IN AN ORGANIZED HEALTH CARE EDUCATION/TRAINING PROGRAM

## 2020-12-02 PROCEDURE — 99999 PR PBB SHADOW E&M-EST. PATIENT-LVL II: ICD-10-PCS | Mod: PBBFAC,,, | Performed by: STUDENT IN AN ORGANIZED HEALTH CARE EDUCATION/TRAINING PROGRAM

## 2020-12-02 PROCEDURE — 99212 OFFICE O/P EST SF 10 MIN: CPT | Mod: PBBFAC | Performed by: STUDENT IN AN ORGANIZED HEALTH CARE EDUCATION/TRAINING PROGRAM

## 2020-12-02 PROCEDURE — 99999 PR PBB SHADOW E&M-EST. PATIENT-LVL II: CPT | Mod: PBBFAC,,, | Performed by: STUDENT IN AN ORGANIZED HEALTH CARE EDUCATION/TRAINING PROGRAM

## 2020-12-02 PROCEDURE — 99024 PR POST-OP FOLLOW-UP VISIT: ICD-10-PCS | Mod: POP,,, | Performed by: STUDENT IN AN ORGANIZED HEALTH CARE EDUCATION/TRAINING PROGRAM

## 2020-12-02 NOTE — PROGRESS NOTES
HPI     Patient is 55 y.o. female here for 1 mo po. Pt states that she has not   noticed any turning or crossing of the eyes and is very happy following   surgery. Having some pain inferior to the orbit of the eye that progresses   to a HA later in the day some evenings.     11/10/2020 Strabismus surgery (Right)          Last edited by Tamy Duarte MD on 12/2/2020  2:27 PM. (History)            Assessment /Plan     For exam results, see Encounter Report.    Post-operative state      Discussed findings with patient today.   Patient is now fusing following surgery at both distance and near.   Stereo has also improved as well.   Wound is healing nicely, no signs of infection   Patient is ortho in all gazes with good movement.     Given still with some pain RTC 3 weeks     This service was scribed by Carolann Rao for and in the presence of Dr. Duarte who personally performed this service.    Carolann Rao COA    Tamy Duarte MD

## 2020-12-14 ENCOUNTER — PATIENT MESSAGE (OUTPATIENT)
Dept: OPHTHALMOLOGY | Facility: CLINIC | Age: 55
End: 2020-12-14

## 2020-12-25 ENCOUNTER — PATIENT MESSAGE (OUTPATIENT)
Dept: OPHTHALMOLOGY | Facility: CLINIC | Age: 55
End: 2020-12-25

## 2020-12-28 ENCOUNTER — OFFICE VISIT (OUTPATIENT)
Dept: OPHTHALMOLOGY | Facility: CLINIC | Age: 55
End: 2020-12-28
Payer: MEDICARE

## 2020-12-28 DIAGNOSIS — Z98.890 POST-OPERATIVE STATE: Primary | ICD-10-CM

## 2020-12-28 PROCEDURE — 99999 PR PBB SHADOW E&M-EST. PATIENT-LVL II: CPT | Mod: PBBFAC,,, | Performed by: STUDENT IN AN ORGANIZED HEALTH CARE EDUCATION/TRAINING PROGRAM

## 2020-12-28 PROCEDURE — 99024 POSTOP FOLLOW-UP VISIT: CPT | Mod: POP,,, | Performed by: STUDENT IN AN ORGANIZED HEALTH CARE EDUCATION/TRAINING PROGRAM

## 2020-12-28 PROCEDURE — 99999 PR PBB SHADOW E&M-EST. PATIENT-LVL II: ICD-10-PCS | Mod: PBBFAC,,, | Performed by: STUDENT IN AN ORGANIZED HEALTH CARE EDUCATION/TRAINING PROGRAM

## 2020-12-28 PROCEDURE — 99212 OFFICE O/P EST SF 10 MIN: CPT | Mod: PBBFAC | Performed by: STUDENT IN AN ORGANIZED HEALTH CARE EDUCATION/TRAINING PROGRAM

## 2020-12-28 PROCEDURE — 99024 PR POST-OP FOLLOW-UP VISIT: ICD-10-PCS | Mod: POP,,, | Performed by: STUDENT IN AN ORGANIZED HEALTH CARE EDUCATION/TRAINING PROGRAM

## 2020-12-28 NOTE — PROGRESS NOTES
HPI     Pt presents for post op appointment today. Pt states vision is doing   well, no double vision. Pt states she is also no longer having any pain.     No ocular complaints.         Last edited by Carolann Rao on 12/28/2020  1:45 PM. (History)            Assessment /Plan     For exam results, see Encounter Report.    Post-operative state      Discussed findings with patient today   Eyes have healed nicely, stitches have absorbed.    No irritation or inflammation noted today.  No deviation noted, good movement. Good surgical result      RTC PRN     This service was scribed by Carolann Rao for and in the presence of Dr. Duarte who personally performed this service.    Carolann Rao COA    Tamy Duarte MD

## 2021-01-05 ENCOUNTER — PATIENT MESSAGE (OUTPATIENT)
Dept: ADMINISTRATIVE | Facility: OTHER | Age: 56
End: 2021-01-05

## 2022-05-11 ENCOUNTER — PATIENT MESSAGE (OUTPATIENT)
Dept: RESEARCH | Facility: CLINIC | Age: 57
End: 2022-05-11
Payer: COMMERCIAL

## 2023-02-06 NOTE — CONSULTS
Spoke with patient. R/S to 2/17 @ 1:30pm, 12:30pm arrival. Advised she will receive a call if anything needs to be changed. Kiera peters. TSU RN able to obtain PIV prior to [facility] arrival to bedside.

## 2024-03-08 NOTE — PLAN OF CARE
Patient: Apurva Rodriguez   [facility] #: [id number]   Diagnosis:   Encounter Diagnosis   Name Primary?    Mixed stress and urge urinary incontinence       Date of start of care: 2/28/18  Date of treatment: 10/08/2018   Time in: 9:00  Time out: 9:59  Total treatment time: 59 minutes  # Visits: 12 (20 auth)  Auth expiration: 12/31/18  POC expiration: 11/31/18  Outpatient Physical Therapy   Updated POC    Subjective     Pt reports she had a frustrating accident after attending a play (this occurred around 10 pm). She went to another play and it went much better, no accidents, that took place in the afternoon.     Pain: Current: 0/10    Patient's Goals: to be able to stand through a 3 hour presentation without having to use the restroom    Objective     TREATMENT      Pt received individual therapeutic exercise control for 59 minutes including:    - Pt education regarding leva, insurance updates regarding obtaining a home unit  - PFM activation with DB in supine; pt continues with good return demonstration, pt displayed some use of overflow today and slightly strained breathing; she was instructed to modify her breath to create a gentle, easeful exhale and quality of PFM activation improved  - Quick flicks, pt uses overflow with less PFM activation       Not performed:  - 10 x 5'' kegals in supine with SEMG assist, vaginal sensor; slightly elevated resting baseline, good-fair WR rise, good holding, complete derecruitment (normalized baseline with pillows under knees, instructions to release arms down by sides)  - 10 x 10'' kegals, supine; good holding, 2+/5 MMT (pt with small lift inconsistently), increased use of overflow mm (gluts) with increasing reps  - Seated ball squeeze 10 x 10''  - Roll out with RTB 10 x 10''  - Ant/post pelvic tilts on SB  - TA activation on SB x 10  - Marching on SB x 10 ea, unilateral UE support  - Side lying hip abduction x 10 ea  - Supine SLR x 10 ea with PFM/TA activation  - Prone hip extension  with glut set x 10 ea  - Prone knee lift x 5 ea  - Quadruped rocking (pt limited to work in quadruped due to R knee pain and arthritis in her hands)  - Cat/cow  - Supine HS stretch with strap x 2 ea side  - Supine hip flexor stretch with yoga deb x 2 ea side    Education: Discussed progression of plan of care with patient; educated pt in activity modification; pt instructed to continue with diaphragmatic breathing, TA activation with DB, PFM activation with DB, sit to stand squat, legs up the wall, supine march, side lying hip abduction, supine bridge, 10'' kegals (supine, seated, supported standing), quick flicks, adductor squeeze and roll out; pt verbalized understanding.    Assessment     Pt tolerated session well without visible adverse effects. Pt continues with good compliance, return of exercises and maintenance of current PFM strength. She does not display improvement in strength at today's session and continues to present with UI. Pt also noted to have more difficulty today with improvement with verbal cueing. Pt continues to present with significant PFM weakness, endurance, and decreased central stability as well as urinary incontinence that has not resolved after > 6 months of treatment with good compliance with her HEP. Pt will benefit from a home unit, recommending leva due to pt's failed improvement in UI with PT.  Pt presents with history of two kidney transplants, increased UI following her recent surgery,  history of constipation, TIGIST, and multiple abdominal hernia repairs. Pt will continue to benefit from physcial therapy services in order to maximize pain free functional independence.     Pt is making good progress towards established goals.   No educational, cultural, or spiritual barriers to learning identified.    GOALS  Short Term Goals: 4 weeks (3/28/18)  1. Pt will verbalize improved awareness of PFM activity as palpated by PT in order to improve activity involvement with HEP. Met  3/26/18  2. Pt will demonstrate decreased overflow muscle activity during PF muscle contraction. Met 5/14/18  3. Pt will display integration or respiratory and pelvic diaphragm in order to improve inner core stability and support. Met 3/26/18  4. Pt will report incorporation of voiding schedule into daily routine to manage frequency, incontinence etc. Met 5/14/18  5. Pt will tolerate HEP to improve impairments and independence with ADL's. Met 3/26/18     Long Term Goals: continue through 11/31/18  1. Pt will be able to lift, drop, and bear down in order to feel improved excursion of her PFM to improve function. Met 5/14/18  2. Pt will be able to perform 5 x 5'' kegals in order to improve PFM strength to decrease urinary frequency. Met 5/14/18  3. Pt will report improvement in holding ability.   4. Pt will be independent with HEP and self management.     CMS Impairment/Limitation/Restriction for Urinary Problem Survey  Status Limitation G-Code CMS Severity Modifier  Intake 49% 51%  Predicted 58% 42% Goal Status+ CK - At least 40 percent but less than 60 percent  4/2/2018 53% 47%  5/14/2018 54% 46%  8/20/2018 56% 44%  10/8/2018 51% 49% Current Status CK - At least 40 percent but less than 60 percent    Plan     Continue with established POC, working toward PT goals.      Statement Selected

## (undated) DEVICE — SUT SILK 2-0 STRANDS 30IN

## (undated) DEVICE — STAPLER SKIN PROXIMATE WIDE

## (undated) DEVICE — FOLEY BLLN 20FR 3WAY 5CC

## (undated) DEVICE — STOCKINETTE 2INX36

## (undated) DEVICE — DRAPE SLUSH WARMER WITH DISC

## (undated) DEVICE — SYR ONLY LUER LOCK 20CC

## (undated) DEVICE — HEMOSTAT SURGICEL NU-KNIT 6X9

## (undated) DEVICE — SUT SILK 3-0 STRANDS 30IN

## (undated) DEVICE — SUT VICRYL 3-0 27 SH

## (undated) DEVICE — CLIPPER BLADE MOD 4406 (CAREF)

## (undated) DEVICE — SUT 3-0 12-18IN SILK

## (undated) DEVICE — SUT 6/0 18IN PLAIN GUT D/A

## (undated) DEVICE — SET IRR URLGY 2LINE UNIV SPIKE

## (undated) DEVICE — SUT ETHILON 3-0 PS2 18 BLK

## (undated) DEVICE — SUT PDS BV 6-0

## (undated) DEVICE — DRESSING ABSRBNT ISLAND 3.6X8

## (undated) DEVICE — SUT 2-0 12-18IN SILK

## (undated) DEVICE — SUT 4-0 12-18IN SILK BLACK

## (undated) DEVICE — SUT PROLENE 5-0 36IN C-1

## (undated) DEVICE — SUT 5/0 30IN PDS II VIO MO

## (undated) DEVICE — SUT 5/0 27IN CHROMIC GUT B

## (undated) DEVICE — DRAPE OPTHALMIC W/POUCH

## (undated) DEVICE — ELECTRODE REM PLYHSV RETURN 9

## (undated) DEVICE — SUT PROLENE 6-0 BV-1 30IN

## (undated) DEVICE — SEE MEDLINE ITEM 146417

## (undated) DEVICE — TRAY FOLEY 16FR INFECTION CONT

## (undated) DEVICE — SET DECANTER MEDICHOICE

## (undated) DEVICE — SUT 1 36IN PDS II VIO MONO

## (undated) DEVICE — TOWEL OR XRAY WHITE 17X26IN

## (undated) DEVICE — PLUG CATHETER STERILE FOLEY

## (undated) DEVICE — SOL NS 1000CC

## (undated) DEVICE — SEE MEDLINE ITEM 156900